# Patient Record
Sex: FEMALE | Race: BLACK OR AFRICAN AMERICAN | NOT HISPANIC OR LATINO | Employment: FULL TIME | URBAN - METROPOLITAN AREA
[De-identification: names, ages, dates, MRNs, and addresses within clinical notes are randomized per-mention and may not be internally consistent; named-entity substitution may affect disease eponyms.]

---

## 2017-01-04 ENCOUNTER — INFUSION (OUTPATIENT)
Dept: INFUSION THERAPY | Facility: HOSPITAL | Age: 62
End: 2017-01-04
Attending: SURGERY
Payer: MEDICAID

## 2017-01-04 DIAGNOSIS — C7A.00 MALIGNANT CARCINOID TUMOR OF UNKNOWN PRIMARY SITE: Primary | ICD-10-CM

## 2017-01-04 DIAGNOSIS — C7B.02 METASTATIC MALIGNANT CARCINOID TUMOR TO LIVER: ICD-10-CM

## 2017-01-04 PROCEDURE — 96372 THER/PROPH/DIAG INJ SC/IM: CPT

## 2017-01-04 PROCEDURE — 63600175 PHARM REV CODE 636 W HCPCS: Performed by: SURGERY

## 2017-01-04 RX ORDER — LANREOTIDE ACETATE 120 MG/.5ML
120 INJECTION SUBCUTANEOUS
Status: DISCONTINUED | OUTPATIENT
Start: 2017-01-04 | End: 2017-01-04 | Stop reason: HOSPADM

## 2017-01-04 RX ORDER — LANREOTIDE ACETATE 120 MG/.5ML
120 INJECTION SUBCUTANEOUS
Status: CANCELLED | OUTPATIENT
Start: 2017-01-04

## 2017-01-04 RX ADMIN — LANREOTIDE ACETATE 120 MG: 120 INJECTION SUBCUTANEOUS at 11:01

## 2017-01-20 ENCOUNTER — OFFICE VISIT (OUTPATIENT)
Dept: NEUROLOGY | Facility: HOSPITAL | Age: 62
End: 2017-01-20
Attending: SURGERY
Payer: MEDICAID

## 2017-01-20 VITALS
SYSTOLIC BLOOD PRESSURE: 129 MMHG | WEIGHT: 135 LBS | DIASTOLIC BLOOD PRESSURE: 83 MMHG | BODY MASS INDEX: 23.05 KG/M2 | HEIGHT: 64 IN | TEMPERATURE: 98 F | HEART RATE: 80 BPM

## 2017-01-20 DIAGNOSIS — C7B.02 METASTATIC MALIGNANT CARCINOID TUMOR TO LIVER: Primary | ICD-10-CM

## 2017-01-20 DIAGNOSIS — C7A.00 MALIGNANT CARCINOID TUMOR OF UNKNOWN PRIMARY SITE: ICD-10-CM

## 2017-01-20 PROCEDURE — 99214 OFFICE O/P EST MOD 30 MIN: CPT | Performed by: SURGERY

## 2017-01-20 NOTE — PROGRESS NOTES
"NOLANETS:  [facility]  A collaboration between [facility] and [facility]      PATIENT: Lena Brantley  MRN: [id number]  DATE: 1/20/2017    Subjective:      Chief Complaint: referred by EW for possible OR. Gastrinoma.  unk primary      Vitals:   Vitals:    01/20/17 1110   BP: 129/83   Pulse: 80   Temp: 98.1 °F (36.7 °C)   TempSrc: Oral   Weight: 61.2 kg (135 lb)   Height: 5' 4" (1.626 m)        Karnofsky Score:     Diagnosis:   1. 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         Oncologic History: newly diagnoses gastrinoma. KI 67 1%.  Liver met segment 4                                    hypercalcemia  Interval History:   C/o diarrhea, abd pain, leg cramps, multiple trips to ER.    Past Medical History:  Past Medical History   Diagnosis Date    Liver mass 06/2016    Mass of colon 06/2016       Past Surgical History:  History reviewed. No pertinent past surgical history.    Family History:  History reviewed. No pertinent family history.    Allergies:  Review of patient's allergies indicates:   Allergen Reactions    Epinephrine Anaphylaxis     Can Cause Carcinoid Crisis       Medications:  Current Outpatient Prescriptions   Medication Sig Dispense Refill    diphenoxylate-atropine 2.5-0.025 mg (LOMOTIL) 2.5-0.025 mg per tablet Take 1 tablet by mouth 4 (four) times daily as needed for Diarrhea.      lanreotide (SOMATULINE DEPOT) 120 mg/0.5 mL Syrg Inject 120 mg into the skin every 28 days.      ondansetron (ZOFRAN-ODT) 4 MG TbDL Take 1 tablet (4 mg total) by mouth every 8 (eight) hours as needed. 12 tablet 0    promethazine (PHENERGAN) 25 MG suppository Place 1 suppository (25 mg total) rectally every 6 (six) hours as needed for Nausea. 10 suppository 0    PROTONIX 40 mg tablet Take 40 mg by mouth once daily.   1     No current facility-administered medications for this visit.        Review of Systems "   Constitutional: Negative.  Negative for appetite change, chills, fatigue, fever and unexpected weight change.   HENT: Negative.  Negative for ear discharge, hearing loss and tinnitus.    Eyes: Negative.  Negative for photophobia and visual disturbance.   Respiratory: Negative.  Negative for apnea, cough, shortness of breath, wheezing and stridor.    Cardiovascular: Negative.  Negative for chest pain, palpitations and leg swelling.   Gastrointestinal: Negative for abdominal distention, abdominal pain, constipation, diarrhea, nausea and vomiting.   Endocrine: Negative.    Genitourinary: Negative.  Negative for difficulty urinating and hematuria.   Musculoskeletal: Negative for arthralgias, back pain, gait problem, joint swelling and neck pain.        Leg cramps   Skin: Negative.  Negative for color change, pallor and rash.   Allergic/Immunologic: Negative.  Negative for environmental allergies, food allergies and immunocompromised state.   Neurological: Negative.  Negative for dizziness, seizures, syncope and weakness.   Hematological: Negative.  Negative for adenopathy. Does not bruise/bleed easily.   Psychiatric/Behavioral: Negative.  Negative for behavioral problems, dysphoric mood and hallucinations.      Objective:      Physical Exam   Constitutional: She is oriented to person, place, and time. She appears well-developed and well-nourished. No distress.   HENT:   Head: Normocephalic and atraumatic.   Mouth/Throat: Oropharynx is clear and moist.   Eyes: EOM are normal. Pupils are equal, round, and reactive to light. No scleral icterus.   Neck: Normal range of motion. Neck supple. No thyromegaly present.   Cardiovascular: Normal rate, regular rhythm, normal heart sounds and intact distal pulses.  Exam reveals no gallop.    No murmur heard.  Pulmonary/Chest: Effort normal and breath sounds normal. No respiratory distress. She has no wheezes. She has no rales.   Abdominal: Soft. Bowel sounds are normal. She  exhibits no distension and no mass. There is no tenderness. There is no rebound and no guarding. No hernia. Hernia confirmed negative in the ventral area.   Musculoskeletal: Normal range of motion. She exhibits no edema or tenderness.   Lymphadenopathy:        Head (right side): No submandibular adenopathy present.        Head (left side): No submandibular adenopathy present.     She has no cervical adenopathy.        Right cervical: No superficial cervical adenopathy present.       Left cervical: No superficial cervical adenopathy present.        Right axillary: No pectoral and no lateral adenopathy present.        Left axillary: No pectoral and no lateral adenopathy present.       Right: No inguinal and no supraclavicular adenopathy present.        Left: No inguinal and no supraclavicular adenopathy present.   Neurological: She is alert and oriented to person, place, and time. She has normal reflexes. No cranial nerve deficit.   Skin: Skin is warm, dry and intact. No rash noted. She is not diaphoretic. No erythema. No pallor.   Psychiatric: She has a normal mood and affect. Her behavior is normal. Thought content normal.   Nursing note and vitals reviewed.     Assessment:       1. 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        EUS:  No tumor found in panc, stomach or duodenum.      Neuroendocrine Labs Latest Ref Rng 7/27/2016   EXT 5 HIAA BLOOD 0 - 22 ng/ml 11   EXT GASTRIN 0 - 100 pg/ml 10418 (A)   EXT SEROTONIN 56 - 244 ng/ml 216   EXT CHROMOGRANIN A 0 - 15 ng/ml 585 (A)   EXT PANCREASTATIN THAIS 10 - 135 pg/ml 695 (A)   EXT ANTI PARIETAL CELL AB Negative Negative   EXT ANTI THYROID AB <9 <1   EXT ANTI ISLET CELL AB Negative Negative   EXT FOLATE <3.4 - >5.4 ng/ml 15.6   EXT VITAMIN B12 200 - 1100 pg/ml 1319 (A)     Laboratory Data:    Neuroendocrine Labs Latest Ref Rng & Units 1/20/2017 11/8/2016 11/8/2016 11/2/2016 10/25/2016 10/25/2016 9/8/2016   EXT 5 HIAA BLOOD 0 - 22 ng/ml - - - - - - -   EXT GASTRIN 0  - 100 pg/ml - - - - - - -   EXT SEROTONIN 56 - 244 ng/ml - - - - - - -   EXT CHROMOGRANIN A 0 - 15 ng/ml - - - - - - -   EXT PANCREASTATIN THAIS 10 - 135 pg/ml - - - - - - -   EXT ANTI PARIETAL CELL AB Negative - - - - - - -   EXT ANTI THYROID AB <9 - - - - - - -   EXT ANTI ISLET CELL AB Negative - - - - - - -   EXT FOLATE <3.4 - >5.4 ng/ml - - - - - - -   EXT VITAMIN B12 200 - 1100 pg/ml - - - - - - -   EXT CALCITONIN 0 - 5 pg/ml - <2 - - - - -   EXT PTH, INTACT 14 - 64 pg/ml - 58 - - - - -   WBC 3.90 - 12.70 K/uL - - - - 7.98 - -   HGB 12.0 - 16.0 g/dL - - - - 13.1 - -   HCT 37.0 - 48.5 % - - - - 40.7 - -   PLATLETS 150 - 350 K/uL - - - - 414(H) - -   PT 9.0 - 12.5 sec - - - - - - 11.4   PTT 21.0 - 32.0 sec - - - - - - 29.2   INR 0.8 - 1.2 - - - - - - 1.1   GLUCOSE 70 - 110 mg/dL - - - - 144(H) - -   BUN 6 - 20 mg/dL - - - - 16 - -   CREATININE 0.5 - 1.4 mg/dL - - - - 1.2 - -    - 145 mmol/L - - - - 140 - -   K 3.5 - 5.1 mmol/L - - - - 3.5 - -   CHLORIDE 95 - 110 mmol/L - - - - 95 - -   CO2 23 - 29 mmol/L - - - - 29 - -   CALCIUM 8.7 - 10.5 mg/dL - - - - 11.0(H) - -   EXT CALCIUM 8.6 - 10.4 mg/dl - 9.5 - - - - -   PROTEIN, TOTAL 6.0 - 8.4 g/dL - - - - 8.4 - -   ALBUMIN 3.5 - 5.2 g/dL - - - - 4.4 - -   TOTAL BILIRUBIN 0.1 - 1.0 mg/dL - - - - 1.2(H) - -   ALK PHOSPHATASE 55 - 135 U/L - - - - 109 - -   SGOT (AST) 10 - 40 U/L - - - - 19 - -   SGPT (ALT) 10 - 44 U/L - - - - 13 - -   Weight - 135 lbs - 123 lbs 128 lbs - 130 lbs -             Impression: Gastrinoma, metastatic to liver.    possible MEN.  patient unsure about OR, j  Plan:       Long D/W patient , pt has unrealistic fear of surgery @ this time wants to wait until May 2017.  Needs restage if she wants to postpone OR  3 phase CT scan, chest/abd/pelvis  MRI  Calcium/calcitonin/PTH levels, gastrin, CGA, pancreastatin,   Repeat EUS esophagus/stomach/duodenum/pancreas  Gallium 68 scan    lanreotide.120 Q month   RTC 1 month      > sly Gomez  MD Venkatesh, FACS  Professor of Surgery, [facility]  Neuroendocrine Surgery, Hepatic/Pancreatic & General Surgery  [address]  [place]  [address]  ph. [phone]; [phone]  fax. [phone]

## 2017-01-20 NOTE — MR AVS SNAPSHOT
[facility]  [address] [place] [address]  Phone: [phone]  Fax: [phone]                  Lena Calderon Early   2017 11:00 AM   Office Visit    Description:  Female : 1955   Provider:  JANES Riddle MD   Department:  [facility]           Reason for Visit     Follow-up           Diagnoses this Visit        Comments    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    -  Primary     Malignant carcinoid tumor of unknown primary site                To Do List           Future Appointments        Provider Department Dept Phone    2017 11:00 AM CHAIR 07 [facility] [phone]      Goals (5 Years of Data)     None      [facility]     [facility] Nurse Care Line -  Assistance  Registered nurses in the [facility] provide clinical advisement, health education, appointment booking, and other advisory services.  Call for this free service at [phone].             Medications                Verify that the below list of medications is an accurate representation of the medications you are currently taking.  If none reported, the list may be blank. If incorrect, please contact your healthcare provider. Carry this list with you in case of emergency.           Current Medications     diphenoxylate-atropine 2.5-0.025 mg (LOMOTIL) 2.5-0.025 mg per tablet Take 1 tablet by mouth 4 (four) times daily as needed for Diarrhea.    lanreotide (SOMATULINE DEPOT) 120 mg/0.5 mL Syrg Inject 120 mg into the skin every 28 days.    ondansetron (ZOFRAN-ODT) 4 MG TbDL Take 1 tablet (4 mg total) by mouth every 8 (eight) hours as needed.    promethazine (PHENERGAN) 25 MG suppository Place 1 suppository (25 mg total) rectally every 6 (six) hours as needed for Nausea.    PROTONIX 40 mg tablet Take 40 mg by mouth once daily.            Clinical Reference Information           Vital Signs - Last Recorded  Most recent  "update: 1/20/2017 11:18 AM by Ivonne Funez MA    BP Pulse Temp Ht Wt BMI    129/83 80 98.1 °F (36.7 °C) (Oral) 5' 4" (1.626 m) 61.2 kg (135 lb) 23.17 kg/m2      Blood Pressure          Most Recent Value    BP  129/83      Allergies as of 1/20/2017     Epinephrine      Immunizations Administered on Date of Encounter - 1/20/2017     None      Orders Placed During Today's Visit     Future Labs/Procedures Expected by Expires    CT Abdomen Pelvis W Wo Contrast  1/20/2017 1/20/2018    MRI Abdomen W WO Contrast  1/20/2017 1/20/2018    NM Gallium GA68 Positron Scan  1/20/2017 1/20/2018      [facility] Sign-Up     Activating your [facility] account is as easy as 1-2-3!     1) Visit [email or web address], select Sign Up Now, enter this activation code and your date of birth, then select Next.  [email or web address]  Expires: 3/6/2017 12:12 PM      2) Create a username and password to use when you visit [facility] in the future and select a security question in case you lose your password and select Next.    3) Enter your e-mail address and click Sign Up!    Additional Information  If you have questions, please e-mail [email or web address] or call [phone] to talk to our [facility] staff. Remember, [facility] is NOT to be used for urgent needs. For medical emergencies, dial 911.         Instructions    Galium 68 scan 2/17/17 at 7:30am    CT/MRI call [phone] to schedule              "

## 2017-01-26 LAB
EXT 24 HR UR METANEPHRINE: ABNORMAL
EXT 24 HR UR NORMETANEPHRINE: ABNORMAL
EXT 24 HR UR NORMETANEPHRINE: ABNORMAL
EXT 25 HYDROXY VIT D2: ABNORMAL
EXT 25 HYDROXY VIT D3: ABNORMAL
EXT 5 HIAA 24 HR URINE: ABNORMAL
EXT 5 HIAA BLOOD: 7 NG/ML (ref 0–22)
EXT ACTH: ABNORMAL
EXT AFP: ABNORMAL
EXT ALBUMIN: 4.1 G/DL (ref 3.6–5.1)
EXT ALKALINE PHOSPHATASE: 107 U/L (ref 33–130)
EXT ALT: 12 U/L (ref 6–29)
EXT AMYLASE: ABNORMAL
EXT ANTI ISLET CELL AB: ABNORMAL
EXT ANTI PARIETAL CELL AB: ABNORMAL
EXT ANTI THYROID AB: ABNORMAL
EXT AST: 18 U/L (ref 10–35)
EXT BILIRUBIN DIRECT: ABNORMAL
EXT BILIRUBIN TOTAL: 1.4 MG/DL (ref 0.2–1.2)
EXT BK VIRUS DNA QN PCR: ABNORMAL
EXT BUN: 13 MG/DL (ref 7–25)
EXT C PEPTIDE: ABNORMAL
EXT CA 125: ABNORMAL
EXT CA 19-9: ABNORMAL
EXT CA 27-29: ABNORMAL
EXT CALCITONIN: ABNORMAL
EXT CALCIUM: 9.6 MG/DL (ref 8.6–10.4)
EXT CEA: ABNORMAL
EXT CHLORIDE: 100 MMOL/L (ref 98–110)
EXT CHOLESTEROL: ABNORMAL
EXT CHROMOGRANIN A: ABNORMAL
EXT CO2: 32 MMOL/L (ref 20–31)
EXT CREATININE UA: ABNORMAL
EXT CREATININE: 0.87 MG/DL (ref 0.5–1.05)
EXT CYCLOSPORONE LEVEL: ABNORMAL
EXT DOPAMINE: ABNORMAL
EXT EBV DNA BY PCR: ABNORMAL
EXT EPINEPHRINE: ABNORMAL
EXT FOLATE: ABNORMAL
EXT FREE T3: ABNORMAL
EXT FREE T4: ABNORMAL
EXT FSH: ABNORMAL
EXT GASTRIN RELEASING PEPTIDE: ABNORMAL
EXT GASTRIN RELEASING PEPTIDE: ABNORMAL
EXT GASTRIN: 81 PG/ML (ref 0–100)
EXT GGT: ABNORMAL
EXT GHRELIN: ABNORMAL
EXT GLUCAGON: ABNORMAL
EXT GLUCOSE: 99 MG/DL (ref 65–99)
EXT GROWTH HORMONE: ABNORMAL
EXT HCV RNA QUANT PCR: ABNORMAL
EXT HDL: ABNORMAL
EXT HEMATOCRIT: 34.2 % (ref 35–45)
EXT HEMOGLOBIN A1C: ABNORMAL
EXT HEMOGLOBIN: 10.7 G/DL (ref 11.7–15.5)
EXT HISTAMINE 24 HR URINE: ABNORMAL
EXT HISTAMINE: ABNORMAL
EXT IGF-1: ABNORMAL
EXT IMMUNKNOW (NON-STIMULATED): ABNORMAL
EXT IMMUNKNOW (STIMULATED): ABNORMAL
EXT INR: 1.1 (ref 0.9–1.1)
EXT INSULIN: ABNORMAL
EXT LANREOTIDE LEVEL: ABNORMAL
EXT LDH, TOTAL: ABNORMAL
EXT LDL CHOLESTEROL: ABNORMAL
EXT LIPASE: ABNORMAL
EXT MAGNESIUM: ABNORMAL
EXT METANEPHRINE FREE PLASMA: ABNORMAL
EXT MOTILIN: ABNORMAL
EXT NEUROKININ A CAMB: ABNORMAL
EXT NEUROKININ A ISI: ABNORMAL
EXT NEUROTENSIN: ABNORMAL
EXT NOREPINEPHRINE: ABNORMAL
EXT NORMETANEPHRINE: ABNORMAL
EXT NSE: ABNORMAL
EXT OCTREOTIDE LEVEL: ABNORMAL
EXT PANCREASTATIN CAMB: ABNORMAL
EXT PANCREASTATIN ISI: 59 PG/ML (ref 10–135)
EXT PANCREATIC POLYPEPTIDE: ABNORMAL
EXT PHOSPHORUS: ABNORMAL
EXT PLATELETS: 217 1000/UL (ref 140–400)
EXT POTASSIUM: 4.1 MMOL/L (ref 3.5–5.3)
EXT PROGRAF LEVEL: ABNORMAL
EXT PROLACTIN: ABNORMAL
EXT PROTEIN TOTAL: 7.2 G/DL (ref 6.1–8.1)
EXT PROTEIN UA: ABNORMAL
EXT PT: 10.8 SEC (ref 9–11.35)
EXT PTH, INTACT: 67 PG/ML (ref 14–64)
EXT PTT: ABNORMAL
EXT RAPAMUNE LEVEL: ABNORMAL
EXT SEROTONIN: 97 NG/ML (ref 56–244)
EXT SODIUM: 137 MMOL/L (ref 135–146)
EXT SOMATOSTATIN: ABNORMAL
EXT SUBSTANCE P: ABNORMAL
EXT TRIGLYCERIDES: ABNORMAL
EXT TRYPTASE: ABNORMAL
EXT TSH: ABNORMAL
EXT URIC ACID: ABNORMAL
EXT URINE AMYLASE U/HR: ABNORMAL
EXT URINE AMYLASE U/L: ABNORMAL
EXT VASOACTIVE INTESTINAL POLYPEPTIDE: ABNORMAL
EXT VITAMIN B12: ABNORMAL
EXT VMA 24 HR URINE: ABNORMAL
EXT WBC: 3.2 1000/UL (ref 3.8–10.8)
NEURON SPECIFIC ENOLASE: ABNORMAL

## 2017-02-01 ENCOUNTER — INFUSION (OUTPATIENT)
Dept: INFUSION THERAPY | Facility: HOSPITAL | Age: 62
End: 2017-02-01
Attending: SURGERY
Payer: MEDICAID

## 2017-02-01 DIAGNOSIS — C7B.02 METASTATIC MALIGNANT CARCINOID TUMOR TO LIVER: ICD-10-CM

## 2017-02-01 DIAGNOSIS — C7A.00 MALIGNANT CARCINOID TUMOR OF UNKNOWN PRIMARY SITE: Primary | ICD-10-CM

## 2017-02-01 PROCEDURE — 63600175 PHARM REV CODE 636 W HCPCS: Performed by: SURGERY

## 2017-02-01 PROCEDURE — 96372 THER/PROPH/DIAG INJ SC/IM: CPT

## 2017-02-01 RX ORDER — LANREOTIDE ACETATE 120 MG/.5ML
120 INJECTION SUBCUTANEOUS
Status: CANCELLED | OUTPATIENT
Start: 2017-02-01

## 2017-02-01 RX ORDER — LANREOTIDE ACETATE 120 MG/.5ML
120 INJECTION SUBCUTANEOUS
Status: DISCONTINUED | OUTPATIENT
Start: 2017-02-01 | End: 2017-02-01 | Stop reason: HOSPADM

## 2017-02-01 RX ADMIN — LANREOTIDE ACETATE 120 MG: 120 INJECTION SUBCUTANEOUS at 11:02

## 2017-02-01 NOTE — NURSING
Injection given to left glut without difficulty after one attempt.  Pt verbalized some pain with injection.  Return appointment made.

## 2017-02-24 ENCOUNTER — HOSPITAL ENCOUNTER (OUTPATIENT)
Dept: RADIOLOGY | Facility: HOSPITAL | Age: 62
Discharge: HOME | End: 2017-02-24
Attending: SURGERY
Payer: MEDICAID

## 2017-02-24 DIAGNOSIS — C7A.00 MALIGNANT CARCINOID TUMOR OF UNKNOWN PRIMARY SITE: ICD-10-CM

## 2017-02-24 DIAGNOSIS — C7B.02 METASTATIC MALIGNANT CARCINOID TUMOR TO LIVER: ICD-10-CM

## 2017-02-24 PROCEDURE — 25500020 PHARM REV CODE 255: Performed by: SURGERY

## 2017-02-24 PROCEDURE — 74183 MRI ABD W/O CNTR FLWD CNTR: CPT | Mod: 26,,, | Performed by: RADIOLOGY

## 2017-02-24 PROCEDURE — 74178 CT ABD&PLV WO CNTR FLWD CNTR: CPT | Mod: TC

## 2017-02-24 PROCEDURE — 74178 CT ABD&PLV WO CNTR FLWD CNTR: CPT | Mod: 26,,, | Performed by: RADIOLOGY

## 2017-02-24 PROCEDURE — 74183 MRI ABD W/O CNTR FLWD CNTR: CPT | Mod: TC

## 2017-02-24 PROCEDURE — A9585 GADOBUTROL INJECTION: HCPCS | Performed by: SURGERY

## 2017-02-24 RX ORDER — GADOBUTROL 604.72 MG/ML
10 INJECTION INTRAVENOUS
Status: COMPLETED | OUTPATIENT
Start: 2017-02-24 | End: 2017-02-24

## 2017-02-24 RX ADMIN — IOHEXOL 30 ML: 350 INJECTION, SOLUTION INTRAVENOUS at 09:02

## 2017-02-24 RX ADMIN — IOHEXOL 75 ML: 350 INJECTION, SOLUTION INTRAVENOUS at 10:02

## 2017-02-24 RX ADMIN — GADOBUTROL 10 ML: 604.72 INJECTION INTRAVENOUS at 09:02

## 2017-03-01 ENCOUNTER — INFUSION (OUTPATIENT)
Dept: INFUSION THERAPY | Facility: HOSPITAL | Age: 62
End: 2017-03-01
Attending: SURGERY
Payer: MEDICAID

## 2017-03-01 DIAGNOSIS — C7A.00 MALIGNANT CARCINOID TUMOR OF UNKNOWN PRIMARY SITE: Primary | ICD-10-CM

## 2017-03-01 DIAGNOSIS — C7B.02 METASTATIC MALIGNANT CARCINOID TUMOR TO LIVER: ICD-10-CM

## 2017-03-01 PROCEDURE — 96372 THER/PROPH/DIAG INJ SC/IM: CPT

## 2017-03-01 PROCEDURE — 63600175 PHARM REV CODE 636 W HCPCS: Performed by: SURGERY

## 2017-03-01 RX ORDER — LANREOTIDE ACETATE 120 MG/.5ML
120 INJECTION SUBCUTANEOUS
Status: CANCELLED | OUTPATIENT
Start: 2017-03-01

## 2017-03-01 RX ORDER — LANREOTIDE ACETATE 120 MG/.5ML
120 INJECTION SUBCUTANEOUS
Status: DISCONTINUED | OUTPATIENT
Start: 2017-03-01 | End: 2017-03-01 | Stop reason: HOSPADM

## 2017-03-01 RX ADMIN — LANREOTIDE ACETATE 120 MG: 120 INJECTION SUBCUTANEOUS at 10:03

## 2017-03-03 ENCOUNTER — OFFICE VISIT (OUTPATIENT)
Dept: NEUROLOGY | Facility: HOSPITAL | Age: 62
End: 2017-03-03
Attending: SURGERY
Payer: MEDICAID

## 2017-03-03 VITALS
WEIGHT: 150 LBS | SYSTOLIC BLOOD PRESSURE: 114 MMHG | HEART RATE: 75 BPM | DIASTOLIC BLOOD PRESSURE: 78 MMHG | TEMPERATURE: 98 F | BODY MASS INDEX: 25.75 KG/M2

## 2017-03-03 DIAGNOSIS — C7A.00 MALIGNANT CARCINOID TUMOR OF UNKNOWN PRIMARY SITE: ICD-10-CM

## 2017-03-03 DIAGNOSIS — C7B.02 METASTATIC MALIGNANT CARCINOID TUMOR TO LIVER: ICD-10-CM

## 2017-03-03 DIAGNOSIS — E16.4 HYPERGASTRINEMIA: Primary | ICD-10-CM

## 2017-03-03 PROCEDURE — 99213 OFFICE O/P EST LOW 20 MIN: CPT | Performed by: SURGERY

## 2017-03-03 NOTE — MR AVS SNAPSHOT
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  Call for this free service at [phone].             Medications                Verify that the below list of medications is an accurate representation of the medications you are currently taking.  If none reported, the list may be blank. If incorrect, please contact your healthcare provider. Carry this list with you in case of emergency.           Current Medications     diphenoxylate-atropine 2.5-0.025 mg (LOMOTIL) 2.5-0.025 mg per tablet Take 1 tablet by mouth 4 (four) times daily as needed for Diarrhea.    lanreotide (SOMATULINE DEPOT) 120 mg/0.5 mL Syrg Inject 120 mg into the skin every 28 days.    ondansetron (ZOFRAN-ODT) 4 MG TbDL Take 1 tablet (4 mg total) by mouth every 8 (eight) hours as needed.    PROTONIX 40 mg tablet Take 40 mg by mouth once daily.            Clinical Reference Information           Your Vitals Were     BP Pulse Temp Weight BMI    114/78 75  97.7 °F (36.5 °C) (Oral) 68 kg (150 lb) 25.75 kg/m2      Blood Pressure          Most Recent Value    BP  114/78      Allergies as of 3/3/2017     Epinephrine      Immunizations Administered on Date of Encounter - 3/3/2017     None      [facility] Sign[facility]Up     Activating your [facility] account is as easy as 1-2-3!     1) Visit [email or web address], select Sign Up Now, enter this activation code and your date of birth, then select Next.  [email or web address]  Expires: 3/6/2017 12:12 PM      2) Create a username and password to use when you visit [facility] in the future and select a security question in case you lose your password and select Next.    3) Enter your e-mail address and click Sign Up!    Additional Information  If you have questions, please e-mail [email or web address] or call [phone] to talk to our [facility] staff. Remember, [facility] is NOT to be used for urgent needs. For medical emergencies, dial 911.         Instructions    - doing well on current therapy  - repeat EUS   - appeal for insurance for gallium scan   - RTC in May to discuss surgery          Language Assistance Services     ATTENTION: Language assistance services are available, free of charge. Please call [phone].      ATENCIÓN: Si habla lolaañol, tiene a samayoa disposición servicios gratuitos de asistencia lingüística. Llame al [phone].     CHÚ Ý: N?u b?n nói Ti?ng Vi?t, có các d?ch v? h? tr? ngôn ng? mi?n phí dành cho b?n. G?i s? [phone].         [facility] complies with applicable Federal civil rights laws and does not discriminate on the basis of race, color, national origin, age, disability, or sex.

## 2017-03-03 NOTE — PROGRESS NOTES
NOLANETS:  [facility]  A collaboration between [facility] and [facility]      PATIENT: Lena Brantley  MRN: [id number]  DATE: 3/3/2017    Subjective:      Chief Complaint: Follow-up (4 week follow up)      Vitals:   Vitals:    03/03/17 1050   BP: 114/78   Pulse: 75   Temp: 97.7 °F (36.5 °C)   TempSrc: Oral   Weight: 68 kg (150 lb)        Karnofsky Score:     Diagnosis:   1. Hypergastrinemia    2. 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    3. Malignant carcinoid tumor of unknown primary site         Oncologic History: Gastrinoma, metastatic to liver    Interval History: 61 y F seen in clinic ~ 1 month ago to discuss possible surgery for gastrinoma metastatic to liver.  Did not desire surgery at that time.  Significant improvement in symptoms.  Tolerating diet with no current nausea/emesis.  Has regained lost weight.  Regular, formed bowel movements.  Taking protonix 40 once per day with good control of symptoms.  Denies any current GI complaints.  Muscle cramps resolved.  Insurance did not approve Gallium 68 scan.  Wanting to hold off on any surgery until daughter finishes that semester.      Past Medical History:  Past Medical History:   Diagnosis Date    Liver mass 06/2016    Mass of colon 06/2016       Past Surgical History:  History reviewed. No pertinent surgical history.    Family History:  History reviewed. No pertinent family history.    Allergies:  Review of patient's allergies indicates:   Allergen Reactions    Epinephrine Anaphylaxis     Can Cause Carcinoid Crisis       Medications:  Current Outpatient Prescriptions   Medication Sig Dispense Refill    diphenoxylate-atropine 2.5-0.025 mg (LOMOTIL) 2.5-0.025 mg per tablet Take 1 tablet by mouth 4 (four) times daily as needed for Diarrhea.      lanreotide (SOMATULINE DEPOT) 120 mg/0.5 mL Syrg Inject 120 mg into the skin every 28 days.       ondansetron (ZOFRAN-ODT) 4 MG TbDL Take 1 tablet (4 mg total) by mouth every 8 (eight) hours as needed. 12 tablet 0    PROTONIX 40 mg tablet Take 40 mg by mouth once daily.   1     No current facility-administered medications for this visit.        Review of Systems   Constitutional: Negative.  Negative for appetite change, chills, fatigue, fever and unexpected weight change.   HENT: Negative.  Negative for ear discharge, hearing loss and tinnitus.    Eyes: Negative.  Negative for photophobia and visual disturbance.   Respiratory: Negative.  Negative for apnea, cough, shortness of breath, wheezing and stridor.    Cardiovascular: Negative.  Negative for chest pain, palpitations and leg swelling.   Gastrointestinal: Negative for abdominal distention, abdominal pain, constipation, diarrhea, nausea and vomiting.        No further nausea/emesis.  Tolerating diet.  BM regular again    Endocrine: Negative.    Genitourinary: Negative.  Negative for difficulty urinating and hematuria.   Musculoskeletal: Negative for arthralgias, back pain, gait problem, joint swelling and neck pain.        Cramps resolved    Skin: Negative.  Negative for color change, pallor and rash.   Allergic/Immunologic: Negative.  Negative for environmental allergies, food allergies and immunocompromised state.   Neurological: Negative.  Negative for dizziness, seizures, syncope and weakness.   Hematological: Negative.  Negative for adenopathy. Does not bruise/bleed easily.   Psychiatric/Behavioral: Negative.  Negative for behavioral problems, dysphoric mood and hallucinations.      Objective:      Physical Exam   Constitutional: She is oriented to person, place, and time. She appears well-developed and well-nourished. No distress.   HENT:   Head: Normocephalic and atraumatic.   Mouth/Throat: Oropharynx is clear and moist.   Eyes: EOM are normal. Pupils are equal, round, and reactive to light. No scleral icterus.   Neck: Normal range of motion. Neck  supple. No thyromegaly present.   Cardiovascular: Normal rate, regular rhythm, normal heart sounds and intact distal pulses.  Exam reveals no gallop.    No murmur heard.  Pulmonary/Chest: Effort normal and breath sounds normal. No respiratory distress. She has no wheezes. She has no rales.   Abdominal: Soft. Bowel sounds are normal. She exhibits no distension and no mass. There is no tenderness. There is no rebound and no guarding. No hernia. Hernia confirmed negative in the ventral area.   Musculoskeletal: Normal range of motion. She exhibits no edema or tenderness.   Lymphadenopathy:        Head (right side): No submandibular adenopathy present.        Head (left side): No submandibular adenopathy present.     She has no cervical adenopathy.        Right cervical: No superficial cervical adenopathy present.       Left cervical: No superficial cervical adenopathy present.        Right axillary: No pectoral and no lateral adenopathy present.        Left axillary: No pectoral and no lateral adenopathy present.       Right: No inguinal and no supraclavicular adenopathy present.        Left: No inguinal and no supraclavicular adenopathy present.   Neurological: She is alert and oriented to person, place, and time. She has normal reflexes. No cranial nerve deficit.   Skin: Skin is warm, dry and intact. No rash noted. She is not diaphoretic. No erythema. No pallor.   Psychiatric: She has a normal mood and affect. Her behavior is normal. Thought content normal.   Nursing note and vitals reviewed.     Assessment:       1. Hypergastrinemia    2. 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    3. Malignant carcinoid tumor of unknown primary site        Laboratory Data:  Neuroendocrine Labs Latest Ref Rng & Units 3/3/2017 1/26/2017 1/20/2017 11/8/2016 11/8/2016 11/2/2016 10/25/2016   EXT 5 HIAA BLOOD 0 - 22 ng/ml - 7 - - - - -   EXT GASTRIN 0 - 100 pg/ml - 81 - - - - -   EXT SEROTONIN 56 - 244 ng/ml - 97 - - - - -   EXT  CHROMOGRANIN A 0 - 15 ng/ml - - - - - - -   EXT PANCREASTATIN THAIS 10 - 135 pg/ml - 59 - - - - -   EXT ANTI PARIETAL CELL AB Negative - - - - - - -   EXT ANTI THYROID AB <9 - - - - - - -   EXT ANTI ISLET CELL AB Negative - - - - - - -   EXT FOLATE <3.4 - >5.4 ng/ml - - - - - - -   EXT VITAMIN B12 200 - 1100 pg/ml - - - - - - -   EXT CALCITONIN 0 - 5 pg/ml - - - <2 - - -   EXT PTH, INTACT 14 - 64 pg/ml - 67(A) - 58 - - -   WBC 3.90 - 12.70 K/uL - - - - - - 7.98   EXT WBC 3.8 - 10.8 1000/ul - 3.2(A) - - - - -   HGB 12.0 - 16.0 g/dL - - - - - - 13.1   EXT HGB 11.7 - 15.5 g/dl - 10.7(A) - - - - -   HCT 37.0 - 48.5 % - - - - - - 40.7   EXT HCT 35.0 - 45.0 % - 34.2(A) - - - - -   PLATLETS 150 - 350 K/uL - - - - - - 414(H)   EXT PLATLETS 140 - 400 1000/ul - 217 - - - - -   PT 9.0 - 12.5 sec - - - - - - -   EXT PT 9.0 - 11.35 sec - 10.8 - - - - -   PTT 21.0 - 32.0 sec - - - - - - -   INR 0.8 - 1.2 - - - - - - -   EXT INR 0.9 - 1.1 - 1.1 - - - - -   GLUCOSE 70 - 110 mg/dL - - - - - - 144(H)   EXT GLUCOSE 65 - 99 mg/dl - 99 - - - - -   BUN 6 - 20 mg/dL - - - - - - 16   EXT BUN 7 - 25 mg/dl - 13 - - - - -   CREATININE 0.5 - 1.4 mg/dL - - - - - - 1.2   EXT CREATININE 0.50 - 1.05 mg/dL - 0.87 - - - - -    - 145 mmol/L - - - - - - 140   EXT  - 146 mmol/l - 137 - - - - -   K 3.5 - 5.1 mmol/L - - - - - - 3.5   EXT K 3.5 - 5.3 mmol/l - 4.1 - - - - -   CHLORIDE 95 - 110 mmol/L - - - - - - 95   EXT CHLORIDE 98 - 110 mmol/l - 100 - - - - -   CO2 23 - 29 mmol/L - - - - - - 29   EXT CO2 20 - 31 mmol/l - 32(A) - - - - -   CALCIUM 8.7 - 10.5 mg/dL - - - - - - 11.0(H)   EXT CALCIUM 8.6 - 10.4 mg/dl - 9.6 - 9.5 - - -   PROTEIN, TOTAL 6.0 - 8.4 g/dL - - - - - - 8.4   EXT PROTEIN, TOTAL 6.1 - 8.1 g/dl - 7.2 - - - - -   ALBUMIN 3.5 - 5.2 g/dL - - - - - - 4.4   EXT ALBUMIN 3.6 - 5.1 g/dl - 4.1 - - - - -   TOTAL BILIRUBIN 0.1 - 1.0 mg/dL - - - - - - 1.2(H)   EXT TOTAL BILIRUBIN 0.2 - 1.2 mg/dl - 1.4(A) - - - - -   ALK PHOSPHATASE 55 -  135 U/L - - - - - - 109   EXT ALK PHOSPHATASE 33 - 130 u/l - 107 - - - - -   SGOT (AST) 10 - 40 U/L - - - - - - 19   EXT SGOT (AST) 10 - 35 u/l - 18 - - - - -   SGPT (ALT) 10 - 44 U/L - - - - - - 13   EXT ALT 6 - 29 u/l - 12 - - - - -   Weight - 150 lbs - 135 lbs - 123 lbs 128 lbs -     Pre-lanreotide  Neuroendocrine Labs Latest Ref Rng & Units 7/27/2016   EXT 5 HIAA BLOOD 0 - 22 ng/ml 11   EXT GASTRIN 0 - 100 pg/ml 51009 (A)   EXT SEROTONIN 56 - 244 ng/ml 216   EXT CHROMOGRANIN A 0 - 15 ng/ml 585 (A)   EXT PANCREASTATIN THAIS 10 - 135 pg/ml 695 (A)   EXT ANTI PARIETAL CELL AB Negative Negative   EXT ANTI THYROID AB <9 <1   EXT ANTI ISLET CELL AB Negative Negative   EXT FOLATE <3.4 - >5.4 ng/ml 15.6   EXT VITAMIN B12 200 - 1100 pg/ml 1319 (A)       CT:   The lung bases are clear.  There is a heterogeneously enhancing liver lesion noted in the segment 4 a measuring approximately 4.0 cm (sequence 6 image 14).  It appears similar to the prior exam.  No new liver lesions seen.  The biliary ducts are not dilated.  The gallbladder is present.  The stomach, pancreas, spleen, adrenal glands and bilateral kidneys appear normal.  Small left kidney cyst, unchanged.  The aorta tapers normally, and no para-aortic lymphadenopathy.  There is a moderate amount of retained stool.  No bowel dilation or inflammatory changes of the bowel seen.  No mesenteric abnormalities seen.  The uterus is retroverted, there are calcified uterine fibroid.  The ovaries appear within normal limits.  The osseous structures demonstrate a mild levoscoliosis of the lumbar spine, no osseous lesions.   Impression       Liver lesion consistent with biopsy-proven metastatic neuroendocrine tumor is unchanged in size.  No new lesions identified.  Left kidney cyst.  Calcified uterine fibroid.     MRI:    Results: Axial gradient T2, axial T1 in and out of phase, axial T2 SSFSE, coronal T2 gradient and T2 SSFSE followed by pre-contrast axial T1 gradient fat sat and  dynamic post contrast images were obtained. The patient received  10 cc of IV Gadovist contrast.    The lung bases appear normal.  Index lesion #1, segment 4A, biopsy proven metastatic well-differentiated neuroendocrine tumor.  Measuring 4.0 cm on the 4 minutes postcontrast.  (Previous MRI measurements are not seen on the MRI films, I personally measured the lesion on the prior MRI in the same plane as the MRI performed today and it is unchanged).    No additional liver lesions seen.  The biliary ducts are not dilated.  The gallbladder is present.  The stomach, pancreas, spleen, adrenal glands and bilateral kidneys appear normal.  Small left kidney cyst, unchanged.  No osseous lesions.  There is a mild levoscoliosis of the lumbar spine.   Impression         1.Unchanged liver lesion consistent with metastatic neuroendocrine tumor.  No new liver lesions seen.  2.  Left kidney cyst.         Impression: Gastrinoma, metastatic to liver, symptoms currently controlled, good response to lanreotide with near normalization of markers      Plan:       - doing well on current therapy  - repeat EUS   - appeal for insurance for gallium scan   - RTC in May to discuss surgery             JANES Riddle MD, FACS  Professor of Surgery, [facility]  Neuroendocrine Surgery, Hepatic/Pancreatic & General Surgery  [address]  [place]  [address]  ph. [phone]; [phone]  fax. [phone]

## 2017-03-03 NOTE — PATIENT INSTRUCTIONS
- doing well on current therapy  - repeat EUS   - appeal for insurance for gallium scan   - RTC in May to discuss surgery

## 2017-03-31 ENCOUNTER — INFUSION (OUTPATIENT)
Dept: INFUSION THERAPY | Facility: HOSPITAL | Age: 62
End: 2017-03-31
Attending: INTERNAL MEDICINE
Payer: MEDICAID

## 2017-03-31 DIAGNOSIS — C7B.02 METASTATIC MALIGNANT CARCINOID TUMOR TO LIVER: ICD-10-CM

## 2017-03-31 DIAGNOSIS — C7A.00 MALIGNANT CARCINOID TUMOR OF UNKNOWN PRIMARY SITE: Primary | ICD-10-CM

## 2017-03-31 PROCEDURE — 96372 THER/PROPH/DIAG INJ SC/IM: CPT

## 2017-03-31 PROCEDURE — 63600175 PHARM REV CODE 636 W HCPCS: Performed by: SURGERY

## 2017-03-31 RX ORDER — LANREOTIDE ACETATE 120 MG/.5ML
120 INJECTION SUBCUTANEOUS
Status: CANCELLED | OUTPATIENT
Start: 2017-03-31

## 2017-03-31 RX ORDER — LANREOTIDE ACETATE 120 MG/.5ML
120 INJECTION SUBCUTANEOUS
Status: DISCONTINUED | OUTPATIENT
Start: 2017-03-31 | End: 2017-03-31 | Stop reason: HOSPADM

## 2017-03-31 RX ADMIN — LANREOTIDE ACETATE 120 MG: 120 INJECTION SUBCUTANEOUS at 11:03

## 2017-03-31 NOTE — NURSING
Tolerated Lanreotide to left buttock injection well. Discharged home with next appointment scheduled.

## 2017-04-27 ENCOUNTER — INFUSION (OUTPATIENT)
Dept: INFUSION THERAPY | Facility: HOSPITAL | Age: 62
End: 2017-04-27
Attending: SURGERY
Payer: MEDICAID

## 2017-04-27 DIAGNOSIS — C7B.02 METASTATIC MALIGNANT CARCINOID TUMOR TO LIVER: ICD-10-CM

## 2017-04-27 DIAGNOSIS — C7A.00 MALIGNANT CARCINOID TUMOR OF UNKNOWN PRIMARY SITE: Primary | ICD-10-CM

## 2017-04-27 PROCEDURE — 63600175 PHARM REV CODE 636 W HCPCS: Performed by: SURGERY

## 2017-04-27 PROCEDURE — 96372 THER/PROPH/DIAG INJ SC/IM: CPT

## 2017-04-27 RX ORDER — LANREOTIDE ACETATE 120 MG/.5ML
120 INJECTION SUBCUTANEOUS
Status: DISCONTINUED | OUTPATIENT
Start: 2017-04-27 | End: 2017-04-27 | Stop reason: HOSPADM

## 2017-04-27 RX ORDER — LANREOTIDE ACETATE 120 MG/.5ML
120 INJECTION SUBCUTANEOUS
Status: CANCELLED | OUTPATIENT
Start: 2017-04-27

## 2017-04-27 RX ADMIN — LANREOTIDE ACETATE 120 MG: 120 INJECTION SUBCUTANEOUS at 11:04

## 2017-05-18 ENCOUNTER — INFUSION (OUTPATIENT)
Dept: INFUSION THERAPY | Facility: HOSPITAL | Age: 62
End: 2017-05-18
Attending: SURGERY
Payer: MEDICAID

## 2017-05-18 DIAGNOSIS — C7A.00 MALIGNANT CARCINOID TUMOR OF UNKNOWN PRIMARY SITE: Primary | ICD-10-CM

## 2017-05-18 DIAGNOSIS — C7B.02 METASTATIC MALIGNANT CARCINOID TUMOR TO LIVER: ICD-10-CM

## 2017-05-18 PROCEDURE — 63600175 PHARM REV CODE 636 W HCPCS: Performed by: SURGERY

## 2017-05-18 PROCEDURE — 96372 THER/PROPH/DIAG INJ SC/IM: CPT

## 2017-05-18 RX ORDER — LANREOTIDE ACETATE 120 MG/.5ML
120 INJECTION SUBCUTANEOUS
Status: DISCONTINUED | OUTPATIENT
Start: 2017-05-18 | End: 2017-05-18 | Stop reason: HOSPADM

## 2017-05-18 RX ORDER — LANREOTIDE ACETATE 120 MG/.5ML
120 INJECTION SUBCUTANEOUS
Status: CANCELLED | OUTPATIENT
Start: 2017-05-18

## 2017-05-18 RX ADMIN — LANREOTIDE ACETATE 120 MG: 120 INJECTION SUBCUTANEOUS at 11:05

## 2017-05-26 ENCOUNTER — OFFICE VISIT (OUTPATIENT)
Dept: NEUROLOGY | Facility: HOSPITAL | Age: 62
End: 2017-05-26
Attending: SURGERY
Payer: MEDICAID

## 2017-05-26 VITALS
HEIGHT: 64 IN | DIASTOLIC BLOOD PRESSURE: 80 MMHG | BODY MASS INDEX: 26.63 KG/M2 | TEMPERATURE: 99 F | SYSTOLIC BLOOD PRESSURE: 135 MMHG | WEIGHT: 156 LBS | HEART RATE: 80 BPM

## 2017-05-26 DIAGNOSIS — C7A.00 MALIGNANT CARCINOID TUMOR OF UNKNOWN PRIMARY SITE: ICD-10-CM

## 2017-05-26 DIAGNOSIS — C25.4 GASTRINOMA, MALIGNANT: ICD-10-CM

## 2017-05-26 DIAGNOSIS — C7B.02 METASTATIC MALIGNANT CARCINOID TUMOR TO LIVER: Primary | ICD-10-CM

## 2017-05-26 LAB
EXT 24 HR UR METANEPHRINE: ABNORMAL
EXT 24 HR UR NORMETANEPHRINE: ABNORMAL
EXT 24 HR UR NORMETANEPHRINE: ABNORMAL
EXT 25 HYDROXY VIT D2: ABNORMAL
EXT 25 HYDROXY VIT D3: ABNORMAL
EXT 5 HIAA 24 HR URINE: ABNORMAL
EXT 5 HIAA BLOOD: 10 NG/ML (ref 0–22)
EXT ACTH: ABNORMAL
EXT AFP: ABNORMAL
EXT ALBUMIN: 0.8 MG/DL (ref 0.2–1.2)
EXT ALKALINE PHOSPHATASE: 110 U/L (ref 33–130)
EXT ALT: 11 U/L (ref 6–29)
EXT AMYLASE: ABNORMAL
EXT ANTI ISLET CELL AB: ABNORMAL
EXT ANTI PARIETAL CELL AB: <20
EXT ANTI THYROID AB: <1 IU/ML (ref 0–1)
EXT AST: 21 U/L (ref 10–35)
EXT BILIRUBIN DIRECT: ABNORMAL
EXT BILIRUBIN TOTAL: 0.8 MG/DL (ref 0.2–1.2)
EXT BK VIRUS DNA QN PCR: ABNORMAL
EXT BUN: 10 MG/DL (ref 7–25)
EXT C PEPTIDE: ABNORMAL
EXT CA 125: ABNORMAL
EXT CA 19-9: ABNORMAL
EXT CA 27-29: ABNORMAL
EXT CALCITONIN: ABNORMAL
EXT CALCIUM: 9.3 MG/DL (ref 8.6–10.4)
EXT CEA: ABNORMAL
EXT CHLORIDE: 103 MMOL/L (ref 98–110)
EXT CHOLESTEROL: ABNORMAL
EXT CHROMOGRANIN A: 13 NG/ML (ref 0–15)
EXT CO2: 28 MMOL/L (ref 20–31)
EXT CREATININE UA: ABNORMAL
EXT CREATININE: 0.77 MG/DL (ref 0.5–1.06)
EXT CYCLOSPORONE LEVEL: ABNORMAL
EXT DOPAMINE: ABNORMAL
EXT EBV DNA BY PCR: ABNORMAL
EXT EPINEPHRINE: ABNORMAL
EXT FOLATE: ABNORMAL
EXT FREE T3: ABNORMAL
EXT FREE T4: ABNORMAL
EXT FSH: ABNORMAL
EXT GASTRIN RELEASING PEPTIDE: ABNORMAL
EXT GASTRIN RELEASING PEPTIDE: ABNORMAL
EXT GASTRIN: 47 (ref 0–100)
EXT GGT: ABNORMAL
EXT GHRELIN: ABNORMAL
EXT GLUCAGON: ABNORMAL
EXT GLUCOSE: 99 MG/DL (ref 65–99)
EXT GROWTH HORMONE: ABNORMAL
EXT HCV RNA QUANT PCR: ABNORMAL
EXT HDL: ABNORMAL
EXT HEMATOCRIT: 32.2 % (ref 35–45)
EXT HEMOGLOBIN A1C: ABNORMAL
EXT HEMOGLOBIN: 10 G/DL (ref 11.7–155)
EXT HISTAMINE 24 HR URINE: ABNORMAL
EXT HISTAMINE: ABNORMAL
EXT IGF-1: ABNORMAL
EXT IMMUNKNOW (NON-STIMULATED): ABNORMAL
EXT IMMUNKNOW (STIMULATED): ABNORMAL
EXT INR: ABNORMAL
EXT INSULIN: ABNORMAL
EXT LANREOTIDE LEVEL: ABNORMAL
EXT LDH, TOTAL: ABNORMAL
EXT LDL CHOLESTEROL: ABNORMAL
EXT LIPASE: ABNORMAL
EXT MAGNESIUM: ABNORMAL
EXT METANEPHRINE FREE PLASMA: ABNORMAL
EXT MOTILIN: ABNORMAL
EXT NEUROKININ A CAMB: ABNORMAL
EXT NEUROKININ A ISI: ABNORMAL
EXT NEUROTENSIN: ABNORMAL
EXT NOREPINEPHRINE: ABNORMAL
EXT NORMETANEPHRINE: ABNORMAL
EXT NSE: ABNORMAL
EXT OCTREOTIDE LEVEL: ABNORMAL
EXT PANCREASTATIN CAMB: ABNORMAL
EXT PANCREASTATIN ISI: 23 PG/ML (ref 10–135)
EXT PANCREATIC POLYPEPTIDE: ABNORMAL
EXT PHOSPHORUS: ABNORMAL
EXT PLATELETS: 224 1000/UL (ref 140–400)
EXT POTASSIUM: 4.2 MMOL/L (ref 3.5–5.3)
EXT PROGRAF LEVEL: ABNORMAL
EXT PROLACTIN: ABNORMAL
EXT PROTEIN TOTAL: 7.2 G/DL (ref 3.6–5.1)
EXT PROTEIN UA: ABNORMAL
EXT PT: ABNORMAL
EXT PTH, INTACT: ABNORMAL
EXT PTT: ABNORMAL
EXT RAPAMUNE LEVEL: ABNORMAL
EXT SEROTONIN: 118 NG/ML (ref 56–244)
EXT SODIUM: 140 MMOL/L (ref 135–146)
EXT SOMATOSTATIN: ABNORMAL
EXT SUBSTANCE P: ABNORMAL
EXT TRIGLYCERIDES: ABNORMAL
EXT TRYPTASE: ABNORMAL
EXT TSH: ABNORMAL
EXT URIC ACID: ABNORMAL
EXT URINE AMYLASE U/HR: ABNORMAL
EXT URINE AMYLASE U/L: ABNORMAL
EXT VASOACTIVE INTESTINAL POLYPEPTIDE: ABNORMAL
EXT VITAMIN B12: ABNORMAL
EXT VMA 24 HR URINE: ABNORMAL
EXT WBC: 3.2 1000/UL (ref 3.8–10.8)
NEURON SPECIFIC ENOLASE: ABNORMAL

## 2017-05-26 PROCEDURE — 99215 OFFICE O/P EST HI 40 MIN: CPT | Performed by: SURGERY

## 2017-05-26 NOTE — PROGRESS NOTES
"NOLANETS:  [facility]  A collaboration between [facility] and [facility]      PATIENT: Lena Brantley  MRN: [id number]  DATE: 5/26/2017    Subjective:      Chief Complaint: Follow-up (discuss surgery)      Vitals:   Vitals:    05/26/17 1143   BP: 135/80   Pulse: 80   Temp: 98.9 °F (37.2 °C)   TempSrc: Oral   Weight: 70.8 kg (156 lb)   Height: 5' 4" (1.626 m)        Karnofsky Score:     Diagnosis:   1. 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 Malignant carcinoid tumor of unknown primary site    3. Gastrinoma, malignant         Oncologic History: Gastrinoma, metastatic to liver    Interval History: 61 y F seen in clinic ~ 1 month ago to discuss possible surgery for gastrinoma metastatic to liver.  Did not desire surgery at that time.  Significant improvement in symptoms.  Tolerating diet with no current nausea/emesis.  Has regained lost weight.  Regular, formed bowel movements.  Taking protonix 40 once per day with good control of symptoms.  Denies any current GI complaints.  Muscle cramps resolved.  Insurance did not approve Gallium 68 scan.  Wanting to hold off on any surgery until daughter finishes that semester.      Past Medical History:  Past Medical History:   Diagnosis Date    Liver mass 06/2016    Mass of colon 06/2016       Past Surgical History:  History reviewed. No pertinent surgical history.    Family History:  History reviewed. No pertinent family history.    Allergies:  Review of patient's allergies indicates:   Allergen Reactions    Epinephrine Anaphylaxis     Can Cause Carcinoid Crisis       Medications:  Current Outpatient Prescriptions   Medication Sig Dispense Refill    diphenoxylate-atropine 2.5-0.025 mg (LOMOTIL) 2.5-0.025 mg per tablet Take 1 tablet by mouth 4 (four) times daily as needed for Diarrhea.      lanreotide (SOMATULINE DEPOT) 120 mg/0.5 mL Syrg Inject 120 mg into the " skin every 28 days.      ondansetron (ZOFRAN-ODT) 4 MG TbDL Take 1 tablet (4 mg total) by mouth every 8 (eight) hours as needed. 12 tablet 0    PROTONIX 40 mg tablet Take 40 mg by mouth once daily.   1     No current facility-administered medications for this visit.        Review of Systems   Constitutional: Negative.  Negative for appetite change, chills, fatigue, fever and unexpected weight change.   HENT: Negative.  Negative for ear discharge, hearing loss and tinnitus.    Eyes: Negative.  Negative for photophobia and visual disturbance.   Respiratory: Negative.  Negative for apnea, cough, shortness of breath, wheezing and stridor.    Cardiovascular: Negative.  Negative for chest pain, palpitations and leg swelling.   Gastrointestinal: Negative for abdominal distention, abdominal pain, constipation, diarrhea, nausea and vomiting.        No further nausea/emesis.  Tolerating diet.  BM regular again    Endocrine: Negative.    Genitourinary: Negative.  Negative for difficulty urinating and hematuria.   Musculoskeletal: Negative for arthralgias, back pain, gait problem, joint swelling and neck pain.        Cramps resolved    Skin: Negative.  Negative for color change, pallor and rash.   Allergic/Immunologic: Negative.  Negative for environmental allergies, food allergies and immunocompromised state.   Neurological: Negative.  Negative for dizziness, seizures, syncope and weakness.   Hematological: Negative.  Negative for adenopathy. Does not bruise/bleed easily.   Psychiatric/Behavioral: Negative.  Negative for behavioral problems, dysphoric mood and hallucinations.      Objective:      Physical Exam   Constitutional: She is oriented to person, place, and time. She appears well-developed and well-nourished. No distress.   HENT:   Head: Normocephalic and atraumatic.   Mouth/Throat: Oropharynx is clear and moist.   Eyes: EOM are normal. Pupils are equal, round, and reactive to light. No scleral icterus.   Neck:  Normal range of motion. Neck supple. No thyromegaly present.   Cardiovascular: Normal rate, regular rhythm, normal heart sounds and intact distal pulses.  Exam reveals no gallop.    No murmur heard.  Pulmonary/Chest: Effort normal and breath sounds normal. No respiratory distress. She has no wheezes. She has no rales.   Abdominal: Soft. Bowel sounds are normal. She exhibits no distension and no mass. There is no tenderness. There is no rebound and no guarding. No hernia. Hernia confirmed negative in the ventral area.   Musculoskeletal: Normal range of motion. She exhibits no edema or tenderness.   Lymphadenopathy:        Head (right side): No submandibular adenopathy present.        Head (left side): No submandibular adenopathy present.     She has no cervical adenopathy.        Right cervical: No superficial cervical adenopathy present.       Left cervical: No superficial cervical adenopathy present.        Right axillary: No pectoral and no lateral adenopathy present.        Left axillary: No pectoral and no lateral adenopathy present.       Right: No inguinal and no supraclavicular adenopathy present.        Left: No inguinal and no supraclavicular adenopathy present.   Neurological: She is alert and oriented to person, place, and time. She has normal reflexes. No cranial nerve deficit.   Skin: Skin is warm, dry and intact. No rash noted. She is not diaphoretic. No erythema. No pallor.   Psychiatric: She has a normal mood and affect. Her behavior is normal. Thought content normal.   Nursing note and vitals reviewed.     Assessment:       1. 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 Malignant carcinoid tumor of unknown primary site    3. Gastrinoma, malignant        Laboratory Data:    Neuroendocrine Labs Latest Ref Rng & Units 5/26/2017 3/3/2017 1/26/2017 1/20/2017 11/8/2016 11/8/2016 11/2/2016   EXT 5 HIAA BLOOD 0 - 22 ng/ml - - 7 - - - -   EXT GASTRIN 0 - 100 pg/ml - - 81 - - - -   EXT SEROTONIN 56 -  244 ng/ml - - 97 - - - -   EXT CHROMOGRANIN A 0 - 15 ng/ml - - - - - - -   EXT PANCREASTATIN THASI 10 - 135 pg/ml - - 59 - - - -   EXT ANTI PARIETAL CELL AB Negative - - - - - - -   EXT ANTI THYROID AB <9 - - - - - - -   EXT ANTI ISLET CELL AB Negative - - - - - - -   EXT FOLATE <3.4 - >5.4 ng/ml - - - - - - -   EXT VITAMIN B12 200 - 1,100 pg/ml - - - - - - -   EXT CALCITONIN 0 - 5 pg/ml - - - - <2 - -   EXT PTH, INTACT 14 - 64 pg/ml - - 67(A) - 58 - -   WBC 3.90 - 12.70 K/uL - - - - - - -   EXT WBC 3.8 - 10.8 1000/ul - - 3.2(A) - - - -   HGB 12.0 - 16.0 g/dL - - - - - - -   EXT HGB 11.7 - 15.5 g/dl - - 10.7(A) - - - -   HCT 37.0 - 48.5 % - - - - - - -   EXT HCT 35.0 - 45.0 % - - 34.2(A) - - - -   PLATLETS 150 - 350 K/uL - - - - - - -   EXT PLATLETS 140 - 400 1000/ul - - 217 - - - -   PT 9.0 - 12.5 sec - - - - - - -   EXT PT 9.0 - 11.35 sec - - 10.8 - - - -   PTT 21.0 - 32.0 sec - - - - - - -   INR 0.8 - 1.2 - - - - - - -   EXT INR 0.9 - 1.1 - - 1.1 - - - -   GLUCOSE 70 - 110 mg/dL - - - - - - -   EXT GLUCOSE 65 - 99 mg/dl - - 99 - - - -   BUN 6 - 20 mg/dL - - - - - - -   EXT BUN 7 - 25 mg/dl - - 13 - - - -   CREATININE 0.5 - 1.4 mg/dL - - - - - - -   EXT CREATININE 0.50 - 1.05 mg/dL - - 0.87 - - - -    - 145 mmol/L - - - - - - -   EXT  - 146 mmol/l - - 137 - - - -   K 3.5 - 5.1 mmol/L - - - - - - -   EXT K 3.5 - 5.3 mmol/l - - 4.1 - - - -   CHLORIDE 95 - 110 mmol/L - - - - - - -   EXT CHLORIDE 98 - 110 mmol/l - - 100 - - - -   CO2 23 - 29 mmol/L - - - - - - -   EXT CO2 20 - 31 mmol/l - - 32(A) - - - -   CALCIUM 8.7 - 10.5 mg/dL - - - - - - -   EXT CALCIUM 8.6 - 10.4 mg/dl - - 9.6 - 9.5 - -   PROTEIN, TOTAL 6.0 - 8.4 g/dL - - - - - - -   EXT PROTEIN, TOTAL 6.1 - 8.1 g/dl - - 7.2 - - - -   ALBUMIN 3.5 - 5.2 g/dL - - - - - - -   EXT ALBUMIN 3.6 - 5.1 g/dl - - 4.1 - - - -   TOTAL BILIRUBIN 0.1 - 1.0 mg/dL - - - - - - -   EXT TOTAL BILIRUBIN 0.2 - 1.2 mg/dl - - 1.4(A) - - - -   ALK PHOSPHATASE 55 - 135 U/L - -  - - - - -   EXT ALK PHOSPHATASE 33 - 130 u/l - - 107 - - - -   SGOT (AST) 10 - 40 U/L - - - - - - -   EXT SGOT (AST) 10 - 35 u/l - - 18 - - - -   SGPT (ALT) 10 - 44 U/L - - - - - - -   EXT ALT 6 - 29 u/l - - 12 - - - -   Weight - 156 lbs 150 lbs - 135 lbs - 123 lbs 128 lbs     Pre-lanreotide  Neuroendocrine Labs Latest Ref Rng & Units 7/27/2016   EXT 5 HIAA BLOOD 0 - 22 ng/ml 11   EXT GASTRIN 0 - 100 pg/ml 64461 (A)   EXT SEROTONIN 56 - 244 ng/ml 216   EXT CHROMOGRANIN A 0 - 15 ng/ml 585 (A)   EXT PANCREASTATIN THAIS 10 - 135 pg/ml 695 (A)   EXT ANTI PARIETAL CELL AB Negative Negative   EXT ANTI THYROID AB <9 <1   EXT ANTI ISLET CELL AB Negative Negative   EXT FOLATE <3.4 - >5.4 ng/ml 15.6   EXT VITAMIN B12 200 - 1100 pg/ml 1319 (A)       CT:   The lung bases are clear.  There is a heterogeneously enhancing liver lesion noted in the segment 4 a measuring approximately 4.0 cm (sequence 6 image 14).  It appears similar to the prior exam.  No new liver lesions seen.  The biliary ducts are not dilated.  The gallbladder is present.  The stomach, pancreas, spleen, adrenal glands and bilateral kidneys appear normal.  Small left kidney cyst, unchanged.  The aorta tapers normally, and no para-aortic lymphadenopathy.  There is a moderate amount of retained stool.  No bowel dilation or inflammatory changes of the bowel seen.  No mesenteric abnormalities seen.  The uterus is retroverted, there are calcified uterine fibroid.  The ovaries appear within normal limits.  The osseous structures demonstrate a mild levoscoliosis of the lumbar spine, no osseous lesions.   Impression       Liver lesion consistent with biopsy-proven metastatic neuroendocrine tumor is unchanged in size.  No new lesions identified.  Left kidney cyst.  Calcified uterine fibroid.     MRI:    Results: Axial gradient T2, axial T1 in and out of phase, axial T2 SSFSE, coronal T2 gradient and T2 SSFSE followed by pre-contrast axial T1 gradient fat sat and dynamic  post contrast images were obtained. The patient received  10 cc of IV Gadovist contrast.    The lung bases appear normal.  Index lesion #1, segment 4A, biopsy proven metastatic well-differentiated neuroendocrine tumor.  Measuring 4.0 cm on the 4 minutes postcontrast.  (Previous MRI measurements are not seen on the MRI films, I personally measured the lesion on the prior MRI in the same plane as the MRI performed today and it is unchanged).    No additional liver lesions seen.  The biliary ducts are not dilated.  The gallbladder is present.  The stomach, pancreas, spleen, adrenal glands and bilateral kidneys appear normal.  Small left kidney cyst, unchanged.  No osseous lesions.  There is a mild levoscoliosis of the lumbar spine.   Impression         1.Unchanged liver lesion consistent with metastatic neuroendocrine tumor.  No new liver lesions seen.  2.  Left kidney cyst.         Impression: Gastrinoma, metastatic to liver, symptoms currently controlled, good response to lanreotide with near normalization of markers  Previously.  Needs restage.  Refuses surgery @ this time    Plan:     tumor markers & gastrin today  Restage CT/MRI/O scan MIBG scan  - repeat EUS   - appeal for insurance for gallium scan   - RTC in 2 mos to discuss  Pt wants to talk to other patients/support group          JANES Ridlde MD, FACS  Professor of Surgery, [facility]  Neuroendocrine Surgery, Hepatic/Pancreatic & General Surgery  [address]  [place]  [address]  ph. [phone]; [phone]  fax. [phone]

## 2017-06-16 ENCOUNTER — INFUSION (OUTPATIENT)
Dept: INFUSION THERAPY | Facility: HOSPITAL | Age: 62
End: 2017-06-16
Attending: SURGERY
Payer: MEDICAID

## 2017-06-16 ENCOUNTER — HOSPITAL ENCOUNTER (EMERGENCY)
Facility: HOSPITAL | Age: 62
Discharge: HOME | End: 2017-06-16
Attending: EMERGENCY MEDICINE
Payer: MEDICAID

## 2017-06-16 VITALS
RESPIRATION RATE: 16 BRPM | BODY MASS INDEX: 25.99 KG/M2 | TEMPERATURE: 98 F | WEIGHT: 156 LBS | SYSTOLIC BLOOD PRESSURE: 130 MMHG | OXYGEN SATURATION: 100 % | HEART RATE: 61 BPM | DIASTOLIC BLOOD PRESSURE: 69 MMHG | HEIGHT: 65 IN

## 2017-06-16 VITALS
SYSTOLIC BLOOD PRESSURE: 150 MMHG | DIASTOLIC BLOOD PRESSURE: 74 MMHG | WEIGHT: 156 LBS | HEART RATE: 84 BPM | RESPIRATION RATE: 18 BRPM | TEMPERATURE: 98 F | BODY MASS INDEX: 26.78 KG/M2

## 2017-06-16 DIAGNOSIS — R42 DIZZINESS: Primary | ICD-10-CM

## 2017-06-16 DIAGNOSIS — C7B.02 METASTATIC MALIGNANT CARCINOID TUMOR TO LIVER: ICD-10-CM

## 2017-06-16 DIAGNOSIS — C7A.00 MALIGNANT CARCINOID TUMOR OF UNKNOWN PRIMARY SITE: Primary | ICD-10-CM

## 2017-06-16 DIAGNOSIS — D50.9 MICROCYTIC HYPOCHROMIC ANEMIA: ICD-10-CM

## 2017-06-16 LAB
ALBUMIN SERPL BCP-MCNC: 3.5 G/DL
ALP SERPL-CCNC: 112 U/L
ALT SERPL W/O P-5'-P-CCNC: 12 U/L
ANION GAP SERPL CALC-SCNC: 7 MMOL/L
ANISOCYTOSIS BLD QL SMEAR: SLIGHT
AST SERPL-CCNC: 19 U/L
BACTERIA #/AREA URNS HPF: ABNORMAL /HPF
BASOPHILS # BLD AUTO: 0.02 K/UL
BASOPHILS NFR BLD: 0.5 %
BILIRUB SERPL-MCNC: 1.1 MG/DL
BILIRUB UR QL STRIP: NEGATIVE
BUN SERPL-MCNC: 11 MG/DL
CALCIUM SERPL-MCNC: 9.3 MG/DL
CHLORIDE SERPL-SCNC: 101 MMOL/L
CLARITY UR: CLEAR
CO2 SERPL-SCNC: 29 MMOL/L
COLOR UR: YELLOW
CREAT SERPL-MCNC: 0.9 MG/DL
DIFFERENTIAL METHOD: ABNORMAL
EOSINOPHIL # BLD AUTO: 0.1 K/UL
EOSINOPHIL NFR BLD: 1.3 %
ERYTHROCYTE [DISTWIDTH] IN BLOOD BY AUTOMATED COUNT: 16.5 %
EST. GFR  (AFRICAN AMERICAN): >60 ML/MIN/1.73 M^2
EST. GFR  (NON AFRICAN AMERICAN): >60 ML/MIN/1.73 M^2
GLUCOSE SERPL-MCNC: 139 MG/DL
GLUCOSE UR QL STRIP: NEGATIVE
HCT VFR BLD AUTO: 32.4 %
HGB BLD-MCNC: 9.8 G/DL
HGB UR QL STRIP: ABNORMAL
HYPOCHROMIA BLD QL SMEAR: ABNORMAL
KETONES UR QL STRIP: NEGATIVE
LEUKOCYTE ESTERASE UR QL STRIP: ABNORMAL
LYMPHOCYTES # BLD AUTO: 0.9 K/UL
LYMPHOCYTES NFR BLD: 24.1 %
MAGNESIUM SERPL-MCNC: 2.1 MG/DL
MCH RBC QN AUTO: 21.8 PG
MCHC RBC AUTO-ENTMCNC: 30.2 %
MCV RBC AUTO: 72 FL
MICROSCOPIC COMMENT: ABNORMAL
MONOCYTES # BLD AUTO: 0.4 K/UL
MONOCYTES NFR BLD: 11 %
NEUTROPHILS # BLD AUTO: 2.4 K/UL
NEUTROPHILS NFR BLD: 63.1 %
NITRITE UR QL STRIP: NEGATIVE
NON-SQ EPI CELLS #/AREA URNS HPF: 1 /HPF
OVALOCYTES BLD QL SMEAR: ABNORMAL
PH UR STRIP: 7 [PH] (ref 5–8)
PHOSPHATE SERPL-MCNC: 2.9 MG/DL
PLATELET # BLD AUTO: 233 K/UL
PLATELET BLD QL SMEAR: ABNORMAL
PMV BLD AUTO: 10.7 FL
POIKILOCYTOSIS BLD QL SMEAR: SLIGHT
POTASSIUM SERPL-SCNC: 3.9 MMOL/L
PROT SERPL-MCNC: 7.3 G/DL
PROT UR QL STRIP: NEGATIVE
RBC # BLD AUTO: 4.49 M/UL
RBC #/AREA URNS HPF: 1 /HPF (ref 0–4)
SODIUM SERPL-SCNC: 137 MMOL/L
SP GR UR STRIP: <=1.005 (ref 1–1.03)
SQUAMOUS #/AREA URNS HPF: 4 /HPF
UNIDENT CRYS URNS QL MICRO: ABNORMAL
URN SPEC COLLECT METH UR: ABNORMAL
UROBILINOGEN UR STRIP-ACNC: NEGATIVE EU/DL
WBC # BLD AUTO: 3.81 K/UL
WBC #/AREA URNS HPF: 50 /HPF (ref 0–5)
WBC CLUMPS URNS QL MICRO: ABNORMAL

## 2017-06-16 PROCEDURE — 83735 ASSAY OF MAGNESIUM: CPT

## 2017-06-16 PROCEDURE — 93005 ELECTROCARDIOGRAM TRACING: CPT

## 2017-06-16 PROCEDURE — 99284 EMERGENCY DEPT VISIT MOD MDM: CPT | Mod: 25

## 2017-06-16 PROCEDURE — 80053 COMPREHEN METABOLIC PANEL: CPT

## 2017-06-16 PROCEDURE — 25000003 PHARM REV CODE 250: Performed by: PHYSICIAN ASSISTANT

## 2017-06-16 PROCEDURE — 85025 COMPLETE CBC W/AUTO DIFF WBC: CPT

## 2017-06-16 PROCEDURE — 96361 HYDRATE IV INFUSION ADD-ON: CPT

## 2017-06-16 PROCEDURE — 81000 URINALYSIS NONAUTO W/SCOPE: CPT

## 2017-06-16 PROCEDURE — 84100 ASSAY OF PHOSPHORUS: CPT

## 2017-06-16 PROCEDURE — 96360 HYDRATION IV INFUSION INIT: CPT

## 2017-06-16 PROCEDURE — 87086 URINE CULTURE/COLONY COUNT: CPT

## 2017-06-16 RX ORDER — NITROFURANTOIN 25; 75 MG/1; MG/1
100 CAPSULE ORAL 2 TIMES DAILY
Qty: 14 CAPSULE | Refills: 0 | Status: SHIPPED | OUTPATIENT
Start: 2017-06-16 | End: 2017-06-16 | Stop reason: CLARIF

## 2017-06-16 RX ORDER — SODIUM CHLORIDE 9 MG/ML
1000 INJECTION, SOLUTION INTRAVENOUS
Status: COMPLETED | OUTPATIENT
Start: 2017-06-16 | End: 2017-06-16

## 2017-06-16 RX ORDER — MECLIZINE HYDROCHLORIDE 25 MG/1
25 TABLET ORAL
Status: COMPLETED | OUTPATIENT
Start: 2017-06-16 | End: 2017-06-16

## 2017-06-16 RX ORDER — MECLIZINE HYDROCHLORIDE 25 MG/1
25 TABLET ORAL 3 TIMES DAILY PRN
Qty: 20 TABLET | Refills: 0 | Status: SHIPPED | OUTPATIENT
Start: 2017-06-16 | End: 2017-08-11

## 2017-06-16 RX ORDER — LANREOTIDE ACETATE 120 MG/.5ML
120 INJECTION SUBCUTANEOUS
Status: DISCONTINUED | OUTPATIENT
Start: 2017-06-16 | End: 2017-06-16 | Stop reason: HOSPADM

## 2017-06-16 RX ORDER — LANREOTIDE ACETATE 120 MG/.5ML
120 INJECTION SUBCUTANEOUS
Status: CANCELLED | OUTPATIENT
Start: 2017-06-16

## 2017-06-16 RX ADMIN — SODIUM CHLORIDE 1000 ML: 0.9 INJECTION, SOLUTION INTRAVENOUS at 01:06

## 2017-06-16 RX ADMIN — LANREOTIDE ACETATE 120 MG: 120 INJECTION SUBCUTANEOUS at 12:06

## 2017-06-16 RX ADMIN — SODIUM CHLORIDE 1000 ML: 0.9 INJECTION, SOLUTION INTRAVENOUS at 02:06

## 2017-06-16 RX ADMIN — MECLIZINE HYDROCHLORIDE 25 MG: 25 TABLET ORAL at 01:06

## 2017-06-16 NOTE — ED PROVIDER NOTES
Encounter Date: 6/16/2017       History     Chief Complaint   Patient presents with    Dizziness     dizziness for one week, headache, constipation     Review of patient's allergies indicates:   Allergen Reactions    Epinephrine Anaphylaxis     Can Cause Carcinoid Crisis     Lena Brantley, a 61 y.o. 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  Dizziness is described as feeling off balance.  She first noted it upon waking and reports that it occurs each morning when she wakes.  It resolves shortly afterwards.  She also noted the same dizziness while walking at work yesterday.  She also reports intermittent headache which resolves with tylenol.  Pt states doesn't usually get headaches.  She does not currently have a headache in the ED.  She also reports constipation but states that her last BM was yesterday.  It was of a firmer consistency than normal.  She denies associated blood in her stool or abdominal pain.  Treatments tried for this was change in diet.  She further reports a bulge in the right side of her neck with soreness that was present on Tuesday.  She points to her trapezius and reports that the pain and bulge was there, but isn o longer present. She was unsure if she slept in an awkward position or had a strained muscle, but denies any trauma to the site.  She used warm compresses and the area improved.        The history is provided by the patient.     Past Medical History:   Diagnosis Date    Cancer     GERD (gastroesophageal reflux disease)     Liver mass 06/2016    Mass of colon 06/2016     History reviewed. No pertinent surgical history.  History reviewed. No pertinent family history.  Social History   Substance Use Topics    Smoking status: Never Smoker    Smokeless tobacco: Not on file    Alcohol use No     Review of Systems   Constitutional: Negative for fever.   HENT: Negative for sore throat.     Respiratory: Negative for shortness of breath.    Cardiovascular: Negative for chest pain.   Gastrointestinal: Negative for abdominal pain, nausea and vomiting.   Genitourinary: Negative for dysuria and frequency.   Musculoskeletal: Negative for back pain.   Skin: Negative for color change and rash.   Allergic/Immunologic: Negative for immunocompromised state.   Neurological: Positive for dizziness. Negative for weakness and numbness.   Hematological: Does not bruise/bleed easily.   Psychiatric/Behavioral: Negative for agitation and confusion.   All other systems reviewed and are negative.      Physical Exam     Initial Vitals [06/16/17 1223]   BP Pulse Resp Temp SpO2   (!) 145/77 70 20 97.9 °F (36.6 °C) 99 %     Physical Exam    Nursing note and vitals reviewed.  Constitutional: She appears well-developed and well-nourished. She is not diaphoretic. No distress.   HENT:   Head: Normocephalic and atraumatic.   Right Ear: External ear normal.   Left Ear: External ear normal.   Nose: Nose normal.   Mouth/Throat: Oropharynx is clear and moist.   Eyes: Conjunctivae and EOM are normal. Pupils are equal, round, and reactive to light. Right eye exhibits no discharge. Left eye exhibits no discharge. No scleral icterus. Right eye exhibits no nystagmus. Left eye exhibits no nystagmus.   Neck: Normal range of motion. No tracheal deviation present.   Cardiovascular: Normal rate, regular rhythm and normal heart sounds. Exam reveals no gallop and no friction rub.    No murmur heard.  Pulmonary/Chest: Breath sounds normal. No respiratory distress. She has no wheezes. She has no rhonchi. She has no rales. She exhibits no tenderness.   Abdominal: Soft. Bowel sounds are normal. She exhibits no distension. There is no tenderness. There is no rebound and no guarding.   Musculoskeletal: Normal range of motion.        Arms:  Location noted where tenderness is and previous bulge was (trapezius where shoulder meets neck)   Neurological:  She is alert and oriented to person, place, and time. She has normal strength. No cranial nerve deficit. Coordination and gait normal. GCS eye subscore is 4. GCS verbal subscore is 5. GCS motor subscore is 6.   Skin: Skin is warm and dry. No rash noted. No erythema.   Psychiatric: She has a normal mood and affect. Thought content normal.         ED Course   Procedures  Labs Reviewed   CBC W/ AUTO DIFFERENTIAL - Abnormal; Notable for the following:        Result Value    WBC 3.81 (*)     Hemoglobin 9.8 (*)     Hematocrit 32.4 (*)     MCV 72 (*)     MCH 21.8 (*)     MCHC 30.2 (*)     RDW 16.5 (*)     Lymph # 0.9 (*)     All other components within normal limits   COMPREHENSIVE METABOLIC PANEL - Abnormal; Notable for the following:     Glucose 139 (*)     Total Bilirubin 1.1 (*)     Anion Gap 7 (*)     All other components within normal limits   URINALYSIS - Abnormal; Notable for the following:     Specific Gravity, UA <=1.005 (*)     Occult Blood UA Trace (*)     Leukocytes, UA 3+ (*)     All other components within normal limits   URINALYSIS MICROSCOPIC - Abnormal; Notable for the following:     WBC, UA 50 (*)     WBC Clumps, UA Occasional (*)     Non-Squam Epith 1 (*)     All other components within normal limits   CULTURE, URINE   MAGNESIUM   PHOSPHORUS     EKG Readings: (Independently Interpreted)   Initial Reading: No STEMI. Rhythm: Sinus Bradycardia. Heart Rate: 59. Ectopy: No Ectopy. Conduction: Normal. ST Segments: Normal ST Segments. T Waves: Normal.     Imaging Results          CT Head Without Contrast (Final result)  Result time 06/16/17 13:57:35    Final result by Jorden Lord MD (06/16/17 13:57:35)                 Impression:       No acute intracranial abnormalities identified.      Electronically signed by: JORDEN LORD MD  Date:     06/16/17  Time:    13:57              Narrative:    Exam: CT of the head without contrast -- 61-year-old female with dizziness.    Comparison: None.    Technique: 5  mm noncontrast axial images through the brain were obtained.    Findings: The brain is normally formed and exhibits normal density throughout with no indication of acute/recent major vascular distribution cerebral infarction, intraparenchymal hemorrhage, or intra-axial space occupying lesion. The ventricular system is normal in appearance for age. No hydrocephalus. No effacement of the skull-base cisterns. No abnormal extra-axial fluid collections or blood products. The paranasal sinuses and mastoid air cells are unremarkable. The calvarium shows no significant abnormality.                                 Medical Decision Making:   Initial Assessment:   Dizziness, headache, constipation   Differential Diagnosis:   Dehydration, electrolyte abnormality, tension headache  Clinical Tests:   Lab Tests: Ordered and Reviewed  The following lab test(s) were unremarkable: CMP and Urinalysis  Radiological Study: Ordered and Reviewed  ED Management:  Patient presents in NAD, afebrile and non-toxic.  She showed no evidence of orthostasis.  There is no nystagmus when EOMs are checked.  Patient is neurologically intact.  Meclizine given for dizziness as well as 2 liters of fluid.  CMP showed no concerning abnormalities.  UA showed evidence of WBC but also had squamous cells indicating a dirty catch.  CBC showed a drop in H&H from 7 months ago.  Patient states that she recently stopped taking a vitamin supplement and was encouraged to start taking the supplement again.  Dr. Rivas was consult and started for patient to make an appointment for next week for further evaluation of her symptoms and felt that no further testing was needed today.  Patient states she is feeling much better after fluids and meclizine. Strict return precautions given and patient verbalized understanding.    RX: meclizine to take as needed for dizziness   Other:   I discussed test(s) with the performing physician.              Attending Attestation:      Physician Attestation Statement for NP/PA:   I have conducted a face to face encounter with this patient in addition to the NP/PA, due to NP/PA Request    Other NP/PA Attestation Additions:    History of Present Illness: 61-year-old female with metastatic malignant carcinoid tumor presents with dizziness which she describes as feeling off balance.  She expresses this dizziness daily upon waking.  It resolved shortly thereafter.  Yesterday it also occurred while walking at work so she became concerned.    Medical Decision Making: Vital signs are stable and she is neurologically intact on exam.  Workup was remarkable for anemia which she has a history of.  CT of the head did not show any acute abnormalities.  Patient was instructed to start iron.  And she is to follow-up with her primary care physician if her symptoms continue.                 ED Course     Clinical Impression:   The primary encounter diagnosis was Dizziness. A diagnosis of Microcytic hypochromic anemia was also pertinent to this visit.          Klarissa Evangelista PA-C  06/16/17 1757       Mary Kay Funk MD  06/21/17 1909       Mary Kay Funk MD  06/22/17 0001

## 2017-06-16 NOTE — ED NOTES
Pt states urinalysis was not collected mid stream.  Pt a/a/o x4.  NAD noted. Respirations even, unlabored.  Will continue to monitor.

## 2017-06-16 NOTE — ED NOTES
Pt c/o dizziness that begins every morning and lasts for approx 30 minutes for the past week.  Pt states that she at times gets dizzy in the afternoon while at work and has to sit down.  Pt also c/o intermittent SOB and HAs.  Pt states she rarely ever has HAs and has taken tylenol for HA.  Pt also c/o constipation.  Pt states that she has been having BMs, but they have been hard.  Pt denies CP.  Pt states she is a pt of Dr. Riddle for an neuroendocrine tumor.  Pt states that she doesn't feel well.

## 2017-06-16 NOTE — ED NOTES
Pt sitting upright in bed, a/a/o x4.  NAD noted.  Respirations even, unlabored.  Pt states that she is feeling much better.  Pt notified of dc shortly.  Will continue to monitor.

## 2017-06-16 NOTE — NURSING
Pt feeling dizzy in waiting room and reports hx of 1 week of dizziness. Nurse Luciana Correia present w/admit to infusion center, spoke with pt about symptoms and will report to Dr Riddle. Pt Vital Signs taken, see flowsheet. Pt reports she has been constipated but still able to have stool, but without stomach or abdominal pain.  She does report feeling bloated. Pt states she is under some stress.  Pt tolerated Lanreotide SQ Injection to upper right gluteal region. No complaints w/last injection. Appt scheduled for next injection. Pt taken to ER in wheelchair by other RN for dizziness before injection.  Pt was given juice before being transported to ER.

## 2017-06-17 LAB
BACTERIA UR CULT: NORMAL
BACTERIA UR CULT: NORMAL

## 2017-07-14 ENCOUNTER — INFUSION (OUTPATIENT)
Dept: INFUSION THERAPY | Facility: HOSPITAL | Age: 62
End: 2017-07-14
Attending: SURGERY
Payer: MEDICAID

## 2017-07-14 DIAGNOSIS — C7B.02 METASTATIC MALIGNANT CARCINOID TUMOR TO LIVER: ICD-10-CM

## 2017-07-14 DIAGNOSIS — C7A.00 MALIGNANT CARCINOID TUMOR OF UNKNOWN PRIMARY SITE: Primary | ICD-10-CM

## 2017-07-14 PROCEDURE — 63600175 PHARM REV CODE 636 W HCPCS: Performed by: SURGERY

## 2017-07-14 PROCEDURE — 96372 THER/PROPH/DIAG INJ SC/IM: CPT

## 2017-07-14 RX ORDER — LANREOTIDE ACETATE 120 MG/.5ML
120 INJECTION SUBCUTANEOUS
Status: CANCELLED | OUTPATIENT
Start: 2017-07-14

## 2017-07-14 RX ORDER — LANREOTIDE ACETATE 120 MG/.5ML
120 INJECTION SUBCUTANEOUS
Status: DISCONTINUED | OUTPATIENT
Start: 2017-07-14 | End: 2017-07-14 | Stop reason: HOSPADM

## 2017-07-14 RX ADMIN — LANREOTIDE ACETATE 120 MG: 120 INJECTION SUBCUTANEOUS at 11:07

## 2017-07-14 NOTE — NURSING
Tolerated Lanreotide to right buttock injection well. Discharged home with next appointment scheduled.

## 2017-08-10 ENCOUNTER — HOSPITAL ENCOUNTER (OUTPATIENT)
Dept: RADIOLOGY | Facility: HOSPITAL | Age: 62
Discharge: HOME | End: 2017-08-10
Attending: SURGERY
Payer: MEDICAID

## 2017-08-10 DIAGNOSIS — C7A.00 MALIGNANT CARCINOID TUMOR OF UNKNOWN PRIMARY SITE: ICD-10-CM

## 2017-08-10 DIAGNOSIS — C25.4 GASTRINOMA, MALIGNANT: ICD-10-CM

## 2017-08-10 DIAGNOSIS — C7B.02 METASTATIC MALIGNANT CARCINOID TUMOR TO LIVER: ICD-10-CM

## 2017-08-10 PROCEDURE — 78803 RP LOCLZJ TUM SPECT 1 AREA: CPT | Mod: 26,,, | Performed by: RADIOLOGY

## 2017-08-10 PROCEDURE — 25500020 PHARM REV CODE 255: Performed by: SURGERY

## 2017-08-10 PROCEDURE — 74178 CT ABD&PLV WO CNTR FLWD CNTR: CPT | Mod: TC

## 2017-08-10 PROCEDURE — 78803 RP LOCLZJ TUM SPECT 1 AREA: CPT | Mod: TC

## 2017-08-10 PROCEDURE — 74150 CT ABDOMEN W/O CONTRAST: CPT | Mod: 26,,, | Performed by: RADIOLOGY

## 2017-08-10 PROCEDURE — 78804 RP LOCLZJ TUM WHBDY 2+D IMG: CPT | Mod: 26,,, | Performed by: RADIOLOGY

## 2017-08-10 PROCEDURE — 74178 CT ABD&PLV WO CNTR FLWD CNTR: CPT | Mod: 26,,, | Performed by: RADIOLOGY

## 2017-08-10 PROCEDURE — 74183 MRI ABD W/O CNTR FLWD CNTR: CPT | Mod: 26,,, | Performed by: RADIOLOGY

## 2017-08-10 PROCEDURE — A9585 GADOBUTROL INJECTION: HCPCS | Performed by: SURGERY

## 2017-08-10 PROCEDURE — 74183 MRI ABD W/O CNTR FLWD CNTR: CPT | Mod: TC

## 2017-08-10 RX ORDER — GADOBUTROL 604.72 MG/ML
10 INJECTION INTRAVENOUS
Status: COMPLETED | OUTPATIENT
Start: 2017-08-10 | End: 2017-08-10

## 2017-08-10 RX ADMIN — IOHEXOL 75 ML: 350 INJECTION, SOLUTION INTRAVENOUS at 10:08

## 2017-08-10 RX ADMIN — GADOBUTROL 10 ML: 604.72 INJECTION INTRAVENOUS at 12:08

## 2017-08-10 RX ADMIN — IOHEXOL 30 ML: 350 INJECTION, SOLUTION INTRAVENOUS at 09:08

## 2017-08-11 ENCOUNTER — INFUSION (OUTPATIENT)
Dept: INFUSION THERAPY | Facility: HOSPITAL | Age: 62
End: 2017-08-11
Attending: SURGERY
Payer: MEDICAID

## 2017-08-11 ENCOUNTER — OFFICE VISIT (OUTPATIENT)
Dept: NEUROLOGY | Facility: HOSPITAL | Age: 62
End: 2017-08-11
Attending: SURGERY
Payer: MEDICAID

## 2017-08-11 ENCOUNTER — HOSPITAL ENCOUNTER (OUTPATIENT)
Dept: RADIOLOGY | Facility: HOSPITAL | Age: 62
Discharge: HOME | End: 2017-08-11
Attending: SURGERY
Payer: MEDICAID

## 2017-08-11 VITALS — WEIGHT: 156 LBS | BODY MASS INDEX: 25.99 KG/M2 | HEIGHT: 65 IN

## 2017-08-11 VITALS
WEIGHT: 161.81 LBS | HEART RATE: 77 BPM | DIASTOLIC BLOOD PRESSURE: 73 MMHG | SYSTOLIC BLOOD PRESSURE: 120 MMHG | BODY MASS INDEX: 26.96 KG/M2 | TEMPERATURE: 98 F | HEIGHT: 65 IN

## 2017-08-11 DIAGNOSIS — C7B.02 METASTATIC MALIGNANT CARCINOID TUMOR TO LIVER: ICD-10-CM

## 2017-08-11 DIAGNOSIS — C25.4 GASTRINOMA, MALIGNANT: ICD-10-CM

## 2017-08-11 DIAGNOSIS — C7A.00 MALIGNANT CARCINOID TUMOR OF UNKNOWN PRIMARY SITE: Primary | ICD-10-CM

## 2017-08-11 DIAGNOSIS — C7A.00 MALIGNANT CARCINOID TUMOR OF UNKNOWN PRIMARY SITE: ICD-10-CM

## 2017-08-11 DIAGNOSIS — C25.4 GASTRINOMA, MALIGNANT: Primary | ICD-10-CM

## 2017-08-11 PROCEDURE — 63600175 PHARM REV CODE 636 W HCPCS: Performed by: SURGERY

## 2017-08-11 PROCEDURE — 96372 THER/PROPH/DIAG INJ SC/IM: CPT

## 2017-08-11 PROCEDURE — 78803 RP LOCLZJ TUM SPECT 1 AREA: CPT | Mod: 26,,, | Performed by: RADIOLOGY

## 2017-08-11 PROCEDURE — A9572 INDIUM IN-111 PENTETREOTIDE: HCPCS

## 2017-08-11 PROCEDURE — 99214 OFFICE O/P EST MOD 30 MIN: CPT | Mod: 25 | Performed by: SURGERY

## 2017-08-11 PROCEDURE — 78803 RP LOCLZJ TUM SPECT 1 AREA: CPT | Mod: TC

## 2017-08-11 RX ORDER — LANREOTIDE ACETATE 120 MG/.5ML
120 INJECTION SUBCUTANEOUS
Status: DISCONTINUED | OUTPATIENT
Start: 2017-08-11 | End: 2017-08-11 | Stop reason: HOSPADM

## 2017-08-11 RX ORDER — LANREOTIDE ACETATE 120 MG/.5ML
120 INJECTION SUBCUTANEOUS
Status: CANCELLED | OUTPATIENT
Start: 2017-08-11

## 2017-08-11 RX ADMIN — LANREOTIDE ACETATE 120 MG: 120 INJECTION SUBCUTANEOUS at 10:08

## 2017-08-11 NOTE — PATIENT INSTRUCTIONS
Clinic appt scheduled on Friday, September 22, 2017 at 1130.    Call [phone], to schedule RFA.

## 2017-08-11 NOTE — PROGRESS NOTES
[place]:  [facility]  A collaboration between [facility] and [facility]      PATIENT: Lena Brantley  MRN: [id number]  DATE: 8/11/2017    Subjective:      Chief Complaint: No chief complaint on file.      Vitals:   There were no vitals filed for this visit.     Karnofsky Score:     Diagnosis:   1. Gastrinoma, malignant         Oncologic History: Gastrinoma, metastatic to liver    Interval History: 61 y F seen in clinic ~ 1 month ago to discuss possible surgery for gastrinoma metastatic to liver.  Did not desire surgery at that time.  Significant improvement in symptoms.  Tolerating diet with no current nausea/emesis.  Has regained lost weight.  Regular, formed bowel movements.  Taking protonix 40 once per day with good control of symptoms.  Denies any current GI complaints.  Muscle cramps resolved.  Insurance did not approve Gallium 68 scan.  Wanting to hold off on any surgery until daughter finishes that semester.      Past Medical History:  Past Medical History:   Diagnosis Date    Cancer     GERD (gastroesophageal reflux disease)     Liver mass 06/2016    Mass of colon 06/2016       Past Surgical History:  History reviewed. No pertinent surgical history.    Family History:  History reviewed. No pertinent family history.    Allergies:  Review of patient's allergies indicates:   Allergen Reactions    Epinephrine Anaphylaxis     Can Cause Carcinoid Crisis       Medications:  Current Outpatient Prescriptions   Medication Sig Dispense Refill    diphenoxylate-atropine 2.5-0.025 mg (LOMOTIL) 2.5-0.025 mg per tablet Take 1 tablet by mouth 4 (four) times daily as needed for Diarrhea.      lanreotide (SOMATULINE DEPOT) 120 mg/0.5 mL Syrg Inject 120 mg into the skin every 28 days.      meclizine (ANTIVERT) 25 mg tablet Take 1 tablet (25 mg total) by mouth 3 (three) times daily as needed. 20 tablet 0    ondansetron  (ZOFRAN-ODT) 4 MG TbDL Take 1 tablet (4 mg total) by mouth every 8 (eight) hours as needed. 12 tablet 0    PROTONIX 40 mg tablet Take 40 mg by mouth once daily.   1     No current facility-administered medications for this visit.        Review of Systems   Constitutional: Negative.  Negative for appetite change, chills, fatigue, fever and unexpected weight change.   HENT: Negative.  Negative for ear discharge, hearing loss and tinnitus.    Eyes: Negative.  Negative for photophobia and visual disturbance.   Respiratory: Negative.  Negative for apnea, cough, shortness of breath, wheezing and stridor.    Cardiovascular: Negative.  Negative for chest pain, palpitations and leg swelling.   Gastrointestinal: Negative for abdominal distention, abdominal pain, constipation, diarrhea, nausea and vomiting.        No further nausea/emesis.  Tolerating diet.  BM regular again    Endocrine: Negative.    Genitourinary: Negative.  Negative for difficulty urinating and hematuria.   Musculoskeletal: Negative for arthralgias, back pain, gait problem, joint swelling and neck pain.        Cramps resolved    Skin: Negative.  Negative for color change, pallor and rash.   Allergic/Immunologic: Negative.  Negative for environmental allergies, food allergies and immunocompromised state.   Neurological: Negative.  Negative for dizziness, seizures, syncope and weakness.   Hematological: Negative.  Negative for adenopathy. Does not bruise/bleed easily.   Psychiatric/Behavioral: Negative.  Negative for behavioral problems, dysphoric mood and hallucinations.      Objective:      Physical Exam   Constitutional: She is oriented to person, place, and time. She appears well-developed and well-nourished. No distress.   HENT:   Head: Normocephalic and atraumatic.   Mouth/Throat: Oropharynx is clear and moist.   Eyes: EOM are normal. Pupils are equal, round, and reactive to light. No scleral icterus.   Neck: Normal range of motion. Neck supple. No  thyromegaly present.   Cardiovascular: Normal rate, regular rhythm, normal heart sounds and intact distal pulses.  Exam reveals no gallop.    No murmur heard.  Pulmonary/Chest: Effort normal and breath sounds normal. No respiratory distress. She has no wheezes. She has no rales.   Abdominal: Soft. Bowel sounds are normal. She exhibits no distension and no mass. There is no tenderness. There is no rebound and no guarding. No hernia. Hernia confirmed negative in the ventral area.   Musculoskeletal: Normal range of motion. She exhibits no edema or tenderness.   Lymphadenopathy:        Head (right side): No submandibular adenopathy present.        Head (left side): No submandibular adenopathy present.     She has no cervical adenopathy.        Right cervical: No superficial cervical adenopathy present.       Left cervical: No superficial cervical adenopathy present.        Right axillary: No pectoral and no lateral adenopathy present.        Left axillary: No pectoral and no lateral adenopathy present.       Right: No inguinal and no supraclavicular adenopathy present.        Left: No inguinal and no supraclavicular adenopathy present.   Neurological: She is alert and oriented to person, place, and time. She has normal reflexes. No cranial nerve deficit.   Skin: Skin is warm, dry and intact. No rash noted. She is not diaphoretic. No erythema. No pallor.   Psychiatric: She has a normal mood and affect. Her behavior is normal. Thought content normal.   Nursing note and vitals reviewed.     Assessment:       1. Gastrinoma, malignant        Laboratory Data:    Neuroendocrine Labs Latest Ref Rng & Units 8/11/2017 6/16/2017 6/16/2017 6/16/2017 5/26/2017 5/26/2017 3/3/2017   EXT 5 HIAA BLOOD 0 - 22 ng/ml - - - - 10 - -   EXT GASTRIN 0 - 100 pg//ml - - - - 47 - -   EXT SEROTONIN 56 - 244 ng/ml - - - - 118 - -   EXT CHROMOGRANIN A 0 - 15 ng/ml - - - - 13 - -   EXT PANCREASTATIN THAIS 10 - 135 pg/ml - - - - 23 - -   EXT ANTI  PARIETAL CELL AB - - - - - <20.0 - -   EXT ANTI THYROID AB 0 - 1 iu/ml - - - - <1 - -   EXT ANTI ISLET CELL AB NEG - - - - NEG - -   EXT FOLATE <3.4 - >5.4 ng/ml - - - - - - -   EXT VITAMIN B12 200 - 1,100 pg/ml - - - - - - -   EXT CALCITONIN 0 - 5 pg/ml - - - - - - -   EXT PTH, INTACT 14 - 64 pg/ml - - - - - - -   WBC 3.90 - 12.70 K/uL - 3.81(L) - - - - -   EXT WBC 3.8 - 10.8 1000/ul - - - - 3.2(A) - -   HGB 12.0 - 16.0 g/dL - 9.8(L) - - - - -   EXT HGB 11.7 - 155 g/dl - - - - 10.0(A) - -   HCT 37.0 - 48.5 % - 32.4(L) - - - - -   EXT HCT 35.0 - 45.0 % - - - - 32.2(A) - -   PLATLETS 150 - 350 K/uL - 233 - - - - -   EXT PLATLETS 140 - 400 1000/ul - - - - 224 - -   PT 9.0 - 12.5 sec - - - - - - -   EXT PT 9.0 - 11.35 sec - - - - - - -   PTT 21.0 - 32.0 sec - - - - - - -   INR 0.8 - 1.2 - - - - - - -   EXT INR 0.9 - 1.1 - - - - - - -   GLUCOSE 70 - 110 mg/dL - 139(H) - - - - -   EXT GLUCOSE 65 - 99 mg/dl - - - - 99 - -   BUN 6 - 20 mg/dL - 11 - - - - -   EXT BUN 7 - 25 mg/dl - - - - 10 - -   CREATININE 0.5 - 1.4 mg/dL - 0.9 - - - - -   EXT CREATININE 0.50 - 1.058 mg/dl - - - - 0.77 - -    - 145 mmol/L - 137 - - - - -   EXT  - 146 mmol/l - - - - 140 - -   K 3.5 - 5.1 mmol/L - 3.9 - - - - -   EXT K 3.5 - 5.3 mmol/l - - - - 4.2 - -   CHLORIDE 95 - 110 mmol/L - 101 - - - - -   EXT CHLORIDE 98 - 110 mmol/l - - - - 103 - -   CO2 23 - 29 mmol/L - 29 - - - - -   EXT CO2 20 - 31 mmol/l - - - - 28 - -   CALCIUM 8.7 - 10.5 mg/dL - 9.3 - - - - -   EXT CALCIUM 8.6 - 10.4 mg/dl - - - - 9.3 - -   PROTEIN, TOTAL 6.0 - 8.4 g/dL - 7.3 - - - - -   EXT PROTEIN, TOTAL 3.6 - 5.1 g/dl - - - - 7.2(A) - -   PHOSPHORUS 2.7 - 4.5 mg/dL - 2.9 - - - - -   ALBUMIN 3.5 - 5.2 g/dL - 3.5 - - - - -   EXT ALBUMIN 0.2 - 1.2 mg/dl - - - - 0.8 - -   TOTAL BILIRUBIN 0.1 - 1.0 mg/dL - 1.1(H) - - - - -   EXT TOTAL BILIRUBIN 0.2 - 1.2 mg/dl - - - - 0.8 - -   ALK PHOSPHATASE 55 - 135 U/L - 112 - - - - -   EXT ALK PHOSPHATASE 33 - 130 u/l - - - - 110  - -   SGOT (AST) 10 - 40 U/L - 19 - - - - -   EXT SGOT (AST) 10 - 35 u/l - - - - 21 - -   SGPT (ALT) 10 - 44 U/L - 12 - - - - -   EXT ALT 6 - 29 u/l - - - - 11 - -   MG 1.6 - 2.6 mg/dL - 2.1 - - - - -   Weight - 156 lbs - 156 lbs 156 lbs - 156 lbs 150 lbs     Pre-lanreotide  Neuroendocrine Labs Latest Ref Rng & Units 7/27/2016   EXT 5 HIAA BLOOD 0 - 22 ng/ml 11   EXT GASTRIN 0 - 100 pg/ml 86491 (A)   EXT SEROTONIN 56 - 244 ng/ml 216   EXT CHROMOGRANIN A 0 - 15 ng/ml 585 (A)   EXT PANCREASTATIN THAIS 10 - 135 pg/ml 695 (A)   EXT ANTI PARIETAL CELL AB Negative Negative   EXT ANTI THYROID AB <9 <1   EXT ANTI ISLET CELL AB Negative Negative   EXT FOLATE <3.4 - >5.4 ng/ml 15.6   EXT VITAMIN B12 200 - 1100 pg/ml 1319 (A)       CT:   The lung bases are clear.  There is a heterogeneously enhancing liver lesion noted in the segment 4 a measuring approximately 4.0 cm (sequence 6 image 14).  It appears similar to the prior exam.  No new liver lesions seen.  The biliary ducts are not dilated.  The gallbladder is present.  The stomach, pancreas, spleen, adrenal glands and bilateral kidneys appear normal.  Small left kidney cyst, unchanged.  The aorta tapers normally, and no para-aortic lymphadenopathy.  There is a moderate amount of retained stool.  No bowel dilation or inflammatory changes of the bowel seen.  No mesenteric abnormalities seen.  The uterus is retroverted, there are calcified uterine fibroid.  The ovaries appear within normal limits.  The osseous structures demonstrate a mild levoscoliosis of the lumbar spine, no osseous lesions.   Impression       Liver lesion consistent with biopsy-proven metastatic neuroendocrine tumor is unchanged in size.  No new lesions identified.  Left kidney cyst.  Calcified uterine fibroid.     MRI:    Results: Axial gradient T2, axial T1 in and out of phase, axial T2 SSFSE, coronal T2 gradient and T2 SSFSE followed by pre-contrast axial T1 gradient fat sat and dynamic post contrast images  "were obtained. The patient received  10 cc of IV Gadovist contrast.    The lung bases appear normal.  Index lesion #1, segment 4A, biopsy proven metastatic well-differentiated neuroendocrine tumor.  Measuring 4.0 cm on the 4 minutes postcontrast.  (Previous MRI measurements are not seen on the MRI films, I personally measured the lesion on the prior MRI in the same plane as the MRI performed today and it is unchanged).    No additional liver lesions seen.  The biliary ducts are not dilated.  The gallbladder is present.  The stomach, pancreas, spleen, adrenal glands and bilateral kidneys appear normal.  Small left kidney cyst, unchanged.  No osseous lesions.  There is a mild levoscoliosis of the lumbar spine.   Impression         1.Unchanged liver lesion consistent with metastatic neuroendocrine tumor.  No new liver lesions seen.  2.  Left kidney cyst.     CT shows 3/.7 ->3.9 _> 4.0 cms.  Slow progression    Impression: Gastrinoma, metastatic to liver,slow progression. symptoms currently controlled, good response to lanreotide with near normalization of markers  Previously.  Needs restage.  Refuses surgery @ this time  Now wants to "wait until it gets cold outside"  But is willing to consider percutaneous ablation.   Unknown primary  Plan:     tumor markers & gastrin today    - repeat EUS   - Tumor board  Refer to Dr Jones for possible percutaneous ablation and repeat biopsy for [employer] at time of ablation.        Pt wants to talk to other patients/support group          JANES Riddle MD, FACS  Professor of Surgery, [facility]  Neuroendocrine Surgery, Hepatic/Pancreatic & General Surgery  [address], [address]  [place]  [address]  ph. [phone]; [phone]  fax. [phone]  "

## 2017-08-15 ENCOUNTER — TELEPHONE (OUTPATIENT)
Dept: NEUROLOGY | Facility: HOSPITAL | Age: 62
End: 2017-08-15

## 2017-08-22 ENCOUNTER — TELEPHONE (OUTPATIENT)
Dept: NEUROLOGY | Facility: HOSPITAL | Age: 62
End: 2017-08-22

## 2017-08-22 ENCOUNTER — CONFERENCE (OUTPATIENT)
Dept: NEUROLOGY | Facility: HOSPITAL | Age: 62
End: 2017-08-22

## 2017-08-22 NOTE — LETTER
August 22, 2017      [facility] [place] [address]  Phone: [phone]  Fax: [phone]       Patient: Lena Brantley   YOB: 1955  Date of Visit: 08/22/2017    Dear Ms. Fercho,     Multiple attempts have been made by our office and the Interventional Radiology department to schedule a consultation for percutaneous ablation and biopsy, without success. I would like to stress the importance of these appointments, and encourage you to call our office or Interventional Radiology to make these appointments.         Sincerely,        Luciana Correia LPN for  Dr. JANES Riddle

## 2017-08-22 NOTE — TELEPHONE ENCOUNTER
"Multiple attempts made by IR , Kenna, and this nurse to contact pt, messages left on voicemail. Today voicemail box full, unable to leave message. Kenna spoke with pts daughter, Sujata today in an attempt to schedule pt, daughter states no dates available will work for her, Kenna attempted to give return call number to schedule, daughter cut off Kenna in the middle of giving phone number by saying "what is your name", followed by thank you and hung up phone. This nurse attempted to call pt, LVM to return call. Will send certified letter to pts home address.   "

## 2017-08-22 NOTE — TELEPHONE ENCOUNTER
Multidisciplinary NET Tumor Board Summary:     Presenter:    Attendees:    Surgery:   MD JANES Allison MD T. Ramcharan, MD  Interventional Radiology - Ronald Boland MD  Pathology - Ami Restrepo MD  Oncology - Sandeep Sanford MD  Gastroenterology - Not present       Discussion: Reviewed medical history, scans and lab work, gastrinoma, mets to liver, tumor is enlarging,       Recommendations:   1. Appointment with Dr Boland  2. Perfect candidate for segmentectomy

## 2017-09-08 ENCOUNTER — INFUSION (OUTPATIENT)
Dept: INFUSION THERAPY | Facility: HOSPITAL | Age: 62
End: 2017-09-08
Attending: SURGERY
Payer: MEDICAID

## 2017-09-08 VITALS — BODY MASS INDEX: 26.82 KG/M2 | HEIGHT: 65 IN | WEIGHT: 161 LBS

## 2017-09-08 DIAGNOSIS — C7A.00 MALIGNANT CARCINOID TUMOR OF UNKNOWN PRIMARY SITE: Primary | ICD-10-CM

## 2017-09-08 DIAGNOSIS — C7B.02 METASTATIC MALIGNANT CARCINOID TUMOR TO LIVER: ICD-10-CM

## 2017-09-08 PROCEDURE — 96372 THER/PROPH/DIAG INJ SC/IM: CPT

## 2017-09-08 PROCEDURE — 63600175 PHARM REV CODE 636 W HCPCS: Performed by: SURGERY

## 2017-09-08 RX ORDER — LANREOTIDE ACETATE 120 MG/.5ML
120 INJECTION SUBCUTANEOUS
Status: DISCONTINUED | OUTPATIENT
Start: 2017-09-08 | End: 2017-09-08 | Stop reason: HOSPADM

## 2017-09-08 RX ORDER — LANREOTIDE ACETATE 120 MG/.5ML
120 INJECTION SUBCUTANEOUS
Status: CANCELLED | OUTPATIENT
Start: 2017-09-08

## 2017-09-08 RX ADMIN — LANREOTIDE ACETATE 120 MG: 120 INJECTION SUBCUTANEOUS at 11:09

## 2017-09-29 ENCOUNTER — INITIAL CONSULT (OUTPATIENT)
Dept: INTERVENTIONAL RADIOLOGY/VASCULAR | Facility: CLINIC | Age: 62
End: 2017-09-29
Attending: FAMILY MEDICINE
Payer: MEDICAID

## 2017-09-29 VITALS
DIASTOLIC BLOOD PRESSURE: 78 MMHG | BODY MASS INDEX: 28.32 KG/M2 | HEIGHT: 65 IN | HEART RATE: 80 BPM | SYSTOLIC BLOOD PRESSURE: 141 MMHG | WEIGHT: 170 LBS

## 2017-09-29 DIAGNOSIS — C7B.02 METASTATIC MALIGNANT CARCINOID TUMOR TO LIVER: Primary | ICD-10-CM

## 2017-09-29 PROCEDURE — 99204 OFFICE O/P NEW MOD 45 MIN: CPT | Mod: S$PBB,,, | Performed by: FAMILY MEDICINE

## 2017-09-29 PROCEDURE — 99999 PR PBB SHADOW E&M-EST. PATIENT-LVL III: CPT | Mod: PBBFAC,,,

## 2017-09-29 PROCEDURE — 99213 OFFICE O/P EST LOW 20 MIN: CPT | Mod: PBBFAC

## 2017-09-29 PROCEDURE — 3008F BODY MASS INDEX DOCD: CPT | Mod: ,,, | Performed by: FAMILY MEDICINE

## 2017-09-29 NOTE — PROGRESS NOTES
Subjective:       Patient ID: Lena Brantley is a 62 y.o. female.    Chief Complaint: Cancer    Patient here to discuss treatment of her liver tumor. 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 She had a liver biopsy performed on 9/8/2016 which showed: Metastatic well-differentiated neuroendocrine tumor. She has c/o occasionally feeling distended, but also tells me she has a history of reflux. She denies any abdominal pain. She complains of fatigue.      Review of Systems   Constitutional: Positive for fatigue. Negative for activity change, appetite change, chills and fever.   HENT: Negative for congestion, drooling, ear discharge, postnasal drip, sneezing and trouble swallowing.    Eyes: Negative for pain, discharge, redness and itching.   Respiratory: Negative for cough, shortness of breath, wheezing and stridor.    Cardiovascular: Negative for chest pain, palpitations and leg swelling.   Gastrointestinal: Positive for abdominal distention (comes and goes). Negative for abdominal pain, constipation, diarrhea, nausea and vomiting.   Genitourinary: Negative for difficulty urinating, dysuria, frequency and urgency.   Musculoskeletal: Negative for arthralgias, back pain, gait problem, joint swelling and myalgias.   Skin: Negative for color change, pallor and rash.   Neurological: Negative for dizziness, weakness and headaches.       Objective:      Physical Exam   Constitutional: She is oriented to person, place, and time. She appears well-developed and well-nourished. No distress.   HENT:   Head: Normocephalic and atraumatic.   Right Ear: External ear normal.   Left Ear: External ear normal.   Nose: Nose normal.   Mouth/Throat: Oropharynx is clear and moist. No oropharyngeal exudate.   Eyes: Conjunctivae are normal. Pupils are equal, round, and reactive to light. Right eye exhibits no discharge. Left eye exhibits no discharge. No scleral icterus.   Neck:  Neck supple. No tracheal deviation present. No thyromegaly present.   Cardiovascular: Normal rate, regular rhythm, normal heart sounds and intact distal pulses.  Exam reveals no gallop and no friction rub.    No murmur heard.  Pulmonary/Chest: Effort normal and breath sounds normal. No stridor. No respiratory distress. She has no wheezes. She has no rales.   Abdominal: Soft. Bowel sounds are normal. She exhibits no distension and no mass. There is no hepatosplenomegaly. There is tenderness in the epigastric area. There is no guarding.   Musculoskeletal: She exhibits no edema.   Lymphadenopathy:     She has no cervical adenopathy.   Neurological: She is alert and oriented to person, place, and time. Gait normal.   Skin: Skin is warm and dry. She is not diaphoretic. No cyanosis. Nails show no clubbing.   Psychiatric: She has a normal mood and affect.   Vitals reviewed.      Assessment:       1. 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  Discussed the need for initial Angiogram mapping study prior to scheduling the actual Radioembolization procedure. Patient verbalized understanding and agreement. She would like to schedule her mapping on either October 20, 2017 or October 27, 2017.  will call her once the physician schedule is posted. Verified patient's phone number in [employer]. Clinic phone number provided. Patient asks if possible to perform this procedure at [facility]. I will ask Dr. Boland, and let her know.

## 2017-10-06 ENCOUNTER — INFUSION (OUTPATIENT)
Dept: INFUSION THERAPY | Facility: HOSPITAL | Age: 62
End: 2017-10-06
Attending: SURGERY
Payer: MEDICAID

## 2017-10-06 DIAGNOSIS — C7B.02 METASTATIC MALIGNANT CARCINOID TUMOR TO LIVER: ICD-10-CM

## 2017-10-06 DIAGNOSIS — C7A.00 MALIGNANT CARCINOID TUMOR OF UNKNOWN PRIMARY SITE: Primary | ICD-10-CM

## 2017-10-06 PROCEDURE — 63600175 PHARM REV CODE 636 W HCPCS: Performed by: SURGERY

## 2017-10-06 PROCEDURE — 96372 THER/PROPH/DIAG INJ SC/IM: CPT

## 2017-10-06 RX ORDER — LANREOTIDE ACETATE 120 MG/.5ML
120 INJECTION SUBCUTANEOUS
Status: DISCONTINUED | OUTPATIENT
Start: 2017-10-06 | End: 2017-10-06 | Stop reason: HOSPADM

## 2017-10-06 RX ORDER — LANREOTIDE ACETATE 120 MG/.5ML
120 INJECTION SUBCUTANEOUS
Status: CANCELLED | OUTPATIENT
Start: 2017-10-06

## 2017-10-06 RX ADMIN — LANREOTIDE ACETATE 120 MG: 120 INJECTION SUBCUTANEOUS at 12:10

## 2017-10-06 NOTE — PATIENT INSTRUCTIONS
Lanreotide injection  What is this medicine?  LANREOTIDE (jaja REE oh tide) is used to reduce blood levels of growth hormone in patients with a condition called acromegaly. It also works to slow or stop tumor growth in patients with gastroenteropancreatic neuroendocrine tumor (GEP-NET).  How should I use this medicine?  This medicine is for injection under the skin. It is given by a health care professional in a hospital or clinic setting.  Contact your pediatrician or health care professional regarding the use of this medicine in children. Special care may be needed.  What side effects may I notice from receiving this medicine?  Side effects that you should report to your doctor or health care professional as soon as possible:  · allergic reactions like skin rash, itching or hives, swelling of the face, lips, or tongue  · changes in blood sugar  · changes in heart rate  · severe stomach pain  Side effects that usually do not require medical attention (report to your doctor or health care professional if they continue or are bothersome):  · diarrhea or constipation  · gas or stomach pain  · nausea, vomiting  · pain, redness, swelling and irritation at site where injected  What may interact with this medicine?  · bromocriptine  · cyclosporine  · medicines for diabetes, including insulin  · medicines for heart disease or hypertension  · quinidine  What if I miss a dose?  It is important not to miss your dose. Call your doctor or health care professional if you are unable to keep an appointment.  Where should I keep my medicine?  This drug is given in a hospital or clinic and will not be stored at home.  What should I tell my health care provider before I take this medicine?  They need to know if you have any of these conditions:  · diabetes  · gallbladder disease  · heart disease  · kidney disease  · liver disease  · an unusual or allergic reaction to lanreotide, other medicines, latex, foods, dyes, or  preservatives  · pregnant or trying to get pregnant  · breast-feeding  What should I watch for while using this medicine?  Visit your doctor or health care professional for regular checks on your progress. Your condition will be monitored carefully while you are receiving this medicine.  This medicine may cause increases or decreases in blood sugar. Signs of high blood sugar include frequent urination, unusual thirst, flushed or dry skin, difficulty breathing, drowsiness, stomach ache, nausea, vomiting or dry mouth. Signs of low blood sugar include chills, cool, pale skin or cold sweats, drowsiness, extreme hunger, fast heartbeat, headache, nausea, nervousness or anxiety, shakiness, trembling, unsteadiness, tiredness, or weakness. Contact your doctor or health care professional right away if you experience any of these symptoms.  NOTE:This sheet is a summary. It may not cover all possible information. If you have questions about this medicine, talk to your doctor, pharmacist, or health care provider. Copyright© 2017 [employer]

## 2017-10-18 ENCOUNTER — TELEPHONE (OUTPATIENT)
Dept: RESEARCH | Facility: HOSPITAL | Age: 62
End: 2017-10-18

## 2017-11-03 ENCOUNTER — TELEPHONE (OUTPATIENT)
Dept: NEUROLOGY | Facility: HOSPITAL | Age: 62
End: 2017-11-03

## 2017-11-03 ENCOUNTER — INFUSION (OUTPATIENT)
Dept: INFUSION THERAPY | Facility: HOSPITAL | Age: 62
End: 2017-11-03
Attending: SURGERY
Payer: MEDICAID

## 2017-11-03 VITALS — WEIGHT: 170 LBS | BODY MASS INDEX: 28.32 KG/M2 | HEIGHT: 65 IN

## 2017-11-03 DIAGNOSIS — C7A.00 MALIGNANT CARCINOID TUMOR OF UNKNOWN PRIMARY SITE: Primary | ICD-10-CM

## 2017-11-03 DIAGNOSIS — C7B.02 METASTATIC MALIGNANT CARCINOID TUMOR TO LIVER: ICD-10-CM

## 2017-11-03 PROCEDURE — 96372 THER/PROPH/DIAG INJ SC/IM: CPT

## 2017-11-03 PROCEDURE — 63600175 PHARM REV CODE 636 W HCPCS: Performed by: SURGERY

## 2017-11-03 RX ORDER — LANREOTIDE ACETATE 120 MG/.5ML
120 INJECTION SUBCUTANEOUS
Status: DISCONTINUED | OUTPATIENT
Start: 2017-11-03 | End: 2017-11-03 | Stop reason: HOSPADM

## 2017-11-03 RX ORDER — LANREOTIDE ACETATE 120 MG/.5ML
120 INJECTION SUBCUTANEOUS
Status: CANCELLED | OUTPATIENT
Start: 2017-11-03

## 2017-11-03 RX ADMIN — LANREOTIDE ACETATE 120 MG: 120 INJECTION SUBCUTANEOUS at 12:11

## 2017-11-03 NOTE — TELEPHONE ENCOUNTER
----- Message from Kya James sent at 11/3/2017  1:09 PM CDT -----  Contact: Patient  JPB- Patient would like to know what labs are needed . Please call the patient at [phone].

## 2017-11-03 NOTE — TELEPHONE ENCOUNTER
Clinic appt scheduled with pt on Tuesday, December 5, 2017 at 1130am.  Pt to call clinic next week to inquire if labs are required.  Pt repeated information correctly.

## 2017-11-03 NOTE — TELEPHONE ENCOUNTER
----- Message from Kerry Aguilar sent at 11/3/2017 12:33 PM CDT -----  Contact: Patient   JPSANTY- Patient would like to know if Dr Riddle needs lab work before rescheduling her appt. Patients call back [phone]

## 2017-12-05 ENCOUNTER — OFFICE VISIT (OUTPATIENT)
Dept: NEUROLOGY | Facility: HOSPITAL | Age: 62
End: 2017-12-05
Attending: SURGERY
Payer: COMMERCIAL

## 2017-12-05 ENCOUNTER — INFUSION (OUTPATIENT)
Dept: INFUSION THERAPY | Facility: HOSPITAL | Age: 62
End: 2017-12-05
Attending: SURGERY
Payer: COMMERCIAL

## 2017-12-05 VITALS
BODY MASS INDEX: 28.56 KG/M2 | HEIGHT: 65 IN | SYSTOLIC BLOOD PRESSURE: 121 MMHG | HEART RATE: 75 BPM | TEMPERATURE: 98 F | DIASTOLIC BLOOD PRESSURE: 80 MMHG | WEIGHT: 171.44 LBS

## 2017-12-05 VITALS — HEIGHT: 65 IN | WEIGHT: 171.44 LBS | BODY MASS INDEX: 28.56 KG/M2

## 2017-12-05 DIAGNOSIS — C25.4 GASTRINOMA, MALIGNANT: Primary | ICD-10-CM

## 2017-12-05 DIAGNOSIS — C7B.02 METASTATIC MALIGNANT CARCINOID TUMOR TO LIVER: ICD-10-CM

## 2017-12-05 DIAGNOSIS — C7A.00 MALIGNANT CARCINOID TUMOR OF UNKNOWN PRIMARY SITE: Primary | ICD-10-CM

## 2017-12-05 PROCEDURE — 99214 OFFICE O/P EST MOD 30 MIN: CPT | Performed by: SURGERY

## 2017-12-05 RX ORDER — LANREOTIDE ACETATE 120 MG/.5ML
120 INJECTION SUBCUTANEOUS
Status: DISCONTINUED | OUTPATIENT
Start: 2017-12-05 | End: 2017-12-05 | Stop reason: HOSPADM

## 2017-12-05 NOTE — PROGRESS NOTES
"NOLANETS:  [facility]  A collaboration between [facility] and [facility]      PATIENT: Lena Brantley  MRN: [id number]  DATE: 12/5/2017    Subjective:      Chief Complaint: Follow-up (6 month follow up)      Vitals:   Vitals:    12/05/17 1132   BP: 121/80   Pulse: 75   Temp: 97.9 °F (36.6 °C)   TempSrc: Oral   Weight: 77.7 kg (171 lb 6.5 oz)   Height: 5' 5" (1.651 m)        Karnofsky Score:     Diagnosis:   1. Gastrinoma, malignant    2. 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    61 y F seen in clinic ~ 4 month ago to discuss possible surgery for gastrinoma metastatic to liver.  Did not desire surgery at that time.  Significant improvement in symptoms.  Tolerating diet with no current nausea/emesis.  Has regained lost weight.  Regular, formed bowel movements.  Taking protonix 40 once per day with good control of symptoms.  Denies any current GI complaints.  Muscle cramps resolved.  Insurance did not approve Gallium 68 scan.  Wanting to hold off on any surgery until daughter finishes that semester.    Biotheranostic: 96% Pancreas         Multidisciplinary NET Tumor Board Summary:      Presenter:     Attendees:    Surgery:              MD JANES Allison MD T. Ramcharan, MD  Interventional Radiology - Ronald Boland MD  Pathology - Ami Restrepo MD  Oncology - Sandeep Sanford MD  Gastroenterology - Not present         Discussion: Reviewed medical history, scans and lab work, gastrinoma, mets to liver, tumor is enlarging,         Recommendations:   1. Appointment with Dr Boland  2. 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 No pertinent surgical history.    Family History:  History reviewed. No pertinent family history.    Allergies:  Review of patient's allergies indicates:   Allergen Reactions    Epinephrine Anaphylaxis     Can Cause Carcinoid Crisis       Medications:  Current Outpatient Prescriptions   Medication Sig Dispense Refill    lanreotide (SOMATULINE DEPOT) 120 mg/0.5 mL Syrg Inject 120 mg into the skin every 28 days.      PROTONIX 40 mg tablet Take 40 mg by mouth once daily.   1     No current facility-administered medications for this visit.        Review of Systems   Constitutional: Negative.  Negative for appetite change, chills, fatigue, fever and unexpected weight change.   HENT: Negative.  Negative for ear discharge, hearing loss and tinnitus.    Eyes: Negative.  Negative for photophobia and visual disturbance.   Respiratory: Negative.  Negative for apnea, cough, shortness of breath, wheezing and stridor.    Cardiovascular: Negative.  Negative for chest pain, palpitations and leg swelling.   Gastrointestinal: Negative for abdominal distention, abdominal pain, constipation, diarrhea, nausea and vomiting.        No further nausea/emesis.  Tolerating diet.  BM regular again    Endocrine: Negative.    Genitourinary: Negative.  Negative for difficulty urinating and hematuria.   Musculoskeletal: Negative for arthralgias, back pain, gait problem, joint swelling and neck pain.        Cramps resolved    Skin: Negative.  Negative for color change, pallor and rash.   Allergic/Immunologic: Negative.  Negative for environmental allergies, food allergies and immunocompromised state.   Neurological: Negative.  Negative for dizziness, seizures, syncope and weakness.   Hematological: Negative.  Negative for adenopathy. Does not bruise/bleed easily.    Psychiatric/Behavioral: Negative.  Negative for behavioral problems, dysphoric mood and hallucinations.      Objective:      Physical Exam   Constitutional: She is oriented to person, place, and time. She appears well-developed and well-nourished. No distress.   HENT:   Head: Normocephalic and atraumatic.   Mouth/Throat: Oropharynx is clear and moist.   Eyes: EOM are normal. Pupils are equal, round, and reactive to light. No scleral icterus.   Neck: Normal range of motion. Neck supple. No thyromegaly present.   Cardiovascular: Normal rate, regular rhythm, normal heart sounds and intact distal pulses.  Exam reveals no gallop.    No murmur heard.  Pulmonary/Chest: Effort normal and breath sounds normal. No respiratory distress. She has no wheezes. She has no rales.   Abdominal: Soft. Bowel sounds are normal. She exhibits no distension and no mass. There is no tenderness. There is no rebound and no guarding. No hernia. Hernia confirmed negative in the ventral area.   Musculoskeletal: Normal range of motion. She exhibits no edema or tenderness.   Lymphadenopathy:        Head (right side): No submandibular adenopathy present.        Head (left side): No submandibular adenopathy present.     She has no cervical adenopathy.        Right cervical: No superficial cervical adenopathy present.       Left cervical: No superficial cervical adenopathy present.        Right axillary: No pectoral and no lateral adenopathy present.        Left axillary: No pectoral and no lateral adenopathy present.       Right: No inguinal and no supraclavicular adenopathy present.        Left: No inguinal and no supraclavicular adenopathy present.   Neurological: She is alert and oriented to person, place, and time. She has normal reflexes. No cranial nerve deficit.   Skin: Skin is warm, dry and intact. No rash noted. She is not diaphoretic. No erythema. No pallor.   Psychiatric: She has a normal mood and affect. Her behavior is normal. Thought  content normal.   Nursing note and vitals reviewed.     Assessment:       1. Gastrinoma, malignant    2. 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          EUS PANCREAS  2016: Normal     Laboratory Data:    Neuroendocrine Labs Latest Ref Rng & Units 12/5/2017 11/3/2017 9/29/2017 9/8/2017 8/11/2017 8/11/2017 6/16/2017   EXT 5 HIAA BLOOD 0 - 22 ng/ml - - - - - - -   EXT GASTRIN 0 - 100 pg//ml - - - - - - -   EXT SEROTONIN 56 - 244 ng/ml - - - - - - -   EXT CHROMOGRANIN A 0 - 15 ng/ml - - - - - - -   EXT PANCREASTATIN THASI 10 - 135 pg/ml - - - - - - -   EXT ANTI PARIETAL CELL AB - - - - - - - -   EXT ANTI THYROID AB 0 - 1 iu/ml - - - - - - -   EXT ANTI ISLET CELL AB NEG - - - - - - -   EXT FOLATE <3.4 - >5.4 ng/ml - - - - - - -   EXT VITAMIN B12 200 - 1,100 pg/ml - - - - - - -   EXT CALCITONIN 0 - 5 pg/ml - - - - - - -   EXT PTH, INTACT 14 - 64 pg/ml - - - - - - -   WBC 3.90 - 12.70 K/uL - - - - - - 3.81(L)   EXT WBC 3.8 - 10.8 1000/ul - - - - - - -   HGB 12.0 - 16.0 g/dL - - - - - - 9.8(L)   EXT HGB 11.7 - 155 g/dl - - - - - - -   HCT 37.0 - 48.5 % - - - - - - 32.4(L)   EXT HCT 35.0 - 45.0 % - - - - - - -   PLATLETS 150 - 350 K/uL - - - - - - 233   EXT PLATLETS 140 - 400 1000/ul - - - - - - -   PT 9.0 - 12.5 sec - - - - - - -   EXT PT 9.0 - 11.35 sec - - - - - - -   PTT 21.0 - 32.0 sec - - - - - - -   INR 0.8 - 1.2 - - - - - - -   EXT INR 0.9 - 1.1 - - - - - - -   GLUCOSE 70 - 110 mg/dL - - - - - - 139(H)   EXT GLUCOSE 65 - 99 mg/dl - - - - - - -   BUN 6 - 20 mg/dL - - - - - - 11   EXT BUN 7 - 25 mg/dl - - - - - - -   CREATININE 0.5 - 1.4 mg/dL - - - - - - 0.9   EXT CREATININE 0.50 - 1.058 mg/dl - - - - - - -    - 145 mmol/L - - - - - - 137   EXT  - 146 mmol/l - - - - - - -   K 3.5 - 5.1 mmol/L - - - - - - 3.9   EXT K 3.5 - 5.3 mmol/l - - - - - - -   CHLORIDE 95 - 110 mmol/L - - - - - - 101   EXT CHLORIDE 98 - 110 mmol/l - - - - - - -   CO2 23 - 29 mmol/L - - - - - - 29   EXT CO2 20 - 31 mmol/l - - - -  - - -   CALCIUM 8.7 - 10.5 mg/dL - - - - - - 9.3   EXT CALCIUM 8.6 - 10.4 mg/dl - - - - - - -   PROTEIN, TOTAL 6.0 - 8.4 g/dL - - - - - - 7.3   EXT PROTEIN, TOTAL 3.6 - 5.1 g/dl - - - - - - -   PHOSPHORUS 2.7 - 4.5 mg/dL - - - - - - 2.9   ALBUMIN 3.5 - 5.2 g/dL - - - - - - 3.5   EXT ALBUMIN 0.2 - 1.2 mg/dl - - - - - - -   TOTAL BILIRUBIN 0.1 - 1.0 mg/dL - - - - - - 1.1(H)   EXT TOTAL BILIRUBIN 0.2 - 1.2 mg/dl - - - - - - -   ALK PHOSPHATASE 55 - 135 U/L - - - - - - 112   EXT ALK PHOSPHATASE 33 - 130 u/l - - - - - - -   SGOT (AST) 10 - 40 U/L - - - - - - 19   EXT SGOT (AST) 10 - 35 u/l - - - - - - -   SGPT (ALT) 10 - 44 U/L - - - - - - 12   EXT ALT 6 - 29 u/l - - - - - - -   MG 1.6 - 2.6 mg/dL - - - - - - 2.1   Weight - 171 lbs 7 oz 170 lbs 170 lbs 161 lbs 156 lbs 161 lbs 13 oz -     Pre-lanreotide  Neuroendocrine Labs Latest Ref Rng & Units 7/27/2016   EXT 5 HIAA BLOOD 0 - 22 ng/ml 11   EXT GASTRIN 0 - 100 pg/ml 25261 (A)   EXT SEROTONIN 56 - 244 ng/ml 216   EXT CHROMOGRANIN A 0 - 15 ng/ml 585 (A)   EXT PANCREASTATIN THAIS 10 - 135 pg/ml 695 (A)   EXT ANTI PARIETAL CELL AB Negative Negative   EXT ANTI THYROID AB <9 <1   EXT ANTI ISLET CELL AB Negative Negative   EXT FOLATE <3.4 - >5.4 ng/ml 15.6   EXT VITAMIN B12 200 - 1100 pg/ml 1319 (A)       CT:   The lung bases are clear.  There is a heterogeneously enhancing liver lesion noted in the segment 4 a measuring approximately 4.0 cm (sequence 6 image 14).  It appears similar to the prior exam.  No new liver lesions seen.  The biliary ducts are not dilated.  The gallbladder is present.  The stomach, pancreas, spleen, adrenal glands and bilateral kidneys appear normal.  Small left kidney cyst, unchanged.  The aorta tapers normally, and no para-aortic lymphadenopathy.  There is a moderate amount of retained stool.  No bowel dilation or inflammatory changes of the bowel seen.  No mesenteric abnormalities seen.  The uterus is retroverted, there are calcified uterine fibroid.  " The ovaries appear within normal limits.  The osseous structures demonstrate a mild levoscoliosis of the lumbar spine, no osseous lesions.   Impression       Liver lesion consistent with biopsy-proven metastatic neuroendocrine tumor is unchanged in size.  No new lesions identified.  Left kidney cyst.  Calcified uterine fibroid.     MRI:    Results: Axial gradient T2, axial T1 in and out of phase, axial T2 SSFSE, coronal T2 gradient and T2 SSFSE followed by pre-contrast axial T1 gradient fat sat and dynamic post contrast images were obtained. The patient received  10 cc of IV Gadovist contrast.    The lung bases appear normal.  Index lesion #1, segment 4A, biopsy proven metastatic well-differentiated neuroendocrine tumor.  Measuring 4.0 cm on the 4 minutes postcontrast.  (Previous MRI measurements are not seen on the MRI films, I personally measured the lesion on the prior MRI in the same plane as the MRI performed today and it is unchanged).    No additional liver lesions seen.  The biliary ducts are not dilated.  The gallbladder is present.  The stomach, pancreas, spleen, adrenal glands and bilateral kidneys appear normal.  Small left kidney cyst, unchanged.  No osseous lesions.  There is a mild levoscoliosis of the lumbar spine.   Impression         1.Unchanged liver lesion consistent with metastatic neuroendocrine tumor.  No new liver lesions seen.  2.  Left kidney cyst.     CT shows 3/.7 ->3.9 _> 4.0 cms.  Slow progression    Impression: Gastrinoma, metastatic to liver,slow progression. symptoms currently controlled, good response to lanreotide with near normalization of markers  Previously.  Needs restage.  Refuses surgery @ this time  Now wants to "wait until it gets cold outside"  But is willing to consider percutaneous ablation.   Undiscover "paNCREAS" PRIMARY.    Plan:     tumor markers & gastrin today    Long d/W patiertn.> 50% of visit. Discussed numerous no shows, and procrastination.  Informed patient " that if she does not want to do anything she will need to see another physician as I cannot continue to make recommendations and have conferences about her that are not going to be followed, as that is not an appropriate use of resources.  She  now agrees to go ahead with Y90 treatments. Has a lot of anxiety and unrealistic fears. Tried to expliain fully. Pt voices understanding.       - Tumor board recs:  Refer to Dr Jones for possible percutaneous ablation and repeat biopsy for [employer] at time of ablation.    REPEAT REFeRRAL TO DR HARVEY FOR y 90 AS BEFORE.    Pt wants to talk to other patients/support group that have had Y 90.          JANES Riddle MD, FACS  Professor of Surgery, [facility]  Neuroendocrine Surgery, Hepatic/Pancreatic & General Surgery  [address]  [place]  [address]  ph. [phone]; [phone]  fax. [phone]

## 2017-12-05 NOTE — NURSING
Pt here to receive injection today. Prior to injection she told me she has BC thru her employment but never gave the info to us. I copied her card and was unsure if this insurance required pre auth. Pt had to go to work so when the insurance has been verified and the auth in place she will reschedule.

## 2017-12-05 NOTE — PATIENT INSTRUCTIONS
Kenna will contact you to set up Y90  We will coordinate a patient-to-patient discussion about experience about Y90 spheres

## 2017-12-11 ENCOUNTER — TELEPHONE (OUTPATIENT)
Dept: NEUROLOGY | Facility: HOSPITAL | Age: 62
End: 2017-12-11

## 2017-12-11 NOTE — TELEPHONE ENCOUNTER
Per [employer], no authorization is required for Lanreotide injections.  Secondary insurance is still active for 12/2017.

## 2017-12-13 ENCOUNTER — INFUSION (OUTPATIENT)
Dept: INFUSION THERAPY | Facility: HOSPITAL | Age: 62
End: 2017-12-13
Attending: SURGERY
Payer: COMMERCIAL

## 2017-12-13 VITALS — BODY MASS INDEX: 28.52 KG/M2 | WEIGHT: 171.44 LBS

## 2017-12-13 DIAGNOSIS — C7B.02 METASTATIC MALIGNANT CARCINOID TUMOR TO LIVER: ICD-10-CM

## 2017-12-13 DIAGNOSIS — C7A.00 MALIGNANT CARCINOID TUMOR OF UNKNOWN PRIMARY SITE: Primary | ICD-10-CM

## 2017-12-13 PROCEDURE — 63600175 PHARM REV CODE 636 W HCPCS: Performed by: SURGERY

## 2017-12-13 PROCEDURE — 96372 THER/PROPH/DIAG INJ SC/IM: CPT

## 2017-12-13 RX ORDER — LANREOTIDE ACETATE 120 MG/.5ML
120 INJECTION SUBCUTANEOUS
Status: DISCONTINUED | OUTPATIENT
Start: 2017-12-13 | End: 2017-12-13 | Stop reason: HOSPADM

## 2017-12-13 RX ADMIN — LANREOTIDE ACETATE 120 MG: 120 INJECTION SUBCUTANEOUS at 11:12

## 2018-01-11 ENCOUNTER — TELEPHONE (OUTPATIENT)
Dept: INFUSION THERAPY | Facility: HOSPITAL | Age: 63
End: 2018-01-11

## 2018-01-11 ENCOUNTER — INFUSION (OUTPATIENT)
Dept: INFUSION THERAPY | Facility: HOSPITAL | Age: 63
End: 2018-01-11
Attending: SURGERY
Payer: COMMERCIAL

## 2018-01-11 VITALS — WEIGHT: 171.44 LBS | BODY MASS INDEX: 28.56 KG/M2 | HEIGHT: 65 IN

## 2018-01-11 DIAGNOSIS — C7B.02 METASTATIC MALIGNANT CARCINOID TUMOR TO LIVER: ICD-10-CM

## 2018-01-11 DIAGNOSIS — C7A.00 MALIGNANT CARCINOID TUMOR OF UNKNOWN PRIMARY SITE: Primary | ICD-10-CM

## 2018-01-11 PROCEDURE — 96372 THER/PROPH/DIAG INJ SC/IM: CPT

## 2018-01-11 PROCEDURE — 63600175 PHARM REV CODE 636 W HCPCS: Mod: JG | Performed by: SURGERY

## 2018-01-11 RX ORDER — LANREOTIDE ACETATE 120 MG/.5ML
120 INJECTION SUBCUTANEOUS
Status: DISCONTINUED | OUTPATIENT
Start: 2018-01-11 | End: 2018-01-11 | Stop reason: HOSPADM

## 2018-01-11 RX ADMIN — LANREOTIDE ACETATE 120 MG: 120 INJECTION SUBCUTANEOUS at 11:01

## 2018-01-11 NOTE — NURSING
Tolerated Lanreotide injection well. Discharged home with next appointment scheduled. Provided pt with the paperwork for [facility] to complete and return for possible copay assistance.

## 2018-01-11 NOTE — TELEPHONE ENCOUNTER
LANREOTIDE 120 MG ( AND 31624) Q 28 DAYS DOES NOT REQUIRE AUTH PER [place]IM[place] AT [employer]. REF # [id number]

## 2018-02-02 RX ORDER — PANTOPRAZOLE SODIUM 40 MG/1
TABLET, DELAYED RELEASE ORAL
Qty: 90 TABLET | Refills: 0 | Status: SHIPPED | OUTPATIENT
Start: 2018-02-02 | End: 2018-03-08 | Stop reason: SDUPTHER

## 2018-02-09 ENCOUNTER — INFUSION (OUTPATIENT)
Dept: INFUSION THERAPY | Facility: HOSPITAL | Age: 63
End: 2018-02-09
Attending: SURGERY
Payer: COMMERCIAL

## 2018-02-09 VITALS — HEIGHT: 65 IN | WEIGHT: 171 LBS | BODY MASS INDEX: 28.49 KG/M2

## 2018-02-09 DIAGNOSIS — C7B.02 METASTATIC MALIGNANT CARCINOID TUMOR TO LIVER: ICD-10-CM

## 2018-02-09 DIAGNOSIS — C7A.00 MALIGNANT CARCINOID TUMOR OF UNKNOWN PRIMARY SITE: Primary | ICD-10-CM

## 2018-02-09 PROCEDURE — 96372 THER/PROPH/DIAG INJ SC/IM: CPT

## 2018-02-09 PROCEDURE — 63600175 PHARM REV CODE 636 W HCPCS: Mod: JG | Performed by: SURGERY

## 2018-02-09 RX ORDER — LANREOTIDE ACETATE 120 MG/.5ML
120 INJECTION SUBCUTANEOUS
Status: DISCONTINUED | OUTPATIENT
Start: 2018-02-09 | End: 2018-02-09 | Stop reason: HOSPADM

## 2018-02-09 RX ADMIN — LANREOTIDE ACETATE 120 MG: 120 INJECTION SUBCUTANEOUS at 11:02

## 2018-02-09 NOTE — NURSING
Tolerated Lanreotide injection to left well by Faiza Washington RN. Discharged home with next appointment scheduled.

## 2018-03-08 RX ORDER — PANTOPRAZOLE SODIUM 40 MG/1
40 TABLET, DELAYED RELEASE ORAL DAILY
Qty: 90 TABLET | Refills: 0 | Status: SHIPPED | OUTPATIENT
Start: 2018-03-08 | End: 2019-01-19 | Stop reason: SDUPTHER

## 2018-03-08 NOTE — TELEPHONE ENCOUNTER
----- Message from Barb Doan sent at 3/8/2018  9:32 AM CST -----  Contact: Patient  JPB:  Patient called requesting a refill of Protonix be sent to her pharmacy - [facility] which is the primary pharmacy listed in her chart.  If questions, Ms. Brantley can be reached at [phone].   Thank you  abc

## 2018-03-09 ENCOUNTER — INFUSION (OUTPATIENT)
Dept: INFUSION THERAPY | Facility: HOSPITAL | Age: 63
End: 2018-03-09
Attending: SURGERY
Payer: COMMERCIAL

## 2018-03-09 VITALS — BODY MASS INDEX: 28.49 KG/M2 | HEIGHT: 65 IN | WEIGHT: 171 LBS

## 2018-03-09 DIAGNOSIS — C7B.02 METASTATIC MALIGNANT CARCINOID TUMOR TO LIVER: ICD-10-CM

## 2018-03-09 DIAGNOSIS — C7A.00 MALIGNANT CARCINOID TUMOR OF UNKNOWN PRIMARY SITE: Primary | ICD-10-CM

## 2018-03-09 PROCEDURE — 63600175 PHARM REV CODE 636 W HCPCS: Mod: JG | Performed by: SURGERY

## 2018-03-09 RX ORDER — LANREOTIDE ACETATE 120 MG/.5ML
120 INJECTION SUBCUTANEOUS
Status: DISCONTINUED | OUTPATIENT
Start: 2018-03-09 | End: 2018-03-09 | Stop reason: HOSPADM

## 2018-03-09 RX ADMIN — LANREOTIDE ACETATE 120 MG: 120 INJECTION SUBCUTANEOUS at 12:03

## 2018-04-06 ENCOUNTER — INFUSION (OUTPATIENT)
Dept: INFUSION THERAPY | Facility: HOSPITAL | Age: 63
End: 2018-04-06
Attending: SURGERY
Payer: COMMERCIAL

## 2018-04-06 VITALS — HEIGHT: 65 IN | BODY MASS INDEX: 28.49 KG/M2 | WEIGHT: 171 LBS

## 2018-04-06 DIAGNOSIS — C7B.02 METASTATIC MALIGNANT CARCINOID TUMOR TO LIVER: ICD-10-CM

## 2018-04-06 DIAGNOSIS — C7A.00 MALIGNANT CARCINOID TUMOR OF UNKNOWN PRIMARY SITE: Primary | ICD-10-CM

## 2018-04-06 PROCEDURE — 63600175 PHARM REV CODE 636 W HCPCS: Mod: JG | Performed by: SURGERY

## 2018-04-06 PROCEDURE — 96372 THER/PROPH/DIAG INJ SC/IM: CPT

## 2018-04-06 RX ORDER — LANREOTIDE ACETATE 120 MG/.5ML
120 INJECTION SUBCUTANEOUS
Status: DISCONTINUED | OUTPATIENT
Start: 2018-04-06 | End: 2018-04-06 | Stop reason: HOSPADM

## 2018-04-06 RX ADMIN — LANREOTIDE ACETATE 120 MG: 120 INJECTION SUBCUTANEOUS at 11:04

## 2018-05-04 ENCOUNTER — INFUSION (OUTPATIENT)
Dept: INFUSION THERAPY | Facility: HOSPITAL | Age: 63
End: 2018-05-04
Attending: SURGERY
Payer: COMMERCIAL

## 2018-05-04 VITALS — HEIGHT: 65 IN | BODY MASS INDEX: 28.49 KG/M2 | WEIGHT: 171 LBS

## 2018-05-04 DIAGNOSIS — C7A.00 MALIGNANT CARCINOID TUMOR OF UNKNOWN PRIMARY SITE: Primary | ICD-10-CM

## 2018-05-04 DIAGNOSIS — C7B.02 METASTATIC MALIGNANT CARCINOID TUMOR TO LIVER: ICD-10-CM

## 2018-05-04 PROCEDURE — 63600175 PHARM REV CODE 636 W HCPCS: Mod: JG | Performed by: SURGERY

## 2018-05-04 PROCEDURE — 96372 THER/PROPH/DIAG INJ SC/IM: CPT

## 2018-05-04 RX ORDER — LANREOTIDE ACETATE 120 MG/.5ML
120 INJECTION SUBCUTANEOUS
Status: DISCONTINUED | OUTPATIENT
Start: 2018-05-04 | End: 2018-05-04 | Stop reason: HOSPADM

## 2018-05-04 RX ADMIN — LANREOTIDE ACETATE 120 MG: 120 INJECTION SUBCUTANEOUS at 11:05

## 2018-06-01 ENCOUNTER — TELEPHONE (OUTPATIENT)
Dept: NEUROLOGY | Facility: HOSPITAL | Age: 63
End: 2018-06-01

## 2018-06-01 ENCOUNTER — INFUSION (OUTPATIENT)
Dept: INFUSION THERAPY | Facility: HOSPITAL | Age: 63
End: 2018-06-01
Attending: INTERNAL MEDICINE
Payer: COMMERCIAL

## 2018-06-01 VITALS — BODY MASS INDEX: 28.49 KG/M2 | WEIGHT: 171 LBS | HEIGHT: 65 IN

## 2018-06-01 DIAGNOSIS — C7A.00 MALIGNANT CARCINOID TUMOR OF UNKNOWN PRIMARY SITE: Primary | ICD-10-CM

## 2018-06-01 DIAGNOSIS — C7B.02 METASTATIC MALIGNANT CARCINOID TUMOR TO LIVER: ICD-10-CM

## 2018-06-01 PROCEDURE — 96372 THER/PROPH/DIAG INJ SC/IM: CPT

## 2018-06-01 PROCEDURE — 63600175 PHARM REV CODE 636 W HCPCS: Mod: JG | Performed by: INTERNAL MEDICINE

## 2018-06-01 RX ORDER — LANREOTIDE ACETATE 120 MG/.5ML
120 INJECTION SUBCUTANEOUS
Status: DISCONTINUED | OUTPATIENT
Start: 2018-06-01 | End: 2018-06-01 | Stop reason: HOSPADM

## 2018-06-01 RX ADMIN — LANREOTIDE ACETATE 120 MG: 120 INJECTION SUBCUTANEOUS at 12:06

## 2018-06-01 NOTE — NURSING
Tolerated Lanreotide injection well. Discharged home with next appointment scheduled.  Spoke with Tisha regarding the next steps this pt needs to do. Pt has been advised she needs blood work and an appt with Dr. Sarmiento for possible Y90.  This recommendation was made in December 2017 by Dr. Riddle but pt did not follow through.  Instructed pt that if she does not comply with the blood work and or appt with Dr. Sarmiento she cannot continue to receive Lanreotide without being followed by a physician. Pt verbalized understanding.

## 2018-06-01 NOTE — TELEPHONE ENCOUNTER
Pt here for Infusion/injection.  Disucssed lack of follow up with Infusion nurse.  Encouraged to get tumor markers now and follow up with IR doc to discuss Y-90 as recommended.  Msg sent to IR Scheduling to contact pt for carlos.

## 2018-06-08 LAB
EXT 24 HR UR METANEPHRINE: ABNORMAL
EXT 24 HR UR NORMETANEPHRINE: ABNORMAL
EXT 24 HR UR NORMETANEPHRINE: ABNORMAL
EXT 25 HYDROXY VIT D2: ABNORMAL
EXT 25 HYDROXY VIT D3: ABNORMAL
EXT 5 HIAA 24 HR URINE: ABNORMAL
EXT 5 HIAA BLOOD: 8 NG/ML (ref 0–22)
EXT ACTH: ABNORMAL
EXT AFP: ABNORMAL
EXT ALBUMIN: 4.1 G/DL (ref 3.6–5.1)
EXT ALKALINE PHOSPHATASE: 133 U/L (ref 33–130)
EXT ALT: 13 U/L (ref 6–29)
EXT AMYLASE: ABNORMAL
EXT ANTI ISLET CELL AB: ABNORMAL
EXT ANTI PARIETAL CELL AB: ABNORMAL
EXT ANTI THYROID AB: ABNORMAL
EXT AST: 19 U/L (ref 10–35)
EXT BILIRUBIN DIRECT: ABNORMAL
EXT BILIRUBIN TOTAL: 0.8 MG/DL (ref 0.2–1.2)
EXT BK VIRUS DNA QN PCR: ABNORMAL
EXT BUN: 10 MG/DL (ref 7–25)
EXT C PEPTIDE: ABNORMAL
EXT CA 125: ABNORMAL
EXT CA 19-9: ABNORMAL
EXT CA 27-29: ABNORMAL
EXT CALCITONIN: ABNORMAL
EXT CALCIUM: 9.6 MG/DL (ref 8.6–10.4)
EXT CEA: ABNORMAL
EXT CHLORIDE: 102 MMOL/L (ref 98–110)
EXT CHOLESTEROL: ABNORMAL
EXT CHROMOGRANIN A: ABNORMAL
EXT CO2: 31 MMOL/L (ref 20–31)
EXT CREATININE UA: ABNORMAL
EXT CREATININE: 0.77 MG/DL (ref 0.5–0.99)
EXT CYCLOSPORONE LEVEL: ABNORMAL
EXT DOPAMINE: ABNORMAL
EXT EBV DNA BY PCR: ABNORMAL
EXT EPINEPHRINE: ABNORMAL
EXT FOLATE: 6.5 NG/ML
EXT FREE T3: ABNORMAL
EXT FREE T4: ABNORMAL
EXT FSH: ABNORMAL
EXT GASTRIN RELEASING PEPTIDE: ABNORMAL
EXT GASTRIN RELEASING PEPTIDE: ABNORMAL
EXT GASTRIN: 130 PG/ML (ref 0–100)
EXT GGT: ABNORMAL
EXT GHRELIN: ABNORMAL
EXT GLUCAGON: ABNORMAL
EXT GLUCOSE: 108 MG/DL (ref 65–99)
EXT GROWTH HORMONE: ABNORMAL
EXT HCV RNA QUANT PCR: ABNORMAL
EXT HDL: ABNORMAL
EXT HEMATOCRIT: 33 % (ref 35–45)
EXT HEMOGLOBIN A1C: ABNORMAL
EXT HEMOGLOBIN: 9.9 G/DL (ref 11.7–15.5)
EXT HISTAMINE 24 HR URINE: ABNORMAL
EXT HISTAMINE: ABNORMAL
EXT IGF-1: ABNORMAL
EXT IMMUNKNOW (NON-STIMULATED): ABNORMAL
EXT IMMUNKNOW (STIMULATED): ABNORMAL
EXT INR: ABNORMAL
EXT INSULIN: ABNORMAL
EXT LANREOTIDE LEVEL: ABNORMAL
EXT LDH, TOTAL: ABNORMAL
EXT LDL CHOLESTEROL: ABNORMAL
EXT LIPASE: ABNORMAL
EXT MAGNESIUM: ABNORMAL
EXT METANEPHRINE FREE PLASMA: ABNORMAL
EXT MOTILIN: ABNORMAL
EXT NEUROKININ A CAMB: ABNORMAL
EXT NEUROKININ A ISI: ABNORMAL
EXT NEUROTENSIN: ABNORMAL
EXT NOREPINEPHRINE: ABNORMAL
EXT NORMETANEPHRINE: ABNORMAL
EXT NSE: ABNORMAL
EXT OCTREOTIDE LEVEL: ABNORMAL
EXT PANCREASTATIN CAMB: ABNORMAL
EXT PANCREASTATIN ISI: 80 PG/ML (ref 10–135)
EXT PANCREATIC POLYPEPTIDE: ABNORMAL
EXT PHOSPHORUS: ABNORMAL
EXT PLATELETS: 251 1000/UL (ref 140–400)
EXT POTASSIUM: 4.1 MMOL/L (ref 3.5–5.3)
EXT PROGRAF LEVEL: ABNORMAL
EXT PROLACTIN: ABNORMAL
EXT PROTEIN TOTAL: 7.1 G/DL (ref 6.1–8.1)
EXT PROTEIN UA: ABNORMAL
EXT PT: ABNORMAL
EXT PTH, INTACT: ABNORMAL
EXT PTT: ABNORMAL
EXT RAPAMUNE LEVEL: ABNORMAL
EXT SEROTONIN: 136 NG/ML (ref 6–244)
EXT SODIUM: 139 MMOL/L (ref 135–146)
EXT SOMATOSTATIN: ABNORMAL
EXT SUBSTANCE P: ABNORMAL
EXT TRIGLYCERIDES: ABNORMAL
EXT TRYPTASE: ABNORMAL
EXT TSH: ABNORMAL
EXT URIC ACID: ABNORMAL
EXT URINE AMYLASE U/HR: ABNORMAL
EXT URINE AMYLASE U/L: ABNORMAL
EXT VASOACTIVE INTESTINAL POLYPEPTIDE: ABNORMAL
EXT VITAMIN B12: 332 PG/ML (ref 200–1100)
EXT VMA 24 HR URINE: ABNORMAL
EXT WBC: 4 1000/UL (ref 3.8–10.8)
NEURON SPECIFIC ENOLASE: ABNORMAL

## 2018-06-12 ENCOUNTER — TELEPHONE (OUTPATIENT)
Dept: NEUROLOGY | Facility: HOSPITAL | Age: 63
End: 2018-06-12

## 2018-06-12 NOTE — TELEPHONE ENCOUNTER
----- Message from Adali Mccabe RN sent at 6/11/2018  2:31 PM CDT -----  glenna  ----- Message -----  From: Kenna House  Sent: 6/11/2018   1:07 PM  To: Adali Mccabe RN     Patient is schedule to see Dr. Boland on 6-19-18. She has confirm appointment and was given where to report.  ----- Message -----  From: Adali Mccabe RN  Sent: 6/1/2018  12:20 PM  To: [place] Scheduling    pls call pt to set up carlos with Dr. Boland to discuss Y-90.  Thanks  Tisha

## 2018-06-19 ENCOUNTER — INITIAL CONSULT (OUTPATIENT)
Dept: INTERVENTIONAL RADIOLOGY/VASCULAR | Facility: CLINIC | Age: 63
End: 2018-06-19
Attending: FAMILY MEDICINE
Payer: COMMERCIAL

## 2018-06-19 VITALS
BODY MASS INDEX: 28.5 KG/M2 | DIASTOLIC BLOOD PRESSURE: 82 MMHG | WEIGHT: 171.06 LBS | SYSTOLIC BLOOD PRESSURE: 129 MMHG | HEIGHT: 65 IN | HEART RATE: 86 BPM

## 2018-06-19 DIAGNOSIS — D49.89 NEOPLASM OF ABDOMEN: ICD-10-CM

## 2018-06-19 DIAGNOSIS — C7A.00 MALIGNANT CARCINOID TUMOR OF UNKNOWN PRIMARY SITE: Primary | ICD-10-CM

## 2018-06-19 DIAGNOSIS — C7B.02 METASTATIC MALIGNANT CARCINOID TUMOR TO LIVER: ICD-10-CM

## 2018-06-19 PROCEDURE — 99213 OFFICE O/P EST LOW 20 MIN: CPT | Mod: S$GLB,,, | Performed by: RADIOLOGY

## 2018-06-19 PROCEDURE — 99999 PR PBB SHADOW E&M-EST. PATIENT-LVL III: CPT | Mod: PBBFAC,,, | Performed by: RADIOLOGY

## 2018-06-19 NOTE — PROGRESS NOTES
"Consult Note  Interventional Radiology    Consult Requested By: Dr. 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 Currently feels well. Symptoms controlled with Lanreotide. No imaging since 8/2017.    Past Medical History:   Diagnosis Date    Cancer     GERD (gastroesophageal reflux disease)     Liver mass 06/2016    Mass of colon 06/2016    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      History reviewed. No pertinent surgical history.  History reviewed. No pertinent family history.  Social History   Substance Use Topics    Smoking status: Never Smoker    Smokeless tobacco: Never Used    Alcohol use No       Review of patient's allergies indicates:   Allergen Reactions    Epinephrine Anaphylaxis     Can Cause Carcinoid Crisis        Review of Systems:   HEENT: No nose bleeds  CV: No chest pain  Resp: No shortness of breath  GI: No abdominal pain  : No difficulty urinating  MSK: No weakness  Neurologic: No numbness  Skin: No rashes  Hematology: No bleeding disorders       OBJECTIVE:     /82   Pulse 86   Ht 5' 4.5" (1.638 m)   Wt 77.6 kg (171 lb 1.2 oz)   BMI 28.91 kg/m²       Physical Exam:  General- Patient alert and oriented x3 in NAD  Eyes-  ENT- EOMI, MMM  Neck- No JVD  CV- Regular rate and rhythm  Resp-  No increased WOB  GI- Non tender/non-distended  Extrem- No cyanosis  Derm- No lesions noted  Neuro-  No focal deficits noted.     Physical Exam  Body mass index is 28.91 kg/m².    Laboratory:  Reviewed labs for last year.    Diagnostic Results:  MRI: Reviewed  Multiple priors dating back to 8/2016    EKG: N/A    ASSESSMENT/PLAN:     63 yo female with metastatic gastrinoma to the liver presents to discuss therapeutic options.  - Discussed again with pt ablation vs TACE vs Y90. All questions " were answered. Given than most recent imaging is from 8/2017 we will need updated imaging. Discussed with pt hat surgery would be beneficial to address primary site of malignancy given that if only metastatic disease is treated recurrence is still a possibility. We will order new imaging and follow up once that is done. Discussed with pt that her disease has been stable for one year but that can change. Currently her disease burden is manageable but this may change as well. She understands.    Ronald Boland M.D.  Diagnostic and Interventional Radiologist  Department of Radiology  Pager: [phone]

## 2018-06-19 NOTE — LETTER
June 19, 2018      JANES Riddle MD  [address]  [address]  [facility] [address]           [facility]  [address]  [facility] [address]  Phone: [phone]          Patient: Lena Brantley   MR Number: [id number]   YOB: 1955   Date of Visit: 6/19/2018       Dear Dr. JANES Riddle:    Thank you for referring Lena Brantley to me for evaluation. Attached you will find relevant portions of my assessment and plan of care.    If you have questions, please do not hesitate to call me. I look forward to following Lena Brantley along with you.    Sincerely,    Ronald Boland MD    Enclosure  CC:  No Recipients    If you would like to receive this communication electronically, please contact [email or web address] or [phone] to request more information on [employer] access.    For providers and/or their staff who would like to refer a patient to [facility], please contact us through our one-stop-shop provider referral line, [facility], [place], at [phone].    If you feel you have received this communication in error or would no longer like to receive these types of communications, please e-mail [email or web address]

## 2018-06-29 ENCOUNTER — INFUSION (OUTPATIENT)
Dept: INFUSION THERAPY | Facility: HOSPITAL | Age: 63
End: 2018-06-29
Attending: SURGERY
Payer: COMMERCIAL

## 2018-06-29 VITALS — WEIGHT: 171.06 LBS | HEIGHT: 65 IN | BODY MASS INDEX: 28.5 KG/M2

## 2018-06-29 DIAGNOSIS — C7A.00 MALIGNANT CARCINOID TUMOR OF UNKNOWN PRIMARY SITE: Primary | ICD-10-CM

## 2018-06-29 DIAGNOSIS — C7B.02 METASTATIC MALIGNANT CARCINOID TUMOR TO LIVER: ICD-10-CM

## 2018-06-29 PROCEDURE — 63600175 PHARM REV CODE 636 W HCPCS: Mod: JG | Performed by: SURGERY

## 2018-06-29 PROCEDURE — 96372 THER/PROPH/DIAG INJ SC/IM: CPT

## 2018-06-29 RX ORDER — LANREOTIDE ACETATE 120 MG/.5ML
120 INJECTION SUBCUTANEOUS
Status: DISCONTINUED | OUTPATIENT
Start: 2018-06-29 | End: 2018-06-29 | Stop reason: HOSPADM

## 2018-06-29 RX ORDER — LANREOTIDE ACETATE 120 MG/.5ML
INJECTION SUBCUTANEOUS
Status: DISPENSED
Start: 2018-06-29 | End: 2018-06-29

## 2018-06-29 RX ADMIN — LANREOTIDE ACETATE 120 MG: 120 INJECTION SUBCUTANEOUS at 10:06

## 2018-07-23 RX ORDER — PANTOPRAZOLE SODIUM 40 MG/1
TABLET, DELAYED RELEASE ORAL
Qty: 90 TABLET | Refills: 0 | Status: SHIPPED | OUTPATIENT
Start: 2018-07-23 | End: 2020-04-06 | Stop reason: SDUPTHER

## 2018-07-31 ENCOUNTER — INFUSION (OUTPATIENT)
Dept: INFUSION THERAPY | Facility: HOSPITAL | Age: 63
End: 2018-07-31
Attending: SURGERY
Payer: COMMERCIAL

## 2018-07-31 VITALS — WEIGHT: 171.06 LBS | HEIGHT: 65 IN | BODY MASS INDEX: 28.5 KG/M2

## 2018-07-31 DIAGNOSIS — C7B.02 METASTATIC MALIGNANT CARCINOID TUMOR TO LIVER: ICD-10-CM

## 2018-07-31 DIAGNOSIS — C7A.00 MALIGNANT CARCINOID TUMOR OF UNKNOWN PRIMARY SITE: Primary | ICD-10-CM

## 2018-07-31 PROCEDURE — 63600175 PHARM REV CODE 636 W HCPCS: Mod: JG | Performed by: SURGERY

## 2018-07-31 PROCEDURE — 96372 THER/PROPH/DIAG INJ SC/IM: CPT

## 2018-07-31 RX ORDER — LANREOTIDE ACETATE 120 MG/.5ML
120 INJECTION SUBCUTANEOUS
Status: DISCONTINUED | OUTPATIENT
Start: 2018-07-31 | End: 2018-07-31 | Stop reason: HOSPADM

## 2018-07-31 RX ADMIN — LANREOTIDE ACETATE 120 MG: 120 INJECTION SUBCUTANEOUS at 11:07

## 2018-08-28 ENCOUNTER — INFUSION (OUTPATIENT)
Dept: INFUSION THERAPY | Facility: HOSPITAL | Age: 63
End: 2018-08-28
Attending: SURGERY
Payer: COMMERCIAL

## 2018-08-28 VITALS — BODY MASS INDEX: 28.5 KG/M2 | WEIGHT: 171.06 LBS | HEIGHT: 65 IN

## 2018-08-28 DIAGNOSIS — C7A.00 MALIGNANT CARCINOID TUMOR OF UNKNOWN PRIMARY SITE: Primary | ICD-10-CM

## 2018-08-28 DIAGNOSIS — C7B.02 METASTATIC MALIGNANT CARCINOID TUMOR TO LIVER: ICD-10-CM

## 2018-08-28 PROCEDURE — 63600175 PHARM REV CODE 636 W HCPCS: Mod: JG | Performed by: SURGERY

## 2018-08-28 PROCEDURE — 96372 THER/PROPH/DIAG INJ SC/IM: CPT

## 2018-08-28 RX ORDER — LANREOTIDE ACETATE 120 MG/.5ML
120 INJECTION SUBCUTANEOUS
Status: DISCONTINUED | OUTPATIENT
Start: 2018-08-28 | End: 2018-08-28 | Stop reason: HOSPADM

## 2018-08-28 RX ADMIN — LANREOTIDE ACETATE 120 MG: 120 INJECTION SUBCUTANEOUS at 11:08

## 2018-09-25 ENCOUNTER — INFUSION (OUTPATIENT)
Dept: INFUSION THERAPY | Facility: HOSPITAL | Age: 63
End: 2018-09-25
Attending: SURGERY
Payer: COMMERCIAL

## 2018-09-25 VITALS — BODY MASS INDEX: 28.5 KG/M2 | WEIGHT: 171.06 LBS | HEIGHT: 65 IN

## 2018-09-25 DIAGNOSIS — C7A.00 MALIGNANT CARCINOID TUMOR OF UNKNOWN PRIMARY SITE: Primary | ICD-10-CM

## 2018-09-25 DIAGNOSIS — C7B.02 METASTATIC MALIGNANT CARCINOID TUMOR TO LIVER: ICD-10-CM

## 2018-09-25 PROCEDURE — 96372 THER/PROPH/DIAG INJ SC/IM: CPT

## 2018-09-25 PROCEDURE — 63600175 PHARM REV CODE 636 W HCPCS: Mod: JG | Performed by: SURGERY

## 2018-09-25 RX ORDER — LANREOTIDE ACETATE 120 MG/.5ML
120 INJECTION SUBCUTANEOUS
Status: DISCONTINUED | OUTPATIENT
Start: 2018-09-25 | End: 2018-09-25 | Stop reason: HOSPADM

## 2018-09-25 RX ADMIN — LANREOTIDE ACETATE 120 MG: 120 INJECTION SUBCUTANEOUS at 11:09

## 2018-10-23 ENCOUNTER — INFUSION (OUTPATIENT)
Dept: INFUSION THERAPY | Facility: HOSPITAL | Age: 63
End: 2018-10-23
Attending: SURGERY
Payer: COMMERCIAL

## 2018-10-23 VITALS — HEIGHT: 65 IN | WEIGHT: 171.06 LBS | BODY MASS INDEX: 28.5 KG/M2

## 2018-10-23 DIAGNOSIS — C7B.02 METASTATIC MALIGNANT CARCINOID TUMOR TO LIVER: ICD-10-CM

## 2018-10-23 DIAGNOSIS — C7A.00 MALIGNANT CARCINOID TUMOR OF UNKNOWN PRIMARY SITE: Primary | ICD-10-CM

## 2018-10-23 PROCEDURE — 63600175 PHARM REV CODE 636 W HCPCS: Mod: JG | Performed by: SURGERY

## 2018-10-23 PROCEDURE — 96372 THER/PROPH/DIAG INJ SC/IM: CPT

## 2018-10-23 RX ORDER — LANREOTIDE ACETATE 120 MG/.5ML
120 INJECTION SUBCUTANEOUS
Status: DISCONTINUED | OUTPATIENT
Start: 2018-10-23 | End: 2018-10-23 | Stop reason: HOSPADM

## 2018-10-23 RX ADMIN — LANREOTIDE ACETATE 120 MG: 120 INJECTION SUBCUTANEOUS at 11:10

## 2018-11-20 ENCOUNTER — INFUSION (OUTPATIENT)
Dept: INFUSION THERAPY | Facility: HOSPITAL | Age: 63
End: 2018-11-20
Attending: SURGERY
Payer: COMMERCIAL

## 2018-11-20 VITALS — HEIGHT: 65 IN | BODY MASS INDEX: 28.5 KG/M2 | WEIGHT: 171.06 LBS

## 2018-11-20 DIAGNOSIS — C7A.00 MALIGNANT CARCINOID TUMOR OF UNKNOWN PRIMARY SITE: Primary | ICD-10-CM

## 2018-11-20 DIAGNOSIS — C7B.02 METASTATIC MALIGNANT CARCINOID TUMOR TO LIVER: ICD-10-CM

## 2018-11-20 PROCEDURE — 96372 THER/PROPH/DIAG INJ SC/IM: CPT

## 2018-11-20 PROCEDURE — 63600175 PHARM REV CODE 636 W HCPCS: Mod: JG | Performed by: SURGERY

## 2018-11-20 RX ORDER — LANREOTIDE ACETATE 120 MG/.5ML
120 INJECTION SUBCUTANEOUS
Status: DISCONTINUED | OUTPATIENT
Start: 2018-11-20 | End: 2018-11-20 | Stop reason: HOSPADM

## 2018-11-20 RX ADMIN — LANREOTIDE ACETATE 120 MG: 120 INJECTION SUBCUTANEOUS at 11:11

## 2018-12-18 ENCOUNTER — INFUSION (OUTPATIENT)
Dept: INFUSION THERAPY | Facility: HOSPITAL | Age: 63
End: 2018-12-18
Attending: SURGERY
Payer: COMMERCIAL

## 2018-12-18 VITALS — HEIGHT: 65 IN | BODY MASS INDEX: 28.49 KG/M2 | WEIGHT: 171 LBS

## 2018-12-18 DIAGNOSIS — C7A.00 MALIGNANT CARCINOID TUMOR OF UNKNOWN PRIMARY SITE: Primary | ICD-10-CM

## 2018-12-18 DIAGNOSIS — C7B.02 METASTATIC MALIGNANT CARCINOID TUMOR TO LIVER: ICD-10-CM

## 2018-12-18 PROCEDURE — 63600175 PHARM REV CODE 636 W HCPCS: Mod: JG | Performed by: SURGERY

## 2018-12-18 PROCEDURE — 96372 THER/PROPH/DIAG INJ SC/IM: CPT

## 2018-12-18 RX ORDER — LANREOTIDE ACETATE 120 MG/.5ML
120 INJECTION SUBCUTANEOUS
Status: DISCONTINUED | OUTPATIENT
Start: 2018-12-18 | End: 2018-12-18 | Stop reason: HOSPADM

## 2018-12-18 RX ADMIN — LANREOTIDE ACETATE 120 MG: 120 INJECTION SUBCUTANEOUS at 11:12

## 2019-01-18 ENCOUNTER — INFUSION (OUTPATIENT)
Dept: INFUSION THERAPY | Facility: HOSPITAL | Age: 64
End: 2019-01-18
Attending: SURGERY
Payer: COMMERCIAL

## 2019-01-18 VITALS — WEIGHT: 171 LBS | BODY MASS INDEX: 28.49 KG/M2 | HEIGHT: 65 IN

## 2019-01-18 DIAGNOSIS — C7B.02 METASTATIC MALIGNANT CARCINOID TUMOR TO LIVER: ICD-10-CM

## 2019-01-18 DIAGNOSIS — C7A.00 MALIGNANT CARCINOID TUMOR OF UNKNOWN PRIMARY SITE: Primary | ICD-10-CM

## 2019-01-18 PROCEDURE — 96372 THER/PROPH/DIAG INJ SC/IM: CPT

## 2019-01-18 PROCEDURE — 63600175 PHARM REV CODE 636 W HCPCS: Mod: JG | Performed by: SURGERY

## 2019-01-18 RX ORDER — LANREOTIDE ACETATE 120 MG/.5ML
120 INJECTION SUBCUTANEOUS
Status: DISCONTINUED | OUTPATIENT
Start: 2019-01-18 | End: 2019-01-18 | Stop reason: HOSPADM

## 2019-01-18 RX ADMIN — LANREOTIDE ACETATE 120 MG: 120 INJECTION SUBCUTANEOUS at 01:01

## 2019-01-21 RX ORDER — PANTOPRAZOLE SODIUM 40 MG/1
TABLET, DELAYED RELEASE ORAL
Qty: 90 TABLET | Refills: 0 | Status: SHIPPED | OUTPATIENT
Start: 2019-01-21 | End: 2019-04-16 | Stop reason: SDUPTHER

## 2019-02-11 ENCOUNTER — TELEPHONE (OUTPATIENT)
Dept: NEUROLOGY | Facility: HOSPITAL | Age: 64
End: 2019-02-11

## 2019-02-11 DIAGNOSIS — C7A.00: ICD-10-CM

## 2019-02-11 DIAGNOSIS — C25.4 GASTRINOMA, MALIGNANT: Primary | ICD-10-CM

## 2019-02-11 DIAGNOSIS — C78.7 SECONDARY MALIGNANT NEOPLASM OF LIVER: ICD-10-CM

## 2019-02-11 NOTE — TELEPHONE ENCOUNTER
----- Message from Kya James sent at 2/11/2019 11:35 AM CST -----  Contact: Patient  JPSANTY- Patient has questions about her [place] [place] Assistance Program information received about re-enrolement questions. Call patient at [phone]  
Returned pts call. [facility]. She will call [facility] to confirm re-enrollment and we will complete application on her behalf. Also noted pt has been lost to follow up. Pt was supposed to have f/u imaging w/ IR and did not follow through. Pt in need of complete restage. Pt transferred to scheduling department to schedule CT, MRI and Gallium scan. Clinic appt made. Pt due for injection this Friday. Advised to  lab orders on Friday from , then proceed to [facility] on 3rd floor to have labs drawn. Reinforced importance of restage. Pt verbalizes understanding.   
Yes

## 2019-02-15 ENCOUNTER — INFUSION (OUTPATIENT)
Dept: INFUSION THERAPY | Facility: HOSPITAL | Age: 64
End: 2019-02-15
Attending: SURGERY
Payer: COMMERCIAL

## 2019-02-15 VITALS — HEIGHT: 64 IN | BODY MASS INDEX: 29.19 KG/M2 | WEIGHT: 171 LBS

## 2019-02-15 DIAGNOSIS — C7A.00 MALIGNANT CARCINOID TUMOR OF UNKNOWN PRIMARY SITE: Primary | ICD-10-CM

## 2019-02-15 DIAGNOSIS — C7B.02 METASTATIC MALIGNANT CARCINOID TUMOR TO LIVER: ICD-10-CM

## 2019-02-15 LAB
EXT 24 HR UR METANEPHRINE: ABNORMAL
EXT 24 HR UR NORMETANEPHRINE: ABNORMAL
EXT 24 HR UR NORMETANEPHRINE: ABNORMAL
EXT 25 HYDROXY VIT D2: ABNORMAL
EXT 25 HYDROXY VIT D3: ABNORMAL
EXT 5 HIAA 24 HR URINE: ABNORMAL
EXT 5 HIAA BLOOD: 8 NG/ML (ref 0–22)
EXT ACTH: ABNORMAL
EXT AFP: ABNORMAL
EXT ALBUMIN: 4.1 G/DL (ref 3.6–5.1)
EXT ALKALINE PHOSPHATASE: 142 U/L (ref 33–130)
EXT ALT: 17 U/L (ref 6–29)
EXT AMYLASE: ABNORMAL
EXT ANTI ISLET CELL AB: ABNORMAL
EXT ANTI PARIETAL CELL AB: ABNORMAL
EXT ANTI THYROID AB: ABNORMAL
EXT AST: 21 U/L (ref 10–35)
EXT BILIRUBIN DIRECT: ABNORMAL
EXT BILIRUBIN TOTAL: 0.8 MG/DL (ref 0.2–1.2)
EXT BK VIRUS DNA QN PCR: ABNORMAL
EXT BUN: 9 MG/DL (ref 7–25)
EXT C PEPTIDE: ABNORMAL
EXT CA 125: ABNORMAL
EXT CA 19-9: ABNORMAL
EXT CA 27-29: ABNORMAL
EXT CALCITONIN: ABNORMAL
EXT CALCIUM: 9.5 MG/DL (ref 8.6–10.4)
EXT CEA: ABNORMAL
EXT CHLORIDE: 103 MMOL/L (ref 98–110)
EXT CHOLESTEROL: ABNORMAL
EXT CHROMOGRANIN A: ABNORMAL
EXT CO2: 29 MMOL/L (ref 20–32)
EXT CREATININE UA: ABNORMAL
EXT CREATININE: 0.84 MG/DL (ref 0.5–0.99)
EXT CYCLOSPORONE LEVEL: ABNORMAL
EXT DOPAMINE: ABNORMAL
EXT EBV DNA BY PCR: ABNORMAL
EXT EPINEPHRINE: ABNORMAL
EXT FOLATE: 7.1 NG/ML
EXT FREE T3: ABNORMAL
EXT FREE T4: ABNORMAL
EXT FSH: ABNORMAL
EXT GASTRIN RELEASING PEPTIDE: ABNORMAL
EXT GASTRIN RELEASING PEPTIDE: ABNORMAL
EXT GASTRIN: 132 PG/ML (ref 0–100)
EXT GGT: ABNORMAL
EXT GHRELIN: ABNORMAL
EXT GLUCAGON: ABNORMAL
EXT GLUCOSE: 103 MG/DL (ref 65–99)
EXT GROWTH HORMONE: ABNORMAL
EXT HCV RNA QUANT PCR: ABNORMAL
EXT HDL: ABNORMAL
EXT HEMATOCRIT: 33.8 % (ref 35–45)
EXT HEMOGLOBIN A1C: ABNORMAL
EXT HEMOGLOBIN: 10.1 G/DL (ref 11.7–15.5)
EXT HISTAMINE 24 HR URINE: ABNORMAL
EXT HISTAMINE: ABNORMAL
EXT IGF-1: ABNORMAL
EXT IMMUNKNOW (NON-STIMULATED): ABNORMAL
EXT IMMUNKNOW (STIMULATED): ABNORMAL
EXT INR: ABNORMAL
EXT INSULIN: ABNORMAL
EXT LANREOTIDE LEVEL: ABNORMAL
EXT LDH, TOTAL: ABNORMAL
EXT LDL CHOLESTEROL: ABNORMAL
EXT LIPASE: ABNORMAL
EXT MAGNESIUM: ABNORMAL
EXT METANEPHRINE FREE PLASMA: ABNORMAL
EXT MOTILIN: ABNORMAL
EXT NEUROKININ A CAMB: ABNORMAL
EXT NEUROKININ A ISI: ABNORMAL
EXT NEUROTENSIN: ABNORMAL
EXT NOREPINEPHRINE: ABNORMAL
EXT NORMETANEPHRINE: ABNORMAL
EXT NSE: ABNORMAL
EXT OCTREOTIDE LEVEL: ABNORMAL
EXT PANCREASTATIN CAMB: ABNORMAL
EXT PANCREASTATIN ISI: 78 PG/ML (ref 10–135)
EXT PANCREATIC POLYPEPTIDE: ABNORMAL
EXT PHOSPHORUS: ABNORMAL
EXT PLATELETS: 297 1000/UL (ref 140–400)
EXT POTASSIUM: 4.5 MMOL/L (ref 3.5–5.3)
EXT PROGRAF LEVEL: ABNORMAL
EXT PROLACTIN: ABNORMAL
EXT PROTEIN TOTAL: 7.3 G/DL (ref 6.1–8.1)
EXT PROTEIN UA: ABNORMAL
EXT PT: ABNORMAL
EXT PTH, INTACT: ABNORMAL
EXT PTT: ABNORMAL
EXT RAPAMUNE LEVEL: ABNORMAL
EXT SEROTONIN: 149 NG/ML (ref 56–244)
EXT SODIUM: 138 MMOL/L (ref 135–146)
EXT SOMATOSTATIN: ABNORMAL
EXT SUBSTANCE P: ABNORMAL
EXT TRIGLYCERIDES: ABNORMAL
EXT TRYPTASE: ABNORMAL
EXT TSH: ABNORMAL
EXT URIC ACID: ABNORMAL
EXT URINE AMYLASE U/HR: ABNORMAL
EXT URINE AMYLASE U/L: ABNORMAL
EXT VASOACTIVE INTESTINAL POLYPEPTIDE: ABNORMAL
EXT VITAMIN B12: 627 PG/ML (ref 650–1340)
EXT VMA 24 HR URINE: ABNORMAL
EXT WBC: 3.6 1000/UL (ref 3.8–10.8)
NEURON SPECIFIC ENOLASE: ABNORMAL

## 2019-02-15 PROCEDURE — 63600175 PHARM REV CODE 636 W HCPCS: Mod: JG | Performed by: SURGERY

## 2019-02-15 PROCEDURE — 96372 THER/PROPH/DIAG INJ SC/IM: CPT

## 2019-02-15 RX ORDER — LANREOTIDE ACETATE 120 MG/.5ML
120 INJECTION SUBCUTANEOUS
Status: CANCELLED | OUTPATIENT
Start: 2019-02-15

## 2019-02-15 RX ORDER — LANREOTIDE ACETATE 120 MG/.5ML
120 INJECTION SUBCUTANEOUS
Status: DISCONTINUED | OUTPATIENT
Start: 2019-02-15 | End: 2019-02-15 | Stop reason: HOSPADM

## 2019-02-15 RX ADMIN — LANREOTIDE ACETATE 120 MG: 120 INJECTION SUBCUTANEOUS at 12:02

## 2019-02-22 ENCOUNTER — HOSPITAL ENCOUNTER (OUTPATIENT)
Dept: RADIOLOGY | Facility: HOSPITAL | Age: 64
Discharge: HOME | End: 2019-02-22
Attending: SURGERY
Payer: COMMERCIAL

## 2019-02-22 DIAGNOSIS — C78.7 SECONDARY MALIGNANT NEOPLASM OF LIVER: ICD-10-CM

## 2019-02-22 DIAGNOSIS — C25.4 GASTRINOMA, MALIGNANT: ICD-10-CM

## 2019-02-22 PROCEDURE — 74177 CT ABDOMEN PELVIS WITH CONTRAST: ICD-10-PCS | Mod: 26,,, | Performed by: RADIOLOGY

## 2019-02-22 PROCEDURE — 74177 CT ABD & PELVIS W/CONTRAST: CPT | Mod: TC

## 2019-02-22 PROCEDURE — 74177 CT ABD & PELVIS W/CONTRAST: CPT | Mod: 26,,, | Performed by: RADIOLOGY

## 2019-02-22 PROCEDURE — 25500020 PHARM REV CODE 255: Performed by: SURGERY

## 2019-02-22 RX ADMIN — IOHEXOL 30 ML: 350 INJECTION, SOLUTION INTRAVENOUS at 01:02

## 2019-02-22 RX ADMIN — IOHEXOL 75 ML: 350 INJECTION, SOLUTION INTRAVENOUS at 02:02

## 2019-03-08 ENCOUNTER — INFUSION (OUTPATIENT)
Dept: INFUSION THERAPY | Facility: HOSPITAL | Age: 64
End: 2019-03-08
Attending: SURGERY
Payer: COMMERCIAL

## 2019-03-08 ENCOUNTER — OFFICE VISIT (OUTPATIENT)
Dept: NEUROLOGY | Facility: HOSPITAL | Age: 64
End: 2019-03-08
Attending: SURGERY
Payer: COMMERCIAL

## 2019-03-08 VITALS
TEMPERATURE: 99 F | SYSTOLIC BLOOD PRESSURE: 100 MMHG | DIASTOLIC BLOOD PRESSURE: 68 MMHG | BODY MASS INDEX: 29.42 KG/M2 | HEART RATE: 94 BPM | HEIGHT: 64 IN | WEIGHT: 172.31 LBS

## 2019-03-08 VITALS — WEIGHT: 172.31 LBS | HEIGHT: 64 IN | BODY MASS INDEX: 29.42 KG/M2

## 2019-03-08 DIAGNOSIS — C7B.02 METASTATIC MALIGNANT CARCINOID TUMOR TO LIVER: ICD-10-CM

## 2019-03-08 DIAGNOSIS — C7A.00 MALIGNANT CARCINOID TUMOR OF UNKNOWN PRIMARY SITE: Primary | ICD-10-CM

## 2019-03-08 DIAGNOSIS — C25.4 GASTRINOMA, MALIGNANT: Primary | ICD-10-CM

## 2019-03-08 PROCEDURE — 99214 OFFICE O/P EST MOD 30 MIN: CPT | Mod: 25 | Performed by: SURGERY

## 2019-03-08 PROCEDURE — 63600175 PHARM REV CODE 636 W HCPCS: Performed by: SURGERY

## 2019-03-08 PROCEDURE — 96372 THER/PROPH/DIAG INJ SC/IM: CPT

## 2019-03-08 RX ORDER — CYANOCOBALAMIN 1000 UG/ML
1000 INJECTION, SOLUTION INTRAMUSCULAR; SUBCUTANEOUS
Status: CANCELLED
Start: 2019-03-28

## 2019-03-08 RX ORDER — LANREOTIDE ACETATE 120 MG/.5ML
120 INJECTION SUBCUTANEOUS
Status: CANCELLED | OUTPATIENT
Start: 2019-03-28

## 2019-03-08 RX ORDER — LANREOTIDE ACETATE 120 MG/.5ML
120 INJECTION SUBCUTANEOUS
Status: DISCONTINUED | OUTPATIENT
Start: 2019-03-08 | End: 2019-03-08 | Stop reason: HOSPADM

## 2019-03-08 RX ORDER — CYANOCOBALAMIN 1000 UG/ML
1000 INJECTION, SOLUTION INTRAMUSCULAR; SUBCUTANEOUS
Status: COMPLETED | OUTPATIENT
Start: 2019-03-08 | End: 2019-03-08

## 2019-03-08 RX ORDER — CYANOCOBALAMIN 1000 UG/ML
1000 INJECTION, SOLUTION INTRAMUSCULAR; SUBCUTANEOUS
Status: CANCELLED
Start: 2019-03-08

## 2019-03-08 RX ADMIN — LANREOTIDE ACETATE 120 MG: 120 INJECTION SUBCUTANEOUS at 01:03

## 2019-03-08 RX ADMIN — CYANOCOBALAMIN 1000 MCG: 1000 INJECTION, SOLUTION INTRAMUSCULAR at 01:03

## 2019-03-08 NOTE — PROGRESS NOTES
"NOLANETS:  [facility]  A collaboration between [facility] and [facility]      PATIENT: Lena Brantley  MRN: [id number]  DATE: 3/8/2019    Subjective:      Chief Complaint: Follow-up (follow up with MRI)      Vitals:   Vitals:    03/08/19 1154   BP: 100/68   Pulse: 94   Temp: 99.3 °F (37.4 °C)   TempSrc: Oral   Weight: 78.1 kg (172 lb 4.6 oz)   Height: 5' 4" (1.626 m)        Karnofsky Score:     Diagnosis:   1. Gastrinoma, malignant    2. 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    61 y F seen in clinic ~ 4 month ago to discuss possible surgery for gastrinoma metastatic to liver.  Did not desire surgery at that time.  Significant improvement in symptoms.  Tolerating diet with no current nausea/emesis.  Has regained lost weight.  Regular, formed bowel movements.  Taking protonix 40 once per day with good control of symptoms.  Denies any current GI complaints.  Muscle cramps resolved.  Insurance did not approve Gallium 68 scan.  Wanting to hold off on any surgery until daughter finishes that semester.  Now with more excuses for not doing Y 90 anfter d/w Kya, and still refuses surgery.    Biotheranostic: 96% Pancreas         Multidisciplinary NET Tumor Board Summary:      Presenter:     Attendees:    Surgery:              MD JANES Allison MD T. Ramcharan, MD  Interventional Radiology - Ronald Boland MD  Pathology - Ami Restrepo MD  Oncology - Sandeep Sanford MD  Gastroenterology - Not present         Discussion: Reviewed medical history, scans and lab work, gastrinoma, mets to liver, tumor is enlarging,         Recommendations:   1. Appointment with Dr Boland (done 2017)  2. Perfect candidate for segmentectomy  Not willing. 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    Allergies:  Review of patient's allergies indicates:   Allergen Reactions    Epinephrine Anaphylaxis     Can Cause Carcinoid Crisis       Medications:  Current Outpatient Medications   Medication Sig Dispense Refill    lanreotide (SOMATULINE DEPOT) 120 mg/0.5 mL Syrg Inject 120 mg into the skin every 28 days.      pantoprazole (PROTONIX) 40 MG tablet TAKE 1 TABLET BY MOUTH EVERY DAY 90 tablet 0    pantoprazole (PROTONIX) 40 MG tablet TAKE 1 TABLET(40 MG) BY MOUTH EVERY DAY 90 tablet 0     No current facility-administered medications for this visit.        Review of Systems   Constitutional: Negative.  Negative for appetite change, chills, fatigue, fever and unexpected weight change.   HENT: Negative.  Negative for ear discharge, hearing loss and tinnitus.    Eyes: Negative.  Negative for photophobia and visual disturbance.   Respiratory: Negative.  Negative for apnea, cough, shortness of breath, wheezing and stridor.    Cardiovascular: Negative.  Negative for chest pain, palpitations and leg swelling.   Gastrointestinal: Negative for abdominal distention, abdominal pain, constipation, diarrhea, nausea and vomiting.        No further nausea/emesis.  Tolerating diet.  BM regular again    Endocrine: Negative.    Genitourinary: Negative.  Negative for difficulty urinating and hematuria.   Musculoskeletal: Negative for arthralgias, back pain, gait problem,  joint swelling and neck pain.        Cramps resolved    Skin: Negative.  Negative for color change, pallor and rash.   Allergic/Immunologic: Negative.  Negative for environmental allergies, food allergies and immunocompromised state.   Neurological: Negative.  Negative for dizziness, seizures, syncope and weakness.   Hematological: Negative.  Negative for adenopathy. Does not bruise/bleed easily.   Psychiatric/Behavioral: Negative.  Negative for behavioral problems, dysphoric mood and hallucinations.      Objective:      Physical Exam   Constitutional: She is oriented to person, place, and time. She appears well-developed and well-nourished. No distress.   HENT:   Head: Normocephalic and atraumatic.   Mouth/Throat: Oropharynx is clear and moist.   Eyes: EOM are normal. Pupils are equal, round, and reactive to light. No scleral icterus.   Neck: Normal range of motion. Neck supple. No thyromegaly present.   Cardiovascular: Normal rate, regular rhythm, normal heart sounds and intact distal pulses. Exam reveals no gallop.   No murmur heard.  Pulmonary/Chest: Effort normal and breath sounds normal. No respiratory distress. She has no wheezes. She has no rales.   Abdominal: Soft. Bowel sounds are normal. She exhibits no distension and no mass. There is no tenderness. There is no rebound and no guarding. No hernia. Hernia confirmed negative in the ventral area.   Musculoskeletal: Normal range of motion. She exhibits no edema or tenderness.   Neurological: She is alert and oriented to person, place, and time. She has normal reflexes. No cranial nerve deficit.   Skin: Skin is warm, dry and intact. No rash noted. She is not diaphoretic. No erythema. No pallor.   Psychiatric: She has a normal mood and affect. Her behavior is normal. Thought content normal.   Nursing note and vitals reviewed.     Assessment:       1. Gastrinoma, malignant    2. 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          EUS PANCREAS  2016: Normal      Laboratory Data:    Neuroendocrine Labs Latest Ref Rng & Units 3/8/2019 2/15/2019 2/15/2019 1/18/2019 12/18/2018 11/20/2018 10/23/2018   EXT 5 HIAA BLOOD 0 - 22 ng/ml - 8 - - - - -   EXT GASTRIN 0 - 100 pg/ml - 132(A) - - - - -   EXT SEROTONIN 56 - 244 ng/ml - 149 - - - - -   EXT CHROMOGRANIN A 0 - 15 ng/ml - - - - - - -   EXT PANCREASTATIN THAIS 10 - 135 pg/ml - 78 - - - - -   EXT ANTI PARIETAL CELL AB NEG - NEG - - - - -   EXT ANTI THYROID AB 0 - 1 iu/ml - - - - - - -   EXT ANTI ISLET CELL AB NEG - NEG - - - - -   EXT FOLATE >5.4 ng/ml - 7.1 - - - - -   EXT VITAMIN B12 650 - 1,340 pg/ml - 627(A) - - - - -   EXT CALCITONIN 0 - 5 pg/ml - - - - - - -   EXT PTH, INTACT 14 - 64 pg/ml - - - - - - -   WBC 3.90 - 12.70 K/uL - - - - - - -   EXT WBC 3.8 - 10.8 1000/ul - 3.6(A) - - - - -   HGB 12.0 - 16.0 g/dL - - - - - - -   EXT HGB 11.7 - 15.5 g/dl - 10.1(A) - - - - -   HCT 37.0 - 48.5 % - - - - - - -   EXT HCT 35.0 - 45.0 % - 33.8(A) - - - - -   PLATLETS 150 - 350 K/uL - - - - - - -   EXT PLATLETS 140 - 400 1000/ul - 297 - - - - -   PT 9.0 - 12.5 sec - - - - - - -   EXT PT 9.0 - 11.35 sec - - - - - - -   PTT 21.0 - 32.0 sec - - - - - - -   INR 0.8 - 1.2 - - - - - - -   EXT INR 0.9 - 1.1 - - - - - - -   GLUCOSE 70 - 110 mg/dL - - - - - - -   EXT GLUCOSE 65 - 99 mg/dl - 103(A) - - - - -   BUN 6 - 20 mg/dL - - - - - - -   EXT BUN 7 - 25 mg/dl - 9 - - - - -   CREATININE 0.5 - 1.4 mg/dL - - - - - - -   EXT CREATININE 0.50 - 0.99 mg/dl - 0.84 - - - - -    - 145 mmol/L - - - - - - -   EXT  - 146 mmol/l - 138 - - - - -   K 3.5 - 5.1 mmol/L - - - - - - -   EXT K 3.5 - 5.3 mmol/l - 4.5 - - - - -   CHLORIDE 95 - 110 mmol/L - - - - - - -   EXT CHLORIDE 98 - 110 mmol/l - 103 - - - - -   CO2 23 - 29 mmol/L - - - - - - -   EXT CO2 20 - 32 mmol/l - 29 - - - - -   CALCIUM 8.7 - 10.5 mg/dL - - - - - - -   EXT CALCIUM 8.6 - 10.4 mg/dl - 9.5 - - - - -   PROTEIN, TOTAL 6.0 - 8.4 g/dL - - - - - - -   EXT PROTEIN, TOTAL 6.1 - 8.1  g/dl - 7.3 - - - - -   PHOSPHORUS 2.7 - 4.5 mg/dL - - - - - - -   ALBUMIN 3.5 - 5.2 g/dL - - - - - - -   EXT ALBUMIN 3.6 - 5.1 g/dl - 4.1 - - - - -   TOTAL BILIRUBIN 0.1 - 1.0 mg/dL - - - - - - -   EXT TOTAL BILIRUBIN 0.2 - 1.2 mg/dl - 0.8 - - - - -   ALK PHOSPHATASE 55 - 135 U/L - - - - - - -   EXT ALK PHOSPHATASE 33 - 130 u/l - 142(A) - - - - -   SGOT (AST) 10 - 40 U/L - - - - - - -   EXT SGOT (AST) 10 - 35 u/l - 21 - - - - -   SGPT (ALT) 10 - 44 U/L - - - - - - -   EXT ALT 6 - 29 u/l - 17 - - - - -   MG 1.6 - 2.6 mg/dL - - - - - - -   Weight - 172 lbs 5 oz - 171 lbs 171 lbs 171 lbs 171 lbs 1 oz 171 lbs 1 oz     Pre-lanreotide  Neuroendocrine Labs Latest Ref Rng & Units 7/27/2016   EXT 5 HIAA BLOOD 0 - 22 ng/ml 11   EXT GASTRIN 0 - 100 pg/ml 18319 (A)   EXT SEROTONIN 56 - 244 ng/ml 216   EXT CHROMOGRANIN A 0 - 15 ng/ml 585 (A)   EXT PANCREASTATIN THAIS 10 - 135 pg/ml 695 (A)   EXT ANTI PARIETAL CELL AB Negative Negative   EXT ANTI THYROID AB <9 <1   EXT ANTI ISLET CELL AB Negative Negative   EXT FOLATE <3.4 - >5.4 ng/ml 15.6   EXT VITAMIN B12 200 - 1100 pg/ml 1319 (A)       CT:   The lung bases are clear.  There is a heterogeneously enhancing liver lesion noted in the segment 4 a measuring approximately 4.0 cm (sequence 6 image 14).  It appears similar to the prior exam.  No new liver lesions seen.  The biliary ducts are not dilated.  The gallbladder is present.  The stomach, pancreas, spleen, adrenal glands and bilateral kidneys appear normal.  Small left kidney cyst, unchanged.  The aorta tapers normally, and no para-aortic lymphadenopathy.  There is a moderate amount of retained stool.  No bowel dilation or inflammatory changes of the bowel seen.  No mesenteric abnormalities seen.  The uterus is retroverted, there are calcified uterine fibroid.  The ovaries appear within normal limits.  The osseous structures demonstrate a mild levoscoliosis of the lumbar spine, no osseous lesions.   Impression       Liver lesion  "consistent with biopsy-proven metastatic neuroendocrine tumor is unchanged in size.  No new lesions identified.  Left kidney cyst.  Calcified uterine fibroid.     MRI:    Results: Axial gradient T2, axial T1 in and out of phase, axial T2 SSFSE, coronal T2 gradient and T2 SSFSE followed by pre-contrast axial T1 gradient fat sat and dynamic post contrast images were obtained. The patient received  10 cc of IV Gadovist contrast.    The lung bases appear normal.  Index lesion #1, segment 4A, biopsy proven metastatic well-differentiated neuroendocrine tumor.  Measuring 4.0 cm on the 4 minutes postcontrast.  (Previous MRI measurements are not seen on the MRI films, I personally measured the lesion on the prior MRI in the same plane as the MRI performed today and it is unchanged).    No additional liver lesions seen.  The biliary ducts are not dilated.  The gallbladder is present.  The stomach, pancreas, spleen, adrenal glands and bilateral kidneys appear normal.  Small left kidney cyst, unchanged.  No osseous lesions.  There is a mild levoscoliosis of the lumbar spine.   Impression         1.Unchanged liver lesion consistent with metastatic neuroendocrine tumor.  No new liver lesions seen.  2.  Left kidney cyst.     CT shows 3/.7 ->3.9 _> 4.0 cms.  Slow progression until 2017.  Now 3.7 cms on most recent scan.    Impression: Gastrinoma, metastatic to liver,some regression? symptoms currently controlled, good response to lanreotide with near normalization of markers  Previously.  Needs restage.  Refuses surgery @ this time  Now wants to "wait until it gets cold outside"  But is willing to consider percutaneous ablation.   Undiscover "paNCREAS" PRIMARY.  Ga 69 scan may be the answer. Anemic low B12 level.  Gastrin levels normalized!   Plan:       Long d/W patiertn.> 50% of visit. Discussed numerous no shows, and procrastination.  Informed patient that if she does not want to do anything she will need to see another " physician as I cannot continue to make recommendations and have conferences about her that are not going to be followed, as that is not an appropriate use of resources.   Has a lot of anxiety and unrealistic fears. Tried to expliain fully. Pt voices understanding.   Continue lanreotide  ga68 scan  b 12 shot  Appt with Juvenal for medical management & assume care  RTC see me if patient ever decided to proceeed with surgery.      JANES Riddle MD, FACS  Professor of Surgery, [facility]  Neuroendocrine Surgery, Hepatic/Pancreatic & General Surgery  [address]  [place]  [address]  ph. [phone]; [phone]  fax. [phone]

## 2019-04-05 ENCOUNTER — INFUSION (OUTPATIENT)
Dept: INFUSION THERAPY | Facility: HOSPITAL | Age: 64
End: 2019-04-05
Attending: SURGERY
Payer: COMMERCIAL

## 2019-04-05 VITALS — BODY MASS INDEX: 29.42 KG/M2 | WEIGHT: 172.31 LBS | HEIGHT: 64 IN

## 2019-04-05 DIAGNOSIS — C7A.00 MALIGNANT CARCINOID TUMOR OF UNKNOWN PRIMARY SITE: Primary | ICD-10-CM

## 2019-04-05 DIAGNOSIS — C7B.02 METASTATIC MALIGNANT CARCINOID TUMOR TO LIVER: ICD-10-CM

## 2019-04-05 PROCEDURE — 96372 THER/PROPH/DIAG INJ SC/IM: CPT

## 2019-04-05 PROCEDURE — 63600175 PHARM REV CODE 636 W HCPCS: Mod: JG | Performed by: SURGERY

## 2019-04-05 RX ORDER — LANREOTIDE ACETATE 120 MG/.5ML
120 INJECTION SUBCUTANEOUS
Status: CANCELLED | OUTPATIENT
Start: 2019-04-28

## 2019-04-05 RX ORDER — CYANOCOBALAMIN 1000 UG/ML
1000 INJECTION, SOLUTION INTRAMUSCULAR; SUBCUTANEOUS
Status: CANCELLED
Start: 2019-04-28

## 2019-04-05 RX ORDER — CYANOCOBALAMIN 1000 UG/ML
1000 INJECTION, SOLUTION INTRAMUSCULAR; SUBCUTANEOUS
Status: DISCONTINUED | OUTPATIENT
Start: 2019-04-05 | End: 2019-04-05 | Stop reason: HOSPADM

## 2019-04-05 RX ORDER — LANREOTIDE ACETATE 120 MG/.5ML
120 INJECTION SUBCUTANEOUS
Status: DISCONTINUED | OUTPATIENT
Start: 2019-04-05 | End: 2019-04-05 | Stop reason: HOSPADM

## 2019-04-05 RX ADMIN — LANREOTIDE ACETATE 120 MG: 120 INJECTION SUBCUTANEOUS at 09:04

## 2019-04-05 NOTE — NURSING
Tolerated Lanreotide injection well.  Discharged with next appointment scheduled.  Pt. Did not want her B12 injection said it was only for one time and she is now taking pills.  Left a message with Dr. Riddle to let him know and to have the treatment plan changed.

## 2019-04-11 ENCOUNTER — TELEPHONE (OUTPATIENT)
Dept: NEUROLOGY | Facility: HOSPITAL | Age: 64
End: 2019-04-11

## 2019-04-11 NOTE — TELEPHONE ENCOUNTER
----- Message from JANES Riddle MD sent at 4/10/2019 12:13 PM CDT -----  Regarding: RE: B12  sure  ----- Message -----  From: Alisha Tobias RN  Sent: 4/5/2019   9:57 AM  To: JANES Riddle MD  Subject: B12                                              Do you want her to get B12 monthly? She said it was a one dose thing last month. Just clarifying.    Thanks

## 2019-04-12 ENCOUNTER — HOSPITAL ENCOUNTER (OUTPATIENT)
Dept: RADIOLOGY | Facility: HOSPITAL | Age: 64
Discharge: HOME | End: 2019-04-12
Attending: SURGERY
Payer: COMMERCIAL

## 2019-04-12 DIAGNOSIS — C7B.02 METASTATIC MALIGNANT CARCINOID TUMOR TO LIVER: ICD-10-CM

## 2019-04-12 DIAGNOSIS — C25.4 GASTRINOMA, MALIGNANT: ICD-10-CM

## 2019-04-12 PROCEDURE — 78815 PET IMAGE W/CT SKULL-THIGH: CPT | Mod: 26,PI,, | Performed by: RADIOLOGY

## 2019-04-12 PROCEDURE — A9587 GALLIUM GA-68: HCPCS | Mod: TB

## 2019-04-12 PROCEDURE — 78815 PET IMAGE W/CT SKULL-THIGH: CPT | Mod: TC

## 2019-04-12 PROCEDURE — 78815 NM PET 68GA DOTATATE WHOLE BODY: ICD-10-PCS | Mod: 26,PI,, | Performed by: RADIOLOGY

## 2019-04-16 RX ORDER — PANTOPRAZOLE SODIUM 40 MG/1
TABLET, DELAYED RELEASE ORAL
Qty: 90 TABLET | Refills: 0 | Status: SHIPPED | OUTPATIENT
Start: 2019-04-16 | End: 2019-07-11 | Stop reason: SDUPTHER

## 2019-04-23 ENCOUNTER — TELEPHONE (OUTPATIENT)
Dept: NEUROLOGY | Facility: HOSPITAL | Age: 64
End: 2019-04-23

## 2019-04-23 NOTE — TELEPHONE ENCOUNTER
----- Message from Shruthi Marsha sent at 4/23/2019 10:52 AM CDT -----  Contact: Veronica, [facility], [phone]  States she is faxing a drug enrollment form needing the doctor's signature to your office right now. Her fax number is [phone].

## 2019-04-23 NOTE — TELEPHONE ENCOUNTER
Returned Beulah's call. Advised that we have received the document, will have Dr. Riddle to sign and fax it back to them ASAP. Beulah has no further questions at this time.

## 2019-05-03 ENCOUNTER — INFUSION (OUTPATIENT)
Dept: INFUSION THERAPY | Facility: HOSPITAL | Age: 64
End: 2019-05-03
Attending: INTERNAL MEDICINE
Payer: COMMERCIAL

## 2019-05-03 VITALS — HEIGHT: 64 IN | BODY MASS INDEX: 29.42 KG/M2 | WEIGHT: 172.31 LBS

## 2019-05-03 DIAGNOSIS — C7A.00 MALIGNANT CARCINOID TUMOR OF UNKNOWN PRIMARY SITE: Primary | ICD-10-CM

## 2019-05-03 DIAGNOSIS — C7B.02 METASTATIC MALIGNANT CARCINOID TUMOR TO LIVER: ICD-10-CM

## 2019-05-03 PROCEDURE — 96372 THER/PROPH/DIAG INJ SC/IM: CPT

## 2019-05-03 PROCEDURE — 63600175 PHARM REV CODE 636 W HCPCS: Performed by: SURGERY

## 2019-05-03 RX ORDER — LANREOTIDE ACETATE 120 MG/.5ML
120 INJECTION SUBCUTANEOUS
Status: CANCELLED | OUTPATIENT
Start: 2019-05-28

## 2019-05-03 RX ORDER — CYANOCOBALAMIN 1000 UG/ML
1000 INJECTION, SOLUTION INTRAMUSCULAR; SUBCUTANEOUS
Status: CANCELLED
Start: 2019-05-28

## 2019-05-03 RX ORDER — LANREOTIDE ACETATE 120 MG/.5ML
120 INJECTION SUBCUTANEOUS
Status: DISCONTINUED | OUTPATIENT
Start: 2019-05-03 | End: 2019-05-03 | Stop reason: HOSPADM

## 2019-05-03 RX ORDER — CYANOCOBALAMIN 1000 UG/ML
1000 INJECTION, SOLUTION INTRAMUSCULAR; SUBCUTANEOUS
Status: COMPLETED | OUTPATIENT
Start: 2019-05-03 | End: 2019-05-03

## 2019-05-03 RX ADMIN — CYANOCOBALAMIN 1000 MCG: 1000 INJECTION, SOLUTION INTRAMUSCULAR; SUBCUTANEOUS at 10:05

## 2019-05-03 RX ADMIN — LANREOTIDE ACETATE 120 MG: 120 INJECTION SUBCUTANEOUS at 10:05

## 2019-05-03 NOTE — NURSING
Tolerated Lanreotide and [id number] well. Discharged home with next appointment scheduled.  Pt does not want to get the B12 injection monthly as she is starting B12 PO. Will notify Dr. Riddle.

## 2019-05-10 ENCOUNTER — OFFICE VISIT (OUTPATIENT)
Dept: NEUROLOGY | Facility: HOSPITAL | Age: 64
End: 2019-05-10
Attending: INTERNAL MEDICINE
Payer: COMMERCIAL

## 2019-05-10 VITALS
TEMPERATURE: 99 F | HEIGHT: 65 IN | OXYGEN SATURATION: 100 % | WEIGHT: 180.44 LBS | SYSTOLIC BLOOD PRESSURE: 111 MMHG | HEART RATE: 84 BPM | DIASTOLIC BLOOD PRESSURE: 71 MMHG | BODY MASS INDEX: 30.06 KG/M2

## 2019-05-10 DIAGNOSIS — C7B.02 METASTATIC MALIGNANT CARCINOID TUMOR TO LIVER: Primary | ICD-10-CM

## 2019-05-10 DIAGNOSIS — E16.4 HYPERGASTRINEMIA: ICD-10-CM

## 2019-05-10 DIAGNOSIS — D57.3 SICKLE CELL TRAIT: ICD-10-CM

## 2019-05-10 DIAGNOSIS — C25.4 GASTRINOMA, MALIGNANT: ICD-10-CM

## 2019-05-10 PROCEDURE — 99205 PR OFFICE/OUTPT VISIT, NEW, LEVL V, 60-74 MIN: ICD-10-PCS | Mod: ,,, | Performed by: INTERNAL MEDICINE

## 2019-05-10 PROCEDURE — 99205 OFFICE O/P NEW HI 60 MIN: CPT | Mod: ,,, | Performed by: INTERNAL MEDICINE

## 2019-05-10 PROCEDURE — 3008F PR BODY MASS INDEX (BMI) DOCUMENTED: ICD-10-PCS | Mod: CPTII,,, | Performed by: INTERNAL MEDICINE

## 2019-05-10 PROCEDURE — 3008F BODY MASS INDEX DOCD: CPT | Mod: CPTII,,, | Performed by: INTERNAL MEDICINE

## 2019-05-10 PROCEDURE — 99214 OFFICE O/P EST MOD 30 MIN: CPT | Performed by: INTERNAL MEDICINE

## 2019-05-10 NOTE — PROGRESS NOTES
NOLANETS:  [facility]  A collaboration between [facility] and [facility]    PATIENT: Lena Brantley  MRN: [id number]  DATE: 5/10/2019      Diagnosis:   1. 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 Gastrinoma, malignant    3. Hypergastrinemia        Chief Complaint: Consult (ref by Dr Riddle/ non surgical candidate)      Oncologic History:      Oncologic History Neuroendocrine tumor unknown primary diagnosed 09/2016  Metastatic disease to liver at presentation    Oncologic Treatment Lanreotide 10/2016    Pathology Well-differentiated neuroendocrine tumor, Ki-67 1%          Subjective:    Interval History: Ms. Brantley is a 63 y.o. female who who is seen as an initial visit for a neuroendocrine tumor of unknown primary.  Her history dates to 9/2016 when she was undergoing workup for episodic nausea, vomiting and diarrhea.  A CT scan was performed showing a solitary and had seeing liver mass.  A biopsy was performed showing a well-differentiated neuroendocrine tumor, Ki-67 of 1%.  Conventional imaging an octreotide scan did not reveal a primary site disease.  Gene expression profiling was performed indicating a possible primary site pancreas.  She was started on Lanreotide in 2016.  She was offered surgical resection and also liver directed therapy and declined.    She states that she has been feeling well.  She denies any abdominal pain, nausea, vomiting, diarrhea.  She denies any weight loss.  She does admit to some occasional dark stools.  She is otherwise fully active and without complaints.    Past Medical History:   Past Medical History:   Diagnosis Date    Cancer     GERD (gastroesophageal reflux disease)     Liver mass 06/2016    Mass of colon 06/2016    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     Sickle cell trait        Past Surgical HIstory:   Past Surgical History:   Procedure  Laterality Date    BIOPSY-LIVER N/A 9/8/2016    Performed by [facility] OR    ULTRASOUND-ENDOSCOPIC-UPPER W/BX AND TATTOO, GASTRIC PH N/A 9/7/2016    Performed by Carlos James MD at [facility] ENDO       Family History:   Family History   Problem Relation Age of Onset    Cancer Maternal Grandmother     Cancer Other        Social History:  reports that she has never smoked. She has never used smokeless tobacco. She reports that she does not drink alcohol or use drugs.    Allergies:  Review of patient's allergies indicates:   Allergen Reactions    Epinephrine Anaphylaxis     Can Cause Carcinoid Crisis       Medications:  Current Outpatient Medications   Medication Sig Dispense Refill    lanreotide (SOMATULINE DEPOT) 120 mg/0.5 mL Syrg Inject 120 mg into the skin every 28 days.      pantoprazole (PROTONIX) 40 MG tablet TAKE 1 TABLET BY MOUTH EVERY DAY 90 tablet 0    pantoprazole (PROTONIX) 40 MG tablet TAKE 1 TABLET(40 MG) BY MOUTH EVERY DAY 90 tablet 0     No current facility-administered medications for this visit.        Review of Systems   Constitutional: Negative for chills, fever and unexpected weight change.   HENT: Negative for congestion, hearing loss and nosebleeds.    Eyes: Negative for visual disturbance.   Respiratory: Negative for cough and shortness of breath.    Cardiovascular: Negative for chest pain and palpitations.   Gastrointestinal: Negative for abdominal pain, blood in stool, constipation, diarrhea, nausea and vomiting.   Genitourinary: Negative for dysuria.   Musculoskeletal: Negative for back pain and gait problem.   Skin: Negative for color change and rash.   Neurological: Negative for dizziness, weakness and headaches.   Hematological: Negative for adenopathy. Does not bruise/bleed easily.   Psychiatric/Behavioral: Negative for confusion.       ECOG Performance Status: 0   Objective:      Vitals:   Vitals:    05/10/19 1351   BP: 111/71   Pulse: 84   Temp: 99.2 °F  "(37.3 °C)   TempSrc: Oral   SpO2: 100%   Weight: 81.8 kg (180 lb 7.1 oz)   Height: 5' 5" (1.651 m)     BMI: Body mass index is 30.03 kg/m².    Physical Exam   Constitutional: She is oriented to person, place, and time. She appears well-developed and well-nourished. No distress.   HENT:   Head: Normocephalic.   Mouth/Throat: No oropharyngeal exudate.   Eyes: EOM are normal. No scleral icterus.   Neck: Neck supple. No tracheal deviation present. No thyromegaly present.   Cardiovascular: Normal rate and regular rhythm.   Pulmonary/Chest: Effort normal and breath sounds normal. No respiratory distress. She has no wheezes. She has no rales.   Abdominal: Soft. She exhibits no distension and no mass. There is no tenderness. There is no rebound and no guarding.   Musculoskeletal: Normal range of motion. She exhibits no edema.   Lymphadenopathy:     She has no cervical adenopathy.   Neurological: She is alert and oriented to person, place, and time. No cranial nerve deficit.   Skin: Skin is warm and dry.   Psychiatric: She has a normal mood and affect.       Laboratory Data:  No visits with results within 1 Month(s) from this visit.   Latest known visit with results is:   Abstract on 02/22/2019   Component Date Value Ref Range Status    EXT 5 HIAA BLOOD 02/15/2019 8  0 - 22 ng/ml Corrected    thais    EXT GASTRIN 02/15/2019 132* 0 - 100 pg/ml Final    quest    EXT SEROTONIN 02/15/2019 149  56 - 244 ng/ml Final    quest    EXT PANCREASTATIN THAIS 02/15/2019 78  10 - 135 pg/ml Corrected    thais    EXT ANTI PARIETAL CELL AB 02/15/2019 NEG  NEG Final    EXT ANTI ISLET CELL AB 02/15/2019 NEG  NEG Final    EXT Folate 02/15/2019 7.1  >5.4 ng/ml Final    quest    EXT VITAMIN B12 02/15/2019 627* 650 - 1,340 pg/ml Final    amd    EXT WBC 02/15/2019 3.6* 3.8 - 10.8 1000/ul Final    EXT Hemoglobin 02/15/2019 10.1* 11.7 - 15.5 g/dl Final    EXT Hematocrit 02/15/2019 33.8* 35.0 - 45.0 % Final    EXT Platelets 02/15/2019 297  140 - " 400 1000/ul Final    EXT Glucose 02/15/2019 103* 65 - 99 mg/dl Final    EXT BUN 02/15/2019 9  7 - 25 mg/dl Final    EXT Creatinine 02/15/2019 0.84  0.50 - 0.99 mg/dl Final    EXT Sodium 02/15/2019 138  135 - 146 mmol/l Final    EXT Potassium 02/15/2019 4.5  3.5 - 5.3 mmol/l Final    EXT Chloride 02/15/2019 103  98 - 110 mmol/l Final    EXT CO2 02/15/2019 29  20 - 32 mmol/l Final    EXT Calcium 02/15/2019 9.5  8.6 - 10.4 mg/dl Final    EXT Protein total 02/15/2019 7.3  6.1 - 8.1 g/dl Final    EXT Albumin 02/15/2019 4.1  3.6 - 5.1 g/dl Final    EXT BilirubiN Total 02/15/2019 0.8  0.2 - 1.2 mg/dl Final    EXT Alkaline Phosphatase 02/15/2019 142* 33 - 130 u/l Final    EXT AST 02/15/2019 21  10 - 35 u/l Final    EXT ALT 02/15/2019 17  6 - 29 u/l Final       Imaging:   Gallium 68 PET-CT 04/12/2019    COMPARISON:  CT of the abdomen and pelvis 02/22/2019.  Nondiagnostic images from octreotide scan 08/11/2017.    FINDINGS:  Quality of the study: Adequate.    Single somatostatin avid metastatic focus is present within segment 4 of the liver, with SUV max 26.50.    Physiologic uptake of the tracer is seen within the pituitary, liver, spleen, kidneys, adrenal glands and bowel.    Incidental CT findings: None.      Impression       Single somatostatin avid metastatic focus is present within segment 4 of the liver, with SUV max 26.50.                Assessment:       1. 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 Gastrinoma, malignant    3. Hypergastrinemia           Plan:     I have had a long discussion with Mrs. Brantley concerning her disease.  She has a well-differentiated neuroendocrine tumor, likely pancreas primary with metastatic disease to the liver.  She has been maintained on treatment with Lanreotide since her diagnosis and has no evidence of progressive disease.  She was offered surgery and also liver directed therapy and she declined.  We will plan to continue her on Lanreotide alone in follow  with scans at six-month intervals.  It is also noted she has a microcytic anemia which has been otherwise stable.  She does have a history of sickle cell trait.  I will repeat labs on her to include CBC and iron studies.  She was also noted to be B12 deficient and is on B12 replacement and will also check a B12 level now.  Follow back up with me in 6 months and report any new symptoms in the interim.  Multiple questions were answered and she is agreeable with this plan.      Sandeep Sanford DO, [facility]  Hematology & Oncology, [facility]  [address]  [place]  [address]  ph. [phone]; [phone]  fax. [phone]      60 minutes were spent in coordination of patient's care, record review and counseling.  More than 50% of the time was face-to-face.

## 2019-05-10 NOTE — PATIENT INSTRUCTIONS
Have labs today and in 3 months (when you have your shot in August)    CT and follow up appointment in October

## 2019-05-13 ENCOUNTER — TELEPHONE (OUTPATIENT)
Dept: HEMATOLOGY/ONCOLOGY | Facility: CLINIC | Age: 64
End: 2019-05-13

## 2019-05-13 NOTE — TELEPHONE ENCOUNTER
Have attempted to call patient to notify her lab results indicating iron deficiency.  Voice mailbox was full. She needs GI workup and will place referral.

## 2019-05-31 ENCOUNTER — INFUSION (OUTPATIENT)
Dept: INFUSION THERAPY | Facility: HOSPITAL | Age: 64
End: 2019-05-31
Attending: INTERNAL MEDICINE
Payer: COMMERCIAL

## 2019-05-31 VITALS — WEIGHT: 180.44 LBS | HEIGHT: 65 IN | BODY MASS INDEX: 30.06 KG/M2

## 2019-05-31 DIAGNOSIS — C7A.00 MALIGNANT CARCINOID TUMOR OF UNKNOWN PRIMARY SITE: Primary | ICD-10-CM

## 2019-05-31 DIAGNOSIS — C7B.02 METASTATIC MALIGNANT CARCINOID TUMOR TO LIVER: ICD-10-CM

## 2019-05-31 PROCEDURE — 96372 THER/PROPH/DIAG INJ SC/IM: CPT

## 2019-05-31 PROCEDURE — 63600175 PHARM REV CODE 636 W HCPCS: Mod: JG | Performed by: SURGERY

## 2019-05-31 RX ORDER — LANREOTIDE ACETATE 120 MG/.5ML
120 INJECTION SUBCUTANEOUS
Status: CANCELLED | OUTPATIENT
Start: 2019-05-31

## 2019-05-31 RX ORDER — LANREOTIDE ACETATE 120 MG/.5ML
120 INJECTION SUBCUTANEOUS
Status: DISCONTINUED | OUTPATIENT
Start: 2019-05-31 | End: 2019-05-31 | Stop reason: HOSPADM

## 2019-05-31 RX ADMIN — LANREOTIDE ACETATE 120 MG: 120 INJECTION SUBCUTANEOUS at 10:05

## 2019-06-20 ENCOUNTER — TELEPHONE (OUTPATIENT)
Dept: NEUROLOGY | Facility: HOSPITAL | Age: 64
End: 2019-06-20

## 2019-06-20 NOTE — TELEPHONE ENCOUNTER
----- Message from Sandeep Sanford DO, FACP sent at 6/20/2019  9:00 AM CDT -----  She has an appointment for her injection tomorrow.  Since we haven't been able to get her via phone do you think that we can have her come over when she's there?  She is iron deficient and needs referral to GI.

## 2019-06-28 ENCOUNTER — INFUSION (OUTPATIENT)
Dept: INFUSION THERAPY | Facility: HOSPITAL | Age: 64
End: 2019-06-28
Attending: INTERNAL MEDICINE
Payer: COMMERCIAL

## 2019-06-28 ENCOUNTER — PATIENT MESSAGE (OUTPATIENT)
Dept: NEUROLOGY | Facility: HOSPITAL | Age: 64
End: 2019-06-28

## 2019-06-28 VITALS — WEIGHT: 180.44 LBS | BODY MASS INDEX: 30.06 KG/M2 | HEIGHT: 65 IN

## 2019-06-28 DIAGNOSIS — C7A.00 MALIGNANT CARCINOID TUMOR OF UNKNOWN PRIMARY SITE: Primary | ICD-10-CM

## 2019-06-28 DIAGNOSIS — C7B.02 METASTATIC MALIGNANT CARCINOID TUMOR TO LIVER: ICD-10-CM

## 2019-06-28 PROCEDURE — 63600175 PHARM REV CODE 636 W HCPCS: Mod: JG | Performed by: SURGERY

## 2019-06-28 PROCEDURE — 96372 THER/PROPH/DIAG INJ SC/IM: CPT

## 2019-06-28 RX ORDER — LANREOTIDE ACETATE 120 MG/.5ML
120 INJECTION SUBCUTANEOUS
Status: CANCELLED | OUTPATIENT
Start: 2019-06-28

## 2019-06-28 RX ORDER — LANREOTIDE ACETATE 120 MG/.5ML
120 INJECTION SUBCUTANEOUS
Status: DISCONTINUED | OUTPATIENT
Start: 2019-06-28 | End: 2019-06-28 | Stop reason: HOSPADM

## 2019-06-28 RX ADMIN — LANREOTIDE ACETATE 120 MG: 120 INJECTION SUBCUTANEOUS at 10:06

## 2019-07-12 RX ORDER — PANTOPRAZOLE SODIUM 40 MG/1
TABLET, DELAYED RELEASE ORAL
Qty: 90 TABLET | Refills: 0 | Status: SHIPPED | OUTPATIENT
Start: 2019-07-12 | End: 2019-10-06 | Stop reason: SDUPTHER

## 2019-07-29 ENCOUNTER — TELEPHONE (OUTPATIENT)
Dept: NEUROLOGY | Facility: HOSPITAL | Age: 64
End: 2019-07-29

## 2019-07-29 ENCOUNTER — INFUSION (OUTPATIENT)
Dept: INFUSION THERAPY | Facility: HOSPITAL | Age: 64
End: 2019-07-29
Attending: SURGERY
Payer: COMMERCIAL

## 2019-07-29 VITALS — WEIGHT: 180.44 LBS | BODY MASS INDEX: 30.06 KG/M2 | HEIGHT: 65 IN

## 2019-07-29 DIAGNOSIS — C7B.02 METASTATIC MALIGNANT CARCINOID TUMOR TO LIVER: ICD-10-CM

## 2019-07-29 DIAGNOSIS — C7A.00 MALIGNANT CARCINOID TUMOR OF UNKNOWN PRIMARY SITE: Primary | ICD-10-CM

## 2019-07-29 PROCEDURE — 63600175 PHARM REV CODE 636 W HCPCS: Mod: JG | Performed by: SURGERY

## 2019-07-29 PROCEDURE — 96372 THER/PROPH/DIAG INJ SC/IM: CPT

## 2019-07-29 RX ORDER — LANREOTIDE ACETATE 120 MG/.5ML
120 INJECTION SUBCUTANEOUS
Status: CANCELLED | OUTPATIENT
Start: 2019-07-29

## 2019-07-29 RX ORDER — LANREOTIDE ACETATE 120 MG/.5ML
120 INJECTION SUBCUTANEOUS
Status: DISCONTINUED | OUTPATIENT
Start: 2019-07-29 | End: 2019-07-29 | Stop reason: HOSPADM

## 2019-07-29 RX ADMIN — LANREOTIDE ACETATE 120 MG: 120 INJECTION SUBCUTANEOUS at 11:07

## 2019-07-29 NOTE — TELEPHONE ENCOUNTER
----- Message from Kya James sent at 7/29/2019 11:58 AM CDT -----  Contact: Patient  RR- Patient would like a call back to see if she needs to have tumor markers done. Please call the patient at [phone].

## 2019-08-21 ENCOUNTER — LAB VISIT (OUTPATIENT)
Dept: LAB | Facility: HOSPITAL | Age: 64
End: 2019-08-21
Attending: INTERNAL MEDICINE
Payer: COMMERCIAL

## 2019-08-21 ENCOUNTER — OFFICE VISIT (OUTPATIENT)
Dept: NEUROLOGY | Facility: HOSPITAL | Age: 64
End: 2019-08-21
Attending: INTERNAL MEDICINE
Payer: COMMERCIAL

## 2019-08-21 VITALS
WEIGHT: 172.63 LBS | DIASTOLIC BLOOD PRESSURE: 84 MMHG | HEIGHT: 65 IN | SYSTOLIC BLOOD PRESSURE: 161 MMHG | HEART RATE: 106 BPM | TEMPERATURE: 98 F | BODY MASS INDEX: 28.76 KG/M2

## 2019-08-21 DIAGNOSIS — D50.9 IRON DEFICIENCY ANEMIA, UNSPECIFIED IRON DEFICIENCY ANEMIA TYPE: ICD-10-CM

## 2019-08-21 DIAGNOSIS — D50.9 IRON DEFICIENCY ANEMIA, UNSPECIFIED IRON DEFICIENCY ANEMIA TYPE: Primary | ICD-10-CM

## 2019-08-21 LAB — IGA SERPL-MCNC: 193 MG/DL (ref 40–350)

## 2019-08-21 PROCEDURE — 99213 OFFICE O/P EST LOW 20 MIN: CPT | Performed by: INTERNAL MEDICINE

## 2019-08-21 PROCEDURE — 86340 INTRINSIC FACTOR ANTIBODY: CPT

## 2019-08-21 PROCEDURE — 36415 COLL VENOUS BLD VENIPUNCTURE: CPT

## 2019-08-21 PROCEDURE — 83516 IMMUNOASSAY NONANTIBODY: CPT

## 2019-08-21 PROCEDURE — 82784 ASSAY IGA/IGD/IGG/IGM EACH: CPT

## 2019-08-21 RX ORDER — SODIUM, POTASSIUM,MAG SULFATES 17.5-3.13G
1 SOLUTION, RECONSTITUTED, ORAL ORAL ONCE
Qty: 1 BOTTLE | Refills: 0 | Status: SHIPPED | OUTPATIENT
Start: 2019-08-21 | End: 2019-08-21

## 2019-08-21 NOTE — PROGRESS NOTES
"[facility]    CC: anemia     HPI 64 y.o. female with a history of NET metastatic to the liver (unknown primary) is referred for a >6 month history of moderate iron deficiency anemia which is unexplained. She also a has a history of sickle cell trait. She denies overt bleeding symptoms such as melena or hematochezia. She has no other complaints.     PMH:   Sickle cell trait   NET metastatic to the liver   GERD     Social History: no IMAN abuse   Family History: negative for IBD or CRC     Review of Systems  General ROS: negative for chills, fever; positive fatigue   Ophthalmic ROS: negative for blurry vision, photophobia or eye pain  ENT ROS: negative for epistaxis, sore throat or rhinorrhea  Respiratory ROS: no cough, shortness of breath, or wheezing  Cardiovascular ROS: no chest pain or dyspnea on exertion  Gastrointestinal ROS: no abdominal pain, change in bowel habits, or black/ bloody stools  Genito-Urinary ROS: no dysuria, trouble voiding, or hematuria  Musculoskeletal ROS: negative for gait disturbance or muscular weakness  Neurological ROS: no syncope or seizures; no ataxia  Dermatological ROS: negative for pruritis, rash and jaundice    Physical Examination  BP (!) 161/84 (BP Location: Right arm, Patient Position: Sitting, BP Method: Medium (Automatic))   Pulse 106   Temp 98 °F (36.7 °C) (Oral)   Ht 5' 5" (1.651 m)   Wt 78.3 kg (172 lb 9.9 oz)   BMI 28.73 kg/m²   General appearance: alert, cooperative, no distress  HENT: Normocephalic, atraumatic, neck symmetrical, no nasal discharge   Eyes: conjunctivae/corneas clear, PERRL, EOM's intact  Lungs: clear to auscultation bilaterally, no dullness to percussion bilaterally  Heart: regular rate and rhythm without rub; no displacement of the PMI   Abdomen: soft, non-tender; bowel sounds normoactive; no organomegaly  Extremities: extremities symmetric; no clubbing, cyanosis, or edema  Integument: Skin color, texture, turgor normal; no rashes; hair " distrubution normal  Neurologic: Alert and oriented X 3, normal strength, normal coordination and gait  Psychiatric: no pressured speech; normal affect; no evidence of impaired cognition     Labs:  Lab Results   Component Value Date    WBC 5.47 05/10/2019    HGB 9.2 (L) 05/10/2019    HCT 32.2 (L) 05/10/2019    MCV 71 (L) 05/10/2019     05/10/2019       Previous medical records reviewed   Previous imaging reviewed     Assessment:   64yrF with metastatic NET of the liver of unknown primary and history of sickle cell trait has unexplained iron deficiency anemia     Plan:   EGD and colonoscopy   Serologic testing for celiac disease and atrophic gastritis         Jordin Hobbs MD   [address] [place] [address], [address]   [place] [address] [phone]

## 2019-08-21 NOTE — PATIENT INSTRUCTIONS
"Labs today, 1st floor [facility].    MOVIPREP Instructions  You are scheduled for a colonoscopy with Dr. Hobbs on Thursday, September 5, 2019 at [facility] with 845am arrival.  To ensure that your test is accurate and complete, you MUST follow these instructions listed below.  If you have any questions, please call our office at [phone].  Plan on being at the hospital for your procedure for 3-4 hours.    1.  Follow a CLEAR LIQUID DIET for the entire day before your scheduled colonoscopy.  This means no solid food the entire day starting when you wake.  You may have as much of the clear liquids as you want throughout the day.   CLEAR LIQUID DIET:   - Avoid Red, Orange, Purple, and/or Blue food coloring   - NO DAIRY   - You can have:  Coffee with sugar (no creamer), tea, water, soda, apple or white grape juice, chicken or beef broth/bouillon (no meat, noodles, or veggies), green/yellow popsicles, green/yellow Jell-O, lemonade.    2.  AT 5 pm the evening before your colonoscopy, EMPTY ONE PACKET OF "A" AND ONE PACKET OF "B" (which is inside your Moviprep box) INTO THE CONTAINER PROVIDED INSIDE THE BOX.  ADD LUKEWARM WATER TO THE TOP LINE OF THE CONTAINER.  MIX WELL TO DISSOLVE, AND THEN DRINK THE MIXTURE OVER THE NEXT HOUR.  This is sometimes easier to drink if this solution is cold, so you can mix the solution a few hours ahead of time and place in the refrigerator prior to drinking.  You have to drink the solution within 24 hours of mixing it.  Do NOT put this solution over ice.  It IS ok to drink with a straw.    3.  The endoscopy department will call you 2 days before your colonoscopy to tell you the exact time to arrive, AND to tell you the exact time to drink the 2nd portion of your prep (which will be FIVE HOURS BEFORE YOUR ARRIVAL TIME).  At this time given to you, EMPTY ONE PACKET OF "A" AND ONE PACKET OF "B" (which is inside your Moviprep box) INTO THE CONTAINER PROVIDED INSIDE THE " BOX.  ADD LUKEWARM WATER TO THE TOP LINE OF THE CONTAINER.  MIX WELL TO DISSOLVE, AND THEN DRINK THE MIXTURE OVER THE NEXT HOUR.  This is sometimes easier to drink if this solution is cold, so you can mix the solution a few hours ahead of time and place in the refrigerator prior to drinking.  You have to drink the solution within 24 hours of mixing it.  Do NOT put this solution over ice.  It IS ok to drink with a straw. Once this is complete, you may not have ANYTHING else by mouth!    4.  You must have someone with you to DRIVE YOU HOME since you will be receiving IV sedation for the colonoscopy.    5.  It is ok to take your heart, blood pressure, and seizure medications in the morning of your test with a SIP of water.  Hold other medications until after your procedure.  Do NOT have anything else to eat or drink the morning of your colonoscopy.  It is ok to brush your teeth.    6.  If you are on blood thinners THAT YOU HAVE BEEN INSTRUCTED TO HOLD BY YOUR DOCTOR FOR THIS PROCEDURE, then do NOT take this the morning of your colonoscopy.  Do NOT stop these medications on your own, they must be approved to be held by your doctor.  Your colonoscopy can NOT be done if you are on these medications.  Examples of blood thinners include: Coumadin, Aggrenox, Plavix, Pradaxa, Reapro, Pletal, Xarelto, Ticagrelor, Brilinta, Eliquis, and high dose aspirin (325 mg).  You do not have to stop baby aspirin 81 mg.    7.  IF YOU ARE DIABETIC:  NO INSULIN OR ORAL MEDICATIONS THE MORNING OF THE COLONOSCOPY.  TAKE ONLY HALF THE DOSE OF YOUR INSULIN THE DAY BEFORE THE COLONOSCOPY.  DO NOT TAKE ANY ORAL DIABETIC MEDICATIONS THE DAY BEFORE THE COLONOSCOPY.  IF YOU ARE AN INSULIN DEPENDENT DIABETIC WITH UNSTABLE BLOOD SUGARS, NOTIFY YOUR PRIMARY CARE PHYSICIAN FOR INSTRUCTIONS.

## 2019-08-23 ENCOUNTER — TELEPHONE (OUTPATIENT)
Dept: INFUSION THERAPY | Facility: HOSPITAL | Age: 64
End: 2019-08-23

## 2019-08-23 ENCOUNTER — TELEPHONE (OUTPATIENT)
Dept: NEUROLOGY | Facility: HOSPITAL | Age: 64
End: 2019-08-23

## 2019-08-23 LAB
ANNOTATION COMMENT IMP: NORMAL
IF BLOCK AB SER QL: NEGATIVE
TTG IGA SER-ACNC: 6 UNITS

## 2019-08-23 NOTE — TELEPHONE ENCOUNTER
Spoke to patient, she questioned why she had to have labs.  I explained it was to test her HgB and iron levels with her history of anemia.  She stated she didn't want to have more blood drawn because she was concerned it would lower her iron. I explained it would not, but it would let us know if she needed IV iron. She admits to not being compliant with her oral iron supplement.  She states she does not want to have the labs done.  I explained she has the right to refuse anything.  She states will take her iron and will get labs another time.

## 2019-08-23 NOTE — TELEPHONE ENCOUNTER
----- Message from Va Tidwell sent at 8/23/2019  1:20 PM CDT -----  Contact: self [phone]  RR - Patient is confused about her lab and injection. Please call

## 2019-08-26 ENCOUNTER — INFUSION (OUTPATIENT)
Dept: INFUSION THERAPY | Facility: HOSPITAL | Age: 64
End: 2019-08-26
Attending: INTERNAL MEDICINE
Payer: COMMERCIAL

## 2019-08-26 VITALS — HEIGHT: 65 IN | BODY MASS INDEX: 28.76 KG/M2 | WEIGHT: 172.63 LBS

## 2019-08-26 DIAGNOSIS — C7A.00 MALIGNANT CARCINOID TUMOR OF UNKNOWN PRIMARY SITE: Primary | ICD-10-CM

## 2019-08-26 DIAGNOSIS — C7B.02 METASTATIC MALIGNANT CARCINOID TUMOR TO LIVER: ICD-10-CM

## 2019-08-26 PROCEDURE — 96372 THER/PROPH/DIAG INJ SC/IM: CPT

## 2019-08-26 PROCEDURE — 63600175 PHARM REV CODE 636 W HCPCS: Mod: JG | Performed by: SURGERY

## 2019-08-26 RX ORDER — LANREOTIDE ACETATE 120 MG/.5ML
120 INJECTION SUBCUTANEOUS
Status: CANCELLED | OUTPATIENT
Start: 2019-08-26

## 2019-08-26 RX ORDER — LANREOTIDE ACETATE 120 MG/.5ML
120 INJECTION SUBCUTANEOUS
Status: DISCONTINUED | OUTPATIENT
Start: 2019-08-26 | End: 2019-08-26 | Stop reason: HOSPADM

## 2019-08-26 RX ADMIN — LANREOTIDE ACETATE 120 MG: 120 INJECTION SUBCUTANEOUS at 12:08

## 2019-09-03 ENCOUNTER — TELEPHONE (OUTPATIENT)
Dept: ENDOSCOPY | Facility: HOSPITAL | Age: 64
End: 2019-09-03

## 2019-09-03 NOTE — TELEPHONE ENCOUNTER
Left message instructing patient to call dept @ [phone] between 8am-4pm.    Arrival time to be given @0900  (Message sent via [facility] portal)

## 2019-09-04 ENCOUNTER — TELEPHONE (OUTPATIENT)
Dept: ENDOSCOPY | Facility: HOSPITAL | Age: 64
End: 2019-09-04

## 2019-09-04 ENCOUNTER — TELEPHONE (OUTPATIENT)
Dept: NEUROLOGY | Facility: HOSPITAL | Age: 64
End: 2019-09-04

## 2019-09-04 NOTE — TELEPHONE ENCOUNTER
Left message instructing patient to call dept @ [phone] between 8am-4pm.    Arrival time to be given @0900  (Message sent via [facility])

## 2019-09-13 ENCOUNTER — TELEPHONE (OUTPATIENT)
Dept: NEUROLOGY | Facility: HOSPITAL | Age: 64
End: 2019-09-13

## 2019-09-13 NOTE — TELEPHONE ENCOUNTER
----- Message from Farrah Easley RN sent at 9/4/2019 11:59 AM CDT -----  Regarding: reschedule  Pt needs to reschedule bc ride is unavailable    Thanks

## 2019-09-19 ENCOUNTER — TELEPHONE (OUTPATIENT)
Dept: NEUROLOGY | Facility: HOSPITAL | Age: 64
End: 2019-09-19

## 2019-09-19 NOTE — TELEPHONE ENCOUNTER
Clarified with patient she is still seeing Dr. Sanford, dr. Hobbs was just to see why she would be bleeding.  Dr. Sanford is who she is seeing for her cancer.

## 2019-09-19 NOTE — TELEPHONE ENCOUNTER
----- Message from Nicole Acevedo sent at 9/19/2019  9:37 AM CDT -----  Contact: Patient/ [phone]  RR-- Patient is requesting a callback in regards to wanting to know if she have to complete the CT Scan after being switched to another doctor.  Pt stated that she completed a CT Scan the early part of the year.    Please call and Advise.

## 2019-09-20 ENCOUNTER — HOSPITAL ENCOUNTER (OUTPATIENT)
Dept: RADIOLOGY | Facility: HOSPITAL | Age: 64
Discharge: HOME | End: 2019-09-20
Attending: INTERNAL MEDICINE
Payer: COMMERCIAL

## 2019-09-20 LAB
CREAT SERPL-MCNC: 0.9 MG/DL (ref 0.5–1.4)
SAMPLE: NORMAL

## 2019-09-20 PROCEDURE — 74177 CT ABD & PELVIS W/CONTRAST: CPT | Mod: 26,,, | Performed by: RADIOLOGY

## 2019-09-20 PROCEDURE — 74177 CT ABDOMEN PELVIS WITH CONTRAST: ICD-10-PCS | Mod: 26,,, | Performed by: RADIOLOGY

## 2019-09-20 PROCEDURE — 74177 CT ABD & PELVIS W/CONTRAST: CPT | Mod: TC

## 2019-09-20 PROCEDURE — 25500020 PHARM REV CODE 255: Performed by: INTERNAL MEDICINE

## 2019-09-20 RX ADMIN — IOHEXOL 75 ML: 350 INJECTION, SOLUTION INTRAVENOUS at 10:09

## 2019-09-23 ENCOUNTER — INFUSION (OUTPATIENT)
Dept: INFUSION THERAPY | Facility: HOSPITAL | Age: 64
End: 2019-09-23
Attending: SURGERY
Payer: COMMERCIAL

## 2019-09-23 VITALS — HEIGHT: 65 IN | WEIGHT: 171.94 LBS | BODY MASS INDEX: 28.65 KG/M2

## 2019-09-23 DIAGNOSIS — C7A.00 MALIGNANT CARCINOID TUMOR OF UNKNOWN PRIMARY SITE: Primary | ICD-10-CM

## 2019-09-23 DIAGNOSIS — C7B.02 METASTATIC MALIGNANT CARCINOID TUMOR TO LIVER: ICD-10-CM

## 2019-09-23 PROCEDURE — 96372 THER/PROPH/DIAG INJ SC/IM: CPT

## 2019-09-23 PROCEDURE — 63600175 PHARM REV CODE 636 W HCPCS: Mod: JG | Performed by: SURGERY

## 2019-09-23 RX ORDER — LANREOTIDE ACETATE 120 MG/.5ML
120 INJECTION SUBCUTANEOUS
Status: DISCONTINUED | OUTPATIENT
Start: 2019-09-23 | End: 2019-09-23 | Stop reason: HOSPADM

## 2019-09-23 RX ORDER — LANREOTIDE ACETATE 120 MG/.5ML
120 INJECTION SUBCUTANEOUS
Status: CANCELLED | OUTPATIENT
Start: 2019-09-23

## 2019-09-23 RX ADMIN — LANREOTIDE ACETATE 120 MG: 120 INJECTION SUBCUTANEOUS at 01:09

## 2019-10-06 RX ORDER — PANTOPRAZOLE SODIUM 40 MG/1
TABLET, DELAYED RELEASE ORAL
Qty: 90 TABLET | Refills: 0 | Status: SHIPPED | OUTPATIENT
Start: 2019-10-06 | End: 2020-01-03

## 2019-10-25 ENCOUNTER — TELEPHONE (OUTPATIENT)
Dept: NEUROLOGY | Facility: HOSPITAL | Age: 64
End: 2019-10-25

## 2019-10-25 ENCOUNTER — INFUSION (OUTPATIENT)
Dept: INFUSION THERAPY | Facility: HOSPITAL | Age: 64
End: 2019-10-25
Attending: SURGERY
Payer: COMMERCIAL

## 2019-10-25 VITALS — WEIGHT: 171.94 LBS | HEIGHT: 65 IN | BODY MASS INDEX: 28.65 KG/M2

## 2019-10-25 DIAGNOSIS — C7A.00 MALIGNANT CARCINOID TUMOR OF UNKNOWN PRIMARY SITE: Primary | ICD-10-CM

## 2019-10-25 DIAGNOSIS — C7B.02 METASTATIC MALIGNANT CARCINOID TUMOR TO LIVER: ICD-10-CM

## 2019-10-25 PROCEDURE — 63600175 PHARM REV CODE 636 W HCPCS: Mod: JG | Performed by: SURGERY

## 2019-10-25 PROCEDURE — 96372 THER/PROPH/DIAG INJ SC/IM: CPT

## 2019-10-25 RX ORDER — LANREOTIDE ACETATE 120 MG/.5ML
120 INJECTION SUBCUTANEOUS
Status: DISCONTINUED | OUTPATIENT
Start: 2019-10-25 | End: 2019-10-25 | Stop reason: HOSPADM

## 2019-10-25 RX ORDER — LANREOTIDE ACETATE 120 MG/.5ML
120 INJECTION SUBCUTANEOUS
Status: CANCELLED | OUTPATIENT
Start: 2019-10-25

## 2019-10-25 RX ADMIN — LANREOTIDE ACETATE 120 MG: 120 INJECTION SUBCUTANEOUS at 11:10

## 2019-10-25 NOTE — TELEPHONE ENCOUNTER
----- Message from Kya James sent at 10/25/2019 12:01 PM CDT -----  Contact: Patient  RR- Patient would like her latest scan results. Please call the patient at [phone].

## 2019-11-22 ENCOUNTER — INFUSION (OUTPATIENT)
Dept: INFUSION THERAPY | Facility: HOSPITAL | Age: 64
End: 2019-11-22
Attending: SURGERY
Payer: COMMERCIAL

## 2019-11-22 VITALS — HEIGHT: 65 IN | BODY MASS INDEX: 28.65 KG/M2 | WEIGHT: 171.94 LBS

## 2019-11-22 DIAGNOSIS — C7B.02 METASTATIC MALIGNANT CARCINOID TUMOR TO LIVER: ICD-10-CM

## 2019-11-22 DIAGNOSIS — C7A.00 MALIGNANT CARCINOID TUMOR OF UNKNOWN PRIMARY SITE: Primary | ICD-10-CM

## 2019-11-22 PROCEDURE — 96372 THER/PROPH/DIAG INJ SC/IM: CPT

## 2019-11-22 PROCEDURE — 63600175 PHARM REV CODE 636 W HCPCS: Mod: JG | Performed by: SURGERY

## 2019-11-22 RX ORDER — LANREOTIDE ACETATE 120 MG/.5ML
120 INJECTION SUBCUTANEOUS
Status: CANCELLED | OUTPATIENT
Start: 2019-11-22

## 2019-11-22 RX ORDER — LANREOTIDE ACETATE 120 MG/.5ML
120 INJECTION SUBCUTANEOUS
Status: DISCONTINUED | OUTPATIENT
Start: 2019-11-22 | End: 2019-11-22 | Stop reason: HOSPADM

## 2019-11-22 RX ADMIN — LANREOTIDE ACETATE 120 MG: 120 INJECTION SUBCUTANEOUS at 12:11

## 2019-12-19 ENCOUNTER — TELEPHONE (OUTPATIENT)
Dept: NEUROLOGY | Facility: HOSPITAL | Age: 64
End: 2019-12-19

## 2019-12-19 NOTE — TELEPHONE ENCOUNTER
----- Message from Carie Ruvalcaba sent at 12/19/2019  3:54 PM CST -----  Contact: Sherry Nurse with [employer] Kaz Powell is calling for progress notes for pt     Sherry contact [phone]  Fax [phone]

## 2019-12-19 NOTE — TELEPHONE ENCOUNTER
Left message with Sherry at [facility] in regards to her requesting records on this patient. Asked her to call back and to confirm SILAS.

## 2019-12-20 ENCOUNTER — INFUSION (OUTPATIENT)
Dept: INFUSION THERAPY | Facility: HOSPITAL | Age: 64
End: 2019-12-20
Attending: SURGERY
Payer: COMMERCIAL

## 2019-12-20 DIAGNOSIS — C7B.02 METASTATIC MALIGNANT CARCINOID TUMOR TO LIVER: ICD-10-CM

## 2019-12-20 DIAGNOSIS — C7A.00 MALIGNANT CARCINOID TUMOR OF UNKNOWN PRIMARY SITE: Primary | ICD-10-CM

## 2019-12-20 PROCEDURE — 63600175 PHARM REV CODE 636 W HCPCS: Mod: JG | Performed by: SURGERY

## 2019-12-20 PROCEDURE — 96372 THER/PROPH/DIAG INJ SC/IM: CPT

## 2019-12-20 RX ORDER — LANREOTIDE ACETATE 120 MG/.5ML
120 INJECTION SUBCUTANEOUS
Status: DISCONTINUED | OUTPATIENT
Start: 2019-12-20 | End: 2019-12-20 | Stop reason: HOSPADM

## 2019-12-20 RX ORDER — LANREOTIDE ACETATE 120 MG/.5ML
120 INJECTION SUBCUTANEOUS
Status: CANCELLED | OUTPATIENT
Start: 2019-12-20

## 2019-12-20 RX ADMIN — LANREOTIDE ACETATE 120 MG: 120 INJECTION SUBCUTANEOUS at 12:12

## 2020-01-03 RX ORDER — PANTOPRAZOLE SODIUM 40 MG/1
TABLET, DELAYED RELEASE ORAL
Qty: 90 TABLET | Refills: 0 | Status: SHIPPED | OUTPATIENT
Start: 2020-01-03 | End: 2020-03-19 | Stop reason: CLARIF

## 2020-01-17 ENCOUNTER — TELEPHONE (OUTPATIENT)
Dept: FAMILY MEDICINE | Facility: CLINIC | Age: 65
End: 2020-01-17

## 2020-01-17 ENCOUNTER — INFUSION (OUTPATIENT)
Dept: INFUSION THERAPY | Facility: HOSPITAL | Age: 65
End: 2020-01-17
Attending: SURGERY
Payer: COMMERCIAL

## 2020-01-17 VITALS — HEIGHT: 65 IN | BODY MASS INDEX: 28.65 KG/M2 | WEIGHT: 171.94 LBS

## 2020-01-17 DIAGNOSIS — C7A.00 MALIGNANT CARCINOID TUMOR OF UNKNOWN PRIMARY SITE: Primary | ICD-10-CM

## 2020-01-17 DIAGNOSIS — C7B.02 METASTATIC MALIGNANT CARCINOID TUMOR TO LIVER: ICD-10-CM

## 2020-01-17 PROCEDURE — 63600175 PHARM REV CODE 636 W HCPCS: Mod: JG | Performed by: SURGERY

## 2020-01-17 PROCEDURE — 96372 THER/PROPH/DIAG INJ SC/IM: CPT

## 2020-01-17 RX ORDER — LANREOTIDE ACETATE 120 MG/.5ML
120 INJECTION SUBCUTANEOUS
Status: DISCONTINUED | OUTPATIENT
Start: 2020-01-17 | End: 2020-01-17 | Stop reason: HOSPADM

## 2020-01-17 RX ORDER — LANREOTIDE ACETATE 120 MG/.5ML
120 INJECTION SUBCUTANEOUS
Status: CANCELLED | OUTPATIENT
Start: 2020-01-17

## 2020-01-17 RX ADMIN — LANREOTIDE ACETATE 120 MG: 120 INJECTION SUBCUTANEOUS at 12:01

## 2020-01-17 NOTE — TELEPHONE ENCOUNTER
----- Message from Deedee Harmon sent at 1/17/2020 12:55 PM CST -----  Contact: Patient came into office.  Hello, this patient came into the office. I scheduled her an appointment for the earliest appointment on the schedule. This patient would like to establish care and she would like to know if you have any earlier appointments she said that she has a rare cancer disease and she would like to know if their is any early availability. When you get a chance she would like to be contacted in reference to this matter thank you.

## 2020-01-17 NOTE — TELEPHONE ENCOUNTER
Called patient to see if she would like to move her appointment time up to a sooner date.  No answer, left voicemail.

## 2020-01-17 NOTE — TELEPHONE ENCOUNTER
----- Message from Festus Grey sent at 1/17/2020  4:16 PM CST -----  Contact: [phone]/self  Patient returning your call   Please advise

## 2020-01-17 NOTE — TELEPHONE ENCOUNTER
Patient declined sooner appointment due to her work schedule and the time she gets off.  Patient states that Thursday afternoons are best since she is off on that day.  Advised patient that Thursday afternoon is currently booked.  Patient verbalized understanding.  Will call if something opens up.

## 2020-01-18 ENCOUNTER — HOSPITAL ENCOUNTER (EMERGENCY)
Facility: OTHER | Age: 65
Discharge: HOME | End: 2020-01-18
Attending: EMERGENCY MEDICINE
Payer: COMMERCIAL

## 2020-01-18 VITALS
HEART RATE: 84 BPM | BODY MASS INDEX: 27.31 KG/M2 | DIASTOLIC BLOOD PRESSURE: 65 MMHG | RESPIRATION RATE: 18 BRPM | WEIGHT: 160 LBS | HEIGHT: 64 IN | OXYGEN SATURATION: 100 % | TEMPERATURE: 98 F | SYSTOLIC BLOOD PRESSURE: 153 MMHG

## 2020-01-18 DIAGNOSIS — A59.9 TRICHOMONIASIS: ICD-10-CM

## 2020-01-18 DIAGNOSIS — N30.01 ACUTE CYSTITIS WITH HEMATURIA: Primary | ICD-10-CM

## 2020-01-18 LAB
ABO + RH BLD: NORMAL
ALBUMIN SERPL BCP-MCNC: 3.7 G/DL (ref 3.5–5.2)
ALP SERPL-CCNC: 120 U/L (ref 55–135)
ALT SERPL W/O P-5'-P-CCNC: 17 U/L (ref 10–44)
ANION GAP SERPL CALC-SCNC: 10 MMOL/L (ref 8–16)
AST SERPL-CCNC: 18 U/L (ref 10–40)
BACTERIA #/AREA URNS HPF: ABNORMAL /HPF
BASOPHILS # BLD AUTO: 0.04 K/UL (ref 0–0.2)
BASOPHILS NFR BLD: 0.6 % (ref 0–1.9)
BILIRUB SERPL-MCNC: 0.8 MG/DL (ref 0.1–1)
BILIRUB UR QL STRIP: NEGATIVE
BLD GP AB SCN CELLS X3 SERPL QL: NORMAL
BUN SERPL-MCNC: 12 MG/DL (ref 8–23)
CALCIUM SERPL-MCNC: 9.9 MG/DL (ref 8.7–10.5)
CHLORIDE SERPL-SCNC: 103 MMOL/L (ref 95–110)
CLARITY UR: CLEAR
CO2 SERPL-SCNC: 26 MMOL/L (ref 23–29)
COLOR UR: YELLOW
CREAT SERPL-MCNC: 1 MG/DL (ref 0.5–1.4)
DIFFERENTIAL METHOD: ABNORMAL
EOSINOPHIL # BLD AUTO: 0 K/UL (ref 0–0.5)
EOSINOPHIL NFR BLD: 0.1 % (ref 0–8)
ERYTHROCYTE [DISTWIDTH] IN BLOOD BY AUTOMATED COUNT: 16.9 % (ref 11.5–14.5)
EST. GFR  (AFRICAN AMERICAN): >60 ML/MIN/1.73 M^2
EST. GFR  (NON AFRICAN AMERICAN): >60 ML/MIN/1.73 M^2
GLUCOSE SERPL-MCNC: 98 MG/DL (ref 70–110)
GLUCOSE UR QL STRIP: NEGATIVE
HCT VFR BLD AUTO: 39.7 % (ref 37–48.5)
HGB BLD-MCNC: 12.7 G/DL (ref 12–16)
HGB UR QL STRIP: ABNORMAL
IMM GRANULOCYTES # BLD AUTO: 0.02 K/UL (ref 0–0.04)
IMM GRANULOCYTES NFR BLD AUTO: 0.3 % (ref 0–0.5)
KETONES UR QL STRIP: NEGATIVE
LEUKOCYTE ESTERASE UR QL STRIP: ABNORMAL
LYMPHOCYTES # BLD AUTO: 0.7 K/UL (ref 1–4.8)
LYMPHOCYTES NFR BLD: 9.9 % (ref 18–48)
MCH RBC QN AUTO: 25.7 PG (ref 27–31)
MCHC RBC AUTO-ENTMCNC: 32 G/DL (ref 32–36)
MCV RBC AUTO: 80 FL (ref 82–98)
MICROSCOPIC COMMENT: ABNORMAL
MONOCYTES # BLD AUTO: 0.5 K/UL (ref 0.3–1)
MONOCYTES NFR BLD: 7.4 % (ref 4–15)
NEUTROPHILS # BLD AUTO: 5.7 K/UL (ref 1.8–7.7)
NEUTROPHILS NFR BLD: 81.7 % (ref 38–73)
NITRITE UR QL STRIP: NEGATIVE
NRBC BLD-RTO: 0 /100 WBC
PH UR STRIP: 8 [PH] (ref 5–8)
PLATELET # BLD AUTO: 218 K/UL (ref 150–350)
PMV BLD AUTO: 10.5 FL (ref 9.2–12.9)
POCT GLUCOSE: 92 MG/DL (ref 70–110)
POTASSIUM SERPL-SCNC: 4.3 MMOL/L (ref 3.5–5.1)
PROT SERPL-MCNC: 7.2 G/DL (ref 6–8.4)
PROT UR QL STRIP: NEGATIVE
RBC # BLD AUTO: 4.94 M/UL (ref 4–5.4)
RBC #/AREA URNS HPF: 12 /HPF (ref 0–4)
SODIUM SERPL-SCNC: 139 MMOL/L (ref 136–145)
SP GR UR STRIP: 1.01 (ref 1–1.03)
SQUAMOUS #/AREA URNS HPF: 6 /HPF
TRICHOMONAS UR QL MICRO: ABNORMAL
URN SPEC COLLECT METH UR: ABNORMAL
UROBILINOGEN UR STRIP-ACNC: NEGATIVE EU/DL
WBC # BLD AUTO: 6.99 K/UL (ref 3.9–12.7)
WBC #/AREA URNS HPF: >100 /HPF (ref 0–5)
WBC CLUMPS URNS QL MICRO: ABNORMAL

## 2020-01-18 PROCEDURE — 81000 URINALYSIS NONAUTO W/SCOPE: CPT

## 2020-01-18 PROCEDURE — 87186 SC STD MICRODIL/AGAR DIL: CPT

## 2020-01-18 PROCEDURE — 85025 COMPLETE CBC W/AUTO DIFF WBC: CPT

## 2020-01-18 PROCEDURE — 63600175 PHARM REV CODE 636 W HCPCS: Performed by: PHYSICIAN ASSISTANT

## 2020-01-18 PROCEDURE — 87077 CULTURE AEROBIC IDENTIFY: CPT

## 2020-01-18 PROCEDURE — 96365 THER/PROPH/DIAG IV INF INIT: CPT

## 2020-01-18 PROCEDURE — 80053 COMPREHEN METABOLIC PANEL: CPT

## 2020-01-18 PROCEDURE — 86901 BLOOD TYPING SEROLOGIC RH(D): CPT

## 2020-01-18 PROCEDURE — 87086 URINE CULTURE/COLONY COUNT: CPT

## 2020-01-18 PROCEDURE — 99284 EMERGENCY DEPT VISIT MOD MDM: CPT | Mod: 25

## 2020-01-18 PROCEDURE — 82962 GLUCOSE BLOOD TEST: CPT

## 2020-01-18 PROCEDURE — 87088 URINE BACTERIA CULTURE: CPT

## 2020-01-18 PROCEDURE — 96375 TX/PRO/DX INJ NEW DRUG ADDON: CPT

## 2020-01-18 RX ORDER — CEPHALEXIN 500 MG/1
500 CAPSULE ORAL EVERY 12 HOURS
Qty: 10 CAPSULE | Refills: 0 | Status: SHIPPED | OUTPATIENT
Start: 2020-01-18 | End: 2020-01-23

## 2020-01-18 RX ORDER — METRONIDAZOLE 500 MG/1
500 TABLET ORAL EVERY 12 HOURS
Qty: 14 TABLET | Refills: 0 | Status: SHIPPED | OUTPATIENT
Start: 2020-01-18 | End: 2020-01-25

## 2020-01-18 RX ORDER — KETOROLAC TROMETHAMINE 30 MG/ML
10 INJECTION, SOLUTION INTRAMUSCULAR; INTRAVENOUS
Status: COMPLETED | OUTPATIENT
Start: 2020-01-18 | End: 2020-01-18

## 2020-01-18 RX ADMIN — KETOROLAC TROMETHAMINE 10 MG: 30 INJECTION, SOLUTION INTRAMUSCULAR; INTRAVENOUS at 04:01

## 2020-01-18 RX ADMIN — CEFTRIAXONE 1 G: 1 INJECTION, SOLUTION INTRAVENOUS at 03:01

## 2020-01-18 RX ADMIN — SODIUM CHLORIDE 1000 ML: 0.9 INJECTION, SOLUTION INTRAVENOUS at 03:01

## 2020-01-18 NOTE — ED NOTES
ROUNDING:  Lying on the stretcher with HOB at semi-fowlers. AAOx4. States lower back pain 1/10. Denies dizziness, lightheadedness, sob, cp, n/v or any other discomfort at this time. Skin is warm and dry. Resp:18 even and unlabored. Comfort and BR needs addressed. Plan of care updated. Family at BS. NADN. Bed locked in low position, side rails up x2 and call light within reach. Will continue to monitor.

## 2020-01-18 NOTE — ED TRIAGE NOTES
"Pt presents to ER via POV w/ daughter reporting + dark colored blood "down below". Pt is unsure if blood is from rectum or vagina. Pt reports + intermittent episodes of weakness. No fever or chills.   "

## 2020-01-18 NOTE — ED NOTES
Two patient identifiers have been checked and are correct.      Appearance: Pt awake, alert & oriented to person, place & time. Pt in no acute distress at present time. Pt is clean and well groomed with clothes appropriately fastened.   Skin: Skin warm, dry & intact. Color consistent with ethnicity. Mucous membranes moist. No breakdown or brusing noted.   Musculoskeletal: Patient moving all extremities well, no obvious swelling or deformities noted.   Respiratory: Respirations spontaneous, even, and non-labored. Visible chest rise noted. Airway is open and patent. No accessory muscle use noted.   Neurologic: Sensation is intact. Speech is clear and appropriate. Eyes open spontaneously, behavior appropriate to situation, follows commands, facial expression symmetrical, bilateral hand grasp equal and even, purposeful motor response noted.  Cardiac: All peripheral pulses present. No Bilateral lower extremity edema. Cap refill is <3 seconds.  Abdomen: Abdomen soft, non-tender to palpation.   : Pt reports + intermittent dysuria. Pt unsure of hematuria or rectal bleeding but reports + dark red blood when she wipes.

## 2020-01-18 NOTE — ED PROVIDER NOTES
Encounter Date: 1/18/2020       History     Chief Complaint   Patient presents with    Hematuria     pt with pain on urination and blood in urine last few days. pt also with back pain      Patient is a 64-year-old female with a past medical history of neuroendocrine tumor, presenting to the emergency department with complaints of hematuria.  The patient states that for the past week, she started noticing some dysuria.  She states that over the past few days she has developed to having hematuria.  She states she believes is coming from her urine, as she only notices when she wipes, but cannot be sure.  She denies any blood in her stool.  She states that she no longer has menstrual cycles.  The patient's daughter states that she has been in out of it for the past day.  The patient admits that she recently received her shots for her neuroendocrine syndrome yesterday, received this every 28 days.  No nausea or vomiting. No dizziness.This is the extent of the patient's complaints at this time.       The history is provided by the patient.     Review of patient's allergies indicates:   Allergen Reactions    Epinephrine Anaphylaxis     Can Cause Carcinoid Crisis     Past Medical History:   Diagnosis Date    Cancer     GERD (gastroesophageal reflux disease)     Liver mass 06/2016    Mass of colon 06/2016    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     Sickle cell trait      History reviewed. No pertinent surgical history.  Family History   Problem Relation Age of Onset    Cancer Maternal Grandmother     Cancer Other      Social History     Tobacco Use    Smoking status: Never Smoker    Smokeless tobacco: Never Used   Substance Use Topics    Alcohol use: No    Drug use: No     Review of Systems   Constitutional: Negative for activity change, appetite change, chills, fatigue and fever.   HENT: Negative for congestion, rhinorrhea and sore throat.    Eyes: Negative for photophobia and visual disturbance.    Respiratory: Negative for cough, shortness of breath and wheezing.    Cardiovascular: Negative for chest pain.   Gastrointestinal: Negative for abdominal pain, diarrhea, nausea and vomiting.   Genitourinary: Positive for dysuria and hematuria. Negative for urgency.   Musculoskeletal: Negative for back pain, myalgias and neck pain.   Skin: Negative for color change and wound.   Neurological: Positive for light-headedness. Negative for weakness and headaches.   Psychiatric/Behavioral: Negative for agitation and confusion.       Physical Exam     Initial Vitals [01/18/20 1353]   BP Pulse Resp Temp SpO2   (!) 167/89 93 18 98 °F (36.7 °C) 100 %      MAP       --         Physical Exam    Nursing note and vitals reviewed.  Constitutional: She appears well-developed and well-nourished. She is not diaphoretic. She is cooperative.  Non-toxic appearance. She does not have a sickly appearance. She does not appear ill. No distress.   Well-appearing,  female accompanied by her daughter in the emergency room.  Speaking clearly in full sentences.  No acute distress.   HENT:   Head: Normocephalic and atraumatic.   Right Ear: External ear normal.   Left Ear: External ear normal.   Nose: Nose normal.   Mouth/Throat: Oropharynx is clear and moist.   Eyes: Conjunctivae and EOM are normal.   Neck: Normal range of motion. Neck supple.   Cardiovascular: Normal rate, regular rhythm and normal heart sounds.   Pulmonary/Chest: Breath sounds normal. No respiratory distress. She has no wheezes.   Abdominal: Soft. Bowel sounds are normal. She exhibits no distension. There is no tenderness. There is no rebound and no guarding.   Genitourinary: Rectum normal. Rectal exam shows guaiac negative stool. Guaiac negative stool.   Musculoskeletal: Normal range of motion.   Neurological: She is alert and oriented to person, place, and time. GCS eye subscore is 4. GCS verbal subscore is 5. GCS motor subscore is 6.   Skin: Skin is warm.    Psychiatric: She has a normal mood and affect. Her behavior is normal. Judgment and thought content normal.         ED Course   Procedures  Labs Reviewed   URINALYSIS, REFLEX TO URINE CULTURE - Abnormal; Notable for the following components:       Result Value    Occult Blood UA 3+ (*)     Leukocytes, UA 3+ (*)     All other components within normal limits    Narrative:     Preferred Collection Type->Urine, Clean Catch   CBC W/ AUTO DIFFERENTIAL - Abnormal; Notable for the following components:    Mean Corpuscular Volume 80 (*)     Mean Corpuscular Hemoglobin 25.7 (*)     RDW 16.9 (*)     Lymph # 0.7 (*)     Gran% 81.7 (*)     Lymph% 9.9 (*)     All other components within normal limits   URINALYSIS MICROSCOPIC - Abnormal; Notable for the following components:    RBC, UA 12 (*)     WBC, UA >100 (*)     WBC Clumps, UA Few (*)     Bacteria Few (*)     Trichomonas, UA Rare (*)     All other components within normal limits    Narrative:     Preferred Collection Type->Urine, Clean Catch   CULTURE, URINE   COMPREHENSIVE METABOLIC PANEL   TYPE & SCREEN   POCT GLUCOSE             Medical Decision Making:   Initial Assessment:     Urgent evaluation a 64-year-old female with a past medical history of neuroendocrine syndrome, presenting to the emergency department for evaluation of hematuria.  Patient is afebrile, nontoxic appearing, hemodynamically stable. Physical exam reveals regular rate rhythm, lungs are clear to auscultation bilaterally. Guaiac negative stool.  Will plan for labs, fluids and reassess.    Clinical Tests:   Lab Tests: Ordered and Reviewed  ED Management:    CBC shows no leukocytosis, normal H&H.  CMP is unremarkable.  Accu-Chek is normal. UA shows significant evidence of urinary tract infection, will treat with 1 g of IV Rocephin.    Patient will be discharged home with a prescription for Keflex as well as Flagyl to treat Trichomonas.  Had a lengthy discussion with her.  She is counseled on home care  treatment.  Urged to obtain follow-up with PCP.  Given a note to stay home from work.The patient was instructed to follow up with a primary care provider in 2 days or to return to the emergency department for worsening symptoms. The treatment plan was discussed with the patient who demonstrated understanding and comfort with plan.      This note was created using [employer] Fluency Direct. There may be typographical errors secondary to dictation.                                    Clinical Impression:     1. Acute cystitis with hematuria    2. Trichomoniasis           Disposition:   Disposition: Discharged  Condition: Stable                     Kaleigh Eagle PA-C  01/18/20 0222

## 2020-01-19 ENCOUNTER — NURSE TRIAGE (OUTPATIENT)
Dept: ADMINISTRATIVE | Facility: CLINIC | Age: 65
End: 2020-01-19

## 2020-01-19 NOTE — TELEPHONE ENCOUNTER
Reason for Disposition   Health Information question, no triage required and triager able to answer question    Protocols used: INFORMATION ONLY CALL-A-    Pt called to discuss her dx of UTI and Trichomoniasis from her ER visit on yesterday. Educated on infections and antibiotics that were prescribed. Pt verbalized understanding.

## 2020-01-21 LAB — BACTERIA UR CULT: ABNORMAL

## 2020-01-23 ENCOUNTER — TELEPHONE (OUTPATIENT)
Dept: NEUROLOGY | Facility: HOSPITAL | Age: 65
End: 2020-01-23

## 2020-01-23 NOTE — TELEPHONE ENCOUNTER
----- Message from Georgiana Calderon sent at 1/23/2020 10:35 AM CST -----  Contact: Vicenta Gonsalves RN/ [facility] nurse with [facility] /[phone]  Vicenta called to get the patient's last visit, upcoming visit and review of current treatment plan.    Please call [phone], which is her secure line.

## 2020-01-23 NOTE — TELEPHONE ENCOUNTER
Spoke to Vicenta kahn pt care team and treatment plan. Pt has no record of recent mammogram/ pap smear.

## 2020-02-06 ENCOUNTER — TELEPHONE (OUTPATIENT)
Dept: FAMILY MEDICINE | Facility: CLINIC | Age: 65
End: 2020-02-06

## 2020-02-06 NOTE — TELEPHONE ENCOUNTER
Called pt to reschedule her appt beings that Dr Diallo is out today, left a message requesting a call back

## 2020-02-14 ENCOUNTER — INFUSION (OUTPATIENT)
Dept: INFUSION THERAPY | Facility: HOSPITAL | Age: 65
End: 2020-02-14
Attending: SURGERY
Payer: COMMERCIAL

## 2020-02-14 VITALS — BODY MASS INDEX: 27.33 KG/M2 | HEIGHT: 64 IN | WEIGHT: 160.06 LBS

## 2020-02-14 DIAGNOSIS — C7B.02 METASTATIC MALIGNANT CARCINOID TUMOR TO LIVER: ICD-10-CM

## 2020-02-14 DIAGNOSIS — C7A.00 MALIGNANT CARCINOID TUMOR OF UNKNOWN PRIMARY SITE: Primary | ICD-10-CM

## 2020-02-14 PROCEDURE — 63600175 PHARM REV CODE 636 W HCPCS: Mod: JG | Performed by: SURGERY

## 2020-02-14 PROCEDURE — 96372 THER/PROPH/DIAG INJ SC/IM: CPT

## 2020-02-14 RX ORDER — LANREOTIDE ACETATE 120 MG/.5ML
120 INJECTION SUBCUTANEOUS
Status: CANCELLED | OUTPATIENT
Start: 2020-02-14

## 2020-02-14 RX ORDER — LANREOTIDE ACETATE 120 MG/.5ML
120 INJECTION SUBCUTANEOUS
Status: DISCONTINUED | OUTPATIENT
Start: 2020-02-14 | End: 2020-02-14 | Stop reason: HOSPADM

## 2020-02-14 RX ADMIN — LANREOTIDE ACETATE 120 MG: 120 INJECTION SUBCUTANEOUS at 12:02

## 2020-02-14 NOTE — NURSING
Tolerated Lanreotdie injection well. Pt c/o feeling tired and experiencing a lot of diarrhea.  Notified Dr. Riddle and he recommended Immodium and Gatorade. Pt verbalized understanding. Discharged home with next appointment scheduled.

## 2020-02-27 ENCOUNTER — TELEPHONE (OUTPATIENT)
Dept: FAMILY MEDICINE | Facility: CLINIC | Age: 65
End: 2020-02-27

## 2020-02-27 ENCOUNTER — OFFICE VISIT (OUTPATIENT)
Dept: FAMILY MEDICINE | Facility: CLINIC | Age: 65
End: 2020-02-27
Payer: COMMERCIAL

## 2020-02-27 VITALS
OXYGEN SATURATION: 99 % | WEIGHT: 155 LBS | SYSTOLIC BLOOD PRESSURE: 144 MMHG | HEART RATE: 79 BPM | DIASTOLIC BLOOD PRESSURE: 88 MMHG | BODY MASS INDEX: 26.46 KG/M2 | HEIGHT: 64 IN

## 2020-02-27 DIAGNOSIS — D50.9 IRON DEFICIENCY ANEMIA, UNSPECIFIED IRON DEFICIENCY ANEMIA TYPE: ICD-10-CM

## 2020-02-27 DIAGNOSIS — C7B.02 METASTATIC MALIGNANT CARCINOID TUMOR TO LIVER: ICD-10-CM

## 2020-02-27 DIAGNOSIS — E16.4 HYPERGASTRINEMIA: ICD-10-CM

## 2020-02-27 DIAGNOSIS — R03.0 ELEVATED BLOOD PRESSURE READING WITHOUT DIAGNOSIS OF HYPERTENSION: ICD-10-CM

## 2020-02-27 DIAGNOSIS — C7A.00 MALIGNANT CARCINOID TUMOR OF UNKNOWN PRIMARY SITE: Primary | ICD-10-CM

## 2020-02-27 DIAGNOSIS — Z00.00 ANNUAL PHYSICAL EXAM: ICD-10-CM

## 2020-02-27 PROCEDURE — 99999 PR PBB SHADOW E&M-EST. PATIENT-LVL III: CPT | Mod: PBBFAC,,, | Performed by: FAMILY MEDICINE

## 2020-02-27 PROCEDURE — 3008F BODY MASS INDEX DOCD: CPT | Mod: CPTII,S$GLB,, | Performed by: FAMILY MEDICINE

## 2020-02-27 PROCEDURE — 3008F PR BODY MASS INDEX (BMI) DOCUMENTED: ICD-10-PCS | Mod: CPTII,S$GLB,, | Performed by: FAMILY MEDICINE

## 2020-02-27 PROCEDURE — 99999 PR PBB SHADOW E&M-EST. PATIENT-LVL III: ICD-10-PCS | Mod: PBBFAC,,, | Performed by: FAMILY MEDICINE

## 2020-02-27 PROCEDURE — 99204 PR OFFICE/OUTPT VISIT, NEW, LEVL IV, 45-59 MIN: ICD-10-PCS | Mod: S$GLB,,, | Performed by: FAMILY MEDICINE

## 2020-02-27 PROCEDURE — 99204 OFFICE O/P NEW MOD 45 MIN: CPT | Mod: S$GLB,,, | Performed by: FAMILY MEDICINE

## 2020-02-27 RX ORDER — OMEPRAZOLE 20 MG/1
20 CAPSULE, DELAYED RELEASE ORAL
COMMUNITY
End: 2020-02-27

## 2020-02-27 NOTE — PROGRESS NOTES
Subjective:       Patient ID: Lena Brantley is a 64 y.o. female.    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 She presents to establish care.     Carcinoid tumor: Treated with Lanreotide 120 mg q 28 days since 2016. She was last evaluated by Dr. Jordin Hobbs 8/2019. Advised EGD and colonoscopy and serologic testing for celiac disease and atrophic gastritis. Pt states she did not follow-up with GI testing as she has been afraid of findings; also has not thought of logistics of scheduling with work and transportation.       Review of Systems   Constitutional: Negative for activity change, appetite change, chills, fever and unexpected weight change.   HENT: Negative for congestion, sneezing and sore throat.    Eyes: Negative for redness, itching and visual disturbance.   Respiratory: Negative for cough, chest tightness, shortness of breath and wheezing.    Cardiovascular: Negative for chest pain.   Gastrointestinal: Negative for abdominal pain, constipation, diarrhea, nausea and vomiting.   Genitourinary: Negative for dysuria, frequency, hematuria, urgency, vaginal bleeding and vaginal discharge.   Skin: Negative for rash.   Neurological: Negative for dizziness, syncope and headaches.   Psychiatric/Behavioral: Negative for dysphoric mood and suicidal ideas. The patient is not nervous/anxious.          Past Medical History:   Diagnosis Date    Cancer     GERD (gastroesophageal reflux disease)     Liver mass 06/2016    Mass of colon 06/2016    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     Sickle cell trait        Patient Active Problem List   Diagnosis    Malignant carcinoid tumor of unknown primary site    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    Hypergastrinemia     "Carcinoid (except of appendix)    Gastrinoma, malignant       No past surgical history on file.    Family History   Problem Relation Age of Onset    Cancer Maternal Grandmother     Cancer Other        Social History     Tobacco Use   Smoking Status Never Smoker   Smokeless Tobacco Never Used       Objective:        Vitals:    02/27/20 1501   BP: (!) 144/88   Pulse: 79   SpO2: 99%   Weight: 70.3 kg (154 lb 15.7 oz)   Height: 5' 4" (1.626 m)       Physical Exam   Constitutional: She is oriented to person, place, and time. She appears well-developed and well-nourished. No distress.   HENT:   Head: Normocephalic and atraumatic.   Right Ear: External ear normal.   Left Ear: External ear normal.   Nose: Nose normal.   Mouth/Throat: Oropharynx is clear and moist. No oropharyngeal exudate.   Eyes: Conjunctivae and EOM are normal. No scleral icterus.   Neck: Normal range of motion. Neck supple.   Cardiovascular: Normal rate, regular rhythm and normal heart sounds. Exam reveals no gallop and no friction rub.   No murmur heard.  Pulmonary/Chest: Effort normal and breath sounds normal. She has no wheezes. She has no rales.   Abdominal: Soft. She exhibits no distension and no mass. There is no tenderness.   Musculoskeletal: Normal range of motion. She exhibits no edema.   Lymphadenopathy:     She has no cervical adenopathy.   Neurological: She is alert and oriented to person, place, and time. No cranial nerve deficit.   Skin: Skin is warm and dry. No rash noted. No erythema.   Psychiatric: She has a normal mood and affect. Her behavior is normal.       Assessment:       1. Malignant carcinoid tumor of unknown primary site    2. 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    3. Hypergastrinemia    4. Elevated blood pressure reading without diagnosis of hypertension    5. Iron deficiency anemia, unspecified iron deficiency anemia type    6. Annual physical exam        Plan:       Lena was seen today for establish " care.    Diagnoses and all orders for this visit:    Malignant carcinoid tumor of unknown primary site  Comments:  Advised to schedule EGD and colonoscopy with Dr. Hobbs. Continue Lanreotide 120 mg q 28 days since 2016    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  Comments:  Advised to schedule EGD and colonoscopy with Dr. Hobbs. Continue Lanreotide 120 mg q 28 days since 2016    Hypergastrinemia  Comments:  Continue protonix 40 mg daily.     Elevated blood pressure reading without diagnosis of hypertension  Comments:  Will repeat during f/u visit.     Iron deficiency anemia, unspecified iron deficiency anemia type  Comments:  H/H 9.2/32.2; MCV 71; Ferritin 6; Tsat 3% 5/2019. Needs EGD/colonoscopy and possible evaluation with hematology.    Annual physical exam  -     Hemoglobin A1c; Future  -     TSH; Future  -     Vitamin D; Future  -     Hepatitis C antibody; Future  -     HIV 1/2 Ag/Ab (4th Gen); Future      Nurse mentioned that after visit was over that pt had concerns regarding anxiety. Given community behavioral health resource handout with counselors in region. Will discuss over subsequent visit(s)  Plan to RTC for labs above in anticipation of annual wellness exam in 1 month     Patient Instructions   Colonoscopy Suite: [phone]      Follow up in about 4 weeks (around 3/26/2020) for annual physical exam and lab review.

## 2020-02-29 ENCOUNTER — LAB VISIT (OUTPATIENT)
Dept: LAB | Facility: HOSPITAL | Age: 65
End: 2020-02-29
Attending: INTERNAL MEDICINE
Payer: COMMERCIAL

## 2020-02-29 DIAGNOSIS — Z00.00 ANNUAL PHYSICAL EXAM: ICD-10-CM

## 2020-02-29 LAB
25(OH)D3+25(OH)D2 SERPL-MCNC: 24 NG/ML (ref 30–96)
ESTIMATED AVG GLUCOSE: 126 MG/DL (ref 68–131)
HBA1C MFR BLD HPLC: 6 % (ref 4–5.6)
TSH SERPL DL<=0.005 MIU/L-ACNC: 1.28 UIU/ML (ref 0.4–4)

## 2020-02-29 PROCEDURE — 82306 VITAMIN D 25 HYDROXY: CPT

## 2020-02-29 PROCEDURE — 84443 ASSAY THYROID STIM HORMONE: CPT

## 2020-02-29 PROCEDURE — 83036 HEMOGLOBIN GLYCOSYLATED A1C: CPT

## 2020-02-29 PROCEDURE — 86703 HIV-1/HIV-2 1 RESULT ANTBDY: CPT

## 2020-02-29 PROCEDURE — 86803 HEPATITIS C AB TEST: CPT

## 2020-02-29 PROCEDURE — 36415 COLL VENOUS BLD VENIPUNCTURE: CPT

## 2020-03-03 LAB
HCV AB SERPL QL IA: NEGATIVE
HIV 1+2 AB+HIV1 P24 AG SERPL QL IA: NEGATIVE

## 2020-03-13 ENCOUNTER — INFUSION (OUTPATIENT)
Dept: INFUSION THERAPY | Facility: HOSPITAL | Age: 65
End: 2020-03-13
Attending: SURGERY
Payer: COMMERCIAL

## 2020-03-13 VITALS — WEIGHT: 155 LBS | BODY MASS INDEX: 26.46 KG/M2 | HEIGHT: 64 IN

## 2020-03-13 DIAGNOSIS — C7A.00 MALIGNANT CARCINOID TUMOR OF UNKNOWN PRIMARY SITE: Primary | ICD-10-CM

## 2020-03-13 DIAGNOSIS — C7B.02 METASTATIC MALIGNANT CARCINOID TUMOR TO LIVER: ICD-10-CM

## 2020-03-13 PROCEDURE — 63600175 PHARM REV CODE 636 W HCPCS: Mod: JG | Performed by: SURGERY

## 2020-03-13 PROCEDURE — 96372 THER/PROPH/DIAG INJ SC/IM: CPT

## 2020-03-13 RX ORDER — LANREOTIDE ACETATE 120 MG/.5ML
120 INJECTION SUBCUTANEOUS
Status: DISCONTINUED | OUTPATIENT
Start: 2020-03-13 | End: 2020-03-13 | Stop reason: HOSPADM

## 2020-03-13 RX ADMIN — LANREOTIDE ACETATE 120 MG: 120 INJECTION SUBCUTANEOUS at 12:03

## 2020-03-17 ENCOUNTER — HOSPITAL ENCOUNTER (OUTPATIENT)
Dept: RADIOLOGY | Facility: HOSPITAL | Age: 65
Discharge: HOME | End: 2020-03-17
Attending: FAMILY MEDICINE
Payer: COMMERCIAL

## 2020-03-17 ENCOUNTER — TELEPHONE (OUTPATIENT)
Dept: FAMILY MEDICINE | Facility: CLINIC | Age: 65
End: 2020-03-17

## 2020-03-17 ENCOUNTER — OFFICE VISIT (OUTPATIENT)
Dept: FAMILY MEDICINE | Facility: CLINIC | Age: 65
End: 2020-03-17
Payer: COMMERCIAL

## 2020-03-17 VITALS
HEIGHT: 64 IN | SYSTOLIC BLOOD PRESSURE: 112 MMHG | HEART RATE: 76 BPM | DIASTOLIC BLOOD PRESSURE: 60 MMHG | WEIGHT: 155 LBS | BODY MASS INDEX: 26.46 KG/M2 | OXYGEN SATURATION: 97 %

## 2020-03-17 DIAGNOSIS — S00.03XA CONTUSION OF SCALP, INITIAL ENCOUNTER: ICD-10-CM

## 2020-03-17 DIAGNOSIS — R55 SYNCOPE, UNSPECIFIED SYNCOPE TYPE: ICD-10-CM

## 2020-03-17 DIAGNOSIS — H93.13 TINNITUS AURIUM, BILATERAL: ICD-10-CM

## 2020-03-17 DIAGNOSIS — M27.0 TORUS PALATINUS: ICD-10-CM

## 2020-03-17 DIAGNOSIS — R55 SYNCOPE, UNSPECIFIED SYNCOPE TYPE: Primary | ICD-10-CM

## 2020-03-17 PROCEDURE — 99214 PR OFFICE/OUTPT VISIT, EST, LEVL IV, 30-39 MIN: ICD-10-PCS | Mod: S$GLB,,, | Performed by: FAMILY MEDICINE

## 2020-03-17 PROCEDURE — 99999 PR PBB SHADOW E&M-EST. PATIENT-LVL III: ICD-10-PCS | Mod: PBBFAC,,, | Performed by: FAMILY MEDICINE

## 2020-03-17 PROCEDURE — 3008F PR BODY MASS INDEX (BMI) DOCUMENTED: ICD-10-PCS | Mod: CPTII,S$GLB,, | Performed by: FAMILY MEDICINE

## 2020-03-17 PROCEDURE — 3008F BODY MASS INDEX DOCD: CPT | Mod: CPTII,S$GLB,, | Performed by: FAMILY MEDICINE

## 2020-03-17 PROCEDURE — 93005 ELECTROCARDIOGRAM TRACING: CPT | Mod: S$GLB,,, | Performed by: FAMILY MEDICINE

## 2020-03-17 PROCEDURE — 70450 CT HEAD WITHOUT CONTRAST: ICD-10-PCS | Mod: 26,,, | Performed by: RADIOLOGY

## 2020-03-17 PROCEDURE — 70450 CT HEAD/BRAIN W/O DYE: CPT | Mod: 26,,, | Performed by: RADIOLOGY

## 2020-03-17 PROCEDURE — 70450 CT HEAD/BRAIN W/O DYE: CPT | Mod: TC

## 2020-03-17 PROCEDURE — 99999 PR PBB SHADOW E&M-EST. PATIENT-LVL III: CPT | Mod: PBBFAC,,, | Performed by: FAMILY MEDICINE

## 2020-03-17 PROCEDURE — 93010 EKG 12-LEAD: ICD-10-PCS | Mod: S$GLB,,, | Performed by: INTERNAL MEDICINE

## 2020-03-17 PROCEDURE — 99214 OFFICE O/P EST MOD 30 MIN: CPT | Mod: S$GLB,,, | Performed by: FAMILY MEDICINE

## 2020-03-17 PROCEDURE — 93010 ELECTROCARDIOGRAM REPORT: CPT | Mod: S$GLB,,, | Performed by: INTERNAL MEDICINE

## 2020-03-17 PROCEDURE — 93005 EKG 12-LEAD: ICD-10-PCS | Mod: S$GLB,,, | Performed by: FAMILY MEDICINE

## 2020-03-17 NOTE — TELEPHONE ENCOUNTER
----- Message from Earnest Levine sent at 3/17/2020  9:23 AM CDT -----  Contact: [phone] / self   Patient would like to speak with your office regarding a fall she had last night, Pt  States she blacked out for a few minutes. Also patient states she is dealing with anxiety. Please Advise.

## 2020-03-17 NOTE — PROGRESS NOTES
Subjective:       Patient ID: Lena Brantley is a 64 y.o. female.    Chief Complaint: Fall    64 years old female came to the clinic after recent syncope yesterday.  Patient reported head contusion over the right side.  Patient with a hematoma around the right eye.  Patient did not remember how long she was passing out.  Patient with generalized weakness.  Patient did not go to the emergency room.  Patient reports persistent ringing in the ears bilaterally.  No recent ENT follow-up.  Blood pressure today stable.  No chest pain, palpitation, orthopnea or PND.    Review of Systems   Constitutional: Negative.    HENT: Negative.    Eyes: Negative.    Respiratory: Negative.    Cardiovascular: Negative.  Negative for chest pain, palpitations and leg swelling.   Gastrointestinal: Negative.    Genitourinary: Negative.    Musculoskeletal: Negative.    Skin: Negative.    Neurological: Positive for syncope, weakness and headaches.   Psychiatric/Behavioral: Negative.        Objective:      Physical Exam   Constitutional: She is oriented to person, place, and time. She appears well-developed and well-nourished. No distress.   HENT:   Head: Normocephalic and atraumatic.   Right Ear: External ear normal.   Left Ear: External ear normal.   Nose: Nose normal.   Mouth/Throat: Oropharynx is clear and moist. No oropharyngeal exudate.       Eyes: Pupils are equal, round, and reactive to light. Conjunctivae and EOM are normal. Right eye exhibits no discharge. Left eye exhibits no discharge. No scleral icterus.   Neck: Normal range of motion. Neck supple. No JVD present. No tracheal deviation present. No thyromegaly present.   Cardiovascular: Normal rate, regular rhythm, normal heart sounds and intact distal pulses. Exam reveals no gallop and no friction rub.   No murmur heard.  Pulmonary/Chest: Effort normal and breath sounds normal. No stridor. No respiratory distress. She has no wheezes. She has no rales. She exhibits no  tenderness.   Abdominal: Soft. Bowel sounds are normal. She exhibits no distension and no mass. There is no tenderness. There is no rebound and no guarding.   Musculoskeletal: Normal range of motion. She exhibits no edema or tenderness.   Lymphadenopathy:     She has no cervical adenopathy.   Neurological: She is alert and oriented to person, place, and time. She has normal reflexes. No cranial nerve deficit. She exhibits normal muscle tone. Coordination normal.   Skin: Skin is warm and dry. No rash noted. She is not diaphoretic. No erythema. No pallor.   Psychiatric: She has a normal mood and affect. Her behavior is normal. Judgment and thought content normal.       Assessment:       1. Syncope, unspecified syncope type    2. Contusion of scalp, initial encounter    3. Tinnitus aurium, bilateral    4. Torus palatinus        Plan:         Lena was seen today for fall.    Diagnoses and all orders for this visit:    Syncope, unspecified syncope type  -     IN OFFICE EKG 12-LEAD (to [place])  -     CT Head Without Contrast; Future  -     CBC auto differential; Future  -     Basic metabolic panel; Future    Contusion of scalp, initial encounter  -     IN OFFICE EKG 12-LEAD (to [place])  -     CT Head Without Contrast; Future    Tinnitus aurium, bilateral  -     Ambulatory referral/consult to ENT; Future    Torus palatinus

## 2020-03-18 ENCOUNTER — OFFICE VISIT (OUTPATIENT)
Dept: FAMILY MEDICINE | Facility: CLINIC | Age: 65
End: 2020-03-18
Payer: COMMERCIAL

## 2020-03-18 VITALS
HEIGHT: 64 IN | HEART RATE: 94 BPM | WEIGHT: 153.44 LBS | BODY MASS INDEX: 26.2 KG/M2 | OXYGEN SATURATION: 98 % | TEMPERATURE: 98 F | DIASTOLIC BLOOD PRESSURE: 84 MMHG | SYSTOLIC BLOOD PRESSURE: 128 MMHG

## 2020-03-18 DIAGNOSIS — E16.4 HYPERGASTRINEMIA: ICD-10-CM

## 2020-03-18 DIAGNOSIS — D50.9 IRON DEFICIENCY ANEMIA, UNSPECIFIED IRON DEFICIENCY ANEMIA TYPE: ICD-10-CM

## 2020-03-18 DIAGNOSIS — R55 SYNCOPE, UNSPECIFIED SYNCOPE TYPE: Primary | ICD-10-CM

## 2020-03-18 DIAGNOSIS — C7A.00 MALIGNANT CARCINOID TUMOR OF UNKNOWN PRIMARY SITE: ICD-10-CM

## 2020-03-18 DIAGNOSIS — C7B.02 METASTATIC MALIGNANT CARCINOID TUMOR TO LIVER: ICD-10-CM

## 2020-03-18 DIAGNOSIS — F41.1 GENERALIZED ANXIETY DISORDER: ICD-10-CM

## 2020-03-18 DIAGNOSIS — F32.1 CURRENT MODERATE EPISODE OF MAJOR DEPRESSIVE DISORDER WITHOUT PRIOR EPISODE: ICD-10-CM

## 2020-03-18 PROCEDURE — 99214 PR OFFICE/OUTPT VISIT, EST, LEVL IV, 30-39 MIN: ICD-10-PCS | Mod: S$GLB,,, | Performed by: FAMILY MEDICINE

## 2020-03-18 PROCEDURE — 99999 PR PBB SHADOW E&M-EST. PATIENT-LVL III: CPT | Mod: PBBFAC,,, | Performed by: FAMILY MEDICINE

## 2020-03-18 PROCEDURE — 99214 OFFICE O/P EST MOD 30 MIN: CPT | Mod: S$GLB,,, | Performed by: FAMILY MEDICINE

## 2020-03-18 PROCEDURE — 3008F BODY MASS INDEX DOCD: CPT | Mod: CPTII,S$GLB,, | Performed by: FAMILY MEDICINE

## 2020-03-18 PROCEDURE — 99999 PR PBB SHADOW E&M-EST. PATIENT-LVL III: ICD-10-PCS | Mod: PBBFAC,,, | Performed by: FAMILY MEDICINE

## 2020-03-18 PROCEDURE — 3008F PR BODY MASS INDEX (BMI) DOCUMENTED: ICD-10-PCS | Mod: CPTII,S$GLB,, | Performed by: FAMILY MEDICINE

## 2020-03-18 RX ORDER — ESCITALOPRAM OXALATE 5 MG/1
5 TABLET ORAL DAILY
Qty: 30 TABLET | Refills: 1 | Status: SHIPPED | OUTPATIENT
Start: 2020-03-18 | End: 2020-06-03

## 2020-03-18 NOTE — PROGRESS NOTES
"Subjective:       Patient ID: Lena Brantley is a 64 y.o. female.    Chief Complaint: Fall (Had fall on Monday and hit her head on chair.  dark circles around eyes and experienced ringing in ears.  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 She presents to f/u fall.     Pt states on 3/15/2020, she experienced dizziness while taking a shower. Subsequently experienced loud ringing in her ears and felt that she was going to pass out. Was able to make it out of the shower and on to the bed to rest; no LOC. Reports feeling "generalized weakness" for the remainder of the day. On 3/16/2020 she experienced similar symptoms while urinating. At that time she did lose concsiousness for an unknown amount of time and woke up on her left side. Upon waking she was not confused; she did have pain to right frontal lobe. On 3/17/2020 she woke up with bruising to medial aspect of right eye. No further episodes of dizziness/syncope. Evaluated by PCP (Dr. Steven) on 3/17/2020: Had CT 3/17/2020 head which showed Mild supraorbital/right frontal extracranial soft tissue swelling without evidence of underlying calvarial fracture or acute intracranial hemorrhage; CBC and BMP normal; EKG with sinus bradycardia. Finds  that she has been breathing a little faster than normal. Denies cough, wheezing, or palpitations. She has been experiencing increased anxiety. Recently found out that her sister was diagnosed with endometrial cancer and is s/p hysterectomy. Also she has a niece that is being treated for cervical cancer. She is worried about the health of her daughter.    Review of Systems   Constitutional: Negative for activity change, appetite change, chills, fever and unexpected weight change.   HENT: " "Negative for congestion, sneezing and sore throat.    Eyes: Negative for redness, itching and visual disturbance.   Respiratory: Negative for cough, chest tightness, shortness of breath and wheezing.    Cardiovascular: Negative for chest pain.   Gastrointestinal: Negative for abdominal pain, constipation, diarrhea, nausea and vomiting.   Genitourinary: Negative for dysuria, frequency, hematuria, urgency, vaginal bleeding and vaginal discharge.   Skin: Negative for rash.   Neurological: Positive for headaches. Negative for dizziness, seizures and syncope.   Psychiatric/Behavioral: Negative for dysphoric mood and suicidal ideas. The patient is nervous/anxious.          Past Medical History:   Diagnosis Date    Cancer     GERD (gastroesophageal reflux disease)     Liver mass 06/2016    Mass of colon 06/2016    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     Sickle cell trait        Patient Active Problem List   Diagnosis    Malignant carcinoid tumor of unknown primary site    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    Hypergastrinemia    Carcinoid (except of appendix)    Gastrinoma, malignant    Generalized anxiety disorder    Current moderate episode of major depressive disorder without prior episode       No past surgical history on file.    Family History   Problem Relation Age of Onset    Cancer Maternal Grandmother     Cancer Other        Social History     Tobacco Use   Smoking Status Never Smoker   Smokeless Tobacco Never Used       Objective:        Vitals:    03/18/20 1507   BP: 128/84   Pulse: 94   Temp: 98.2 °F (36.8 °C)   TempSrc: Oral   SpO2: 98%   Weight: 69.6 kg (153 lb 7 oz)   Height: 5' 4" (1.626 m)           Physical Exam   Constitutional: She is oriented to person, place, and time. She appears well-developed and well-nourished. No distress.   HENT:   Head: Normocephalic and atraumatic.   Right Ear: External ear normal.   Left Ear: External ear normal.   Nose: Nose normal. "   Mouth/Throat: Oropharynx is clear and moist. No oropharyngeal exudate.   Eyes: Conjunctivae and EOM are normal. No scleral icterus.   Eyes:       + ecchymosis to areas outlined in red.    Neck: Normal range of motion. Neck supple.   Cardiovascular: Normal rate, regular rhythm and normal heart sounds. Exam reveals no gallop and no friction rub.   No murmur heard.  Pulmonary/Chest: Effort normal and breath sounds normal. She has no wheezes. She has no rales.   Abdominal: Soft. She exhibits no distension and no mass. There is no tenderness.   Musculoskeletal: Normal range of motion. She exhibits no edema.   Lymphadenopathy:     She has no cervical adenopathy.   Neurological: She is alert and oriented to person, place, and time. No cranial nerve deficit.   Skin: Skin is warm and dry. No rash noted. No erythema.   Psychiatric: She has a normal mood and affect. Her behavior is normal.   Assessment:       1. Syncope, unspecified syncope type    2. Current moderate episode of major depressive disorder without prior episode    3. Generalized anxiety disorder    4. Malignant carcinoid tumor of unknown primary site    5. 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    6. Hypergastrinemia    7. Iron deficiency anemia, unspecified iron deficiency anemia type        Plan:       Lena was seen today for fall.    Diagnoses and all orders for this visit:    Syncope, unspecified syncope type    Current moderate episode of major depressive disorder without prior episode  -     escitalopram oxalate (LEXAPRO) 5 MG Tab; Take 1 tablet (5 mg total) by mouth once daily.    Generalized anxiety disorder  -     escitalopram oxalate (LEXAPRO) 5 MG Tab; Take 1 tablet (5 mg total) by mouth once daily.    Malignant carcinoid tumor of unknown primary site    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    Hypergastrinemia    Iron deficiency anemia, unspecified iron deficiency anemia type      Syncope  Etiology unclear   [facility] 3/17/2020 with no  intracranial pathology   CBC, BMP normal 3/17/2020  EKG with sinus maribel only   Pt now feeling well--symptoms could be vasovagal and associated with recent stressors--see below  May need to consider Holter vs event monitor if symptoms recur.     Moderate MDD/Severe MINH   PHQ-9 14; MINH-7 16  Discussed treatment options including pharmacotherapy vs psychotherapy vs combination of both   Pt has agreed to both   Start escitalopram 5 mg daily. F/U 3 weeks for titration  Given community behavioral health resource handout with counselors in region. Also can try [email or web address]       Malignant carcinoid tumor of unknown primary site  Comments:  Advised to schedule EGD and colonoscopy with Dr. Hobbs. Continue Lanreotide 120 mg q 28 days since 2016    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  Comments:  Advised to schedule EGD and colonoscopy with Dr. Hobbs. Continue Lanreotide 120 mg q 28 days since 2016    Hypergastrinemia  Comments:  Continue protonix 40 mg daily.     Elevated blood pressure reading without diagnosis of hypertension  Comments:  BP okay today    Iron deficiency anemia, unspecified iron deficiency anemia type  Comments:  H/H 9.2/32.2; MCV 71; Ferritin 6; Tsat 3% 5/2019. Needs EGD/colonoscopy and possible evaluation with hematology.    Annual physical exam  -     Hemoglobin A1c; Future  -     TSH; Future  -     Vitamin D; Future  -     Hepatitis C antibody; Future  -     HIV 1/2 Ag/Ab (4th Gen); Future    Follow up in about 3 weeks (around 4/8/2020).---Anxiety/Depression. AWV + labs also due at future visit.

## 2020-03-19 ENCOUNTER — HOSPITAL ENCOUNTER (EMERGENCY)
Facility: HOSPITAL | Age: 65
Discharge: HOME | End: 2020-03-19
Attending: EMERGENCY MEDICINE
Payer: COMMERCIAL

## 2020-03-19 VITALS
TEMPERATURE: 99 F | BODY MASS INDEX: 26.29 KG/M2 | HEIGHT: 64 IN | WEIGHT: 154 LBS | OXYGEN SATURATION: 96 % | DIASTOLIC BLOOD PRESSURE: 73 MMHG | RESPIRATION RATE: 18 BRPM | SYSTOLIC BLOOD PRESSURE: 125 MMHG | HEART RATE: 66 BPM

## 2020-03-19 DIAGNOSIS — R55 NEAR SYNCOPE: Primary | ICD-10-CM

## 2020-03-19 DIAGNOSIS — E86.0 DEHYDRATION: ICD-10-CM

## 2020-03-19 LAB
ALBUMIN SERPL BCP-MCNC: 3.4 G/DL (ref 3.5–5.2)
ALP SERPL-CCNC: 108 U/L (ref 55–135)
ALT SERPL W/O P-5'-P-CCNC: 30 U/L (ref 10–44)
AMMONIA PLAS-SCNC: 44 UMOL/L (ref 10–50)
ANION GAP SERPL CALC-SCNC: 8 MMOL/L (ref 8–16)
AST SERPL-CCNC: 37 U/L (ref 10–40)
BASOPHILS # BLD AUTO: 0.02 K/UL (ref 0–0.2)
BASOPHILS NFR BLD: 0.4 % (ref 0–1.9)
BILIRUB SERPL-MCNC: 0.8 MG/DL (ref 0.1–1)
BILIRUB UR QL STRIP: NEGATIVE
BNP SERPL-MCNC: <10 PG/ML (ref 0–99)
BUN SERPL-MCNC: 7 MG/DL (ref 8–23)
CALCIUM SERPL-MCNC: 9.1 MG/DL (ref 8.7–10.5)
CHLORIDE SERPL-SCNC: 105 MMOL/L (ref 95–110)
CLARITY UR REFRACT.AUTO: CLEAR
CO2 SERPL-SCNC: 26 MMOL/L (ref 23–29)
COLOR UR AUTO: ABNORMAL
CREAT SERPL-MCNC: 0.9 MG/DL (ref 0.5–1.4)
DIFFERENTIAL METHOD: ABNORMAL
EOSINOPHIL # BLD AUTO: 0 K/UL (ref 0–0.5)
EOSINOPHIL NFR BLD: 0.2 % (ref 0–8)
ERYTHROCYTE [DISTWIDTH] IN BLOOD BY AUTOMATED COUNT: 15 % (ref 11.5–14.5)
EST. GFR  (AFRICAN AMERICAN): >60 ML/MIN/1.73 M^2
EST. GFR  (NON AFRICAN AMERICAN): >60 ML/MIN/1.73 M^2
GLUCOSE SERPL-MCNC: 106 MG/DL (ref 70–110)
GLUCOSE UR QL STRIP: NEGATIVE
HCT VFR BLD AUTO: 41.1 % (ref 37–48.5)
HGB BLD-MCNC: 13.3 G/DL (ref 12–16)
HGB UR QL STRIP: NEGATIVE
IMM GRANULOCYTES # BLD AUTO: 0.01 K/UL (ref 0–0.04)
IMM GRANULOCYTES NFR BLD AUTO: 0.2 % (ref 0–0.5)
KETONES UR QL STRIP: NEGATIVE
LEUKOCYTE ESTERASE UR QL STRIP: NEGATIVE
LYMPHOCYTES # BLD AUTO: 0.8 K/UL (ref 1–4.8)
LYMPHOCYTES NFR BLD: 17.4 % (ref 18–48)
MAGNESIUM SERPL-MCNC: 1.9 MG/DL (ref 1.6–2.6)
MCH RBC QN AUTO: 27.4 PG (ref 27–31)
MCHC RBC AUTO-ENTMCNC: 32.4 G/DL (ref 32–36)
MCV RBC AUTO: 85 FL (ref 82–98)
MONOCYTES # BLD AUTO: 0.4 K/UL (ref 0.3–1)
MONOCYTES NFR BLD: 8.7 % (ref 4–15)
NEUTROPHILS # BLD AUTO: 3.5 K/UL (ref 1.8–7.7)
NEUTROPHILS NFR BLD: 73.1 % (ref 38–73)
NITRITE UR QL STRIP: NEGATIVE
NRBC BLD-RTO: 0 /100 WBC
PH UR STRIP: 7 [PH] (ref 5–8)
PLATELET # BLD AUTO: 178 K/UL (ref 150–350)
PMV BLD AUTO: 11.8 FL (ref 9.2–12.9)
POTASSIUM SERPL-SCNC: 3.7 MMOL/L (ref 3.5–5.1)
PROT SERPL-MCNC: 7.4 G/DL (ref 6–8.4)
PROT UR QL STRIP: NEGATIVE
RBC # BLD AUTO: 4.85 M/UL (ref 4–5.4)
SODIUM SERPL-SCNC: 139 MMOL/L (ref 136–145)
SP GR UR STRIP: 1 (ref 1–1.03)
TROPONIN I SERPL DL<=0.01 NG/ML-MCNC: 0.01 NG/ML (ref 0–0.03)
URN SPEC COLLECT METH UR: ABNORMAL
WBC # BLD AUTO: 4.83 K/UL (ref 3.9–12.7)

## 2020-03-19 PROCEDURE — 80053 COMPREHEN METABOLIC PANEL: CPT

## 2020-03-19 PROCEDURE — 83880 ASSAY OF NATRIURETIC PEPTIDE: CPT

## 2020-03-19 PROCEDURE — 83735 ASSAY OF MAGNESIUM: CPT

## 2020-03-19 PROCEDURE — 93010 EKG 12-LEAD: ICD-10-PCS | Mod: ,,, | Performed by: INTERNAL MEDICINE

## 2020-03-19 PROCEDURE — 84484 ASSAY OF TROPONIN QUANT: CPT

## 2020-03-19 PROCEDURE — 99285 PR EMERGENCY DEPT VISIT,LEVEL V: ICD-10-PCS | Mod: ,,, | Performed by: EMERGENCY MEDICINE

## 2020-03-19 PROCEDURE — 99285 EMERGENCY DEPT VISIT HI MDM: CPT | Mod: ,,, | Performed by: EMERGENCY MEDICINE

## 2020-03-19 PROCEDURE — 85025 COMPLETE CBC W/AUTO DIFF WBC: CPT

## 2020-03-19 PROCEDURE — 63600175 PHARM REV CODE 636 W HCPCS: Performed by: EMERGENCY MEDICINE

## 2020-03-19 PROCEDURE — 99285 EMERGENCY DEPT VISIT HI MDM: CPT | Mod: 25

## 2020-03-19 PROCEDURE — 81003 URINALYSIS AUTO W/O SCOPE: CPT

## 2020-03-19 PROCEDURE — 96360 HYDRATION IV INFUSION INIT: CPT

## 2020-03-19 PROCEDURE — 93005 ELECTROCARDIOGRAM TRACING: CPT

## 2020-03-19 PROCEDURE — 82140 ASSAY OF AMMONIA: CPT

## 2020-03-19 PROCEDURE — 93010 ELECTROCARDIOGRAM REPORT: CPT | Mod: ,,, | Performed by: INTERNAL MEDICINE

## 2020-03-19 RX ADMIN — SODIUM CHLORIDE 1000 ML: 0.9 INJECTION, SOLUTION INTRAVENOUS at 01:03

## 2020-03-19 NOTE — ED TRIAGE NOTES
"Lena Brantley, a 64 y.o. female presents to the ED w/ complaint of weakness and dizziness. She lost consciousness today with her daughter. She recently had a fall Monday hitting her head which caused bruising to her right eye. Pt does not remember falling but waking up on the floor. She went to a PCP on Tuesday for evaluation.     Triage note:  Chief Complaint   Patient presents with    Syncopal Episode     Patient was in the passenger in the car returning home with daughter. Daughter reports that patient "lost consciousness" for less than one minute. Patient is A&Ox4 and following commands.      Review of patient's allergies indicates:   Allergen Reactions    Epinephrine Anaphylaxis     Can Cause Carcinoid Crisis     Past Medical History:   Diagnosis Date    Cancer     GERD (gastroesophageal reflux disease)     Liver mass 06/2016    Mass of colon 06/2016    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     Sickle cell trait      LOC: The patient is awake, alert, and oriented to place, time, situation. Affect is appropriate.  Speech is appropriate and clear.   APPEARANCE: Patient resting comfortably in no acute distress.  Patient is clean and well groomed.  SKIN: The skin is warm and dry; color consistent with ethnicity.  Patient has normal skin turgor and moist mucus membranes.  Skin intact; Bruising to her right eye noted. Pt states bruising is from her fall on Monday.   MUSCULOSKELETAL: Patient moving upper and lower extremities without difficulty. Complains of weakness.   RESPIRATORY: Airway is open and patent. Respirations spontaneous, even, easy, and non-labored.  Patient has a normal effort and rate.  No accessory muscle use noted. Denies cough.   CARDIAC:  Normal rhythm and rate noted.  No peripheral edema noted. No complaints of chest pain.    ABDOMEN: No distention noted.   NEUROLOGIC: Eyes open spontaneously.  Behavior appropriate to situation.  Follows commands; facial expression " symmetrical.  Purposeful motor response noted; normal sensation in all extremities.

## 2020-03-19 NOTE — ED PROVIDER NOTES
"Encounter Date: 3/19/2020       History     Chief Complaint   Patient presents with    Syncopal Episode     Patient was in the passenger in the car returning home with daughter. Daughter reports that patient "lost consciousness" for less than one minute. Patient is A&Ox4 and following commands.      The patient is a 64-year-old female who has a history of GERD, sickle cell trait, malignant carcinoid tumor of the liver.  She had a syncopal episode at home three days ago.  She was lying down when she suddenly had ringing in her left ear.  She then began to have lightheadedness and dizziness.  She loss consciousness for an unknown period of time.  She was home alone when this occurred.  She had swelling to her forehead from presumed head injury.  The next day, she followed up with PCP.  She was evaluated with EKG, basic labs, and a CT of the brain. She followed up in clinic yesterday. She has been staying with her daughter for the past couple of days. Her daughter was driving her home this afternoon when she began to feel lightheaded and like she was going to faint. Her daughter immediately brought her to the ED. She is feeling much better now.  She denies headache.  She denies changes in her vision.  She denies neck pain and stiffness.  She denies chest pain and palpitations.  She denies cough and shortness of breath.  She denies abdominal pain, nausea, vomiting, diarrhea, and constipation.  She denies urinary symptoms including difficulty urinating, pain with urination, and hematuria.  She denies weakness and numbness to her extremities.    The history is provided by the patient. No  was used.     Review of patient's allergies indicates:   Allergen Reactions    Epinephrine Anaphylaxis     Can Cause Carcinoid Crisis     Past Medical History:   Diagnosis Date    Cancer     GERD (gastroesophageal reflux disease)     Liver mass 06/2016    Mass of colon 06/2016    Metastatic malignant carcinoid " tumor to liver     Sickle cell trait      No past surgical history on file.  Family History   Problem Relation Age of Onset    Cancer Maternal Grandmother     Cancer Other      Social History     Tobacco Use    Smoking status: Never Smoker    Smokeless tobacco: Never Used   Substance Use Topics    Alcohol use: No    Drug use: No     Review of Systems   Constitutional: Negative for chills, diaphoresis, fatigue and fever.   HENT: Negative for congestion, hearing loss, rhinorrhea, sinus pressure, sinus pain, sore throat and trouble swallowing.    Eyes: Negative for visual disturbance.   Respiratory: Negative for cough and shortness of breath.    Cardiovascular: Negative for chest pain and palpitations.   Gastrointestinal: Negative for abdominal pain, constipation, diarrhea, nausea and vomiting.   Endocrine: Negative for polydipsia and polyuria.   Genitourinary: Negative for dysuria.   Musculoskeletal: Negative for arthralgias and myalgias.   Skin: Negative for rash.   Allergic/Immunologic: Negative for immunocompromised state.   Neurological: Positive for light-headedness. Negative for tremors, syncope, speech difficulty, weakness, numbness and headaches.        + near syncope   Hematological: Does not bruise/bleed easily.       Physical Exam     Initial Vitals [03/19/20 1225]   BP Pulse Resp Temp SpO2   (!) 95/55 74 16 98.6 °F (37 °C) 98 %      MAP       --         Physical Exam    Nursing note and vitals reviewed.  Constitutional: She appears well-developed and well-nourished. She is not diaphoretic. No distress.   HENT:   Head: Normocephalic and atraumatic.   Right Ear: Tympanic membrane, external ear and ear canal normal.   Left Ear: Tympanic membrane, external ear and ear canal normal.   Mouth/Throat: Oropharynx is clear and moist.   Eyes: Conjunctivae and EOM are normal. Pupils are equal, round, and reactive to light. No scleral icterus. Right eye exhibits no nystagmus. Left eye exhibits no nystagmus.    Neck: Full passive range of motion without pain. No spinous process tenderness and no muscular tenderness present. No neck rigidity.   Cardiovascular: Normal rate, regular rhythm and normal heart sounds. Exam reveals no gallop and no friction rub.    No murmur heard.  Pulmonary/Chest: Breath sounds normal. No respiratory distress. She has no wheezes. She has no rhonchi. She has no rales.   Abdominal: Soft. Bowel sounds are normal. She exhibits no distension. There is no tenderness.   Musculoskeletal: Normal range of motion. She exhibits no edema or tenderness.   Neurological: She is alert and oriented to person, place, and time. She has normal strength. No cranial nerve deficit. Coordination and gait normal. GCS score is 15. GCS eye subscore is 4. GCS verbal subscore is 5. GCS motor subscore is 6.   Skin: Skin is warm. No pallor.         ED Course   Procedures  Labs Reviewed   CBC W/ AUTO DIFFERENTIAL - Abnormal; Notable for the following components:       Result Value    RDW 15.0 (*)     Lymph # 0.8 (*)     Gran% 73.1 (*)     Lymph% 17.4 (*)     All other components within normal limits   COMPREHENSIVE METABOLIC PANEL - Abnormal; Notable for the following components:    BUN, Bld 7 (*)     Albumin 3.4 (*)     All other components within normal limits   URINALYSIS, REFLEX TO URINE CULTURE - Abnormal; Notable for the following components:    Specific Gravity, UA 1.000 (*)     All other components within normal limits    Narrative:     Preferred Collection Type->Urine, Clean Catch   TROPONIN I   B-TYPE NATRIURETIC PEPTIDE   MAGNESIUM   AMMONIA     EKG Readings: (Independently Interpreted)   03/19/2020 12:44  Sinus rhythm with frequent PVCs.  Ventricular rate 70 beats per minute.  Normal axis.  Normal QRS and QT intervals.  No ST segment elevation or depression.  Poor septal R-wave progression.  Normal T-wave morphology.       Imaging Results          X-Ray Chest AP Portable (Final result)  Result time 03/19/20  14:21:56    Final result by Alejandro Campvoerde MD (03/19/20 14:21:56)                 Impression:      Subtle patchy area of increased density within the mid to lower right lung.  This may be related to subtle consolidation/pneumonia.  Correlation with clinical findings and close follow-up is recommended.  Follow-up PA and lateral radiographs versus CT examination of the chest should also be considered.      Electronically signed by: Alejandro Campoverde MD  Date:    03/19/2020  Time:    14:21             Narrative:    EXAMINATION:  XR CHEST AP PORTABLE    CLINICAL HISTORY:  Syncope;    TECHNIQUE:  Single frontal view of the chest was performed.    COMPARISON:  None    FINDINGS:  The heart is not enlarged.  Superior mediastinal structures are unremarkable.  Pulmonary vasculature is within normal limits.  There is patchy increased density within the mid to lower right lung laterally.  The remainder of the right lung and left lung are clear.  There is no evidence for pneumothorax or large pleural effusions.                                 Medical Decision Making:   History:   Old Medical Records: I decided to obtain old medical records.  Old Records Summarized: other records.       <> Summary of Records: Reviewed past medical history, see HPI.  Independently Interpreted Test(s):   I have ordered and independently interpreted EKG Reading(s) - see prior notes  Clinical Tests:   Lab Tests: Ordered and Reviewed  Radiological Study: Ordered and Reviewed  Medical Tests: Ordered and Reviewed    Medical decision making:  This patient underwent evaluation following a near syncopal episode.    She was awake and alert.  She had no focal neurological deficits.  She denied headache.  Differential diagnoses included dehydration, cardiac arrhythmia, acute coronary syndromes, urinary tract in other infections.  She was evaluated with cardiopulmonary monitoring, serial exams, labs, EKG, and chest x-ray.  There were no concerning findings on  workup. Her blood pressure was a little low on arrival. She was treated with IV fluids.  She felt better with IV fluid hydration and was discharged.                                 Clinical Impression:       ICD-10-CM ICD-9-CM   1. Near syncope R55 780.2   2. Dehydration E86.0 276.51         Disposition:   Disposition: Discharged  Condition: Stable                        Deniz Arias III, MD  03/25/20 0257

## 2020-03-19 NOTE — ED NOTES
Orthostatic vital signs obtained as documented.  Pt reports dizziness with position change. Dr. Arias made aware.

## 2020-03-22 PROBLEM — F41.1 GENERALIZED ANXIETY DISORDER: Status: ACTIVE | Noted: 2020-03-22

## 2020-03-22 PROBLEM — F32.1 CURRENT MODERATE EPISODE OF MAJOR DEPRESSIVE DISORDER WITHOUT PRIOR EPISODE: Status: ACTIVE | Noted: 2020-03-22

## 2020-03-23 ENCOUNTER — TELEPHONE (OUTPATIENT)
Dept: NEUROLOGY | Facility: HOSPITAL | Age: 65
End: 2020-03-23

## 2020-03-24 ENCOUNTER — TELEPHONE (OUTPATIENT)
Dept: FAMILY MEDICINE | Facility: CLINIC | Age: 65
End: 2020-03-24

## 2020-04-02 ENCOUNTER — TELEPHONE (OUTPATIENT)
Dept: FAMILY MEDICINE | Facility: CLINIC | Age: 65
End: 2020-04-02

## 2020-04-02 NOTE — TELEPHONE ENCOUNTER
Phone Call Visit 4/2/2020 [not seeing patients in office due to COVID-19 pandemic]   Reason for Visit: F/U   Length of Visit: 11 minutes    Reviewed details of last visit from 3/18/2020. Noted that patient was evaluated in the ED on 3/19/2020 for near syncope episodes. Vitals at that time significant for BP 95/55. Exam within normal limits. Labs showed normal CBC, CMP, Troponin I, BNP, Mag, NH3, and UA. EKG showed: Sinus rhythm with frequent PVCs.  Ventricular rate 70 beats per minute.  Normal axis.  Normal QRS and QT intervals.  No ST segment elevation or depression.  Poor septal R-wave progression.  Normal T-wave morphology.   CXR with no concerning findings. She was treated with IVFs and advised f/u with PCP.     Today patient states she has been overall feeling well since this time. Bruising to her right eye has significantly improved and there is only a small area to the inferior eyelid with bruising.     Pt states she started taking escitalopram 5 mg daily consistently for the past 4-5 days. She tried starting medication on 3/18/2020, but was initially concerned that it could be contributing to her dizziness/syncope. She reports that she has been feeling well since taking the medication.     Due for Lanreotide injection 4/9/2020.   Due for EGD/Colonoscopy  Aware that any plans for elective procedures are being postponed until improvement of COVID-19 pandemic.     Dr. Diallo

## 2020-04-06 RX ORDER — PANTOPRAZOLE SODIUM 40 MG/1
TABLET, DELAYED RELEASE ORAL
Qty: 90 TABLET | Refills: 0 | Status: SHIPPED | OUTPATIENT
Start: 2020-04-06 | End: 2021-04-13

## 2020-04-06 RX ORDER — PANTOPRAZOLE SODIUM 40 MG/1
40 TABLET, DELAYED RELEASE ORAL DAILY
Qty: 90 TABLET | Refills: 8 | Status: SHIPPED | OUTPATIENT
Start: 2020-04-06 | End: 2020-07-05

## 2020-04-09 ENCOUNTER — INFUSION (OUTPATIENT)
Dept: INFUSION THERAPY | Facility: HOSPITAL | Age: 65
End: 2020-04-09
Attending: SURGERY
Payer: COMMERCIAL

## 2020-04-09 VITALS — HEIGHT: 64 IN | BODY MASS INDEX: 26.29 KG/M2 | WEIGHT: 154 LBS

## 2020-04-09 DIAGNOSIS — C7A.00 MALIGNANT CARCINOID TUMOR OF UNKNOWN PRIMARY SITE: Primary | ICD-10-CM

## 2020-04-09 DIAGNOSIS — C7B.02 METASTATIC MALIGNANT CARCINOID TUMOR TO LIVER: ICD-10-CM

## 2020-04-09 PROCEDURE — 96372 THER/PROPH/DIAG INJ SC/IM: CPT

## 2020-04-09 PROCEDURE — 63600175 PHARM REV CODE 636 W HCPCS: Mod: JG | Performed by: SURGERY

## 2020-04-09 RX ORDER — LANREOTIDE ACETATE 120 MG/.5ML
120 INJECTION SUBCUTANEOUS
Status: DISCONTINUED | OUTPATIENT
Start: 2020-04-09 | End: 2020-04-09 | Stop reason: HOSPADM

## 2020-04-09 RX ADMIN — LANREOTIDE ACETATE 120 MG: 120 INJECTION SUBCUTANEOUS at 02:04

## 2020-04-20 ENCOUNTER — PATIENT OUTREACH (OUTPATIENT)
Dept: ADMINISTRATIVE | Facility: HOSPITAL | Age: 65
End: 2020-04-20

## 2020-04-20 NOTE — PROGRESS NOTES
Deleted dead orders.   No mammogram records in [facility] or .  Last mammogram 4/9/13 according to care everywhere.

## 2020-04-21 ENCOUNTER — PATIENT OUTREACH (OUTPATIENT)
Dept: ADMINISTRATIVE | Facility: HOSPITAL | Age: 65
End: 2020-04-21

## 2020-04-29 ENCOUNTER — PATIENT OUTREACH (OUTPATIENT)
Dept: ADMINISTRATIVE | Facility: HOSPITAL | Age: 65
End: 2020-04-29

## 2020-04-29 NOTE — LETTER
AUTHORIZATION FOR RELEASE OF   CONFIDENTIAL INFORMATION    Dear [facility]. [facility],    We are seeing Lena Brantley, date of birth 1955, in the clinic at [facility]. Swapnil Diallo MD is the patient's PCP. Lena Brantley has an outstanding lab/procedure at the time we reviewed her chart. In order to help keep her health information updated, she has authorized us to request the following medical record(s):       ( X )  COLONOSCOPY         Please fax records to us at [phone].     Attention: Monique Joiner      If you have any questions, please contact me at [phone].          Patient Name: Lena Brantley  : 1955  Patient Phone #: [phone]

## 2020-05-04 ENCOUNTER — PATIENT OUTREACH (OUTPATIENT)
Dept: ADMINISTRATIVE | Facility: HOSPITAL | Age: 65
End: 2020-05-04

## 2020-05-04 DIAGNOSIS — Z13.9 SCREENING FOR UNSPECIFIED CONDITION: Primary | ICD-10-CM

## 2020-05-04 NOTE — PROGRESS NOTES
Saved colonoscopy photos in .   Sent E-fax to [facility] for colonoscopy report.

## 2020-05-04 NOTE — LETTER
AUTHORIZATION FOR RELEASE OF   CONFIDENTIAL INFORMATION    Dear Dr. Machuca,    We are seeing Lena Brantley, date of birth 1955, in the clinic at [facility]. Swapnil Diallo MD is the patient's PCP. Lena Brantley has an outstanding lab/procedure at the time we reviewed her chart. In order to help keep her health information updated, she has authorized us to request the following medical record(s):              ( X )  COLONOSCOPY REPORT        -Received photo sheet but no report was attached.           Please fax records to us at [phone].     Attention: Monique Joiner     If you have any questions, please contact me at [phone].          Patient Name: Lena Brantley  : 1955  Patient Phone #: [phone]

## 2020-05-07 ENCOUNTER — LAB VISIT (OUTPATIENT)
Dept: LAB | Facility: HOSPITAL | Age: 65
End: 2020-05-07
Attending: FAMILY MEDICINE
Payer: COMMERCIAL

## 2020-05-07 DIAGNOSIS — Z13.9 SCREENING FOR UNSPECIFIED CONDITION: ICD-10-CM

## 2020-05-07 LAB — SARS-COV-2 RNA RESP QL NAA+PROBE: NOT DETECTED

## 2020-05-07 PROCEDURE — U0002 COVID-19 LAB TEST NON-CDC: HCPCS

## 2020-05-08 ENCOUNTER — INFUSION (OUTPATIENT)
Dept: INFUSION THERAPY | Facility: HOSPITAL | Age: 65
End: 2020-05-08
Attending: SURGERY
Payer: COMMERCIAL

## 2020-05-08 VITALS — HEIGHT: 64 IN | BODY MASS INDEX: 26.29 KG/M2 | WEIGHT: 154 LBS

## 2020-05-08 DIAGNOSIS — C7A.00 MALIGNANT CARCINOID TUMOR OF UNKNOWN PRIMARY SITE: Primary | ICD-10-CM

## 2020-05-08 DIAGNOSIS — Z12.39 BREAST CANCER SCREENING: ICD-10-CM

## 2020-05-08 DIAGNOSIS — C7B.02 METASTATIC MALIGNANT CARCINOID TUMOR TO LIVER: ICD-10-CM

## 2020-05-08 PROCEDURE — 63600175 PHARM REV CODE 636 W HCPCS: Mod: JG | Performed by: SURGERY

## 2020-05-08 PROCEDURE — 96372 THER/PROPH/DIAG INJ SC/IM: CPT

## 2020-05-08 RX ORDER — LANREOTIDE ACETATE 120 MG/.5ML
120 INJECTION SUBCUTANEOUS
Status: DISCONTINUED | OUTPATIENT
Start: 2020-05-08 | End: 2020-05-08 | Stop reason: HOSPADM

## 2020-05-08 RX ADMIN — LANREOTIDE ACETATE 120 MG: 120 INJECTION SUBCUTANEOUS at 11:05

## 2020-06-03 ENCOUNTER — LAB VISIT (OUTPATIENT)
Dept: FAMILY MEDICINE | Facility: CLINIC | Age: 65
End: 2020-06-03
Payer: COMMERCIAL

## 2020-06-03 DIAGNOSIS — Z13.9 SCREENING FOR UNSPECIFIED CONDITION: ICD-10-CM

## 2020-06-03 PROCEDURE — U0003 INFECTIOUS AGENT DETECTION BY NUCLEIC ACID (DNA OR RNA); SEVERE ACUTE RESPIRATORY SYNDROME CORONAVIRUS 2 (SARS-COV-2) (CORONAVIRUS DISEASE [COVID-19]), AMPLIFIED PROBE TECHNIQUE, MAKING USE OF HIGH THROUGHPUT TECHNOLOGIES AS DESCRIBED BY CMS-2020-01-R: HCPCS

## 2020-06-04 LAB — SARS-COV-2 RNA RESP QL NAA+PROBE: NOT DETECTED

## 2020-06-05 ENCOUNTER — INFUSION (OUTPATIENT)
Dept: INFUSION THERAPY | Facility: HOSPITAL | Age: 65
End: 2020-06-05
Attending: SURGERY
Payer: COMMERCIAL

## 2020-06-05 VITALS — WEIGHT: 154.13 LBS | HEIGHT: 64 IN | BODY MASS INDEX: 26.31 KG/M2

## 2020-06-05 DIAGNOSIS — C7A.00 MALIGNANT CARCINOID TUMOR OF UNKNOWN PRIMARY SITE: Primary | ICD-10-CM

## 2020-06-05 DIAGNOSIS — C7B.02 METASTATIC MALIGNANT CARCINOID TUMOR TO LIVER: ICD-10-CM

## 2020-06-05 PROCEDURE — 63600175 PHARM REV CODE 636 W HCPCS: Mod: JG | Performed by: SURGERY

## 2020-06-05 PROCEDURE — 96372 THER/PROPH/DIAG INJ SC/IM: CPT

## 2020-06-05 RX ORDER — LANREOTIDE ACETATE 120 MG/.5ML
120 INJECTION SUBCUTANEOUS
Status: DISCONTINUED | OUTPATIENT
Start: 2020-06-05 | End: 2020-06-05 | Stop reason: HOSPADM

## 2020-06-05 RX ADMIN — LANREOTIDE ACETATE 120 MG: 120 INJECTION SUBCUTANEOUS at 09:06

## 2020-07-02 ENCOUNTER — INFUSION (OUTPATIENT)
Dept: INFUSION THERAPY | Facility: HOSPITAL | Age: 65
End: 2020-07-02
Attending: SURGERY
Payer: COMMERCIAL

## 2020-07-02 VITALS — HEIGHT: 64 IN | WEIGHT: 154.13 LBS | BODY MASS INDEX: 26.31 KG/M2

## 2020-07-02 DIAGNOSIS — C7A.00 MALIGNANT CARCINOID TUMOR OF UNKNOWN PRIMARY SITE: Primary | ICD-10-CM

## 2020-07-02 DIAGNOSIS — C7B.02 METASTATIC MALIGNANT CARCINOID TUMOR TO LIVER: ICD-10-CM

## 2020-07-02 PROCEDURE — 96372 THER/PROPH/DIAG INJ SC/IM: CPT

## 2020-07-02 PROCEDURE — 63600175 PHARM REV CODE 636 W HCPCS: Mod: JG | Performed by: SURGERY

## 2020-07-02 RX ORDER — LANREOTIDE ACETATE 120 MG/.5ML
120 INJECTION SUBCUTANEOUS
Status: DISCONTINUED | OUTPATIENT
Start: 2020-07-02 | End: 2020-07-02 | Stop reason: HOSPADM

## 2020-07-02 RX ADMIN — LANREOTIDE ACETATE 120 MG: 120 INJECTION SUBCUTANEOUS at 09:07

## 2020-07-30 ENCOUNTER — INFUSION (OUTPATIENT)
Dept: INFUSION THERAPY | Facility: HOSPITAL | Age: 65
End: 2020-07-30
Attending: SURGERY
Payer: COMMERCIAL

## 2020-07-30 VITALS — HEIGHT: 64 IN | BODY MASS INDEX: 26.31 KG/M2 | WEIGHT: 154.13 LBS

## 2020-07-30 DIAGNOSIS — C7A.00 MALIGNANT CARCINOID TUMOR OF UNKNOWN PRIMARY SITE: Primary | ICD-10-CM

## 2020-07-30 DIAGNOSIS — C7B.02 METASTATIC MALIGNANT CARCINOID TUMOR TO LIVER: ICD-10-CM

## 2020-07-30 PROCEDURE — 63600175 PHARM REV CODE 636 W HCPCS: Mod: JG | Performed by: SURGERY

## 2020-07-30 PROCEDURE — 96372 THER/PROPH/DIAG INJ SC/IM: CPT

## 2020-07-30 RX ORDER — LANREOTIDE ACETATE 120 MG/.5ML
120 INJECTION SUBCUTANEOUS
Status: DISCONTINUED | OUTPATIENT
Start: 2020-07-30 | End: 2020-07-30 | Stop reason: HOSPADM

## 2020-07-30 RX ADMIN — LANREOTIDE ACETATE 120 MG: 120 INJECTION SUBCUTANEOUS at 09:07

## 2020-08-28 ENCOUNTER — INFUSION (OUTPATIENT)
Dept: INFUSION THERAPY | Facility: HOSPITAL | Age: 65
End: 2020-08-28
Attending: SURGERY
Payer: COMMERCIAL

## 2020-08-28 ENCOUNTER — TELEPHONE (OUTPATIENT)
Dept: NEUROLOGY | Facility: HOSPITAL | Age: 65
End: 2020-08-28

## 2020-08-28 DIAGNOSIS — C7B.02 METASTATIC MALIGNANT CARCINOID TUMOR TO LIVER: ICD-10-CM

## 2020-08-28 DIAGNOSIS — C7A.00 MALIGNANT CARCINOID TUMOR OF UNKNOWN PRIMARY SITE: Primary | ICD-10-CM

## 2020-08-28 DIAGNOSIS — C25.4 GASTRINOMA, MALIGNANT: ICD-10-CM

## 2020-08-28 PROCEDURE — 96372 THER/PROPH/DIAG INJ SC/IM: CPT

## 2020-08-28 PROCEDURE — 63600175 PHARM REV CODE 636 W HCPCS: Mod: JG | Performed by: INTERNAL MEDICINE

## 2020-08-28 RX ORDER — LANREOTIDE ACETATE 120 MG/.5ML
120 INJECTION SUBCUTANEOUS
Status: DISCONTINUED | OUTPATIENT
Start: 2020-08-28 | End: 2020-08-28 | Stop reason: HOSPADM

## 2020-08-28 RX ORDER — LANREOTIDE ACETATE 120 MG/.5ML
120 INJECTION SUBCUTANEOUS
Status: CANCELLED | OUTPATIENT
Start: 2020-08-28

## 2020-08-28 RX ADMIN — LANREOTIDE ACETATE 120 MG: 120 INJECTION SUBCUTANEOUS at 09:08

## 2020-09-02 ENCOUNTER — HOSPITAL ENCOUNTER (OUTPATIENT)
Dept: RADIOLOGY | Facility: HOSPITAL | Age: 65
Discharge: HOME | End: 2020-09-02
Attending: INTERNAL MEDICINE
Payer: COMMERCIAL

## 2020-09-02 DIAGNOSIS — C7B.02 METASTATIC MALIGNANT CARCINOID TUMOR TO LIVER: ICD-10-CM

## 2020-09-02 DIAGNOSIS — C25.4 GASTRINOMA, MALIGNANT: ICD-10-CM

## 2020-09-02 PROCEDURE — 25500020 PHARM REV CODE 255: Performed by: INTERNAL MEDICINE

## 2020-09-02 PROCEDURE — 74177 CT ABDOMEN PELVIS WITH CONTRAST: ICD-10-PCS | Mod: 26,,, | Performed by: RADIOLOGY

## 2020-09-02 PROCEDURE — 74177 CT ABD & PELVIS W/CONTRAST: CPT | Mod: 26,,, | Performed by: RADIOLOGY

## 2020-09-02 PROCEDURE — 74177 CT ABD & PELVIS W/CONTRAST: CPT | Mod: TC

## 2020-09-02 RX ADMIN — IOHEXOL 75 ML: 350 INJECTION, SOLUTION INTRAVENOUS at 11:09

## 2020-09-09 ENCOUNTER — OFFICE VISIT (OUTPATIENT)
Dept: NEUROLOGY | Facility: HOSPITAL | Age: 65
End: 2020-09-09
Attending: INTERNAL MEDICINE
Payer: COMMERCIAL

## 2020-09-09 VITALS
DIASTOLIC BLOOD PRESSURE: 76 MMHG | WEIGHT: 147.81 LBS | HEIGHT: 64 IN | HEART RATE: 77 BPM | OXYGEN SATURATION: 100 % | BODY MASS INDEX: 25.24 KG/M2 | TEMPERATURE: 98 F | SYSTOLIC BLOOD PRESSURE: 143 MMHG

## 2020-09-09 DIAGNOSIS — C7B.8 SECONDARY NEUROENDOCRINE TUMOR OF LIVER: ICD-10-CM

## 2020-09-09 DIAGNOSIS — R17 ELEVATED BILIRUBIN: ICD-10-CM

## 2020-09-09 DIAGNOSIS — C7A.00 MALIGNANT CARCINOID TUMOR OF UNKNOWN PRIMARY SITE: Primary | ICD-10-CM

## 2020-09-09 DIAGNOSIS — Z86.2 HISTORY OF IRON DEFICIENCY ANEMIA: ICD-10-CM

## 2020-09-09 PROCEDURE — 3008F BODY MASS INDEX DOCD: CPT | Mod: CPTII,,, | Performed by: INTERNAL MEDICINE

## 2020-09-09 PROCEDURE — 99215 PR OFFICE/OUTPT VISIT, EST, LEVL V, 40-54 MIN: ICD-10-PCS | Mod: ,,, | Performed by: INTERNAL MEDICINE

## 2020-09-09 PROCEDURE — 99215 OFFICE O/P EST HI 40 MIN: CPT | Performed by: INTERNAL MEDICINE

## 2020-09-09 PROCEDURE — 3008F PR BODY MASS INDEX (BMI) DOCUMENTED: ICD-10-PCS | Mod: CPTII,,, | Performed by: INTERNAL MEDICINE

## 2020-09-09 PROCEDURE — 99215 OFFICE O/P EST HI 40 MIN: CPT | Mod: ,,, | Performed by: INTERNAL MEDICINE

## 2020-09-09 NOTE — LETTER
September 9, 2020        Swapnil Diallo MD  [address]  [address]  [facility] [address]             [facility]  [address]  [facility] [address]  Phone: [phone]  Fax: [phone]   Patient: Lena Brantley   MR Number: [id number]   YOB: 1955   Date of Visit: 9/9/2020       Dear Dr. Diallo:    Thank you for referring Lena Brantley to me for evaluation. Attached you will find relevant portions of my assessment and plan of care.    If you have questions, please do not hesitate to call me. I look forward to following Lena Brantley along with you.    Sincerely,      Sandeep Sanford DO, FACP            CC  No Recipients    Enclosure          What Type Of Note Output Would You Prefer (Optional)?: Bullet Format How Severe Is Your Acne?: mild Is This A New Presentation, Or A Follow-Up?: Acne

## 2020-09-09 NOTE — PATIENT INSTRUCTIONS
Have labs in 1 month    We will have Kenna call you to make an appointment to speak to Dr. Bruno about liver directed therapy    Follow up in 6 months

## 2020-09-09 NOTE — PROGRESS NOTES
[place]:  [facility]  A collaboration between [facility] and [facility]    PATIENT: Lena Brantley  MRN: [id number]  DATE: 9/9/2020      Diagnosis:   1. Malignant carcinoid tumor of unknown primary site    2. Secondary neuroendocrine tumor of liver    3. History of iron deficiency anemia    4. Elevated bilirubin        Chief Complaint: Follow-up      Oncologic History:      Oncologic History Neuroendocrine tumor unknown primary diagnosed 09/2016  Metastatic disease to liver at presentation    Oncologic Treatment Lanreotide 10/2016    Pathology Well-differentiated neuroendocrine tumor, Ki-67 1%          Subjective:    Interval History: Ms. Brantley is a 65 y.o. female who who is seen in follow-up for a neuroendocrine tumor of unknown primary.  I have not seen her since 05/2019.  She states that she generally has been feeling well.  She has occasional indigestion and diarrhea.  She has lost some weight but states that she had been trying to do this and previously had been eating a bag of Doritos daily but has stopped doing this.  She remains on treatment with lanreotide and tolerating well.  She does admit to some anxiety about her diagnosis.  She has no other new complaints.    Her history dates to 9/2016 when she was undergoing workup for episodic nausea, vomiting and diarrhea.  A CT scan was performed showing a solitary and had seeing liver mass.  A biopsy was performed showing a well-differentiated neuroendocrine tumor, Ki-67 of 1%.  Conventional imaging an octreotide scan did not reveal a primary site disease.  Gene expression profiling was performed indicating a possible primary site pancreas.  She was started on Lanreotide in 2016.  She was offered surgical resection and also liver directed therapy and declined.      Past Medical History:   Past Medical History:   Diagnosis Date    Cancer     Elevated bilirubin  "9/9/2020    GERD (gastroesophageal reflux disease)     History of iron deficiency anemia 9/9/2020    Liver mass 06/2016    Mass of colon 06/2016    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     Secondary neuroendocrine tumor of liver 9/9/2020    Sickle cell trait        Past Surgical HIstory:   History reviewed. No pertinent surgical history.    Family History:   Family History   Problem Relation Age of Onset    Cancer Maternal Grandmother     Cancer Other        Social History:  reports that she has never smoked. She has never used smokeless tobacco. She reports that she does not drink alcohol or use drugs.    Allergies:  Review of patient's allergies indicates:   Allergen Reactions    Epinephrine Anaphylaxis     Can Cause Carcinoid Crisis       Medications:  Current Outpatient Medications   Medication Sig Dispense Refill    escitalopram oxalate (LEXAPRO) 5 MG Tab TAKE 1 TABLET(5 MG) BY MOUTH EVERY DAY 30 tablet 1    lanreotide (SOMATULINE DEPOT) 120 mg/0.5 mL Syrg Inject 120 mg into the skin every 28 days.      pantoprazole (PROTONIX) 40 MG tablet TAKE 1 TABLET BY MOUTH EVERY DAY 90 tablet 0     No current facility-administered medications for this visit.      Review of Systems   Constitutional: Positive for weight loss.   Respiratory: Negative for cough and shortness of breath.    Cardiovascular: Negative for chest pain and leg swelling.   Gastrointestinal: Positive for diarrhea. Negative for nausea and vomiting.   Skin: Negative for rash.   Neurological: Negative for dizziness and headaches.   Psychiatric/Behavioral: The patient is nervous/anxious.          ECOG Performance Status: 0   Objective:      Vitals:   Vitals:    09/09/20 1133   BP: (!) 143/76   BP Location: Right arm   Patient Position: Sitting   BP Method: Medium (Automatic)   Pulse: 77   Temp: 97.7 °F (36.5 °C)   TempSrc: Oral   SpO2: 100%   Weight: 67 kg (147 lb 13.1 oz)   Height: 5' 4" (1.626 m)     BMI: Body mass index is 25.37 " kg/m².    Physical Exam   Constitutional: She is oriented to person, place, and time. She appears well-developed and well-nourished. No distress.   HENT:   Head: Normocephalic.   Mouth/Throat: No oropharyngeal exudate.   Eyes: EOM are normal. No scleral icterus.   Neck: Neck supple. No tracheal deviation present. No thyromegaly present.   Cardiovascular: Normal rate and regular rhythm.   Pulmonary/Chest: Effort normal and breath sounds normal. No respiratory distress. She has no wheezes. She has no rales.   Abdominal: Soft. She exhibits no distension and no mass. There is no tenderness. There is no rebound and no guarding.   Musculoskeletal: Normal range of motion. She exhibits no edema.   Lymphadenopathy:     She has no cervical adenopathy.   Neurological: She is alert and oriented to person, place, and time. No cranial nerve deficit.   Skin: Skin is warm and dry.   Psychiatric: She has a normal mood and affect.       Laboratory Data:  Lab Visit on 09/02/2020   Component Date Value Ref Range Status    WBC 09/02/2020 3.98  3.90 - 12.70 K/uL Final    RBC 09/02/2020 4.89  4.00 - 5.40 M/uL Final    Hemoglobin 09/02/2020 13.7  12.0 - 16.0 g/dL Final    Hematocrit 09/02/2020 42.2  37.0 - 48.5 % Final    Mean Corpuscular Volume 09/02/2020 86  82 - 98 fL Final    Mean Corpuscular Hemoglobin 09/02/2020 28.0  27.0 - 31.0 pg Final    Mean Corpuscular Hemoglobin Conc 09/02/2020 32.5  32.0 - 36.0 g/dL Final    RDW 09/02/2020 13.6  11.5 - 14.5 % Final    Platelets 09/02/2020 217  150 - 350 K/uL Final    MPV 09/02/2020 11.7  9.2 - 12.9 fL Final    Gran # (ANC) 09/02/2020 2.4  1.8 - 7.7 K/uL Final    Comment: The ANC is based on a white cell differential from an   automated cell counter. It has not been microscopically   reviewed for the presence of abnormal cells. Clinical   correlation is required.      Immature Grans (Abs) 09/02/2020 0.02  0.00 - 0.04 K/uL Final    Comment: Mild elevation in immature granulocytes is  non specific and   can be seen in a variety of conditions including stress response,   acute inflammation, trauma and pregnancy. Correlation with other   laboratory and clinical findings is essential.      Sodium 09/02/2020 140  136 - 145 mmol/L Final    Potassium 09/02/2020 3.9  3.5 - 5.1 mmol/L Final    Chloride 09/02/2020 104  95 - 110 mmol/L Final    CO2 09/02/2020 27  23 - 29 mmol/L Final    Glucose 09/02/2020 84  70 - 110 mg/dL Final    BUN, Bld 09/02/2020 10  8 - 23 mg/dL Final    Creatinine 09/02/2020 1.0  0.5 - 1.4 mg/dL Final    Calcium 09/02/2020 9.6  8.7 - 10.5 mg/dL Final    Total Protein 09/02/2020 7.2  6.0 - 8.4 g/dL Final    Albumin 09/02/2020 3.7  3.5 - 5.2 g/dL Final    Total Bilirubin 09/02/2020 1.2* 0.1 - 1.0 mg/dL Final    Comment: For infants and newborns, interpretation of results should be based  on gestational age, weight and in agreement with clinical  observations.  Premature Infant recommended reference ranges:  Up to 24 hours.............<8.0 mg/dL  Up to 48 hours............<12.0 mg/dL  3-5 days..................<15.0 mg/dL  6-29 days.................<15.0 mg/dL      Alkaline Phosphatase 09/02/2020 104  55 - 135 U/L Final    AST 09/02/2020 23  10 - 40 U/L Final    Specimen slightly hemolyzed    ALT 09/02/2020 15  10 - 44 U/L Final    Anion Gap 09/02/2020 9  8 - 16 mmol/L Final    eGFR if African American 09/02/2020 >60  >60 mL/min/1.73 m^2 Final    eGFR if non African American 09/02/2020 >60  >60 mL/min/1.73 m^2 Final    Comment: Calculation used to obtain the estimated glomerular filtration  rate (eGFR) is the CKD-EPI equation.       Vitamin B-12 09/02/2020 844  210 - 950 pg/mL Final    Ferritin 09/02/2020 23  20.0 - 300.0 ng/mL Final    Iron 09/02/2020 66  30 - 160 ug/dL Final    Transferrin 09/02/2020 284  200 - 375 mg/dL Final    TIBC 09/02/2020 420  250 - 450 ug/dL Final    Saturated Iron 09/02/2020 16* 20 - 50 % Final       Imaging:       CT  09/02/2020  COMPARISON:  CT abdomen pelvis with contrast dated 09/20/2019 and PET-CT dated 04/12/2019.     FINDINGS:  Limited images chest are unremarkable.     Abdomen and pelvis:     Liver is nonenlarged.  Index lesion:     *heterogeneous hypoattenuating lesion within the left hepatic lobe (segment IV), similar to slightly decreased measuring 3.2 cm (series 3, image 16), previously 3.7 cm.     No definite new liver lesion.     The hepatic veins and main portal vein are patent.  Small stones versus debris in the gallbladder.  No biliary dilatation.  The spleen, pancreas and adrenal glands appear normal.  Similar hypodense cortical lesion in the left upper renal pole, too small for definitive characterization though possible cyst.  Hydronephrosis.  Urinary bladder is unremarkable.  The partially calcified uterine fibroid.  No suspicious adnexal lesion.     The GI tract and appendix are normal in caliber.  No free fluid or adenopathy.  Soft tissue gluteal nodules as can be seen with sequela of prior injection.  Stable sclerotic focus of the right iliac wing.  No aggressive osseous lesion.     Impression:     Redemonstration of left hepatic lobe lesion, similar to slightly decreased in size.  Otherwise, no significant interval detrimental change.     Additional findings as above.     RECIST SUMMARY:     Date of prior examination for comparison: CT abdomen pelvis with contrast dated 09/20/2019     Lesion 1: Left hepatic lobe.  3.2 cm.  Series 3, image 16.  Previously 3.7 cm.               Assessment:       1. Malignant carcinoid tumor of unknown primary site    2. Secondary neuroendocrine tumor of liver    3. History of iron deficiency anemia    4. Elevated bilirubin           Plan:       I had a long conversation with Mrs. Brantley and have reviewed her images and labs with her today.  Clinically, she is doing well and review of her CT shows a mild decrease in the size of her tumor within the liver.  This does not  demonstrate any evidence of extrahepatic disease.  She has previously undergone a gallium 68 PET-CT which was negative except the liver.  She remains on treatment with lanreotide in tolerating well.  She did discuss other roles for treatment so that she could potentially get off lanreotide and I have told her in the absence of definitive therapy I would recommend we continue on with lanreotide.  We had previously discussed her case and thought that she may be a candidate for surgical resection but she was not interested in this and continues to be very anxious about surgery.  Other alternatives could be liver directed therapy but will discuss her case in tumor Board next week prior to an appointment with our interventional radiologist.  Her bilirubin is mildly elevated and will plan to recheck.  She does have a history of iron deficiency and has had negative EGD and colonoscopy recently and her hemoglobin has recovered well since last year and she is no longer anemic.  We will need to continue to monitor this.  Finally, the last time I saw her was in 05/2019 and I have told her she will need to be seen on a regular basis, generally every 6 months if she wishes to continue on with treatment as she is aware that this treatment can cause severe side effects and without regular follow-up we may miss progressive disease.  The patient voiced understanding.  Multiple questions were answered and she is agreeable with this plan.    Sandeep Sanford DO, FACP  Hematology & Oncology, [facility]  [address] [place] [address], [address]  [place]  [id number]  ph. [phone]; [phone]  fax. [phone]      40 minutes were spent in coordination of patient's care, record review and counseling.  More than 50% of the time was face-to-face.

## 2020-09-11 ENCOUNTER — TELEPHONE (OUTPATIENT)
Dept: NEUROLOGY | Facility: HOSPITAL | Age: 65
End: 2020-09-11

## 2020-09-11 DIAGNOSIS — C7A.00 MALIGNANT CARCINOID TUMOR OF UNKNOWN PRIMARY SITE: Primary | ICD-10-CM

## 2020-09-11 DIAGNOSIS — C25.4 GASTRINOMA, MALIGNANT: ICD-10-CM

## 2020-09-11 DIAGNOSIS — C7B.02 METASTATIC MALIGNANT CARCINOID TUMOR TO LIVER: ICD-10-CM

## 2020-09-15 ENCOUNTER — TELEPHONE (OUTPATIENT)
Dept: INTERVENTIONAL RADIOLOGY/VASCULAR | Facility: CLINIC | Age: 65
End: 2020-09-15

## 2020-09-15 NOTE — TELEPHONE ENCOUNTER
----- Message from Onelia Newman RN sent at 9/9/2020 12:18 PM CDT -----  Please call to schedule appt with dr. Bruno to discuss LDT per DR. Sanford. We will be discussing in tumor board next week.

## 2020-09-22 ENCOUNTER — OFFICE VISIT (OUTPATIENT)
Dept: INTERVENTIONAL RADIOLOGY/VASCULAR | Facility: CLINIC | Age: 65
End: 2020-09-22
Payer: COMMERCIAL

## 2020-09-22 VITALS
OXYGEN SATURATION: 96 % | BODY MASS INDEX: 24.87 KG/M2 | WEIGHT: 149.25 LBS | HEIGHT: 65 IN | SYSTOLIC BLOOD PRESSURE: 115 MMHG | HEART RATE: 81 BPM | TEMPERATURE: 98 F | DIASTOLIC BLOOD PRESSURE: 79 MMHG

## 2020-09-22 DIAGNOSIS — C7B.02 METASTATIC MALIGNANT CARCINOID TUMOR TO LIVER: ICD-10-CM

## 2020-09-22 DIAGNOSIS — C7A.00 MALIGNANT CARCINOID TUMOR OF UNKNOWN PRIMARY SITE: ICD-10-CM

## 2020-09-22 PROCEDURE — 99999 PR PBB SHADOW E&M-EST. PATIENT-LVL IV: ICD-10-PCS | Mod: PBBFAC,,, | Performed by: RADIOLOGY

## 2020-09-22 PROCEDURE — 99999 PR PBB SHADOW E&M-EST. PATIENT-LVL IV: CPT | Mod: PBBFAC,,, | Performed by: RADIOLOGY

## 2020-09-22 PROCEDURE — 99204 PR OFFICE/OUTPT VISIT, NEW, LEVL IV, 45-59 MIN: ICD-10-PCS | Mod: S$GLB,,, | Performed by: RADIOLOGY

## 2020-09-22 PROCEDURE — 3008F PR BODY MASS INDEX (BMI) DOCUMENTED: ICD-10-PCS | Mod: CPTII,S$GLB,, | Performed by: RADIOLOGY

## 2020-09-22 PROCEDURE — 3008F BODY MASS INDEX DOCD: CPT | Mod: CPTII,S$GLB,, | Performed by: RADIOLOGY

## 2020-09-22 PROCEDURE — 99204 OFFICE O/P NEW MOD 45 MIN: CPT | Mod: S$GLB,,, | Performed by: RADIOLOGY

## 2020-09-22 NOTE — PROGRESS NOTES
OUTPATIENT INTERVENTIONAL RADIOLOGY CONSULTATION    PATIENT: Lena Brantley  MRN: [id number]  : 1955  DATE OF SERVICE: 2020    REFERRING PROVIDER:   Sandeep Sanford Do, Facp  [address]  [address]  [place] [address]     CHIEF COMPLAINT: Liver metastasis    HISTORY OF PRESENT ILLNESS: Lena Brantley is a 65 y.o. female with NET who presents with a single liver metastasis measuring 3.5 x 1.9 cm in segment 4. This was present at Dx in 2016 and is essentially stable. She was discussed at NET board and the decision was made to offer LDT which could allow lanreotide to be stopped.     Her history dates to 2016 when she was undergoing workup for episodic nausea, vomiting and diarrhea. A CT scan was performed showing a solitary and had seeing liver mass. A biopsy was performed showing a well-differentiated neuroendocrine tumor, Ki-67 of 1%. Conventional imaging an octreotide scan did not reveal a primary site disease. Gene expression profiling was performed indicating a possible primary site pancreas. She was started on Lanreotide in . She was offered surgical resection and also liver directed therapy and declined at that time.    Pt endorses intentional weight loss and occasional diarrhea which may also be associated with diet.    Past Medical History:   Diagnosis Date    Cancer     Elevated bilirubin 2020    GERD (gastroesophageal reflux disease)     History of iron deficiency anemia 2020    Liver mass 2016    Mass of colon 2016    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     Secondary neuroendocrine tumor of liver 2020    Sickle cell trait       No past surgical history on file.   Family History   Problem Relation Age of Onset    Cancer Maternal Grandmother     Cancer Other       Social History     Socioeconomic History    Marital status:      Spouse name: Not on file    Number of children: Not on file    Years of education: Not on file     Highest education level: Not on file   Occupational History    Not on file   Social Needs    Financial resource strain: Not on file    Food insecurity     Worry: Not on file     Inability: Not on file    Transportation needs     Medical: No     Non-medical: No   Tobacco Use    Smoking status: Never Smoker    Smokeless tobacco: Never Used   Substance and Sexual Activity    Alcohol use: No     Frequency: Never     Drinks per session: Patient refused     Binge frequency: Never    Drug use: No    Sexual activity: Not on file   Lifestyle    Physical activity     Days per week: 5 days     Minutes per session: 60 min    Stress: Very much   Relationships    Social connections     Talks on phone: Three times a week     Gets together: Patient refused     Attends Hoahaoism service: Not on file     Active member of club or organization: Yes     Attends meetings of clubs or organizations: Never     Relationship status:    Other Topics Concern    Not on file   Social History Narrative    Not on file      Review of patient's allergies indicates:   Allergen Reactions    Epinephrine Anaphylaxis     Can Cause Carcinoid Crisis     Current Outpatient Medications   Medication Sig    escitalopram oxalate (LEXAPRO) 5 MG Tab TAKE 1 TABLET(5 MG) BY MOUTH EVERY DAY    lanreotide (SOMATULINE DEPOT) 120 mg/0.5 mL Syrg Inject 120 mg into the skin every 28 days.    pantoprazole (PROTONIX) 40 MG tablet TAKE 1 TABLET BY MOUTH EVERY DAY     No current facility-administered medications for this visit.         REVIEW OF SYSTEMS:  General: + for weight loss, negative for chills, fever  Psychological: negative for hallucination, depression and suicidal ideation  Ophthalmic: negative for blurry vision, photophobia and eye pain  ENT: negative for epistaxis, sore throat and rhinorrhea  Respiratory: negative for cough, shortness of breath, and wheezing  Cardiovascular: negative for chest pain and dyspnea on  "exertion  Gastrointestinal: + for diarrhea, negative for abdominal pain and black/bloody stools  Genitourinary: negative for dysuria, trouble voiding, and hematuria  Musculoskeletal: negative for gait disturbance, and muscle weakness  Neurological: negative for syncope, seizure, and impaired balance or coordination  Dermatological: negative for pruritis, rash, and jaundice    PHYSICAL EXAM:    Vitals:    09/22/20 1406   BP: 115/79   Pulse: 81   Temp: 98.4 °F (36.9 °C)   TempSrc: Oral   SpO2: 96%   Weight: 67.7 kg (149 lb 4 oz)   Height: 5' 4.5" (1.638 m)       Constitutional: She is oriented to person, place, and time. She appears well-developed and well-nourished. No distress.   HENT:   Head: Normocephalic.   Mouth/Throat: No oropharyngeal exudate.   Eyes: EOM are normal. No scleral icterus.   Neck: Neck supple. No tracheal deviation present. No thyromegaly present.   Cardiovascular: Normal rate and regular rhythm.   Pulmonary/Chest: Effort normal and breath sounds normal. No respiratory distress. She has no wheezes. She has no rales.   Abdominal: Soft. She exhibits no distension and no mass. There is no tenderness. There is no rebound and no guarding.   Musculoskeletal: Normal range of motion. She exhibits no edema.   Lymphadenopathy:     She has no cervical adenopathy.   Neurological: She is alert and oriented to person, place, and time. No cranial nerve deficit.   Skin: Skin is warm and dry.   Psychiatric: She has a normal mood and affect.     ECOG Score: 0    LABS:  Lab Results   Component Value Date     09/02/2020    K 3.9 09/02/2020     09/02/2020    CO2 27 09/02/2020    BUN 10 09/02/2020      Lab Results   Component Value Date    CALCIUM 9.6 09/02/2020    PHOS 2.9 06/16/2017      Lab Results   Component Value Date    ALKPHOS 104 09/02/2020    AST 23 09/02/2020    ALT 15 09/02/2020    BILITOT 1.2 (H) 09/02/2020    ALBUMIN 3.7 09/02/2020      Lab Results   Component Value Date    WBC 3.98 " 09/02/2020    HGB 13.7 09/02/2020    HCT 42.2 09/02/2020    MCV 86 09/02/2020     09/02/2020        Lab Results   Component Value Date    INR 1.1 09/08/2016     No results found for: AFP  No results found for this or any previous visit (from the past 24 hour(s)).  No results found for this or any previous visit (from the past 168 hour(s)).    IMAGING:  CT AP 9/2/20, 9/20/20, 2/22/20, 7/24/16, 6/3/19, Ga-68 PET/CT 4/12/19, MRI abd 8/10/17    ASSESSMENT/PLAN:   Lena Brantley is a 65 y.o. female with a well-differentiated NET, unknown primary, with a single liver met. She is essentially asymptomatic and disease is stable on Landreotide, but if this is to be stopped then she may likely benefit from definitive treatment. She is not interested in surgery at this time. The lesions is rather central, so perc ablation would not be my first choice. LDT may provide some local control in the absence of systemic therapy. If systemic therapy is to continue, then there is no compelling reason to perform LDT.    Chemoembolization would be my LDT of choice, though Y-90 segmentectomy is a possibility, depending on vascular supply to the tumor. Chemoembolization and radioembolization was discussed at length, including technical details, rationale for one over the other, rationale to treat or not treat, risks (including, but not limited to, pain, bleeding, infection, damage to nearby structures, treatment failure/recurrence, the need for additional procedures), potential benefits, and alternatives were discussed with the patient. All questions were answered to the best of my abilities.     The patient verbalized understanding and wishes to consider these options.    Pt will contact me if she decides to proceed. Literature on the procedure, my contact info and that of our  was given. RTC PRN.    Thank you for the opportunity to participate in the care of this patient.    I, Sameer Bruno MD, was personally present for  this clinic visit and examined the patient. I agree with the findings, assessment and plan of care as documented in this note. I spent a total of 40 minutes reviewing records and imaging, counseling, discussing the examination, therapy, and treatment plan of the patient's condition.        Sameer Bruno MD  [facility]  Pager [phone]

## 2020-09-22 NOTE — LETTER
October 2, 2020      Sandeep Sanford, DO, FACP  [address]  [address]  [facility] [address]           [place] [facility]  [address], [address]  [place] [address]  Phone: [phone]          Patient: Lena Brantley   MR Number: [id number]   YOB: 1955   Date of Visit: 9/22/2020       Dear Dr. Sandeep Sanford:    Thank you for referring Lena Brantley to me for evaluation. Attached you will find relevant portions of my assessment and plan of care.    If you have questions, please do not hesitate to call me. I look forward to following Lena Brantley along with you.    Sincerely,    Sameer Bruno II, MD    Enclosure  CC:  No Recipients    If you would like to receive this communication electronically, please contact [email or web address] or [phone] to request more information on [facility] access.    For providers and/or their staff who would like to refer a patient to [facility], please contact us through our one-stop-shop provider referral line, [facility], at [phone].    If you feel you have received this communication in error or would no longer like to receive these types of communications, please e-mail [email or web address]

## 2020-09-25 ENCOUNTER — INFUSION (OUTPATIENT)
Dept: INFUSION THERAPY | Facility: HOSPITAL | Age: 65
End: 2020-09-25
Attending: SURGERY
Payer: COMMERCIAL

## 2020-09-25 VITALS — BODY MASS INDEX: 24.87 KG/M2 | WEIGHT: 149.25 LBS | HEIGHT: 65 IN

## 2020-09-25 DIAGNOSIS — C7B.02 METASTATIC MALIGNANT CARCINOID TUMOR TO LIVER: ICD-10-CM

## 2020-09-25 DIAGNOSIS — C7A.00 MALIGNANT CARCINOID TUMOR OF UNKNOWN PRIMARY SITE: Primary | ICD-10-CM

## 2020-09-25 PROCEDURE — 96372 THER/PROPH/DIAG INJ SC/IM: CPT

## 2020-09-25 PROCEDURE — 63600175 PHARM REV CODE 636 W HCPCS: Mod: JG | Performed by: INTERNAL MEDICINE

## 2020-09-25 RX ORDER — LANREOTIDE ACETATE 120 MG/.5ML
120 INJECTION SUBCUTANEOUS
Status: CANCELLED | OUTPATIENT
Start: 2020-09-25

## 2020-09-25 RX ORDER — LANREOTIDE ACETATE 120 MG/.5ML
120 INJECTION SUBCUTANEOUS
Status: DISCONTINUED | OUTPATIENT
Start: 2020-09-25 | End: 2020-09-25 | Stop reason: HOSPADM

## 2020-09-25 RX ADMIN — LANREOTIDE ACETATE 120 MG: 120 INJECTION SUBCUTANEOUS at 09:09

## 2020-09-25 NOTE — NURSING
Patient tolerated Lanreotide injection well, next appointment scheduled and pt d/c in no acute distress.

## 2020-10-02 ENCOUNTER — OFFICE VISIT (OUTPATIENT)
Dept: URGENT CARE | Facility: CLINIC | Age: 65
End: 2020-10-02
Payer: COMMERCIAL

## 2020-10-02 VITALS
HEART RATE: 104 BPM | OXYGEN SATURATION: 98 % | RESPIRATION RATE: 18 BRPM | BODY MASS INDEX: 24.83 KG/M2 | TEMPERATURE: 98 F | DIASTOLIC BLOOD PRESSURE: 88 MMHG | HEIGHT: 65 IN | SYSTOLIC BLOOD PRESSURE: 125 MMHG | WEIGHT: 149 LBS

## 2020-10-02 DIAGNOSIS — R31.9 URINARY TRACT INFECTION WITH HEMATURIA, SITE UNSPECIFIED: Primary | ICD-10-CM

## 2020-10-02 DIAGNOSIS — N39.0 URINARY TRACT INFECTION WITH HEMATURIA, SITE UNSPECIFIED: Primary | ICD-10-CM

## 2020-10-02 LAB
BILIRUB UR QL STRIP: NEGATIVE
GLUCOSE UR QL STRIP: NEGATIVE
KETONES UR QL STRIP: NEGATIVE
LEUKOCYTE ESTERASE UR QL STRIP: POSITIVE
PH, POC UA: 7 (ref 5–8)
POC BLOOD, URINE: POSITIVE
POC NITRATES, URINE: NEGATIVE
PROT UR QL STRIP: NEGATIVE
SP GR UR STRIP: 1 (ref 1–1.03)
UROBILINOGEN UR STRIP-ACNC: NORMAL (ref 0.1–1.1)

## 2020-10-02 PROCEDURE — 99214 PR OFFICE/OUTPT VISIT, EST, LEVL IV, 30-39 MIN: ICD-10-PCS | Mod: 25,S$GLB,, | Performed by: FAMILY MEDICINE

## 2020-10-02 PROCEDURE — 81003 URINALYSIS AUTO W/O SCOPE: CPT | Mod: QW,S$GLB,, | Performed by: FAMILY MEDICINE

## 2020-10-02 PROCEDURE — 99214 OFFICE O/P EST MOD 30 MIN: CPT | Mod: 25,S$GLB,, | Performed by: FAMILY MEDICINE

## 2020-10-02 PROCEDURE — 81003 POCT URINALYSIS, DIPSTICK, AUTOMATED, W/O SCOPE: ICD-10-PCS | Mod: QW,S$GLB,, | Performed by: FAMILY MEDICINE

## 2020-10-02 PROCEDURE — 87086 URINE CULTURE/COLONY COUNT: CPT

## 2020-10-02 RX ORDER — CIPROFLOXACIN 500 MG/1
500 TABLET ORAL 2 TIMES DAILY
Qty: 14 TABLET | Refills: 0 | Status: SHIPPED | OUTPATIENT
Start: 2020-10-02 | End: 2020-10-09

## 2020-10-02 NOTE — PROGRESS NOTES
"Subjective:       Patient ID: Lena Brantley is a 65 y.o. female.    Vitals:  height is 5' 4.5" (1.638 m) and weight is 67.6 kg (149 lb). Her temperature is 97.6 °F (36.4 °C). Her blood pressure is 125/88 and her pulse is 104. Her respiration is 18 and oxygen saturation is 98%.     Chief Complaint: Urinary Tract Infection    This is a 65 y.o. female   with Past Medical History:  No date: Cancer  9/9/2020: Elevated bilirubin  No date: GERD (gastroesophageal reflux disease)  9/9/2020: History of iron deficiency anemia  06/2016: Liver mass  06/2016: Mass of colon  No date: 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  9/9/2020: Secondary neuroendocrine tumor of liver  No date: Sickle cell trait   and History reviewed. No pertinent surgical history.  who presents today with a chief complaint of a urinary tract infection that began three days ago. She's complaining of pain, hematuria and diarrhea. She's been taking pepto bismol and increased her fluid intake to help relieve her symptoms.     Urinary Tract Infection   This is a new problem. The current episode started in the past 7 days. The problem occurs every urination. The problem has been gradually worsening. The pain is at a severity of 5/10. The pain is moderate. There has been no fever. She is not sexually active. There is no history of pyelonephritis. Associated symptoms include hematuria. Pertinent negatives include no chills, frequency, nausea, urgency, vomiting or rash. She has tried increased fluids (pepto bismol) for the symptoms. The treatment provided mild relief.       Constitution: Negative for chills and fever.   Neck: Negative for painful lymph nodes.   Gastrointestinal: Positive for diarrhea. Negative for abdominal pain, nausea and vomiting.   Genitourinary: Positive for hematuria. Negative for dysuria, frequency, urgency, urine decreased, history of kidney stones, painful menstruation, irregular menstruation, missed menses, heavy menstrual " bleeding, ovarian cysts, genital trauma, vaginal pain, vaginal discharge, vaginal bleeding, vaginal odor, painful intercourse, genital sore, painful ejaculation and pelvic pain.   Musculoskeletal: Positive for pain. Negative for back pain.   Skin: Negative for rash and lesion.   Hematologic/Lymphatic: Negative for swollen lymph nodes.       Objective:      Physical Exam   Constitutional: normal  HENT:   Head: Normocephalic and atraumatic.   Nose: Nose normal.   Mouth/Throat: Mucous membranes are moist.   Eyes: Pupils are equal, round, and reactive to light.   Neck: Normal range of motion. Neck supple.   Cardiovascular: Normal rate and regular rhythm.   Pulmonary/Chest: Effort normal and breath sounds normal.   Abdominal: Soft. Normal appearance. There is no left CVA tenderness and no right CVA tenderness.   Neurological: She is alert.   Nursing note and vitals reviewed.    Results for orders placed or performed in visit on 10/02/20   POCT Urinalysis, Dipstick, Automated, W/O Scope   Result Value Ref Range    POC Blood, Urine Positive (A) Negative    POC Bilirubin, Urine Negative Negative    POC Urobilinogen, Urine normal 0.1 - 1.1    POC Ketones, Urine Negative Negative    POC Protein, Urine Negative Negative    POC Nitrates, Urine Negative Negative    POC Glucose, Urine Negative Negative    pH, UA 7.0 5 - 8    POC Specific Gravity, Urine 1.005 1.003 - 1.029    POC Leukocytes, Urine Positive (A) Negative         Assessment:       1. Urinary tract infection with hematuria, site unspecified        Plan:         Urinary tract infection with hematuria, site unspecified  -     POCT Urinalysis, Dipstick, Automated, W/O Scope  -     ciprofloxacin HCl (CIPRO) 500 MG tablet; Take 1 tablet (500 mg total) by mouth 2 (two) times daily. for 7 days  Dispense: 14 tablet; Refill: 0  -     Culture, Urine

## 2020-10-02 NOTE — LETTER
October 2, 2020      [facility]  [address]  [facility] [place] [address]  Phone: [phone]  Fax: [phone]       Patient: Lena Brantley   YOB: 1955  Date of Visit: 10/02/2020    To Whom It May Concern:    Oswaldo Brantley  was at [facility] on 10/02/2020. She may return to work/school on 10/05/2020 with no restrictions. If you have any questions or concerns, or if I can be of further assistance, please do not hesitate to contact me.    Sincerely,            Sukhi Calderon MD

## 2020-10-02 NOTE — PATIENT INSTRUCTIONS

## 2020-10-04 LAB
BACTERIA UR CULT: NORMAL
BACTERIA UR CULT: NORMAL

## 2020-10-05 ENCOUNTER — PATIENT MESSAGE (OUTPATIENT)
Dept: ADMINISTRATIVE | Facility: HOSPITAL | Age: 65
End: 2020-10-05

## 2020-10-07 ENCOUNTER — LAB VISIT (OUTPATIENT)
Dept: LAB | Facility: HOSPITAL | Age: 65
End: 2020-10-07
Attending: INTERNAL MEDICINE
Payer: COMMERCIAL

## 2020-10-07 DIAGNOSIS — C7B.8 SECONDARY NEUROENDOCRINE TUMOR OF LIVER: ICD-10-CM

## 2020-10-07 DIAGNOSIS — Z86.2 HISTORY OF IRON DEFICIENCY ANEMIA: ICD-10-CM

## 2020-10-07 DIAGNOSIS — C7A.00 MALIGNANT CARCINOID TUMOR OF UNKNOWN PRIMARY SITE: ICD-10-CM

## 2020-10-07 DIAGNOSIS — R17 ELEVATED BILIRUBIN: ICD-10-CM

## 2020-10-07 LAB
ALBUMIN SERPL BCP-MCNC: 3.7 G/DL (ref 3.5–5.2)
ALP SERPL-CCNC: 102 U/L (ref 55–135)
ALT SERPL W/O P-5'-P-CCNC: 16 U/L (ref 10–44)
ANION GAP SERPL CALC-SCNC: 9 MMOL/L (ref 8–16)
AST SERPL-CCNC: 20 U/L (ref 10–40)
BILIRUB SERPL-MCNC: 1.1 MG/DL (ref 0.1–1)
BUN SERPL-MCNC: 13 MG/DL (ref 8–23)
CALCIUM SERPL-MCNC: 9.2 MG/DL (ref 8.7–10.5)
CHLORIDE SERPL-SCNC: 104 MMOL/L (ref 95–110)
CO2 SERPL-SCNC: 27 MMOL/L (ref 23–29)
CREAT SERPL-MCNC: 1 MG/DL (ref 0.5–1.4)
ERYTHROCYTE [DISTWIDTH] IN BLOOD BY AUTOMATED COUNT: 13.7 % (ref 11.5–14.5)
EST. GFR  (AFRICAN AMERICAN): >60 ML/MIN/1.73 M^2
EST. GFR  (NON AFRICAN AMERICAN): >60 ML/MIN/1.73 M^2
GLUCOSE SERPL-MCNC: 125 MG/DL (ref 70–110)
HCT VFR BLD AUTO: 42.4 % (ref 37–48.5)
HGB BLD-MCNC: 13.5 G/DL (ref 12–16)
IMM GRANULOCYTES # BLD AUTO: 0.03 K/UL (ref 0–0.04)
MCH RBC QN AUTO: 28.4 PG (ref 27–31)
MCHC RBC AUTO-ENTMCNC: 31.8 G/DL (ref 32–36)
MCV RBC AUTO: 89 FL (ref 82–98)
NEUTROPHILS # BLD AUTO: 2.5 K/UL (ref 1.8–7.7)
PLATELET # BLD AUTO: 216 K/UL (ref 150–350)
PMV BLD AUTO: 11.7 FL (ref 9.2–12.9)
POTASSIUM SERPL-SCNC: 3.8 MMOL/L (ref 3.5–5.1)
PROT SERPL-MCNC: 7.1 G/DL (ref 6–8.4)
RBC # BLD AUTO: 4.75 M/UL (ref 4–5.4)
SODIUM SERPL-SCNC: 140 MMOL/L (ref 136–145)
WBC # BLD AUTO: 4.05 K/UL (ref 3.9–12.7)

## 2020-10-07 PROCEDURE — 85027 COMPLETE CBC AUTOMATED: CPT

## 2020-10-07 PROCEDURE — 36415 COLL VENOUS BLD VENIPUNCTURE: CPT

## 2020-10-07 PROCEDURE — 80053 COMPREHEN METABOLIC PANEL: CPT

## 2020-10-11 ENCOUNTER — TELEPHONE (OUTPATIENT)
Dept: URGENT CARE | Facility: CLINIC | Age: 65
End: 2020-10-11

## 2020-10-11 NOTE — TELEPHONE ENCOUNTER
----- Message from Leila Oleary PA-C sent at 10/6/2020  7:59 AM CDT -----  Please call the patient regarding her nonspecific urine culture results

## 2020-10-16 ENCOUNTER — TELEPHONE (OUTPATIENT)
Dept: FAMILY MEDICINE | Facility: CLINIC | Age: 65
End: 2020-10-16

## 2020-10-16 ENCOUNTER — TELEPHONE (OUTPATIENT)
Dept: INTERNAL MEDICINE | Facility: CLINIC | Age: 65
End: 2020-10-16

## 2020-10-16 NOTE — TELEPHONE ENCOUNTER
Called patient to notify that Dr. Diallo will be out of office on 10/29/20.  Left detailed voicemail.

## 2020-10-16 NOTE — TELEPHONE ENCOUNTER
Returned patient's call.  Rescheduled patient for 10/22/20 at 10:30am.  Patient accepted date/time.

## 2020-10-16 NOTE — TELEPHONE ENCOUNTER
----- Message from Luis Baker sent at 10/16/2020  3:17 PM CDT -----  Type:  Patient Returning Call    Who Called: Lena  Who Left Message for Patient: Davida  Does the patient know what this is regarding?: rescheduling appt  Would the patient rather a call back or a response via [facility]? Call back  Best Call Back Number: [phone]  Additional Information: none

## 2020-10-22 ENCOUNTER — OFFICE VISIT (OUTPATIENT)
Dept: FAMILY MEDICINE | Facility: CLINIC | Age: 65
End: 2020-10-22
Payer: COMMERCIAL

## 2020-10-22 VITALS
DIASTOLIC BLOOD PRESSURE: 60 MMHG | OXYGEN SATURATION: 98 % | WEIGHT: 150.81 LBS | BODY MASS INDEX: 25.13 KG/M2 | SYSTOLIC BLOOD PRESSURE: 100 MMHG | TEMPERATURE: 98 F | HEART RATE: 73 BPM | HEIGHT: 65 IN

## 2020-10-22 DIAGNOSIS — C7B.02 METASTATIC MALIGNANT CARCINOID TUMOR TO LIVER: ICD-10-CM

## 2020-10-22 DIAGNOSIS — E16.4 HYPERGASTRINEMIA: ICD-10-CM

## 2020-10-22 DIAGNOSIS — C7A.00 MALIGNANT CARCINOID TUMOR OF UNKNOWN PRIMARY SITE: ICD-10-CM

## 2020-10-22 DIAGNOSIS — F32.1 CURRENT MODERATE EPISODE OF MAJOR DEPRESSIVE DISORDER WITHOUT PRIOR EPISODE: ICD-10-CM

## 2020-10-22 DIAGNOSIS — Z01.419 WELL WOMAN EXAM: Primary | ICD-10-CM

## 2020-10-22 DIAGNOSIS — E55.9 VITAMIN D DEFICIENCY: ICD-10-CM

## 2020-10-22 DIAGNOSIS — F41.1 GENERALIZED ANXIETY DISORDER: ICD-10-CM

## 2020-10-22 DIAGNOSIS — R73.09 ABNORMAL GLUCOSE: ICD-10-CM

## 2020-10-22 PROCEDURE — 87481 CANDIDA DNA AMP PROBE: CPT | Mod: 59

## 2020-10-22 PROCEDURE — 90471 FLU VACCINE (QUAD) GREATER THAN OR EQUAL TO 3YO PRESERVATIVE FREE IM: ICD-10-PCS | Mod: S$GLB,,, | Performed by: FAMILY MEDICINE

## 2020-10-22 PROCEDURE — 99999 PR PBB SHADOW E&M-EST. PATIENT-LVL III: ICD-10-PCS | Mod: PBBFAC,,, | Performed by: FAMILY MEDICINE

## 2020-10-22 PROCEDURE — 99396 PREV VISIT EST AGE 40-64: CPT | Mod: 25,S$GLB,, | Performed by: FAMILY MEDICINE

## 2020-10-22 PROCEDURE — 90686 FLU VACCINE (QUAD) GREATER THAN OR EQUAL TO 3YO PRESERVATIVE FREE IM: ICD-10-PCS | Mod: S$GLB,,, | Performed by: FAMILY MEDICINE

## 2020-10-22 PROCEDURE — 3008F BODY MASS INDEX DOCD: CPT | Mod: CPTII,S$GLB,, | Performed by: FAMILY MEDICINE

## 2020-10-22 PROCEDURE — 3008F PR BODY MASS INDEX (BMI) DOCUMENTED: ICD-10-PCS | Mod: CPTII,S$GLB,, | Performed by: FAMILY MEDICINE

## 2020-10-22 PROCEDURE — 87801 DETECT AGNT MULT DNA AMPLI: CPT

## 2020-10-22 PROCEDURE — 88175 CYTOPATH C/V AUTO FLUID REDO: CPT

## 2020-10-22 PROCEDURE — 90686 IIV4 VACC NO PRSV 0.5 ML IM: CPT | Mod: S$GLB,,, | Performed by: FAMILY MEDICINE

## 2020-10-22 PROCEDURE — 99396 PR PREVENTIVE VISIT,EST,40-64: ICD-10-PCS | Mod: 25,S$GLB,, | Performed by: FAMILY MEDICINE

## 2020-10-22 PROCEDURE — 90471 IMMUNIZATION ADMIN: CPT | Mod: S$GLB,,, | Performed by: FAMILY MEDICINE

## 2020-10-22 PROCEDURE — 87624 HPV HI-RISK TYP POOLED RSLT: CPT

## 2020-10-22 PROCEDURE — 99999 PR PBB SHADOW E&M-EST. PATIENT-LVL III: CPT | Mod: PBBFAC,,, | Performed by: FAMILY MEDICINE

## 2020-10-22 NOTE — PROGRESS NOTES
Subjective:       Patient ID: Lena Brantley is a 65 y.o. female.    Chief Complaint: Annual Exam    66 yo F pt, new to me, with PMH significant for malignant carcinoid tumor of unknown primary site (? malignant gastrinoma), with metastasis to liver, hypergastrinemia, GERD, and Sickle Cell trait, unexplained Fe deficiency anemia. She presents for wellness exam.     BP: 100/60  Body mass index is 25.48 kg/m². 21 lb intentional weight loss over the past 1 year  Diet/Lifestyle: Has healthy diet; limiting high calorie snacks; walks for exercise  Last Eye Exam: 1 year ago; not wearing rx'd lenses; wears reading glasses  Last Dental Exam: > 6 months ago  LMP: 57/59 yo  Last Pap neg 11/2012. Trichomonas noted--states she was treated at that time, but told she had trichomonas again a few years later.   Last Mammogram: 10/7/2020 BIRADs Cat 1 neg  Last Colonoscopy: DUE  Last Flu Vaccine: Has not had  Last Tdap Vaccine: 11/6/2012  Has never had pneumonia vaccine  Last Shingrix: DUE    Review of Systems   Constitutional: Negative for activity change, appetite change, chills, fever and unexpected weight change.   HENT: Negative for congestion, sneezing and sore throat.    Eyes: Negative for redness, itching and visual disturbance.   Respiratory: Negative for cough, chest tightness, shortness of breath and wheezing.    Cardiovascular: Negative for chest pain.   Gastrointestinal: Negative for abdominal pain, constipation, diarrhea, nausea and vomiting.   Genitourinary: Negative for dysuria, frequency, hematuria, urgency, vaginal bleeding and vaginal discharge.   Skin: Negative for rash.   Neurological: Negative for dizziness, syncope and headaches.   Psychiatric/Behavioral: Negative for dysphoric mood and suicidal ideas. The patient is not nervous/anxious.          Past Medical History:   Diagnosis Date    Cancer     Elevated bilirubin 9/9/2020    GERD (gastroesophageal reflux disease)     History of iron deficiency  "anemia 9/9/2020    Liver mass 06/2016    Mass of colon 06/2016    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     Secondary neuroendocrine tumor of liver 9/9/2020    Sickle cell trait        Patient Active Problem List   Diagnosis    Malignant carcinoid tumor of unknown primary site    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    Hypergastrinemia    Carcinoid (except of appendix)    Gastrinoma, malignant    Generalized anxiety disorder    Current moderate episode of major depressive disorder without prior episode    Secondary neuroendocrine tumor of liver    History of iron deficiency anemia    Elevated bilirubin       History reviewed. No pertinent surgical history.    Family History   Problem Relation Age of Onset    Cancer Maternal Grandmother     Cancer Other        Social History     Tobacco Use   Smoking Status Never Smoker   Smokeless Tobacco Never Used       Social History     Social History Narrative    Not on file       Medications have been reviewed and reconciled.     Review of patient's allergies indicates:   Allergen Reactions    Epinephrine Anaphylaxis     Can Cause Carcinoid Crisis        Objective:        Vitals:    10/22/20 1041   BP: 100/60   Pulse: 73   Temp: 98.4 °F (36.9 °C)   SpO2: 98%   Weight: 68.4 kg (150 lb 12.7 oz)   Height: 5' 4.5" (1.638 m)       Physical Exam  Exam conducted with a chaperone present.   Constitutional:       General: She is not in acute distress.     Appearance: She is well-developed.   HENT:      Head: Normocephalic and atraumatic.      Right Ear: External ear normal.      Left Ear: External ear normal.   Eyes:      General: No scleral icterus.     Extraocular Movements: Extraocular movements intact.      Conjunctiva/sclera: Conjunctivae normal.   Neck:      Musculoskeletal: Normal range of motion and neck supple.   Cardiovascular:      Rate and Rhythm: Normal rate and regular rhythm.      Pulses:           Dorsalis pedis pulses are 2+ on the " right side and 2+ on the left side.      Heart sounds: Normal heart sounds. No murmur. No friction rub. No gallop.    Pulmonary:      Effort: Pulmonary effort is normal.      Breath sounds: Normal breath sounds. No wheezing or rales.   Abdominal:      General: Abdomen is flat. There is no distension.      Palpations: Abdomen is soft. There is no mass.      Tenderness: There is no abdominal tenderness.   Genitourinary:     Labia:         Right: No rash, tenderness or lesion.         Left: No rash, tenderness or lesion.       Vagina: No vaginal discharge, tenderness or lesions.      Cervix: No cervical motion tenderness, friability or erythema.      Uterus: Normal.       Adnexa:         Right: No mass, tenderness or fullness.          Left: No mass or fullness.     Musculoskeletal: Normal range of motion.   Lymphadenopathy:      Cervical: No cervical adenopathy.   Skin:     General: Skin is warm and dry.      Findings: No erythema or rash.   Neurological:      Mental Status: She is alert and oriented to person, place, and time.      Cranial Nerves: No cranial nerve deficit.   Psychiatric:         Behavior: Behavior normal.         Assessment:       1. Well woman exam    2. Abnormal glucose    3. Vitamin D deficiency    4. Current moderate episode of major depressive disorder without prior episode    5. Generalized anxiety disorder    6. Malignant carcinoid tumor of unknown primary site    7. 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    8. Hypergastrinemia        Plan:       Lena was seen today for annual exam.    Diagnoses and all orders for this visit:    Well woman exam  -     Influenza - Quadrivalent (PF)  -     Liquid-Based Pap Smear, Screening  -     HPV High Risk Genotypes, PCR  -     Hemoglobin A1C; Future  -     Lipid Panel; Future  -     Vitamin D; Future  -     Vaginosis Screen by DNA Probe    Abnormal glucose  -     Hemoglobin A1C; Future  -     Lipid Panel; Future    Vitamin D deficiency  -      Vitamin D; Future    Current moderate episode of major depressive disorder without prior episode    Generalized anxiety disorder    Malignant carcinoid tumor of unknown primary site    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    Hypergastrinemia    Annual Exam  BP normotensive  Body mass index is 25.48 kg/m². 21 lb intentional weight loss over the past 1 year. Encouraged continue healthy diet/lifestyle modifications   Advised eye exam q1-2 years   Advised  dental exams q6 months.  Pap/HPV screen today 10/22/2020. Affirm performed given h/o trichomoniasis.   Last Mammogram: 10/7/2020 BIRADs Cat 1 neg  Last Colonoscopy: DUE. Needs to be scheduled via [facility] as she was due for EGD and colonoscopy  Flu Vaccine today 10/22/2020  Last Tdap Vaccine: 11/6/2012  Considering pneumococcal vaccine on f/u visit  Encouraged Shingrix from pharmacy   RTC for repeat fasting labs as above   CMP and CBC wnl 10/7/2020    Prediabetes  A1c 6.0 2/2020  Will have pt return for repeat a1c  Obtain FLP as well     Vitamin D deficiency  Vitamin D 24 2/2020  Will have pt return for repeat vit d    Moderate MDD/Severe MINH   Previously rx'd escitalopram 5 mg daily; no longer taking  Stable without medication.    Malignant carcinoid tumor of unknown primary site  Comments:  Advised to schedule EGD and colonoscopy with Dr. Hobbs. Continue Lanreotide 120 mg q 28 days since 2016    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  Comments:  Advised to schedule EGD and colonoscopy with Dr. Hobbs. Continue Lanreotide 120 mg q 28 days since 2016    Hypergastrinemia  Comments:  Continue protonix 40 mg daily.       F/U plan pending fasting labs. If wnl, plan for f/u in 6 months

## 2020-10-23 ENCOUNTER — PATIENT MESSAGE (OUTPATIENT)
Dept: FAMILY MEDICINE | Facility: CLINIC | Age: 65
End: 2020-10-23

## 2020-10-23 ENCOUNTER — INFUSION (OUTPATIENT)
Dept: INFUSION THERAPY | Facility: HOSPITAL | Age: 65
End: 2020-10-23
Attending: SURGERY
Payer: COMMERCIAL

## 2020-10-23 VITALS — BODY MASS INDEX: 25.75 KG/M2 | HEIGHT: 64 IN | WEIGHT: 150.81 LBS

## 2020-10-23 DIAGNOSIS — C7A.00 MALIGNANT CARCINOID TUMOR OF UNKNOWN PRIMARY SITE: Primary | ICD-10-CM

## 2020-10-23 DIAGNOSIS — C7B.02 METASTATIC MALIGNANT CARCINOID TUMOR TO LIVER: ICD-10-CM

## 2020-10-23 PROCEDURE — 63600175 PHARM REV CODE 636 W HCPCS: Mod: JG | Performed by: INTERNAL MEDICINE

## 2020-10-23 PROCEDURE — 96372 THER/PROPH/DIAG INJ SC/IM: CPT

## 2020-10-23 RX ORDER — LANREOTIDE ACETATE 120 MG/.5ML
120 INJECTION SUBCUTANEOUS
Status: DISCONTINUED | OUTPATIENT
Start: 2020-10-23 | End: 2020-10-23 | Stop reason: HOSPADM

## 2020-10-23 RX ORDER — LANREOTIDE ACETATE 120 MG/.5ML
120 INJECTION SUBCUTANEOUS
Status: CANCELLED | OUTPATIENT
Start: 2020-10-23

## 2020-10-23 RX ADMIN — LANREOTIDE ACETATE 120 MG: 120 INJECTION SUBCUTANEOUS at 09:10

## 2020-10-23 NOTE — TELEPHONE ENCOUNTER
The 10-year ASCVD risk score (Naveen WAYNE Jr., et al., 2013) is: 3.9%    Values used to calculate the score:      Age: 65 years      Sex: Female      Is Non- : Yes      Diabetic: No      Tobacco smoker: No      Systolic Blood Pressure: 100 mmHg      Is BP treated: No      HDL Cholesterol: 48 mg/dL      Total Cholesterol: 205 mg/dL

## 2020-10-26 LAB
BACTERIAL VAGINOSIS DNA: NEGATIVE
CANDIDA GLABRATA DNA: POSITIVE
CANDIDA KRUSEI DNA: NEGATIVE
CANDIDA RRNA VAG QL PROBE: NEGATIVE
T VAGINALIS RRNA GENITAL QL PROBE: NEGATIVE

## 2020-10-27 ENCOUNTER — PATIENT MESSAGE (OUTPATIENT)
Dept: FAMILY MEDICINE | Facility: CLINIC | Age: 65
End: 2020-10-27

## 2020-10-28 ENCOUNTER — PATIENT MESSAGE (OUTPATIENT)
Dept: FAMILY MEDICINE | Facility: CLINIC | Age: 65
End: 2020-10-28

## 2020-10-28 DIAGNOSIS — B37.31 VAGINAL CANDIDIASIS: Primary | ICD-10-CM

## 2020-10-28 RX ORDER — FLUCONAZOLE 150 MG/1
150 TABLET ORAL DAILY
Qty: 1 TABLET | Refills: 0 | Status: SHIPPED | OUTPATIENT
Start: 2020-10-28 | End: 2020-10-29

## 2020-10-30 ENCOUNTER — PATIENT MESSAGE (OUTPATIENT)
Dept: ADMINISTRATIVE | Facility: HOSPITAL | Age: 65
End: 2020-10-30

## 2020-11-05 LAB
HPV HR 12 DNA SPEC QL NAA+PROBE: NEGATIVE
HPV16 AG SPEC QL: NEGATIVE
HPV18 DNA SPEC QL NAA+PROBE: NEGATIVE

## 2020-11-20 ENCOUNTER — TELEPHONE (OUTPATIENT)
Dept: NEUROLOGY | Facility: HOSPITAL | Age: 65
End: 2020-11-20

## 2020-11-20 ENCOUNTER — HOSPITAL ENCOUNTER (EMERGENCY)
Facility: HOSPITAL | Age: 65
Discharge: HOME | End: 2020-11-20
Attending: EMERGENCY MEDICINE
Payer: COMMERCIAL

## 2020-11-20 ENCOUNTER — INFUSION (OUTPATIENT)
Dept: INFUSION THERAPY | Facility: HOSPITAL | Age: 65
End: 2020-11-20
Attending: OPHTHALMOLOGY
Payer: COMMERCIAL

## 2020-11-20 ENCOUNTER — DOCUMENTATION ONLY (OUTPATIENT)
Dept: NEUROLOGY | Facility: HOSPITAL | Age: 65
End: 2020-11-20

## 2020-11-20 VITALS
HEART RATE: 76 BPM | TEMPERATURE: 98 F | OXYGEN SATURATION: 100 % | RESPIRATION RATE: 60 BRPM | SYSTOLIC BLOOD PRESSURE: 156 MMHG | DIASTOLIC BLOOD PRESSURE: 69 MMHG

## 2020-11-20 DIAGNOSIS — R45.89 ANXIETY ABOUT HEALTH: ICD-10-CM

## 2020-11-20 DIAGNOSIS — C7A.00 MALIGNANT CARCINOID TUMOR OF UNKNOWN PRIMARY SITE: Primary | ICD-10-CM

## 2020-11-20 DIAGNOSIS — C7B.02 METASTATIC MALIGNANT CARCINOID TUMOR TO LIVER: ICD-10-CM

## 2020-11-20 DIAGNOSIS — F41.1 GENERALIZED ANXIETY DISORDER: Primary | ICD-10-CM

## 2020-11-20 DIAGNOSIS — F32.1 CURRENT MODERATE EPISODE OF MAJOR DEPRESSIVE DISORDER WITHOUT PRIOR EPISODE: ICD-10-CM

## 2020-11-20 DIAGNOSIS — R53.1 WEAKNESS: ICD-10-CM

## 2020-11-20 LAB
ALBUMIN SERPL BCP-MCNC: 4 G/DL (ref 3.5–5.2)
ALP SERPL-CCNC: 105 U/L (ref 55–135)
ALT SERPL W/O P-5'-P-CCNC: 16 U/L (ref 10–44)
ANION GAP SERPL CALC-SCNC: 13 MMOL/L (ref 8–16)
AST SERPL-CCNC: 22 U/L (ref 10–40)
BACTERIA #/AREA URNS HPF: NORMAL /HPF
BASOPHILS # BLD AUTO: 0.03 K/UL (ref 0–0.2)
BASOPHILS NFR BLD: 0.5 % (ref 0–1.9)
BILIRUB SERPL-MCNC: 1.4 MG/DL (ref 0.1–1)
BILIRUB UR QL STRIP: NEGATIVE
BUN SERPL-MCNC: 12 MG/DL (ref 8–23)
CALCIUM SERPL-MCNC: 9.6 MG/DL (ref 8.7–10.5)
CHLORIDE SERPL-SCNC: 102 MMOL/L (ref 95–110)
CLARITY UR: CLEAR
CO2 SERPL-SCNC: 24 MMOL/L (ref 23–29)
COLOR UR: YELLOW
CREAT SERPL-MCNC: 1 MG/DL (ref 0.5–1.4)
DIFFERENTIAL METHOD: ABNORMAL
EOSINOPHIL # BLD AUTO: 0 K/UL (ref 0–0.5)
EOSINOPHIL NFR BLD: 0.3 % (ref 0–8)
ERYTHROCYTE [DISTWIDTH] IN BLOOD BY AUTOMATED COUNT: 13.7 % (ref 11.5–14.5)
EST. GFR  (AFRICAN AMERICAN): >60 ML/MIN/1.73 M^2
EST. GFR  (NON AFRICAN AMERICAN): >60 ML/MIN/1.73 M^2
GLUCOSE SERPL-MCNC: 172 MG/DL (ref 70–110)
GLUCOSE UR QL STRIP: NEGATIVE
HCT VFR BLD AUTO: 42.6 % (ref 37–48.5)
HGB BLD-MCNC: 14.2 G/DL (ref 12–16)
HGB UR QL STRIP: NEGATIVE
IMM GRANULOCYTES # BLD AUTO: 0.01 K/UL (ref 0–0.04)
IMM GRANULOCYTES NFR BLD AUTO: 0.2 % (ref 0–0.5)
KETONES UR QL STRIP: NEGATIVE
LEUKOCYTE ESTERASE UR QL STRIP: ABNORMAL
LYMPHOCYTES # BLD AUTO: 0.9 K/UL (ref 1–4.8)
LYMPHOCYTES NFR BLD: 15.3 % (ref 18–48)
MAGNESIUM SERPL-MCNC: 1.7 MG/DL (ref 1.6–2.6)
MCH RBC QN AUTO: 28.7 PG (ref 27–31)
MCHC RBC AUTO-ENTMCNC: 33.3 G/DL (ref 32–36)
MCV RBC AUTO: 86 FL (ref 82–98)
MICROSCOPIC COMMENT: NORMAL
MONOCYTES # BLD AUTO: 0.3 K/UL (ref 0.3–1)
MONOCYTES NFR BLD: 5.6 % (ref 4–15)
NEUTROPHILS # BLD AUTO: 4.6 K/UL (ref 1.8–7.7)
NEUTROPHILS NFR BLD: 78.1 % (ref 38–73)
NITRITE UR QL STRIP: NEGATIVE
NRBC BLD-RTO: 0 /100 WBC
PH UR STRIP: 7 [PH] (ref 5–8)
PLATELET # BLD AUTO: 220 K/UL (ref 150–350)
PMV BLD AUTO: 11.5 FL (ref 9.2–12.9)
POCT GLUCOSE: 98 MG/DL (ref 70–110)
POTASSIUM SERPL-SCNC: 3.7 MMOL/L (ref 3.5–5.1)
PROT SERPL-MCNC: 7.7 G/DL (ref 6–8.4)
PROT UR QL STRIP: NEGATIVE
RBC # BLD AUTO: 4.94 M/UL (ref 4–5.4)
RBC #/AREA URNS HPF: 1 /HPF (ref 0–4)
SODIUM SERPL-SCNC: 139 MMOL/L (ref 136–145)
SP GR UR STRIP: 1 (ref 1–1.03)
SQUAMOUS #/AREA URNS HPF: 6 /HPF
TROPONIN I SERPL DL<=0.01 NG/ML-MCNC: <0.006 NG/ML (ref 0–0.03)
URN SPEC COLLECT METH UR: ABNORMAL
UROBILINOGEN UR STRIP-ACNC: NEGATIVE EU/DL
WBC # BLD AUTO: 5.89 K/UL (ref 3.9–12.7)
WBC #/AREA URNS HPF: 1 /HPF (ref 0–5)

## 2020-11-20 PROCEDURE — 80053 COMPREHEN METABOLIC PANEL: CPT

## 2020-11-20 PROCEDURE — 96360 HYDRATION IV INFUSION INIT: CPT

## 2020-11-20 PROCEDURE — 84484 ASSAY OF TROPONIN QUANT: CPT

## 2020-11-20 PROCEDURE — 96372 THER/PROPH/DIAG INJ SC/IM: CPT

## 2020-11-20 PROCEDURE — 85025 COMPLETE CBC W/AUTO DIFF WBC: CPT

## 2020-11-20 PROCEDURE — 83735 ASSAY OF MAGNESIUM: CPT

## 2020-11-20 PROCEDURE — 93005 ELECTROCARDIOGRAM TRACING: CPT

## 2020-11-20 PROCEDURE — 25000003 PHARM REV CODE 250: Performed by: EMERGENCY MEDICINE

## 2020-11-20 PROCEDURE — 63600175 PHARM REV CODE 636 W HCPCS: Mod: JG | Performed by: INTERNAL MEDICINE

## 2020-11-20 PROCEDURE — 81000 URINALYSIS NONAUTO W/SCOPE: CPT

## 2020-11-20 PROCEDURE — 99284 EMERGENCY DEPT VISIT MOD MDM: CPT | Mod: 25

## 2020-11-20 RX ORDER — LANREOTIDE ACETATE 120 MG/.5ML
120 INJECTION SUBCUTANEOUS
Status: DISCONTINUED | OUTPATIENT
Start: 2020-11-20 | End: 2020-11-20 | Stop reason: HOSPADM

## 2020-11-20 RX ORDER — LANREOTIDE ACETATE 120 MG/.5ML
120 INJECTION SUBCUTANEOUS
Status: CANCELLED | OUTPATIENT
Start: 2020-11-20

## 2020-11-20 RX ADMIN — SODIUM CHLORIDE 1000 ML: 0.9 INJECTION, SOLUTION INTRAVENOUS at 11:11

## 2020-11-20 RX ADMIN — LANREOTIDE ACETATE 120 MG: 120 INJECTION SUBCUTANEOUS at 01:11

## 2020-11-20 NOTE — DISCHARGE INSTRUCTIONS
Additional instructions  Be sure to drink plenty of fluids. Follow up with your primary care physician in 2-3 days if you are not improving. Take all your medications as prescribed. Return to the emergency department if you have increasing pain, chest pain, difficulty breathing,  nonstop vomiting, or any other concerns. Be sure to drink plenty of fluids to stay hydrated. Get plenty of rest. Please refer to additional educational material for further instructions.

## 2020-11-20 NOTE — ED PROVIDER NOTES
History       Chief Complaint: weakness     HPI:    Lena Calderon Early 65 y.o.  presents to the emergency department today with a complaint of weakness. Pt has generalized weakness. Feels like she might pass out. She has been tired at home for the past 2 days. She denies any one sided weakness. She denies any one sided sensation issues. She denies headache. No speech difficulties. No gait abnormalities. She denies fever, chills, sweats, cough, sputum production, nausea, vomiting, diarrhea, change in urination. No chest pain, palpitations, diaphoresis.     Pt was brought down to the ED by someone from her clinic and the report was that she had left sided weakness but the pt denies ever having one sided weakness. It has been a general weakness.       ROS    Constitutional: No fever, no chills.  Eyes: No discharge. No pain.  HENT: No nasal drainage. No ear ache. No sore throat.  Cardiovascular: No chest pain, no palpitations.  Respiratory: No cough, no shortness of breath.  Gastrointestinal: No abdominal pain, no vomiting. No diarrhea.  Genitourinary: No hematuria, dysuria, urgency.  Musculoskeletal: No back pain.   Skin: No rashes, no lesions.  Neurological: See above.     Otherwise remaining ROS negative     The history is provided by the patient     ALLERGIES REVIEWED  MEDICATIONS REVIEWED  PMH/PSH/SOC/FH REVIEWED     Past Medical History:   Diagnosis Date    Cancer     Elevated bilirubin 9/9/2020    GERD (gastroesophageal reflux disease)     History of iron deficiency anemia 9/9/2020    Liver mass 06/2016    Mass of colon 06/2016    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     Secondary neuroendocrine tumor of liver 9/9/2020    Sickle cell trait        No past surgical history on file.    Family History   Problem Relation Age of Onset    Cancer Maternal Grandmother     Cancer Other        Social History     Tobacco Use    Smoking status: Never Smoker    Smokeless tobacco: Never Used   Substance  Use Topics    Alcohol use: No     Frequency: Never     Drinks per session: Patient refused     Binge frequency: Never    Drug use: No       Nursing/Ancillary staff note reviewed.  VS reviewed         Physical Exam   BP (!) 156/69 (BP Location: Left arm, Patient Position: Lying)   Pulse 76   Temp 98 °F (36.7 °C) (Oral)   Resp (!) 60   SpO2 100%     Physical Exam  General Appearance: The patient is alert, has no immediate need for airway protection and no signs of toxicity.     HEENT: Head: NCAT.     Eyes: Pupils equal and round no pallor or injection. Extra ocular movements intact. No nystagmus. No drainage.       Mouth: Mucous membranes are dry. Oropharynx clear.   Neck:Neck is supple non-tender. No lymphadenopathy. No stridor.   Respiratory: There are no retractions, lungs are clear to auscultation. No wheezing, no crackles. Chest wall nontender to palpation.   Cardiovascular: Regular rate and rhythm. No murmurs, rubs or gallops.  Gastrointestinal:  Abdomen is soft and non-tender, no masses, bowel sounds normal. No guarding, no rebound.  No pulsatile mass.   Neurological: Alert and oriented x 4.   Speech: Normal.   CN: CN grossly intact   Strength: Intact bilaterally   Drift: No UE or LE drift.   Gait: Normal.  Coordination: normal finger to nose.   Sensation: Intact to light touch bilaterally.     Skin: Warm and dry, no rashes.  Musculoskeletal:Musculoskeletal: Extremities are non-tender, non-swollen and have full range of motion. Back NTTP along the midline.       NIHSS : 0        VAN neg             DIFFERENTIAL DIAGNOSIS: After history and physical exam a differential diagnosis was considered, but was not limited to, dehydration, CVA, TIA, hypertensive encephalopathy, seizure, migraine, hyperglycemia, hypoglycemia, metabolic derangement           Initial management: This is a 65 y.o. female who presents with global weakness. A stroke code was called on her arrival due to report from her clinic however pt  denies having one sided weakness. She has generalized weakness. This has been ongoing for 2 days. She has no neurologic deficits on exam. I have canceled the code stroke due to her symptoms being global and not one sided. On exam she appears dry, dehydration high on my differential.  Will obtain labs to further evaluate.              I decided to obtain old records. Reviewed and summarized the old medical record and it showed :                Orthostatics show increase in her pulse when going from lying to standing.     I independently reviewed the EKG and it showed:   EK bpm, sinus rhythm, PVCs, no STEMI, no WPW read by myself, read by cardiology pending.         I independently reviewed the labs and it showed the following abnormalities:    Labs Reviewed   CBC W/ AUTO DIFFERENTIAL - Abnormal; Notable for the following components:       Result Value    Lymph # 0.9 (*)     Gran % 78.1 (*)     Lymph % 15.3 (*)     All other components within normal limits   COMPREHENSIVE METABOLIC PANEL - Abnormal; Notable for the following components:    Glucose 172 (*)     Total Bilirubin 1.4 (*)     All other components within normal limits   URINALYSIS, REFLEX TO URINE CULTURE - Abnormal; Notable for the following components:    Leukocytes, UA Trace (*)     All other components within normal limits    Narrative:     Specimen Source->Urine   MAGNESIUM   TROPONIN I   URINALYSIS MICROSCOPIC    Narrative:     Specimen Source->Urine   POCT GLUCOSE   POCT GLUCOSE MONITORING CONTINUOUS         ED Course     ED Course     1030 Pt is feeling better. Her daughter is at bedside. She has had issues like this in the past when she is dehydrated. Discussed with them that that could also be the reason today given her elevated HR with standing. Her urine is also dark and her MM are dry. She is receiving IVFs. She is also having some issues with anxiety over her NETS. Discussed holopathic ways to manage as she feels her meds for anxiety lower  her BP too much and she doesn't like to take it.        1215 Pt is feeling much improved. I have discussed the results of the workup with her. She'll increase her fluids at home. She is feeling comfortable going home at this time. Patient improved with treatment in the emergency department and comfortable going home. Discussed reasons to return and importance of followup.  Patient understands that the emergency visit today is primarily to address immediate concerns and to rule out emergent cause of symptoms and that they may require further workup and evaluation as an outpatient. All questions addressed and patient given discharge instructions and followup information.       CINDY Calderon Early  presents to the ED today with weakness. This is a global weakness not a one sided weakness. Unlikely to be a central etiology. She did appear dry on physical exam and she had increase in HR with standing. Pt feels better after receiving IVFs. No signs of infection on workup. No signs of MICHAELA. No other abnormality that would require admission. She'll be discharged home to follow up with PCP. After taking into careful account the historical factors and physical exam findings of the patient's presentation today, in conjunction with the empirical and objective data obtained on ED workup, no acute emergent medical condition requiring admission has been identified. The patient appears to be low risk for an emergent medical condition and I feel it is safe and appropriate at this time for the patient to be discharged to follow-up as detailed in their discharge instructions for reevaluation and possible continued outpatient workup and management. I have discussed the specifics of the workup with the patient and the patient has verbalized understanding of the details of the workup, the diagnosis, the treatment plan, and the need for outpatient follow-up.  Although the patient has no emergent etiology today this does not  preclude the development of an emergent condition so in addition, I have advised the patient that they can return to the ED and/or activate EMS at any time with worsening of their symptoms, change of their symptoms, or with any other medical complaint.  The patient remained comfortable and stable during their visit in the ED.  Discharge and follow-up instructions discussed with the patient who expressed understanding and willingness to comply with my recommendations.     This medical record was prepared using voice dictation software. There may be phonetic errors.                    Impression        The encounter diagnosis was Weakness.        Follow-up Information     Schedule an appointment as soon as possible for a visit  with Swapnil Diallo MD.    Specialty: Family Medicine  Why: As needed  Contact information:  [address]  [address]  [facility] [address]  [phone]             [facility].    Specialty: Emergency Medicine  Why: As needed  Contact information:  [address]  [place] [id number] [phone]                    Sherry Roland MD  11/20/20 2152

## 2020-11-20 NOTE — PROGRESS NOTES
"Called to lobby of clinic, patient sitting slumped in chair.  She stated her feet were tingling and she felt weak.  After asking other questions while waiting for wheelchair she endorsed SOB and tingling right hand.  /100, pulse 98.  Her head kept "rolling" side to side and she kept saying "I am so tired".  Placed in wheelchair with assistance, brought to ED. On the way to ER, patient's head kept slumping forward, she kept trying to speak but did not related to visit or trip to ER.  When we arrived at ER, asked Patient access rep to get nurse ASAP, report given, let patient know I would call family, she repeatedly asked for her daughter.  Called brother who is listed as her emergency contact in her chart.  Will let Dr. Sanford know of incident.     "

## 2020-11-20 NOTE — ED NOTES
Present to ED with fatigue. Reports while at her doctor carlos. Today she began feeling fatigue with tingling to ble and palms of both hand and weakness to left side. Tingling to BLE and palms have resolved. AAO x 4.

## 2020-11-25 ENCOUNTER — PATIENT MESSAGE (OUTPATIENT)
Dept: FAMILY MEDICINE | Facility: CLINIC | Age: 65
End: 2020-11-25

## 2020-11-25 LAB
FINAL PATHOLOGIC DIAGNOSIS: NORMAL
Lab: NORMAL

## 2020-12-02 ENCOUNTER — TELEPHONE (OUTPATIENT)
Dept: INFUSION THERAPY | Facility: HOSPITAL | Age: 65
End: 2020-12-02

## 2020-12-04 ENCOUNTER — PATIENT MESSAGE (OUTPATIENT)
Dept: FAMILY MEDICINE | Facility: CLINIC | Age: 65
End: 2020-12-04

## 2020-12-04 ENCOUNTER — OFFICE VISIT (OUTPATIENT)
Dept: FAMILY MEDICINE | Facility: CLINIC | Age: 65
End: 2020-12-04
Payer: COMMERCIAL

## 2020-12-04 DIAGNOSIS — C7B.02 METASTATIC MALIGNANT CARCINOID TUMOR TO LIVER: ICD-10-CM

## 2020-12-04 DIAGNOSIS — F32.1 CURRENT MODERATE EPISODE OF MAJOR DEPRESSIVE DISORDER WITHOUT PRIOR EPISODE: Primary | ICD-10-CM

## 2020-12-04 DIAGNOSIS — E16.4 HYPERGASTRINEMIA: ICD-10-CM

## 2020-12-04 DIAGNOSIS — C7A.00 MALIGNANT CARCINOID TUMOR OF UNKNOWN PRIMARY SITE: ICD-10-CM

## 2020-12-04 DIAGNOSIS — R73.09 ABNORMAL GLUCOSE: ICD-10-CM

## 2020-12-04 DIAGNOSIS — E55.9 VITAMIN D DEFICIENCY: ICD-10-CM

## 2020-12-04 DIAGNOSIS — F41.1 GENERALIZED ANXIETY DISORDER: ICD-10-CM

## 2020-12-04 PROCEDURE — 99214 PR OFFICE/OUTPT VISIT, EST, LEVL IV, 30-39 MIN: ICD-10-PCS | Mod: 95,,, | Performed by: FAMILY MEDICINE

## 2020-12-04 PROCEDURE — 99214 OFFICE O/P EST MOD 30 MIN: CPT | Mod: 95,,, | Performed by: FAMILY MEDICINE

## 2020-12-04 NOTE — PROGRESS NOTES
The patient location is: [facility]   The chief complaint leading to consultation is: ED discharge f/u visit    Visit type: audiovisual    Face to Face time with patient: 25 minutes of total time spent on the encounter, which includes face to face time and non-face to face time preparing to see the patient (eg, review of tests), Obtaining and/or reviewing separately obtained history, Documenting clinical information in the electronic or other health record, Independently interpreting results (not separately reported) and communicating results to the patient/family/caregiver, or Care coordination (not separately reported).     Each patient to whom he or she provides medical services by telemedicine is:  (1) informed of the relationship between the physician and patient and the respective role of any other health care provider with respect to management of the patient; and (2) notified that he or she may decline to receive medical services by telemedicine and may withdraw from such care at any time.    Notes: See Below    Subjective:       Patient ID: Lena Brantley is a 65 y.o. female.    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 She presents for evaluation via virtual visit for ED discharge f/u. She was evaluated in the ED on 11/20/2020 with c/o generalized weakness. Pt [facility] when she complained that she felt like she might pass out. She had been tired at home 2 days prior to admission. She denied any one sided weakness or sensation issues. No headache. No speech difficulties. No gait abnormalities. She denied fever, chills, sweats, cough, sputum production, nausea, vomiting, diarrhea, change in urination. No chest pain, palpitations, diaphoresis.   "Vitals in ED significant for /69.  Orthostatics showed increase in her pulse when going from lying to standing. PE showed dry mucus membranes.  Labs showed normal Troponin I, Mg, CMP, CBC, and UA. EKG showed NSR with no  ST-T wave changes.  Symptoms improved with IVFs. Pt discharged home.    Today patient states she is feeling much better. She thinks that symptoms were brought on 2/2 feeling anxious. She was previously rx'd escitalopram 5 mg daily, but stopped taking medication as it caused her to feel sleepy and "disoriented". She is not sure what is making her feel anxious, thinks it may be related to have a NET. She was referred to NET support group for counseling in the past, however, this was not helpful---more like a seminar per pt. She is scheduled to see Elena Gurrola with [facility] for counseling on 12/23/2020.     Review of Systems   Constitutional: Negative for activity change.   HENT: Negative for hearing loss, rhinorrhea and trouble swallowing.    Eyes: Positive for discharge (left eye itching and discharge. Improved wiht OTC visine. Has upcoming optometry appointment). Negative for visual disturbance.   Respiratory: Negative for chest tightness and wheezing.    Cardiovascular: Negative for chest pain and palpitations.   Gastrointestinal: Negative for blood in stool, constipation, diarrhea and vomiting.   Genitourinary: Negative for difficulty urinating, dysuria, hematuria and menstrual problem.   Musculoskeletal: Negative for arthralgias, joint swelling and neck pain.   Neurological: Positive for weakness. Negative for headaches.   Psychiatric/Behavioral: Positive for dysphoric mood.         Past Medical History:   Diagnosis Date    Cancer     Elevated bilirubin 9/9/2020    GERD (gastroesophageal reflux disease)     History of iron deficiency anemia 9/9/2020    Liver mass 06/2016    Mass of colon 06/2016    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     Secondary neuroendocrine tumor " of liver 9/9/2020    Sickle cell trait        Patient Active Problem List   Diagnosis    Malignant carcinoid tumor of unknown primary site    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    Hypergastrinemia    Carcinoid (except of appendix)    Gastrinoma, malignant    Generalized anxiety disorder    Current moderate episode of major depressive disorder without prior episode    Secondary neuroendocrine tumor of liver    History of iron deficiency anemia    Elevated bilirubin    Vitamin D deficiency    Abnormal glucose       No past surgical history on file.    Family History   Problem Relation Age of Onset    Cancer Maternal Grandmother     Cancer Other        Social History     Tobacco Use   Smoking Status Never Smoker   Smokeless Tobacco Never Used       Medications have been reviewed and reconciled.     Review of patient's allergies indicates:   Allergen Reactions    Epinephrine Anaphylaxis     Can Cause Carcinoid Crisis        Objective:      Vitals Unable to be Obtained    Physical Exam  Constitutional:       General: She is not in acute distress.     Appearance: Normal appearance. She is not toxic-appearing.   HENT:      Head: Normocephalic and atraumatic.      Right Ear: External ear normal.      Left Ear: External ear normal.   Eyes:      Extraocular Movements: Extraocular movements intact.   Pulmonary:      Effort: Pulmonary effort is normal. No respiratory distress.   Neurological:      Mental Status: She is alert and oriented to person, place, and time.   Psychiatric:         Mood and Affect: Mood normal.         Behavior: Behavior normal.         Assessment:       1. Current moderate episode of major depressive disorder without prior episode    2. Generalized anxiety disorder    3. Abnormal glucose    4. Vitamin D deficiency    5. Malignant carcinoid tumor of unknown primary site    6. 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    7. Hypergastrinemia        Plan:       Lena was seen  today for follow-up.    Diagnoses and all orders for this visit:    Current moderate episode of major depressive disorder without prior episode    Generalized anxiety disorder    Abnormal glucose    Vitamin D deficiency    Malignant carcinoid tumor of unknown primary site    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    Hypergastrinemia      Moderate MDD/Severe MINH   Previously rx'd escitalopram 5 mg daily; no longer taking as she experienced fatigue and feeling disoriented with medication   Scheduled to initiate counseling with [facility] (Carmella Gurrola) on 12/23/2020   Will f/u with patient after visit to determine if we need to re-initiate anxiolytics. Pt advised that she can take SSRIs hs.      Prediabetes  A1c 5.6 10/2020  A1c 6.0 2/2020  Monitor on annual basis    Vitamin D deficiency  Vitamin D 28 10/2020  Advised Vitamin D3 1000 IU daily       Malignant carcinoid tumor of unknown primary site  Comments:  Advised to schedule EGD and colonoscopy with Dr. Hobbs. Continue Lanreotide 120 mg q 28 days since 2016    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  Comments:  Advised to schedule EGD and colonoscopy with Dr. Hobbs. Continue Lanreotide 120 mg q 28 days since 2016    Hypergastrinemia  Comments:  Continue protonix 40 mg daily.     Health Mainteance  BP normotensive  Body mass index is 25.48 kg/m². 21 lb intentional weight loss over the past 1 year. Encouraged continue healthy diet/lifestyle modifications   Advised eye exam q1-2 years   Advised  dental exams q6 months.  Pap/HPV screen today 10/22/2020. Affirm performed given h/o trichomoniasis.   Last Mammogram: 10/7/2020 BIRADs Cat 1 neg  Last Colonoscopy: DUE. Needs to be scheduled via [facility] as she was due for EGD and colonoscopy  Flu Vaccine 10/22/2020  Last Tdap Vaccine: 11/6/2012  Considering pneumococcal vaccine on f/u visit  Encouraged Shingrix from pharmacy   CMP and CBC wnl 10/7/2020      Follow up in about 6 weeks (around  1/15/2021) for F/U Mood.

## 2020-12-18 ENCOUNTER — INFUSION (OUTPATIENT)
Dept: INFUSION THERAPY | Facility: HOSPITAL | Age: 65
End: 2020-12-18
Attending: SURGERY
Payer: COMMERCIAL

## 2020-12-18 VITALS — HEIGHT: 64 IN | BODY MASS INDEX: 25.75 KG/M2 | WEIGHT: 150.81 LBS

## 2020-12-18 DIAGNOSIS — C7A.00 MALIGNANT CARCINOID TUMOR OF UNKNOWN PRIMARY SITE: Primary | ICD-10-CM

## 2020-12-18 DIAGNOSIS — C7B.02 METASTATIC MALIGNANT CARCINOID TUMOR TO LIVER: ICD-10-CM

## 2020-12-18 PROCEDURE — 96372 THER/PROPH/DIAG INJ SC/IM: CPT

## 2020-12-18 PROCEDURE — 63600175 PHARM REV CODE 636 W HCPCS: Mod: JG | Performed by: INTERNAL MEDICINE

## 2020-12-18 RX ORDER — LANREOTIDE ACETATE 120 MG/.5ML
120 INJECTION SUBCUTANEOUS
Status: DISCONTINUED | OUTPATIENT
Start: 2020-12-18 | End: 2020-12-18 | Stop reason: HOSPADM

## 2020-12-18 RX ORDER — LANREOTIDE ACETATE 120 MG/.5ML
120 INJECTION SUBCUTANEOUS
Status: CANCELLED | OUTPATIENT
Start: 2020-12-18

## 2020-12-18 RX ADMIN — LANREOTIDE ACETATE 120 MG: 120 INJECTION SUBCUTANEOUS at 09:12

## 2020-12-23 ENCOUNTER — INITIAL CONSULT (OUTPATIENT)
Dept: PSYCHIATRY | Facility: CLINIC | Age: 65
End: 2020-12-23
Payer: COMMERCIAL

## 2020-12-23 DIAGNOSIS — F41.1 GENERALIZED ANXIETY DISORDER: Primary | ICD-10-CM

## 2020-12-23 DIAGNOSIS — R45.89 ANXIETY ABOUT HEALTH: ICD-10-CM

## 2020-12-23 PROCEDURE — 99999 PR PBB SHADOW E&M-EST. PATIENT-LVL II: CPT | Mod: PBBFAC,,, | Performed by: PSYCHOLOGIST

## 2020-12-23 PROCEDURE — 99999 PR PBB SHADOW E&M-EST. PATIENT-LVL II: ICD-10-PCS | Mod: PBBFAC,,, | Performed by: PSYCHOLOGIST

## 2020-12-23 PROCEDURE — 90791 PSYCH DIAGNOSTIC EVALUATION: CPT | Mod: S$GLB,,, | Performed by: PSYCHOLOGIST

## 2020-12-23 PROCEDURE — 90791 PR PSYCHIATRIC DIAGNOSTIC EVALUATION: ICD-10-PCS | Mod: S$GLB,,, | Performed by: PSYCHOLOGIST

## 2020-12-23 NOTE — PROGRESS NOTES
INFORMED CONSENT/ LIMITS of CONFIDENTIALITY: Prior to beginning the interview, the patient's identification was confirmed via name and date of birth. Lena Brantley  was informed of the possible risks and benefits of psychological interventions (e.g., counseling, psychotherapy, testing) and provided information regarding the handling of protected health records and   the limits of confidentiality, including the importance of reporting any suicidal or homicidal ideation to ensure safety of all parties. This provider explained the purpose of today's appointment and the patient was provided with time to ask questions regarding this information.  Acceptance and understanding of these conditions was expressed, and Lena Brantley freely consented to this evaluation.     PSYCHO-ONCOLOGY INTAKE    Diagnostic Interview - CPT 77267    Date: 12/23/2020  Site: [facility]     Evaluation Length (direct face-to-face time):  1 hour     Referral Source: Sandeep Sanford DO,*   Oncologist:   PCP: Swapnil Diallo MD    Clinical status of patient: Outpatient    Lena Brantley, a 65 y.o. female, seen for initial evaluation visit.  Met with patient.    Chief complaint/reason for encounter: adjustment to illness, anxiety and Psychological Evaluation and treatment recommendations    Medical/Surgical History:    Patient Active Problem List   Diagnosis    Malignant carcinoid tumor of unknown primary site    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    Hypergastrinemia    Carcinoid (except of appendix)    Gastrinoma, malignant    Generalized anxiety disorder    Current moderate episode of major depressive disorder without prior episode    Secondary neuroendocrine tumor of liver    History of iron deficiency anemia    Elevated bilirubin    Vitamin D deficiency    Abnormal glucose       Health Behaviors:       ETOH Use: No (rare)       Tobacco Use: No   Illicit Drug Use:  No     Prescription  "Misuse:No   Caffeine: minimal   Exercise:The patient engages in little, if any physical activity.   Firearms:  No   Advanced directives:No     Family History:   Psychiatric illness: Yes Sister with unknown psych issues; younger brother lives in group home; Mother had a "nervous breakdown"     Alcohol/Drug Abuse: No     Suicide: No      Past Psychiatric History:   Inpatient treatment: No     Outpatient treatment: No     Prior substance abuse treatment: No     Suicide Attempts: No     Psychotropic Medications:  Current: Has a prescription for Lexapro but has not taken in a month due to side effects.        Past: none    Current medications as per below, allergies reviewed in chart.    Current Outpatient Medications   Medication    lanreotide (SOMATULINE DEPOT) 120 mg/0.5 mL Syrg    pantoprazole (PROTONIX) 40 MG tablet     No current facility-administered medications for this visit.        CAM Therapies: None     Screening: Depression: Positive  Yadi: Denies Psychosis: Denies    Generalized anxiety: Positive Panic Disorder: Denies    Social/specific phobia: Denies OCD: Denies   Sexual Dysfunction:  No Desire Trauma: Mother with mental health issues; Patient ended up in foster care. No [place]a      Social situation/Stressors: Lena Brantley lives alone in [facility].  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  She has been in her job for years.    Lena Brantley has been  once 6 years ending in divorce and has  1 adult daughter.  The patient reports good social support. Lena Brantley is an active member of the [employer] and [employer].  Lena Brantley's hobbies include sewing.    Additional stressors:  Work Stress    Strengths:Housing stability, Setting and pursuing goals, hopes, dreams, aspirations, Resources - social, interpersonal, monetary, Vocational interests, hobbies and/or talents, Interpersonal relationships and supports available - family, relatives, friends and " Cultural/spiritual/Taoism and community involvement  Liabilities: No interests, Complicated medical illness and Financial strain    Current Evaluation:     Mental Status Exam: Lena Brantley arrived promptly for the assessment session.  The patient was fully cooperative throughout the interview and was an adequate historian   Appearance: age appropriate, appropriately  dressed, adequately  groomed  Behavior/Cooperation: friendly and cooperative  Speech: normal in rate, volume, and tone and appropriate quality, quantity and organization of sentences  Mood: anxious  Affect: increased in intensity  Thought Process: goal-directed, logical  Thought Content: normal, no suicidally, no homicidality, delusions, or paranoia;did not appear to be responding to internal stimuli during the interview.   Orientation: grossly intact  Memory: Grossly intact  Attention Span/Concentration: Attends to interview without distraction; reports no difficulty  Fund of Knowledge: average  Estimate of Intelligence: average from verbal skills and history  Cognition: grossly intact  Insight: patient has awareness of illness; good insight into own behavior and behavior of others  Judgment: the patient's behavior is adequate to circumstances    Distress Score    Distress Score: 7        Practical Problems Physical Problems   : No Appearance: No   Housing: No Bathing / Dressing: No   Insurance / Financial: Yes Breathing: Yes    Transportation: No  Changes in Urination: No    Work / School: Yes  Constipation: No   Treatment Decisions: Yes  Diarrhea: No     Eating: Yes    Family Problems Fatigue: Yes    Dealing with Children: No Feeling Swollen: No    Dealing with Partner: No Fevers: No    Ability to Have Children: No  Getting Around: No       Indigestion: No     Memory / Concentration: Yes   Emotional Problems Mouth Sores: No    Depression: Yes  Nausea: No    Fears: Yes  Nose Dry / Congested: No    Nervousness: Yes  Pain: No     Sadness: Yes Sexual: No    Worry: Yes Skin Dry / Itchy: No    Loss of Interest in Usual Activities: Yes Sleep: Yes     Substance Abuse: No    Spiritual/Religions Concerns Tingling in Hands / Feet: No   Spritual / Yarsani Concerns: No         Other Problems    Other Problems:: Sometimes experience dehydration; anxiety and worry often.         PHQ ANSWERS    Q1. Little interest or pleasure in doing things: (P) Several days (12/22/20 1459)  Q2. Feeling down, depressed, or hopeless: (P) Several days (12/22/20 1459)  Q3. Trouble falling or staying asleep, or sleeping too much: (P) Several days (12/22/20 1459)  Q4. Feeling tired or having little energy: (P) Several days (12/22/20 1459)  Q5. Poor appetite or overeating: (P) Several days (12/22/20 1459)  Q6. Feeling bad about yourself - or that you are a failure or have let yourself or your family down: (P) Several days (12/22/20 1459)  Q7. Trouble concentrating on things, such as reading the newspaper or watching television: (P) Not at all (12/22/20 1459)  Q8. Moving or speaking so slowly that other people could have noticed. Or the opposite - being so fidgety or restless that you have been moving around a lot more than usual: (P) Not at all (12/22/20 1459)  Q9.  No SI    PHQ8 Score : (P) 6 (12/22/20 1459)  PHQ-9 Total Score: (P) 6 (12/22/20 1459)       MINH-7     GAD7 12/22/2020   1. Feeling nervous, anxious, or on edge? 2   2. Not being able to stop or control worrying? 1   3. Worrying too much about different things? 2   4. Trouble relaxing? 1   5. Being so restless that it is hard to sit still? 0   6. Becoming easily annoyed or irritable? 1   7. Feeling afraid as if something awful might happen? 1   MINH-7 Score 8          History of present illness:    Well-differentiated neuroendocrine tumor, Ki-67 of 1%.  Conventional imaging an octreotide scan did not reveal a primary site disease.  Gene expression profiling was performed indicating a possible primary site  pancreas.  She was started on Lanreotide in 2016.  She was offered surgical resection and also liver directed therapy and declined.    She did discuss other roles for treatment so that she could potentially get off lanreotide and I have told her in the absence of definitive therapy I would recommend we continue on with lanreotide.  We had previously discussed her case and thought that she may be a candidate for surgical resection but she was not interested in this and continues to be very anxious about surgery.       Patient referred by Dr. Sanford for anxiety, history of nonadherence to appointments and follow-ups. Has declined definitive therapy and surgery. Sister with cancer. Patient reported psychosocial stress related to work and fear of the unknown related to her job switching contracts and transitioning her work. Daughter (25 yrs) is a source of stress due to her unmanaged ADHD. Patient is also struggling with debilitation anxiety, and often avoids daily tasks, medical recommendations, and interactions.       Lena Kvng Early has adjusted to illness with significant difficulty primarily through avoidance. She has engaged in appropriate information gathering.  The patient has good family/friend support.  Her support system is coping adequately with the diagnosis/treatment/prognosis. Illness-related psychosocial stressors include absence from work, difficulty meeting family responsibilities and changes in ability to engage in leisure activities.  The patient has a fair partnership with her [facility] oncology treatment team. The patient reports the following barriers to cancer care:avoidance.   Symptoms:   · Mood: depressed mood, diminished interest, insomnia, fatigue, worthlessness/guilt, poor concentration, tearfulness and social isolation;  prior depression:several times and no SI/HI  · Anxiety: Feeling nervous, anxious, or on edge, Uncontrollable worry (about health), Excessive worry (interfering with  adherence), Difficulty relaxing, Irritability and Fear of unknown; lifelong anxiety  · Substance abuse: denied  · Cognitive functioning: denied  · Health behaviors: Avoidance  · Sleep: Trouble with quality, trouble falling asleep; Anxious Cognitions   interrupted sleep and non-restorative sleep; Tried melatonin.   · Pain: Ms. Early reports no pain.  Symptoms interfere with daily activity, sleeping and work.       Assessment - Diagnosis - Goals:       ICD-10-CM ICD-9-CM   1. Generalized anxiety disorder  F41.1 300.02   2. Anxiety about health  F41.8 300.09     Plan:individual psychotherapy and consult psychiatrist for medication evaluation    Summary and Recommendations  Lena Brantley is a 65 y.o. female referred by Sandeep Sanford DO,* for psychological evaluation and treatment.  Ms. Brantley appears to be coping poorly through avoidance with her diagnosis and proposed treatment course.  Patient was encouraged to speak to her primary care physician or oncologist regarding psychotropic medication options. She is interested in CBT to address depression/anxiety/insomnia and will follow up with me for that purpose. Mood protective strategies during cancer treatment were discussed.   She was encouraged to continue with pleasant events scheduling and to increase exercise. .    GOALS:   Referral to Dr. Orat for medication evaluation  Relaxation exercises (instructions and options sent to patient)      Carmella Gurrola, PhD  Clinical Psychologist  [place] License #[id number]

## 2021-01-11 ENCOUNTER — TELEPHONE (OUTPATIENT)
Dept: NEUROLOGY | Facility: HOSPITAL | Age: 66
End: 2021-01-11

## 2021-01-12 ENCOUNTER — OFFICE VISIT (OUTPATIENT)
Dept: PSYCHIATRY | Facility: CLINIC | Age: 66
End: 2021-01-12
Payer: COMMERCIAL

## 2021-01-12 DIAGNOSIS — F41.1 GENERALIZED ANXIETY DISORDER: Primary | ICD-10-CM

## 2021-01-12 DIAGNOSIS — C7B.02 METASTATIC MALIGNANT CARCINOID TUMOR TO LIVER: ICD-10-CM

## 2021-01-12 PROCEDURE — 90834 PSYTX W PT 45 MINUTES: CPT | Mod: S$GLB,,, | Performed by: PSYCHOLOGIST

## 2021-01-12 PROCEDURE — 99999 PR PBB SHADOW E&M-EST. PATIENT-LVL I: CPT | Mod: PBBFAC,,, | Performed by: PSYCHOLOGIST

## 2021-01-12 PROCEDURE — 99999 PR PBB SHADOW E&M-EST. PATIENT-LVL I: ICD-10-PCS | Mod: PBBFAC,,, | Performed by: PSYCHOLOGIST

## 2021-01-12 PROCEDURE — 90834 PR PSYCHOTHERAPY W/PATIENT, 45 MIN: ICD-10-PCS | Mod: S$GLB,,, | Performed by: PSYCHOLOGIST

## 2021-01-14 ENCOUNTER — TELEPHONE (OUTPATIENT)
Dept: NEUROLOGY | Facility: HOSPITAL | Age: 66
End: 2021-01-14

## 2021-01-14 RX ORDER — LANREOTIDE ACETATE 120 MG/.5ML
120 INJECTION SUBCUTANEOUS
Status: CANCELLED
Start: 2021-01-14

## 2021-01-15 ENCOUNTER — INFUSION (OUTPATIENT)
Dept: INFUSION THERAPY | Facility: HOSPITAL | Age: 66
End: 2021-01-15
Attending: INTERNAL MEDICINE
Payer: COMMERCIAL

## 2021-01-15 ENCOUNTER — TELEPHONE (OUTPATIENT)
Dept: NEUROLOGY | Facility: HOSPITAL | Age: 66
End: 2021-01-15

## 2021-01-15 DIAGNOSIS — C7A.00 MALIGNANT CARCINOID TUMOR OF UNKNOWN PRIMARY SITE: Primary | ICD-10-CM

## 2021-01-15 DIAGNOSIS — C7B.02 METASTATIC MALIGNANT CARCINOID TUMOR TO LIVER: ICD-10-CM

## 2021-01-15 PROCEDURE — 63600175 PHARM REV CODE 636 W HCPCS: Mod: JG | Performed by: SURGERY

## 2021-01-15 PROCEDURE — 96372 THER/PROPH/DIAG INJ SC/IM: CPT

## 2021-01-15 RX ORDER — LANREOTIDE ACETATE 120 MG/.5ML
120 INJECTION SUBCUTANEOUS
Status: DISCONTINUED | OUTPATIENT
Start: 2021-01-15 | End: 2021-01-15 | Stop reason: HOSPADM

## 2021-01-15 RX ORDER — LANREOTIDE ACETATE 120 MG/.5ML
120 INJECTION SUBCUTANEOUS
Status: CANCELLED
Start: 2021-01-15

## 2021-01-15 RX ADMIN — LANREOTIDE ACETATE 120 MG: 120 INJECTION SUBCUTANEOUS at 10:01

## 2021-02-02 ENCOUNTER — OFFICE VISIT (OUTPATIENT)
Dept: PSYCHIATRY | Facility: CLINIC | Age: 66
End: 2021-02-02
Payer: COMMERCIAL

## 2021-02-02 ENCOUNTER — TELEPHONE (OUTPATIENT)
Dept: FAMILY MEDICINE | Facility: CLINIC | Age: 66
End: 2021-02-02

## 2021-02-02 DIAGNOSIS — C7B.02 METASTATIC MALIGNANT CARCINOID TUMOR TO LIVER: ICD-10-CM

## 2021-02-02 DIAGNOSIS — F32.1 CURRENT MODERATE EPISODE OF MAJOR DEPRESSIVE DISORDER WITHOUT PRIOR EPISODE: ICD-10-CM

## 2021-02-02 DIAGNOSIS — F41.1 GENERALIZED ANXIETY DISORDER: Primary | ICD-10-CM

## 2021-02-02 PROCEDURE — 99999 PR PBB SHADOW E&M-EST. PATIENT-LVL I: ICD-10-PCS | Mod: PBBFAC,,, | Performed by: PSYCHOLOGIST

## 2021-02-02 PROCEDURE — 90837 PR PSYCHOTHERAPY W/PATIENT, 60 MIN: ICD-10-PCS | Mod: S$GLB,,, | Performed by: PSYCHOLOGIST

## 2021-02-02 PROCEDURE — 90837 PSYTX W PT 60 MINUTES: CPT | Mod: S$GLB,,, | Performed by: PSYCHOLOGIST

## 2021-02-02 PROCEDURE — 99999 PR PBB SHADOW E&M-EST. PATIENT-LVL I: CPT | Mod: PBBFAC,,, | Performed by: PSYCHOLOGIST

## 2021-02-02 RX ORDER — SERTRALINE HYDROCHLORIDE 25 MG/1
25 TABLET, FILM COATED ORAL DAILY
Qty: 30 TABLET | Refills: 1 | Status: SHIPPED | OUTPATIENT
Start: 2021-02-02 | End: 2021-03-05

## 2021-02-12 ENCOUNTER — HOSPITAL ENCOUNTER (EMERGENCY)
Facility: HOSPITAL | Age: 66
Discharge: HOME | End: 2021-02-12
Attending: EMERGENCY MEDICINE
Payer: COMMERCIAL

## 2021-02-12 ENCOUNTER — INFUSION (OUTPATIENT)
Dept: INFUSION THERAPY | Facility: HOSPITAL | Age: 66
End: 2021-02-12
Attending: SURGERY
Payer: COMMERCIAL

## 2021-02-12 VITALS
OXYGEN SATURATION: 100 % | HEIGHT: 64 IN | SYSTOLIC BLOOD PRESSURE: 150 MMHG | HEART RATE: 66 BPM | BODY MASS INDEX: 23.05 KG/M2 | RESPIRATION RATE: 18 BRPM | DIASTOLIC BLOOD PRESSURE: 71 MMHG | WEIGHT: 135 LBS | TEMPERATURE: 98 F

## 2021-02-12 VITALS — HEIGHT: 64 IN | WEIGHT: 150.81 LBS | BODY MASS INDEX: 25.75 KG/M2

## 2021-02-12 DIAGNOSIS — C7A.00 MALIGNANT CARCINOID TUMOR OF UNKNOWN PRIMARY SITE: Primary | ICD-10-CM

## 2021-02-12 DIAGNOSIS — C7B.02 METASTATIC MALIGNANT CARCINOID TUMOR TO LIVER: ICD-10-CM

## 2021-02-12 DIAGNOSIS — R53.83 FATIGUE, UNSPECIFIED TYPE: Primary | ICD-10-CM

## 2021-02-12 DIAGNOSIS — R11.0 NAUSEA: ICD-10-CM

## 2021-02-12 DIAGNOSIS — R20.2 PARESTHESIAS: ICD-10-CM

## 2021-02-12 DIAGNOSIS — R10.9 ABDOMINAL PAIN: ICD-10-CM

## 2021-02-12 DIAGNOSIS — R10.84 GENERALIZED ABDOMINAL PAIN: ICD-10-CM

## 2021-02-12 LAB
ALBUMIN SERPL BCP-MCNC: 4.1 G/DL (ref 3.5–5.2)
ALP SERPL-CCNC: 95 U/L (ref 55–135)
ALT SERPL W/O P-5'-P-CCNC: 16 U/L (ref 10–44)
ANION GAP SERPL CALC-SCNC: 10 MMOL/L (ref 8–16)
AST SERPL-CCNC: 20 U/L (ref 10–40)
BASOPHILS # BLD AUTO: 0.03 K/UL (ref 0–0.2)
BASOPHILS NFR BLD: 0.6 % (ref 0–1.9)
BILIRUB SERPL-MCNC: 1.3 MG/DL (ref 0.1–1)
BILIRUB UR QL STRIP: NEGATIVE
BUN SERPL-MCNC: 8 MG/DL (ref 8–23)
CALCIUM SERPL-MCNC: 9.6 MG/DL (ref 8.7–10.5)
CHLORIDE SERPL-SCNC: 103 MMOL/L (ref 95–110)
CLARITY UR: CLEAR
CO2 SERPL-SCNC: 25 MMOL/L (ref 23–29)
COLOR UR: YELLOW
CREAT SERPL-MCNC: 0.9 MG/DL (ref 0.5–1.4)
CTP QC/QA: YES
DIFFERENTIAL METHOD: ABNORMAL
EOSINOPHIL # BLD AUTO: 0 K/UL (ref 0–0.5)
EOSINOPHIL NFR BLD: 0.2 % (ref 0–8)
ERYTHROCYTE [DISTWIDTH] IN BLOOD BY AUTOMATED COUNT: 13 % (ref 11.5–14.5)
EST. GFR  (AFRICAN AMERICAN): >60 ML/MIN/1.73 M^2
EST. GFR  (NON AFRICAN AMERICAN): >60 ML/MIN/1.73 M^2
GLUCOSE SERPL-MCNC: 106 MG/DL (ref 70–110)
GLUCOSE UR QL STRIP: NEGATIVE
HCT VFR BLD AUTO: 42.2 % (ref 37–48.5)
HGB BLD-MCNC: 14.4 G/DL (ref 12–16)
HGB UR QL STRIP: NEGATIVE
IMM GRANULOCYTES # BLD AUTO: 0.02 K/UL (ref 0–0.04)
IMM GRANULOCYTES NFR BLD AUTO: 0.4 % (ref 0–0.5)
KETONES UR QL STRIP: NEGATIVE
LEUKOCYTE ESTERASE UR QL STRIP: NEGATIVE
LYMPHOCYTES # BLD AUTO: 0.8 K/UL (ref 1–4.8)
LYMPHOCYTES NFR BLD: 17.4 % (ref 18–48)
MAGNESIUM SERPL-MCNC: 1.7 MG/DL (ref 1.6–2.6)
MCH RBC QN AUTO: 29.1 PG (ref 27–31)
MCHC RBC AUTO-ENTMCNC: 34.1 G/DL (ref 32–36)
MCV RBC AUTO: 85 FL (ref 82–98)
MONOCYTES # BLD AUTO: 0.4 K/UL (ref 0.3–1)
MONOCYTES NFR BLD: 8.7 % (ref 4–15)
NEUTROPHILS # BLD AUTO: 3.5 K/UL (ref 1.8–7.7)
NEUTROPHILS NFR BLD: 72.7 % (ref 38–73)
NITRITE UR QL STRIP: NEGATIVE
NRBC BLD-RTO: 0 /100 WBC
PH UR STRIP: 5 [PH] (ref 5–8)
PHOSPHATE SERPL-MCNC: 2.8 MG/DL (ref 2.7–4.5)
PLATELET # BLD AUTO: 219 K/UL (ref 150–350)
PMV BLD AUTO: 11.5 FL (ref 9.2–12.9)
POTASSIUM SERPL-SCNC: 3.9 MMOL/L (ref 3.5–5.1)
PROT SERPL-MCNC: 7.5 G/DL (ref 6–8.4)
PROT UR QL STRIP: NEGATIVE
RBC # BLD AUTO: 4.95 M/UL (ref 4–5.4)
SARS-COV-2 RDRP RESP QL NAA+PROBE: NEGATIVE
SODIUM SERPL-SCNC: 138 MMOL/L (ref 136–145)
SP GR UR STRIP: 1 (ref 1–1.03)
URN SPEC COLLECT METH UR: NORMAL
UROBILINOGEN UR STRIP-ACNC: NEGATIVE EU/DL
WBC # BLD AUTO: 4.82 K/UL (ref 3.9–12.7)

## 2021-02-12 PROCEDURE — 81003 URINALYSIS AUTO W/O SCOPE: CPT

## 2021-02-12 PROCEDURE — 96372 THER/PROPH/DIAG INJ SC/IM: CPT

## 2021-02-12 PROCEDURE — 93005 ELECTROCARDIOGRAM TRACING: CPT

## 2021-02-12 PROCEDURE — 93010 ELECTROCARDIOGRAM REPORT: CPT | Mod: ,,, | Performed by: INTERNAL MEDICINE

## 2021-02-12 PROCEDURE — 63600175 PHARM REV CODE 636 W HCPCS: Mod: JG | Performed by: SURGERY

## 2021-02-12 PROCEDURE — 80053 COMPREHEN METABOLIC PANEL: CPT

## 2021-02-12 PROCEDURE — 96374 THER/PROPH/DIAG INJ IV PUSH: CPT

## 2021-02-12 PROCEDURE — 84100 ASSAY OF PHOSPHORUS: CPT

## 2021-02-12 PROCEDURE — 99285 EMERGENCY DEPT VISIT HI MDM: CPT | Mod: 25

## 2021-02-12 PROCEDURE — U0002 COVID-19 LAB TEST NON-CDC: HCPCS | Performed by: EMERGENCY MEDICINE

## 2021-02-12 PROCEDURE — 25000003 PHARM REV CODE 250: Performed by: EMERGENCY MEDICINE

## 2021-02-12 PROCEDURE — 85025 COMPLETE CBC W/AUTO DIFF WBC: CPT

## 2021-02-12 PROCEDURE — 63600175 PHARM REV CODE 636 W HCPCS: Performed by: EMERGENCY MEDICINE

## 2021-02-12 PROCEDURE — 93010 EKG 12-LEAD: ICD-10-PCS | Mod: ,,, | Performed by: INTERNAL MEDICINE

## 2021-02-12 PROCEDURE — 96361 HYDRATE IV INFUSION ADD-ON: CPT

## 2021-02-12 PROCEDURE — 83735 ASSAY OF MAGNESIUM: CPT

## 2021-02-12 RX ORDER — PROCHLORPERAZINE EDISYLATE 5 MG/ML
10 INJECTION INTRAMUSCULAR; INTRAVENOUS
Status: COMPLETED | OUTPATIENT
Start: 2021-02-12 | End: 2021-02-12

## 2021-02-12 RX ORDER — LANREOTIDE ACETATE 120 MG/.5ML
120 INJECTION SUBCUTANEOUS
Status: CANCELLED
Start: 2021-02-12

## 2021-02-12 RX ORDER — PROCHLORPERAZINE MALEATE 10 MG
10 TABLET ORAL EVERY 8 HOURS PRN
Qty: 14 TABLET | Refills: 0 | Status: SHIPPED | OUTPATIENT
Start: 2021-02-12 | End: 2021-03-05

## 2021-02-12 RX ORDER — LANREOTIDE ACETATE 120 MG/.5ML
120 INJECTION SUBCUTANEOUS
Status: DISCONTINUED | OUTPATIENT
Start: 2021-02-12 | End: 2021-02-12 | Stop reason: HOSPADM

## 2021-02-12 RX ADMIN — LANREOTIDE ACETATE 120 MG: 120 INJECTION SUBCUTANEOUS at 09:02

## 2021-02-12 RX ADMIN — PROCHLORPERAZINE EDISYLATE 10 MG: 5 INJECTION INTRAMUSCULAR; INTRAVENOUS at 10:02

## 2021-02-12 RX ADMIN — SODIUM CHLORIDE 1000 ML: 0.9 INJECTION, SOLUTION INTRAVENOUS at 10:02

## 2021-02-22 ENCOUNTER — PATIENT MESSAGE (OUTPATIENT)
Dept: FAMILY MEDICINE | Facility: CLINIC | Age: 66
End: 2021-02-22

## 2021-02-22 ENCOUNTER — TELEPHONE (OUTPATIENT)
Dept: FAMILY MEDICINE | Facility: CLINIC | Age: 66
End: 2021-02-22

## 2021-02-24 ENCOUNTER — PATIENT MESSAGE (OUTPATIENT)
Dept: FAMILY MEDICINE | Facility: CLINIC | Age: 66
End: 2021-02-24

## 2021-02-24 ENCOUNTER — OFFICE VISIT (OUTPATIENT)
Dept: PSYCHIATRY | Facility: CLINIC | Age: 66
End: 2021-02-24
Payer: COMMERCIAL

## 2021-02-24 DIAGNOSIS — C7B.02 METASTATIC MALIGNANT CARCINOID TUMOR TO LIVER: ICD-10-CM

## 2021-02-24 DIAGNOSIS — F41.1 GENERALIZED ANXIETY DISORDER: Primary | ICD-10-CM

## 2021-02-24 DIAGNOSIS — F54 PSYCHOLOGICAL FACTORS AFFECTING MEDICAL CONDITION: ICD-10-CM

## 2021-02-24 PROCEDURE — 99999 PR PBB SHADOW E&M-EST. PATIENT-LVL I: ICD-10-PCS | Mod: PBBFAC,,, | Performed by: PSYCHOLOGIST

## 2021-02-24 PROCEDURE — 90837 PR PSYCHOTHERAPY W/PATIENT, 60 MIN: ICD-10-PCS | Mod: S$GLB,,, | Performed by: PSYCHOLOGIST

## 2021-02-24 PROCEDURE — 99999 PR PBB SHADOW E&M-EST. PATIENT-LVL I: CPT | Mod: PBBFAC,,, | Performed by: PSYCHOLOGIST

## 2021-02-24 PROCEDURE — 90837 PSYTX W PT 60 MINUTES: CPT | Mod: S$GLB,,, | Performed by: PSYCHOLOGIST

## 2021-02-25 ENCOUNTER — PATIENT MESSAGE (OUTPATIENT)
Dept: NEUROLOGY | Facility: HOSPITAL | Age: 66
End: 2021-02-25

## 2021-02-25 ENCOUNTER — OFFICE VISIT (OUTPATIENT)
Dept: URGENT CARE | Facility: CLINIC | Age: 66
End: 2021-02-25
Payer: COMMERCIAL

## 2021-02-25 VITALS
WEIGHT: 135 LBS | SYSTOLIC BLOOD PRESSURE: 120 MMHG | OXYGEN SATURATION: 100 % | DIASTOLIC BLOOD PRESSURE: 81 MMHG | RESPIRATION RATE: 20 BRPM | HEART RATE: 94 BPM | HEIGHT: 64 IN | TEMPERATURE: 98 F | BODY MASS INDEX: 23.05 KG/M2

## 2021-02-25 DIAGNOSIS — N39.0 URINARY TRACT INFECTION WITHOUT HEMATURIA, SITE UNSPECIFIED: Primary | ICD-10-CM

## 2021-02-25 LAB
BILIRUB UR QL STRIP: NEGATIVE
GLUCOSE UR QL STRIP: NEGATIVE
KETONES UR QL STRIP: NEGATIVE
LEUKOCYTE ESTERASE UR QL STRIP: NEGATIVE
PH, POC UA: 6 (ref 5–8)
POC BLOOD, URINE: NEGATIVE
POC NITRATES, URINE: NEGATIVE
PROT UR QL STRIP: NEGATIVE
SP GR UR STRIP: 1.01 (ref 1–1.03)
UROBILINOGEN UR STRIP-ACNC: NORMAL (ref 0.1–1.1)

## 2021-02-25 PROCEDURE — 81003 URINALYSIS AUTO W/O SCOPE: CPT | Mod: QW,S$GLB,, | Performed by: FAMILY MEDICINE

## 2021-02-25 PROCEDURE — 81003 POCT URINALYSIS, DIPSTICK, AUTOMATED, W/O SCOPE: ICD-10-PCS | Mod: QW,S$GLB,, | Performed by: FAMILY MEDICINE

## 2021-02-25 PROCEDURE — 3008F BODY MASS INDEX DOCD: CPT | Mod: CPTII,S$GLB,, | Performed by: FAMILY MEDICINE

## 2021-02-25 PROCEDURE — 3008F PR BODY MASS INDEX (BMI) DOCUMENTED: ICD-10-PCS | Mod: CPTII,S$GLB,, | Performed by: FAMILY MEDICINE

## 2021-02-25 PROCEDURE — 99214 OFFICE O/P EST MOD 30 MIN: CPT | Mod: 25,S$GLB,, | Performed by: FAMILY MEDICINE

## 2021-02-25 PROCEDURE — 99214 PR OFFICE/OUTPT VISIT, EST, LEVL IV, 30-39 MIN: ICD-10-PCS | Mod: 25,S$GLB,, | Performed by: FAMILY MEDICINE

## 2021-02-25 RX ORDER — CIPROFLOXACIN 500 MG/1
500 TABLET ORAL 2 TIMES DAILY
Qty: 14 TABLET | Refills: 0 | Status: SHIPPED | OUTPATIENT
Start: 2021-02-25 | End: 2021-03-04

## 2021-02-25 RX ORDER — FLUCONAZOLE 150 MG/1
TABLET ORAL
COMMUNITY
Start: 2020-11-25 | End: 2021-08-20

## 2021-03-05 ENCOUNTER — OFFICE VISIT (OUTPATIENT)
Dept: FAMILY MEDICINE | Facility: CLINIC | Age: 66
End: 2021-03-05
Payer: COMMERCIAL

## 2021-03-05 DIAGNOSIS — R73.09 ABNORMAL GLUCOSE: ICD-10-CM

## 2021-03-05 DIAGNOSIS — C7B.02 METASTATIC MALIGNANT CARCINOID TUMOR TO LIVER: ICD-10-CM

## 2021-03-05 DIAGNOSIS — F32.1 CURRENT MODERATE EPISODE OF MAJOR DEPRESSIVE DISORDER WITHOUT PRIOR EPISODE: ICD-10-CM

## 2021-03-05 DIAGNOSIS — F41.1 GENERALIZED ANXIETY DISORDER: Primary | ICD-10-CM

## 2021-03-05 DIAGNOSIS — E16.4 HYPERGASTRINEMIA: ICD-10-CM

## 2021-03-05 DIAGNOSIS — E55.9 VITAMIN D DEFICIENCY: ICD-10-CM

## 2021-03-05 DIAGNOSIS — C7A.00 MALIGNANT CARCINOID TUMOR OF UNKNOWN PRIMARY SITE: ICD-10-CM

## 2021-03-05 PROCEDURE — 99214 OFFICE O/P EST MOD 30 MIN: CPT | Mod: 95,,, | Performed by: FAMILY MEDICINE

## 2021-03-05 PROCEDURE — 99214 PR OFFICE/OUTPT VISIT, EST, LEVL IV, 30-39 MIN: ICD-10-PCS | Mod: 95,,, | Performed by: FAMILY MEDICINE

## 2021-03-05 RX ORDER — BUSPIRONE HYDROCHLORIDE 7.5 MG/1
7.5 TABLET ORAL 2 TIMES DAILY
Qty: 60 TABLET | Refills: 1 | Status: SHIPPED | OUTPATIENT
Start: 2021-03-05 | End: 2021-08-10 | Stop reason: SDUPTHER

## 2021-03-09 ENCOUNTER — HOSPITAL ENCOUNTER (OUTPATIENT)
Dept: RADIOLOGY | Facility: HOSPITAL | Age: 66
Discharge: HOME | End: 2021-03-09
Attending: INTERNAL MEDICINE
Payer: COMMERCIAL

## 2021-03-09 PROCEDURE — 74177 CT ABD & PELVIS W/CONTRAST: CPT | Mod: TC

## 2021-03-09 PROCEDURE — 25500020 PHARM REV CODE 255: Performed by: INTERNAL MEDICINE

## 2021-03-09 PROCEDURE — 74177 CT ABDOMEN PELVIS WITH CONTRAST: ICD-10-PCS | Mod: 26,,, | Performed by: RADIOLOGY

## 2021-03-09 PROCEDURE — 74177 CT ABD & PELVIS W/CONTRAST: CPT | Mod: 26,,, | Performed by: RADIOLOGY

## 2021-03-09 RX ADMIN — IOHEXOL 75 ML: 350 INJECTION, SOLUTION INTRAVENOUS at 12:03

## 2021-03-12 ENCOUNTER — INFUSION (OUTPATIENT)
Dept: INFUSION THERAPY | Facility: HOSPITAL | Age: 66
End: 2021-03-12
Attending: SURGERY
Payer: COMMERCIAL

## 2021-03-12 ENCOUNTER — TELEPHONE (OUTPATIENT)
Dept: NEUROLOGY | Facility: HOSPITAL | Age: 66
End: 2021-03-12

## 2021-03-12 VITALS — WEIGHT: 135 LBS | BODY MASS INDEX: 23.05 KG/M2 | HEIGHT: 64 IN

## 2021-03-12 DIAGNOSIS — C7B.02 METASTATIC MALIGNANT CARCINOID TUMOR TO LIVER: ICD-10-CM

## 2021-03-12 DIAGNOSIS — C7A.00 MALIGNANT CARCINOID TUMOR OF UNKNOWN PRIMARY SITE: Primary | ICD-10-CM

## 2021-03-12 PROCEDURE — 63600175 PHARM REV CODE 636 W HCPCS: Mod: JG | Performed by: SURGERY

## 2021-03-12 PROCEDURE — 96372 THER/PROPH/DIAG INJ SC/IM: CPT

## 2021-03-12 RX ORDER — LANREOTIDE ACETATE 120 MG/.5ML
120 INJECTION SUBCUTANEOUS
Status: CANCELLED
Start: 2021-03-12

## 2021-03-12 RX ORDER — LANREOTIDE ACETATE 120 MG/.5ML
120 INJECTION SUBCUTANEOUS
Status: DISCONTINUED | OUTPATIENT
Start: 2021-03-12 | End: 2021-03-12 | Stop reason: HOSPADM

## 2021-03-12 RX ADMIN — LANREOTIDE ACETATE 120 MG: 120 INJECTION SUBCUTANEOUS at 09:03

## 2021-03-16 ENCOUNTER — OFFICE VISIT (OUTPATIENT)
Dept: NEUROLOGY | Facility: HOSPITAL | Age: 66
End: 2021-03-16
Attending: INTERNAL MEDICINE
Payer: COMMERCIAL

## 2021-03-16 ENCOUNTER — LAB VISIT (OUTPATIENT)
Dept: LAB | Facility: HOSPITAL | Age: 66
End: 2021-03-16
Attending: INTERNAL MEDICINE
Payer: COMMERCIAL

## 2021-03-16 VITALS
BODY MASS INDEX: 24.32 KG/M2 | DIASTOLIC BLOOD PRESSURE: 89 MMHG | HEART RATE: 74 BPM | HEIGHT: 64 IN | WEIGHT: 142.44 LBS | TEMPERATURE: 99 F | SYSTOLIC BLOOD PRESSURE: 144 MMHG

## 2021-03-16 DIAGNOSIS — C7B.8 SECONDARY NEUROENDOCRINE TUMOR OF LIVER: ICD-10-CM

## 2021-03-16 DIAGNOSIS — C25.4 GASTRINOMA, MALIGNANT: ICD-10-CM

## 2021-03-16 DIAGNOSIS — C7B.8 SECONDARY NEUROENDOCRINE TUMOR OF LIVER: Primary | ICD-10-CM

## 2021-03-16 PROCEDURE — 86340 INTRINSIC FACTOR ANTIBODY: CPT | Performed by: SURGERY

## 2021-03-16 PROCEDURE — 86316 IMMUNOASSAY TUMOR OTHER: CPT | Performed by: SURGERY

## 2021-03-16 PROCEDURE — 82746 ASSAY OF FOLIC ACID SERUM: CPT | Performed by: SURGERY

## 2021-03-16 PROCEDURE — 86256 FLUORESCENT ANTIBODY TITER: CPT | Performed by: SURGERY

## 2021-03-16 PROCEDURE — 84260 ASSAY OF SEROTONIN: CPT | Performed by: SURGERY

## 2021-03-16 PROCEDURE — 82542 COL CHROMOTOGRAPHY QUAL/QUAN: CPT | Performed by: SURGERY

## 2021-03-16 PROCEDURE — 99213 OFFICE O/P EST LOW 20 MIN: CPT | Performed by: SURGERY

## 2021-03-16 PROCEDURE — 82941 ASSAY OF GASTRIN: CPT | Performed by: SURGERY

## 2021-03-17 LAB — FOLATE SERPL-MCNC: 13.5 NG/ML (ref 4–24)

## 2021-03-18 LAB
GASTRIN SERPL-MCNC: 594 PG/ML
PCA AB TITR SER IF: NORMAL {TITER}

## 2021-03-19 LAB
ANNOTATION COMMENT IMP: NORMAL
IF BLOCK AB SER QL: NEGATIVE

## 2021-03-23 ENCOUNTER — TELEPHONE (OUTPATIENT)
Dept: GASTROENTEROLOGY | Facility: CLINIC | Age: 66
End: 2021-03-23

## 2021-03-24 ENCOUNTER — OFFICE VISIT (OUTPATIENT)
Dept: PSYCHIATRY | Facility: CLINIC | Age: 66
End: 2021-03-24
Payer: COMMERCIAL

## 2021-03-24 DIAGNOSIS — F41.1 GENERALIZED ANXIETY DISORDER: Primary | ICD-10-CM

## 2021-03-24 DIAGNOSIS — F54 PSYCHOLOGICAL FACTORS AFFECTING MEDICAL CONDITION: ICD-10-CM

## 2021-03-24 DIAGNOSIS — C7B.02 METASTATIC MALIGNANT CARCINOID TUMOR TO LIVER: ICD-10-CM

## 2021-03-24 LAB — SEROTONIN: 66 NG/ML

## 2021-03-24 PROCEDURE — 99999 PR PBB SHADOW E&M-EST. PATIENT-LVL I: ICD-10-PCS | Mod: PBBFAC,,, | Performed by: PSYCHOLOGIST

## 2021-03-24 PROCEDURE — 90834 PR PSYCHOTHERAPY W/PATIENT, 45 MIN: ICD-10-PCS | Mod: S$GLB,,, | Performed by: PSYCHOLOGIST

## 2021-03-24 PROCEDURE — 90834 PSYTX W PT 45 MINUTES: CPT | Mod: S$GLB,,, | Performed by: PSYCHOLOGIST

## 2021-03-24 PROCEDURE — 99999 PR PBB SHADOW E&M-EST. PATIENT-LVL I: CPT | Mod: PBBFAC,,, | Performed by: PSYCHOLOGIST

## 2021-03-31 LAB
5OH-INDOLEACETATE SERPL-MCNC: 7 NG/ML
PANCREASTATIN SERPL-MCNC: 84 PG/ML (ref 10–135)

## 2021-04-09 ENCOUNTER — INFUSION (OUTPATIENT)
Dept: INFUSION THERAPY | Facility: HOSPITAL | Age: 66
End: 2021-04-09
Attending: SURGERY
Payer: COMMERCIAL

## 2021-04-09 VITALS — WEIGHT: 142 LBS | HEIGHT: 64 IN | BODY MASS INDEX: 24.24 KG/M2

## 2021-04-09 DIAGNOSIS — C7B.02 METASTATIC MALIGNANT CARCINOID TUMOR TO LIVER: ICD-10-CM

## 2021-04-09 DIAGNOSIS — C7A.00 MALIGNANT CARCINOID TUMOR OF UNKNOWN PRIMARY SITE: Primary | ICD-10-CM

## 2021-04-09 PROCEDURE — 96372 THER/PROPH/DIAG INJ SC/IM: CPT

## 2021-04-09 PROCEDURE — 63600175 PHARM REV CODE 636 W HCPCS: Mod: JG | Performed by: SURGERY

## 2021-04-09 RX ORDER — LANREOTIDE ACETATE 120 MG/.5ML
120 INJECTION SUBCUTANEOUS
Status: CANCELLED | OUTPATIENT
Start: 2021-04-09

## 2021-04-09 RX ORDER — LANREOTIDE ACETATE 120 MG/.5ML
120 INJECTION SUBCUTANEOUS
Status: CANCELLED
Start: 2021-04-09

## 2021-04-09 RX ORDER — LANREOTIDE ACETATE 120 MG/.5ML
120 INJECTION SUBCUTANEOUS
Status: DISCONTINUED | OUTPATIENT
Start: 2021-04-09 | End: 2021-04-09 | Stop reason: HOSPADM

## 2021-04-09 RX ADMIN — LANREOTIDE ACETATE 120 MG: 120 INJECTION SUBCUTANEOUS at 10:04

## 2021-04-14 ENCOUNTER — OFFICE VISIT (OUTPATIENT)
Dept: PSYCHIATRY | Facility: CLINIC | Age: 66
End: 2021-04-14
Payer: COMMERCIAL

## 2021-04-14 DIAGNOSIS — F54 PSYCHOLOGICAL FACTORS AFFECTING MEDICAL CONDITION: ICD-10-CM

## 2021-04-14 DIAGNOSIS — C7B.02 METASTATIC MALIGNANT CARCINOID TUMOR TO LIVER: ICD-10-CM

## 2021-04-14 DIAGNOSIS — F41.1 GENERALIZED ANXIETY DISORDER: Primary | ICD-10-CM

## 2021-04-14 PROCEDURE — 99999 PR PBB SHADOW E&M-EST. PATIENT-LVL I: ICD-10-PCS | Mod: PBBFAC,,, | Performed by: PSYCHOLOGIST

## 2021-04-14 PROCEDURE — 99999 PR PBB SHADOW E&M-EST. PATIENT-LVL I: CPT | Mod: PBBFAC,,, | Performed by: PSYCHOLOGIST

## 2021-04-14 PROCEDURE — 90837 PSYTX W PT 60 MINUTES: CPT | Mod: S$GLB,,, | Performed by: PSYCHOLOGIST

## 2021-04-14 PROCEDURE — 90837 PR PSYCHOTHERAPY W/PATIENT, 60 MIN: ICD-10-PCS | Mod: S$GLB,,, | Performed by: PSYCHOLOGIST

## 2021-04-23 ENCOUNTER — TELEPHONE (OUTPATIENT)
Dept: INFUSION THERAPY | Facility: HOSPITAL | Age: 66
End: 2021-04-23

## 2021-05-07 ENCOUNTER — INFUSION (OUTPATIENT)
Dept: INFUSION THERAPY | Facility: HOSPITAL | Age: 66
End: 2021-05-07
Attending: SURGERY
Payer: COMMERCIAL

## 2021-05-07 VITALS — HEIGHT: 64 IN | BODY MASS INDEX: 24.24 KG/M2 | WEIGHT: 142 LBS

## 2021-05-07 DIAGNOSIS — C7B.02 METASTATIC MALIGNANT CARCINOID TUMOR TO LIVER: ICD-10-CM

## 2021-05-07 DIAGNOSIS — C7A.00 MALIGNANT CARCINOID TUMOR OF UNKNOWN PRIMARY SITE: Primary | ICD-10-CM

## 2021-05-07 PROCEDURE — 96372 THER/PROPH/DIAG INJ SC/IM: CPT

## 2021-05-07 PROCEDURE — 63600175 PHARM REV CODE 636 W HCPCS: Mod: JG | Performed by: SURGERY

## 2021-05-07 RX ORDER — LANREOTIDE ACETATE 120 MG/.5ML
120 INJECTION SUBCUTANEOUS
Status: DISCONTINUED | OUTPATIENT
Start: 2021-05-07 | End: 2021-05-07 | Stop reason: HOSPADM

## 2021-05-07 RX ORDER — LANREOTIDE ACETATE 120 MG/.5ML
120 INJECTION SUBCUTANEOUS
Status: CANCELLED | OUTPATIENT
Start: 2021-05-07

## 2021-05-07 RX ADMIN — LANREOTIDE ACETATE 120 MG: 120 INJECTION SUBCUTANEOUS at 09:05

## 2021-05-19 ENCOUNTER — OFFICE VISIT (OUTPATIENT)
Dept: PSYCHIATRY | Facility: CLINIC | Age: 66
End: 2021-05-19
Payer: COMMERCIAL

## 2021-05-19 ENCOUNTER — IMMUNIZATION (OUTPATIENT)
Dept: PRIMARY CARE CLINIC | Facility: CLINIC | Age: 66
End: 2021-05-19
Payer: COMMERCIAL

## 2021-05-19 DIAGNOSIS — F54 PSYCHOLOGICAL FACTORS AFFECTING MEDICAL CONDITION: ICD-10-CM

## 2021-05-19 DIAGNOSIS — Z23 NEED FOR VACCINATION: Primary | ICD-10-CM

## 2021-05-19 DIAGNOSIS — C7B.02 METASTATIC MALIGNANT CARCINOID TUMOR TO LIVER: ICD-10-CM

## 2021-05-19 DIAGNOSIS — F32.A DEPRESSION, UNSPECIFIED DEPRESSION TYPE: ICD-10-CM

## 2021-05-19 DIAGNOSIS — F41.1 GENERALIZED ANXIETY DISORDER: Primary | ICD-10-CM

## 2021-05-19 PROCEDURE — 90834 PR PSYCHOTHERAPY W/PATIENT, 45 MIN: ICD-10-PCS | Mod: S$GLB,,, | Performed by: PSYCHOLOGIST

## 2021-05-19 PROCEDURE — 91300 COVID-19, MRNA, LNP-S, PF, 30 MCG/0.3 ML DOSE VACCINE: CPT | Mod: PBBFAC | Performed by: INTERNAL MEDICINE

## 2021-05-19 PROCEDURE — 90834 PSYTX W PT 45 MINUTES: CPT | Mod: S$GLB,,, | Performed by: PSYCHOLOGIST

## 2021-05-19 PROCEDURE — 99999 PR PBB SHADOW E&M-EST. PATIENT-LVL I: CPT | Mod: PBBFAC,,, | Performed by: PSYCHOLOGIST

## 2021-05-19 PROCEDURE — 99999 PR PBB SHADOW E&M-EST. PATIENT-LVL I: ICD-10-PCS | Mod: PBBFAC,,, | Performed by: PSYCHOLOGIST

## 2021-05-24 ENCOUNTER — DOCUMENTATION ONLY (OUTPATIENT)
Dept: NEUROLOGY | Facility: HOSPITAL | Age: 66
End: 2021-05-24

## 2021-06-04 ENCOUNTER — INFUSION (OUTPATIENT)
Dept: INFUSION THERAPY | Facility: HOSPITAL | Age: 66
End: 2021-06-04
Attending: SURGERY
Payer: COMMERCIAL

## 2021-06-04 DIAGNOSIS — C7B.02 METASTATIC MALIGNANT CARCINOID TUMOR TO LIVER: ICD-10-CM

## 2021-06-04 DIAGNOSIS — C7A.00 MALIGNANT CARCINOID TUMOR OF UNKNOWN PRIMARY SITE: Primary | ICD-10-CM

## 2021-06-04 PROCEDURE — 96372 THER/PROPH/DIAG INJ SC/IM: CPT

## 2021-06-04 PROCEDURE — 63600175 PHARM REV CODE 636 W HCPCS: Mod: JG | Performed by: SURGERY

## 2021-06-04 RX ORDER — LANREOTIDE ACETATE 120 MG/.5ML
120 INJECTION SUBCUTANEOUS
Status: CANCELLED | OUTPATIENT
Start: 2021-06-04

## 2021-06-04 RX ORDER — LANREOTIDE ACETATE 120 MG/.5ML
120 INJECTION SUBCUTANEOUS
Status: DISCONTINUED | OUTPATIENT
Start: 2021-06-04 | End: 2021-06-04 | Stop reason: HOSPADM

## 2021-06-04 RX ADMIN — LANREOTIDE ACETATE 120 MG: 120 INJECTION SUBCUTANEOUS at 09:06

## 2021-06-08 ENCOUNTER — DOCUMENTATION ONLY (OUTPATIENT)
Dept: NEUROLOGY | Facility: HOSPITAL | Age: 66
End: 2021-06-08

## 2021-06-16 ENCOUNTER — IMMUNIZATION (OUTPATIENT)
Dept: PRIMARY CARE CLINIC | Facility: CLINIC | Age: 66
End: 2021-06-16
Payer: COMMERCIAL

## 2021-06-16 DIAGNOSIS — Z23 NEED FOR VACCINATION: Primary | ICD-10-CM

## 2021-06-16 PROCEDURE — 91300 COVID-19, MRNA, LNP-S, PF, 30 MCG/0.3 ML DOSE VACCINE: ICD-10-PCS | Mod: S$GLB,,, | Performed by: INTERNAL MEDICINE

## 2021-06-16 PROCEDURE — 0002A COVID-19, MRNA, LNP-S, PF, 30 MCG/0.3 ML DOSE VACCINE: CPT | Mod: CV19,S$GLB,, | Performed by: INTERNAL MEDICINE

## 2021-06-16 PROCEDURE — 91300 COVID-19, MRNA, LNP-S, PF, 30 MCG/0.3 ML DOSE VACCINE: CPT | Mod: S$GLB,,, | Performed by: INTERNAL MEDICINE

## 2021-06-16 PROCEDURE — 0002A COVID-19, MRNA, LNP-S, PF, 30 MCG/0.3 ML DOSE VACCINE: ICD-10-PCS | Mod: CV19,S$GLB,, | Performed by: INTERNAL MEDICINE

## 2021-06-22 ENCOUNTER — OFFICE VISIT (OUTPATIENT)
Dept: PSYCHIATRY | Facility: CLINIC | Age: 66
End: 2021-06-22
Payer: COMMERCIAL

## 2021-06-22 DIAGNOSIS — F41.1 GENERALIZED ANXIETY DISORDER: Primary | ICD-10-CM

## 2021-06-22 PROCEDURE — 99999 PR PBB SHADOW E&M-EST. PATIENT-LVL I: CPT | Mod: PBBFAC,,, | Performed by: PSYCHOLOGIST

## 2021-06-22 PROCEDURE — 99999 PR PBB SHADOW E&M-EST. PATIENT-LVL I: ICD-10-PCS | Mod: PBBFAC,,, | Performed by: PSYCHOLOGIST

## 2021-06-22 PROCEDURE — 90834 PR PSYCHOTHERAPY W/PATIENT, 45 MIN: ICD-10-PCS | Mod: S$GLB,,, | Performed by: PSYCHOLOGIST

## 2021-06-22 PROCEDURE — 90834 PSYTX W PT 45 MINUTES: CPT | Mod: S$GLB,,, | Performed by: PSYCHOLOGIST

## 2021-07-02 ENCOUNTER — INFUSION (OUTPATIENT)
Dept: INFUSION THERAPY | Facility: HOSPITAL | Age: 66
End: 2021-07-02
Attending: SURGERY
Payer: COMMERCIAL

## 2021-07-02 VITALS — HEIGHT: 64 IN | BODY MASS INDEX: 24.24 KG/M2 | WEIGHT: 142 LBS

## 2021-07-02 DIAGNOSIS — C7A.00 MALIGNANT CARCINOID TUMOR OF UNKNOWN PRIMARY SITE: Primary | ICD-10-CM

## 2021-07-02 DIAGNOSIS — C7B.02 METASTATIC MALIGNANT CARCINOID TUMOR TO LIVER: ICD-10-CM

## 2021-07-02 PROCEDURE — 63600175 PHARM REV CODE 636 W HCPCS: Mod: JG | Performed by: SURGERY

## 2021-07-02 PROCEDURE — 96372 THER/PROPH/DIAG INJ SC/IM: CPT

## 2021-07-02 RX ORDER — LANREOTIDE ACETATE 120 MG/.5ML
120 INJECTION SUBCUTANEOUS
Status: CANCELLED | OUTPATIENT
Start: 2021-07-02

## 2021-07-02 RX ORDER — LANREOTIDE ACETATE 120 MG/.5ML
120 INJECTION SUBCUTANEOUS
Status: DISCONTINUED | OUTPATIENT
Start: 2021-07-02 | End: 2021-07-02 | Stop reason: HOSPADM

## 2021-07-02 RX ADMIN — LANREOTIDE ACETATE 120 MG: 120 INJECTION SUBCUTANEOUS at 10:07

## 2021-08-02 ENCOUNTER — INFUSION (OUTPATIENT)
Dept: INFUSION THERAPY | Facility: HOSPITAL | Age: 66
End: 2021-08-02
Attending: SURGERY
Payer: COMMERCIAL

## 2021-08-02 VITALS — BODY MASS INDEX: 24.24 KG/M2 | WEIGHT: 142 LBS | HEIGHT: 64 IN

## 2021-08-02 DIAGNOSIS — C7B.02 METASTATIC MALIGNANT CARCINOID TUMOR TO LIVER: ICD-10-CM

## 2021-08-02 DIAGNOSIS — C7A.00 MALIGNANT CARCINOID TUMOR OF UNKNOWN PRIMARY SITE: Primary | ICD-10-CM

## 2021-08-02 PROCEDURE — 63600175 PHARM REV CODE 636 W HCPCS: Mod: JG | Performed by: SURGERY

## 2021-08-02 PROCEDURE — 96372 THER/PROPH/DIAG INJ SC/IM: CPT

## 2021-08-02 RX ORDER — LANREOTIDE ACETATE 120 MG/.5ML
120 INJECTION SUBCUTANEOUS
Status: DISCONTINUED | OUTPATIENT
Start: 2021-08-02 | End: 2021-08-02 | Stop reason: HOSPADM

## 2021-08-02 RX ORDER — LANREOTIDE ACETATE 120 MG/.5ML
120 INJECTION SUBCUTANEOUS
Status: CANCELLED | OUTPATIENT
Start: 2021-08-02

## 2021-08-02 RX ADMIN — LANREOTIDE ACETATE 120 MG: 120 INJECTION SUBCUTANEOUS at 09:08

## 2021-08-03 ENCOUNTER — OFFICE VISIT (OUTPATIENT)
Dept: PSYCHIATRY | Facility: CLINIC | Age: 66
End: 2021-08-03
Payer: COMMERCIAL

## 2021-08-03 DIAGNOSIS — C7B.02 METASTATIC MALIGNANT CARCINOID TUMOR TO LIVER: ICD-10-CM

## 2021-08-03 DIAGNOSIS — F41.1 GENERALIZED ANXIETY DISORDER: Primary | ICD-10-CM

## 2021-08-03 DIAGNOSIS — F54 PSYCHOLOGICAL FACTORS AFFECTING MEDICAL CONDITION: ICD-10-CM

## 2021-08-03 PROCEDURE — 1159F MED LIST DOCD IN RCRD: CPT | Mod: CPTII,S$GLB,, | Performed by: PSYCHOLOGIST

## 2021-08-03 PROCEDURE — 90837 PSYTX W PT 60 MINUTES: CPT | Mod: S$GLB,,, | Performed by: PSYCHOLOGIST

## 2021-08-03 PROCEDURE — 99999 PR PBB SHADOW E&M-EST. PATIENT-LVL I: ICD-10-PCS | Mod: PBBFAC,,, | Performed by: PSYCHOLOGIST

## 2021-08-03 PROCEDURE — 90837 PR PSYCHOTHERAPY W/PATIENT, 60 MIN: ICD-10-PCS | Mod: S$GLB,,, | Performed by: PSYCHOLOGIST

## 2021-08-03 PROCEDURE — 1159F PR MEDICATION LIST DOCUMENTED IN MEDICAL RECORD: ICD-10-PCS | Mod: CPTII,S$GLB,, | Performed by: PSYCHOLOGIST

## 2021-08-03 PROCEDURE — 99999 PR PBB SHADOW E&M-EST. PATIENT-LVL I: CPT | Mod: PBBFAC,,, | Performed by: PSYCHOLOGIST

## 2021-08-10 ENCOUNTER — PATIENT MESSAGE (OUTPATIENT)
Dept: PRIMARY CARE CLINIC | Facility: CLINIC | Age: 66
End: 2021-08-10

## 2021-08-10 DIAGNOSIS — F41.1 GENERALIZED ANXIETY DISORDER: ICD-10-CM

## 2021-08-10 RX ORDER — BUSPIRONE HYDROCHLORIDE 7.5 MG/1
7.5 TABLET ORAL 2 TIMES DAILY
Qty: 60 TABLET | Refills: 0 | Status: SHIPPED | OUTPATIENT
Start: 2021-08-10 | End: 2021-09-07

## 2021-08-10 RX ORDER — BUSPIRONE HYDROCHLORIDE 7.5 MG/1
7.5 TABLET ORAL 2 TIMES DAILY
Qty: 60 TABLET | Refills: 1 | OUTPATIENT
Start: 2021-08-10 | End: 2022-08-10

## 2021-08-17 ENCOUNTER — PATIENT MESSAGE (OUTPATIENT)
Dept: PRIMARY CARE CLINIC | Facility: CLINIC | Age: 66
End: 2021-08-17

## 2021-08-18 ENCOUNTER — PATIENT MESSAGE (OUTPATIENT)
Dept: PRIMARY CARE CLINIC | Facility: CLINIC | Age: 66
End: 2021-08-18

## 2021-08-18 ENCOUNTER — OFFICE VISIT (OUTPATIENT)
Dept: PRIMARY CARE CLINIC | Facility: CLINIC | Age: 66
End: 2021-08-18
Payer: COMMERCIAL

## 2021-08-18 DIAGNOSIS — F41.1 GENERALIZED ANXIETY DISORDER: Primary | ICD-10-CM

## 2021-08-18 DIAGNOSIS — E16.4 HYPERGASTRINEMIA: ICD-10-CM

## 2021-08-18 DIAGNOSIS — E55.9 VITAMIN D DEFICIENCY: ICD-10-CM

## 2021-08-18 DIAGNOSIS — C7B.02 METASTATIC MALIGNANT CARCINOID TUMOR TO LIVER: ICD-10-CM

## 2021-08-18 DIAGNOSIS — F32.1 CURRENT MODERATE EPISODE OF MAJOR DEPRESSIVE DISORDER WITHOUT PRIOR EPISODE: ICD-10-CM

## 2021-08-18 DIAGNOSIS — R73.09 ABNORMAL GLUCOSE: ICD-10-CM

## 2021-08-18 DIAGNOSIS — C25.4 GASTRINOMA, MALIGNANT: ICD-10-CM

## 2021-08-18 PROCEDURE — 99214 OFFICE O/P EST MOD 30 MIN: CPT | Mod: 95,,, | Performed by: FAMILY MEDICINE

## 2021-08-18 PROCEDURE — 99214 PR OFFICE/OUTPT VISIT, EST, LEVL IV, 30-39 MIN: ICD-10-PCS | Mod: 95,,, | Performed by: FAMILY MEDICINE

## 2021-09-01 ENCOUNTER — PATIENT MESSAGE (OUTPATIENT)
Dept: NEUROLOGY | Facility: HOSPITAL | Age: 66
End: 2021-09-01

## 2021-09-01 ENCOUNTER — PATIENT MESSAGE (OUTPATIENT)
Dept: PRIMARY CARE CLINIC | Facility: CLINIC | Age: 66
End: 2021-09-01

## 2021-09-03 ENCOUNTER — TELEPHONE (OUTPATIENT)
Dept: INFUSION THERAPY | Facility: HOSPITAL | Age: 66
End: 2021-09-03

## 2021-09-03 ENCOUNTER — TELEPHONE (OUTPATIENT)
Dept: HEMATOLOGY/ONCOLOGY | Facility: CLINIC | Age: 66
End: 2021-09-03

## 2021-09-07 ENCOUNTER — OFFICE VISIT (OUTPATIENT)
Dept: PSYCHIATRY | Facility: CLINIC | Age: 66
End: 2021-09-07
Payer: COMMERCIAL

## 2021-09-07 DIAGNOSIS — F41.1 GENERALIZED ANXIETY DISORDER: Primary | ICD-10-CM

## 2021-09-07 DIAGNOSIS — F54 PSYCHOLOGICAL FACTORS AFFECTING MEDICAL CONDITION: ICD-10-CM

## 2021-09-07 DIAGNOSIS — C7B.02 METASTATIC MALIGNANT CARCINOID TUMOR TO LIVER: ICD-10-CM

## 2021-09-07 PROCEDURE — 1159F MED LIST DOCD IN RCRD: CPT | Mod: CPTII,95,, | Performed by: PSYCHOLOGIST

## 2021-09-07 PROCEDURE — 90832 PSYTX W PT 30 MINUTES: CPT | Mod: 95,,, | Performed by: PSYCHOLOGIST

## 2021-09-07 PROCEDURE — 1159F PR MEDICATION LIST DOCUMENTED IN MEDICAL RECORD: ICD-10-PCS | Mod: CPTII,95,, | Performed by: PSYCHOLOGIST

## 2021-09-07 PROCEDURE — 90832 PR PSYCHOTHERAPY W/PATIENT, 30 MIN: ICD-10-PCS | Mod: 95,,, | Performed by: PSYCHOLOGIST

## 2021-09-14 ENCOUNTER — INFUSION (OUTPATIENT)
Dept: INFUSION THERAPY | Facility: HOSPITAL | Age: 66
End: 2021-09-14
Attending: SURGERY
Payer: COMMERCIAL

## 2021-09-14 DIAGNOSIS — C7B.02 METASTATIC MALIGNANT CARCINOID TUMOR TO LIVER: ICD-10-CM

## 2021-09-14 DIAGNOSIS — C7A.00 MALIGNANT CARCINOID TUMOR OF UNKNOWN PRIMARY SITE: Primary | ICD-10-CM

## 2021-09-14 PROCEDURE — 63600175 PHARM REV CODE 636 W HCPCS: Mod: JG | Performed by: SURGERY

## 2021-09-14 PROCEDURE — 96372 THER/PROPH/DIAG INJ SC/IM: CPT

## 2021-09-14 RX ORDER — LANREOTIDE ACETATE 120 MG/.5ML
120 INJECTION SUBCUTANEOUS
Status: DISCONTINUED | OUTPATIENT
Start: 2021-09-14 | End: 2021-09-14 | Stop reason: HOSPADM

## 2021-09-14 RX ORDER — LANREOTIDE ACETATE 120 MG/.5ML
120 INJECTION SUBCUTANEOUS
Status: CANCELLED | OUTPATIENT
Start: 2021-09-14

## 2021-09-14 RX ADMIN — LANREOTIDE ACETATE 120 MG: 120 INJECTION SUBCUTANEOUS at 09:09

## 2021-09-29 ENCOUNTER — OFFICE VISIT (OUTPATIENT)
Dept: PSYCHIATRY | Facility: CLINIC | Age: 66
End: 2021-09-29
Payer: COMMERCIAL

## 2021-09-29 DIAGNOSIS — F41.1 GENERALIZED ANXIETY DISORDER: Primary | ICD-10-CM

## 2021-09-29 DIAGNOSIS — C7B.02 METASTATIC MALIGNANT CARCINOID TUMOR TO LIVER: ICD-10-CM

## 2021-09-29 DIAGNOSIS — F54 PSYCHOLOGICAL FACTORS AFFECTING MEDICAL CONDITION: ICD-10-CM

## 2021-09-29 PROCEDURE — 99999 PR PBB SHADOW E&M-EST. PATIENT-LVL I: CPT | Mod: PBBFAC,,, | Performed by: PSYCHOLOGIST

## 2021-09-29 PROCEDURE — 90834 PSYTX W PT 45 MINUTES: CPT | Mod: S$GLB,,, | Performed by: PSYCHOLOGIST

## 2021-09-29 PROCEDURE — 90834 PR PSYCHOTHERAPY W/PATIENT, 45 MIN: ICD-10-PCS | Mod: S$GLB,,, | Performed by: PSYCHOLOGIST

## 2021-09-29 PROCEDURE — 1159F PR MEDICATION LIST DOCUMENTED IN MEDICAL RECORD: ICD-10-PCS | Mod: CPTII,S$GLB,, | Performed by: PSYCHOLOGIST

## 2021-09-29 PROCEDURE — 99999 PR PBB SHADOW E&M-EST. PATIENT-LVL I: ICD-10-PCS | Mod: PBBFAC,,, | Performed by: PSYCHOLOGIST

## 2021-09-29 PROCEDURE — 1159F MED LIST DOCD IN RCRD: CPT | Mod: CPTII,S$GLB,, | Performed by: PSYCHOLOGIST

## 2021-10-11 ENCOUNTER — TELEPHONE (OUTPATIENT)
Dept: INFUSION THERAPY | Facility: HOSPITAL | Age: 66
End: 2021-10-11

## 2021-10-14 ENCOUNTER — INFUSION (OUTPATIENT)
Dept: INFUSION THERAPY | Facility: HOSPITAL | Age: 66
End: 2021-10-14
Attending: SURGERY
Payer: COMMERCIAL

## 2021-10-14 DIAGNOSIS — C7B.02 METASTATIC MALIGNANT CARCINOID TUMOR TO LIVER: ICD-10-CM

## 2021-10-14 DIAGNOSIS — C7A.00 MALIGNANT CARCINOID TUMOR OF UNKNOWN PRIMARY SITE: Primary | ICD-10-CM

## 2021-10-14 PROCEDURE — 63600175 PHARM REV CODE 636 W HCPCS: Mod: JG | Performed by: SURGERY

## 2021-10-14 PROCEDURE — 96372 THER/PROPH/DIAG INJ SC/IM: CPT

## 2021-10-14 RX ORDER — LANREOTIDE ACETATE 120 MG/.5ML
120 INJECTION SUBCUTANEOUS
Status: CANCELLED | OUTPATIENT
Start: 2021-10-14

## 2021-10-14 RX ORDER — LANREOTIDE ACETATE 120 MG/.5ML
120 INJECTION SUBCUTANEOUS
Status: DISCONTINUED | OUTPATIENT
Start: 2021-10-14 | End: 2021-10-14 | Stop reason: HOSPADM

## 2021-10-14 RX ADMIN — LANREOTIDE ACETATE 120 MG: 120 INJECTION SUBCUTANEOUS at 11:10

## 2021-10-25 ENCOUNTER — OFFICE VISIT (OUTPATIENT)
Dept: URGENT CARE | Facility: CLINIC | Age: 66
End: 2021-10-25
Payer: COMMERCIAL

## 2021-10-25 VITALS
DIASTOLIC BLOOD PRESSURE: 86 MMHG | OXYGEN SATURATION: 99 % | HEART RATE: 67 BPM | BODY MASS INDEX: 24.24 KG/M2 | WEIGHT: 142 LBS | SYSTOLIC BLOOD PRESSURE: 131 MMHG | HEIGHT: 64 IN | TEMPERATURE: 97 F | RESPIRATION RATE: 20 BRPM

## 2021-10-25 DIAGNOSIS — R30.0 BURNING WITH URINATION: Primary | ICD-10-CM

## 2021-10-25 DIAGNOSIS — N39.0 URINARY TRACT INFECTION WITH HEMATURIA, SITE UNSPECIFIED: ICD-10-CM

## 2021-10-25 DIAGNOSIS — R31.9 URINARY TRACT INFECTION WITH HEMATURIA, SITE UNSPECIFIED: ICD-10-CM

## 2021-10-25 LAB
BILIRUB UR QL STRIP: NEGATIVE
GLUCOSE UR QL STRIP: NEGATIVE
KETONES UR QL STRIP: NEGATIVE
LEUKOCYTE ESTERASE UR QL STRIP: NEGATIVE
PH, POC UA: 7.5 (ref 5–8)
POC BLOOD, URINE: POSITIVE
POC NITRATES, URINE: NEGATIVE
PROT UR QL STRIP: NEGATIVE
SP GR UR STRIP: 1 (ref 1–1.03)
UROBILINOGEN UR STRIP-ACNC: NORMAL (ref 0.1–1.1)

## 2021-10-25 PROCEDURE — 3075F SYST BP GE 130 - 139MM HG: CPT | Mod: CPTII,S$GLB,, | Performed by: PHYSICIAN ASSISTANT

## 2021-10-25 PROCEDURE — 1160F RVW MEDS BY RX/DR IN RCRD: CPT | Mod: CPTII,S$GLB,, | Performed by: PHYSICIAN ASSISTANT

## 2021-10-25 PROCEDURE — 3008F PR BODY MASS INDEX (BMI) DOCUMENTED: ICD-10-PCS | Mod: CPTII,S$GLB,, | Performed by: PHYSICIAN ASSISTANT

## 2021-10-25 PROCEDURE — 1159F PR MEDICATION LIST DOCUMENTED IN MEDICAL RECORD: ICD-10-PCS | Mod: CPTII,S$GLB,, | Performed by: PHYSICIAN ASSISTANT

## 2021-10-25 PROCEDURE — 3008F BODY MASS INDEX DOCD: CPT | Mod: CPTII,S$GLB,, | Performed by: PHYSICIAN ASSISTANT

## 2021-10-25 PROCEDURE — 99214 OFFICE O/P EST MOD 30 MIN: CPT | Mod: S$GLB,,, | Performed by: PHYSICIAN ASSISTANT

## 2021-10-25 PROCEDURE — 99214 PR OFFICE/OUTPT VISIT, EST, LEVL IV, 30-39 MIN: ICD-10-PCS | Mod: S$GLB,,, | Performed by: PHYSICIAN ASSISTANT

## 2021-10-25 PROCEDURE — 81003 POCT URINALYSIS, DIPSTICK, AUTOMATED, W/O SCOPE: ICD-10-PCS | Mod: QW,S$GLB,, | Performed by: PHYSICIAN ASSISTANT

## 2021-10-25 PROCEDURE — 3075F PR MOST RECENT SYSTOLIC BLOOD PRESS GE 130-139MM HG: ICD-10-PCS | Mod: CPTII,S$GLB,, | Performed by: PHYSICIAN ASSISTANT

## 2021-10-25 PROCEDURE — 1160F PR REVIEW ALL MEDS BY PRESCRIBER/CLIN PHARMACIST DOCUMENTED: ICD-10-PCS | Mod: CPTII,S$GLB,, | Performed by: PHYSICIAN ASSISTANT

## 2021-10-25 PROCEDURE — 3079F PR MOST RECENT DIASTOLIC BLOOD PRESSURE 80-89 MM HG: ICD-10-PCS | Mod: CPTII,S$GLB,, | Performed by: PHYSICIAN ASSISTANT

## 2021-10-25 PROCEDURE — 3079F DIAST BP 80-89 MM HG: CPT | Mod: CPTII,S$GLB,, | Performed by: PHYSICIAN ASSISTANT

## 2021-10-25 PROCEDURE — 1159F MED LIST DOCD IN RCRD: CPT | Mod: CPTII,S$GLB,, | Performed by: PHYSICIAN ASSISTANT

## 2021-10-25 PROCEDURE — 81003 URINALYSIS AUTO W/O SCOPE: CPT | Mod: QW,S$GLB,, | Performed by: PHYSICIAN ASSISTANT

## 2021-10-25 RX ORDER — SULFAMETHOXAZOLE AND TRIMETHOPRIM 800; 160 MG/1; MG/1
1 TABLET ORAL 2 TIMES DAILY
Qty: 10 TABLET | Refills: 1 | Status: SHIPPED | OUTPATIENT
Start: 2021-10-25 | End: 2021-10-30

## 2021-10-26 ENCOUNTER — TELEPHONE (OUTPATIENT)
Dept: PRIMARY CARE CLINIC | Facility: CLINIC | Age: 66
End: 2021-10-26
Payer: COMMERCIAL

## 2021-10-26 ENCOUNTER — PATIENT MESSAGE (OUTPATIENT)
Dept: PRIMARY CARE CLINIC | Facility: CLINIC | Age: 66
End: 2021-10-26
Payer: COMMERCIAL

## 2021-10-26 ENCOUNTER — PATIENT MESSAGE (OUTPATIENT)
Dept: PSYCHIATRY | Facility: CLINIC | Age: 66
End: 2021-10-26
Payer: COMMERCIAL

## 2021-10-26 DIAGNOSIS — Z00.00 ANNUAL PHYSICAL EXAM: ICD-10-CM

## 2021-10-26 DIAGNOSIS — Z12.31 SCREENING MAMMOGRAM FOR BREAST CANCER: Primary | ICD-10-CM

## 2021-11-04 ENCOUNTER — APPOINTMENT (OUTPATIENT)
Dept: RADIOLOGY | Facility: OTHER | Age: 66
End: 2021-11-04
Attending: FAMILY MEDICINE
Payer: COMMERCIAL

## 2021-11-04 VITALS — BODY MASS INDEX: 24.24 KG/M2 | HEIGHT: 64 IN | WEIGHT: 142 LBS

## 2021-11-04 DIAGNOSIS — Z12.31 SCREENING MAMMOGRAM FOR BREAST CANCER: ICD-10-CM

## 2021-11-04 PROCEDURE — 77063 MAMMO DIGITAL SCREENING BILAT WITH TOMO: ICD-10-PCS | Mod: 26,,, | Performed by: RADIOLOGY

## 2021-11-04 PROCEDURE — 77067 SCR MAMMO BI INCL CAD: CPT | Mod: TC,PN

## 2021-11-04 PROCEDURE — 77063 BREAST TOMOSYNTHESIS BI: CPT | Mod: 26,,, | Performed by: RADIOLOGY

## 2021-11-04 PROCEDURE — 77067 MAMMO DIGITAL SCREENING BILAT WITH TOMO: ICD-10-PCS | Mod: 26,,, | Performed by: RADIOLOGY

## 2021-11-04 PROCEDURE — 77067 SCR MAMMO BI INCL CAD: CPT | Mod: 26,,, | Performed by: RADIOLOGY

## 2021-11-06 ENCOUNTER — PATIENT MESSAGE (OUTPATIENT)
Dept: PRIMARY CARE CLINIC | Facility: CLINIC | Age: 66
End: 2021-11-06
Payer: COMMERCIAL

## 2021-11-09 ENCOUNTER — PATIENT OUTREACH (OUTPATIENT)
Dept: ADMINISTRATIVE | Facility: HOSPITAL | Age: 66
End: 2021-11-09
Payer: COMMERCIAL

## 2021-11-09 DIAGNOSIS — Z12.11 COLON CANCER SCREENING: Primary | ICD-10-CM

## 2021-11-11 ENCOUNTER — INFUSION (OUTPATIENT)
Dept: INFUSION THERAPY | Facility: HOSPITAL | Age: 66
End: 2021-11-11
Attending: SURGERY
Payer: COMMERCIAL

## 2021-11-11 VITALS — WEIGHT: 143.94 LBS | BODY MASS INDEX: 24.57 KG/M2 | HEIGHT: 64 IN

## 2021-11-11 DIAGNOSIS — C7A.00 MALIGNANT CARCINOID TUMOR OF UNKNOWN PRIMARY SITE: Primary | ICD-10-CM

## 2021-11-11 DIAGNOSIS — C7B.02 METASTATIC MALIGNANT CARCINOID TUMOR TO LIVER: ICD-10-CM

## 2021-11-11 PROCEDURE — 96372 THER/PROPH/DIAG INJ SC/IM: CPT

## 2021-11-11 PROCEDURE — 63600175 PHARM REV CODE 636 W HCPCS: Mod: JG | Performed by: SURGERY

## 2021-11-11 RX ORDER — LANREOTIDE ACETATE 120 MG/.5ML
120 INJECTION SUBCUTANEOUS
Status: DISCONTINUED | OUTPATIENT
Start: 2021-11-11 | End: 2021-11-11 | Stop reason: HOSPADM

## 2021-11-11 RX ORDER — LANREOTIDE ACETATE 120 MG/.5ML
120 INJECTION SUBCUTANEOUS
Status: CANCELLED | OUTPATIENT
Start: 2021-11-11

## 2021-11-11 RX ADMIN — LANREOTIDE ACETATE 120 MG: 120 INJECTION SUBCUTANEOUS at 10:11

## 2021-11-19 ENCOUNTER — OFFICE VISIT (OUTPATIENT)
Dept: PSYCHIATRY | Facility: CLINIC | Age: 66
End: 2021-11-19
Payer: COMMERCIAL

## 2021-11-19 ENCOUNTER — TELEPHONE (OUTPATIENT)
Dept: INFUSION THERAPY | Facility: HOSPITAL | Age: 66
End: 2021-11-19
Payer: COMMERCIAL

## 2021-11-19 DIAGNOSIS — C7B.02 METASTATIC MALIGNANT CARCINOID TUMOR TO LIVER: ICD-10-CM

## 2021-11-19 DIAGNOSIS — F41.1 GENERALIZED ANXIETY DISORDER: Primary | ICD-10-CM

## 2021-11-19 PROCEDURE — 99999 PR PBB SHADOW E&M-EST. PATIENT-LVL I: ICD-10-PCS | Mod: PBBFAC,,, | Performed by: PSYCHOLOGIST

## 2021-11-19 PROCEDURE — 90837 PR PSYCHOTHERAPY W/PATIENT, 60 MIN: ICD-10-PCS | Mod: S$GLB,,, | Performed by: PSYCHOLOGIST

## 2021-11-19 PROCEDURE — 90837 PSYTX W PT 60 MINUTES: CPT | Mod: S$GLB,,, | Performed by: PSYCHOLOGIST

## 2021-11-19 PROCEDURE — 99999 PR PBB SHADOW E&M-EST. PATIENT-LVL I: CPT | Mod: PBBFAC,,, | Performed by: PSYCHOLOGIST

## 2021-12-01 ENCOUNTER — OFFICE VISIT (OUTPATIENT)
Dept: PRIMARY CARE CLINIC | Facility: CLINIC | Age: 66
End: 2021-12-01
Payer: COMMERCIAL

## 2021-12-01 VITALS
HEIGHT: 64 IN | DIASTOLIC BLOOD PRESSURE: 80 MMHG | TEMPERATURE: 98 F | OXYGEN SATURATION: 100 % | WEIGHT: 148.38 LBS | SYSTOLIC BLOOD PRESSURE: 137 MMHG | BODY MASS INDEX: 25.33 KG/M2 | RESPIRATION RATE: 18 BRPM | HEART RATE: 65 BPM

## 2021-12-01 DIAGNOSIS — E55.9 VITAMIN D DEFICIENCY: ICD-10-CM

## 2021-12-01 DIAGNOSIS — C25.4 GASTRINOMA, MALIGNANT: ICD-10-CM

## 2021-12-01 DIAGNOSIS — Z00.00 ANNUAL PHYSICAL EXAM: Primary | ICD-10-CM

## 2021-12-01 DIAGNOSIS — F41.1 GENERALIZED ANXIETY DISORDER: ICD-10-CM

## 2021-12-01 DIAGNOSIS — C7B.02 METASTATIC MALIGNANT CARCINOID TUMOR TO LIVER: ICD-10-CM

## 2021-12-01 DIAGNOSIS — F32.1 CURRENT MODERATE EPISODE OF MAJOR DEPRESSIVE DISORDER WITHOUT PRIOR EPISODE: ICD-10-CM

## 2021-12-01 DIAGNOSIS — R73.09 ABNORMAL GLUCOSE: ICD-10-CM

## 2021-12-01 PROCEDURE — 90471 FLU VACCINE (QUAD) GREATER THAN OR EQUAL TO 3YO PRESERVATIVE FREE IM: ICD-10-PCS | Mod: S$GLB,,, | Performed by: FAMILY MEDICINE

## 2021-12-01 PROCEDURE — 99999 PR PBB SHADOW E&M-EST. PATIENT-LVL IV: CPT | Mod: PBBFAC,,, | Performed by: FAMILY MEDICINE

## 2021-12-01 PROCEDURE — 99396 PREV VISIT EST AGE 40-64: CPT | Mod: 25,S$GLB,, | Performed by: FAMILY MEDICINE

## 2021-12-01 PROCEDURE — 99396 PR PREVENTIVE VISIT,EST,40-64: ICD-10-PCS | Mod: 25,S$GLB,, | Performed by: FAMILY MEDICINE

## 2021-12-01 PROCEDURE — 99999 PR PBB SHADOW E&M-EST. PATIENT-LVL IV: ICD-10-PCS | Mod: PBBFAC,,, | Performed by: FAMILY MEDICINE

## 2021-12-01 PROCEDURE — 90471 IMMUNIZATION ADMIN: CPT | Mod: S$GLB,,, | Performed by: FAMILY MEDICINE

## 2021-12-01 PROCEDURE — 90686 FLU VACCINE (QUAD) GREATER THAN OR EQUAL TO 3YO PRESERVATIVE FREE IM: ICD-10-PCS | Mod: S$GLB,,, | Performed by: FAMILY MEDICINE

## 2021-12-01 PROCEDURE — 90686 IIV4 VACC NO PRSV 0.5 ML IM: CPT | Mod: S$GLB,,, | Performed by: FAMILY MEDICINE

## 2021-12-06 ENCOUNTER — HOSPITAL ENCOUNTER (EMERGENCY)
Facility: HOSPITAL | Age: 66
Discharge: HOME | End: 2021-12-06
Attending: EMERGENCY MEDICINE
Payer: COMMERCIAL

## 2021-12-06 VITALS
DIASTOLIC BLOOD PRESSURE: 73 MMHG | WEIGHT: 146 LBS | RESPIRATION RATE: 19 BRPM | HEART RATE: 76 BPM | SYSTOLIC BLOOD PRESSURE: 144 MMHG | BODY MASS INDEX: 25.06 KG/M2 | OXYGEN SATURATION: 100 % | TEMPERATURE: 98 F

## 2021-12-06 DIAGNOSIS — R19.7 DIARRHEA, UNSPECIFIED TYPE: Primary | ICD-10-CM

## 2021-12-06 LAB
ALBUMIN SERPL BCP-MCNC: 3.8 G/DL (ref 3.5–5.2)
ALP SERPL-CCNC: 98 U/L (ref 55–135)
ALT SERPL W/O P-5'-P-CCNC: 15 U/L (ref 10–44)
ANION GAP SERPL CALC-SCNC: 8 MMOL/L (ref 8–16)
AST SERPL-CCNC: 21 U/L (ref 10–40)
BASOPHILS # BLD AUTO: 0.03 K/UL (ref 0–0.2)
BASOPHILS NFR BLD: 0.6 % (ref 0–1.9)
BILIRUB SERPL-MCNC: 1.5 MG/DL (ref 0.1–1)
BILIRUB UR QL STRIP: NEGATIVE
BUN SERPL-MCNC: 7 MG/DL (ref 8–23)
CALCIUM SERPL-MCNC: 9.5 MG/DL (ref 8.7–10.5)
CHLORIDE SERPL-SCNC: 104 MMOL/L (ref 95–110)
CLARITY UR: CLEAR
CO2 SERPL-SCNC: 28 MMOL/L (ref 23–29)
COLOR UR: YELLOW
CREAT SERPL-MCNC: 1 MG/DL (ref 0.5–1.4)
DIFFERENTIAL METHOD: ABNORMAL
EOSINOPHIL # BLD AUTO: 0 K/UL (ref 0–0.5)
EOSINOPHIL NFR BLD: 0.6 % (ref 0–8)
ERYTHROCYTE [DISTWIDTH] IN BLOOD BY AUTOMATED COUNT: 13.3 % (ref 11.5–14.5)
EST. GFR  (AFRICAN AMERICAN): >60 ML/MIN/1.73 M^2
EST. GFR  (NON AFRICAN AMERICAN): 60 ML/MIN/1.73 M^2
GLUCOSE SERPL-MCNC: 113 MG/DL (ref 70–110)
GLUCOSE UR QL STRIP: NEGATIVE
HCT VFR BLD AUTO: 41.3 % (ref 37–48.5)
HGB BLD-MCNC: 14 G/DL (ref 12–16)
HGB UR QL STRIP: NEGATIVE
IMM GRANULOCYTES # BLD AUTO: 0.03 K/UL (ref 0–0.04)
IMM GRANULOCYTES NFR BLD AUTO: 0.6 % (ref 0–0.5)
KETONES UR QL STRIP: NEGATIVE
LACTATE SERPL-SCNC: 1.5 MMOL/L (ref 0.5–2.2)
LEUKOCYTE ESTERASE UR QL STRIP: NEGATIVE
LIPASE SERPL-CCNC: 13 U/L (ref 4–60)
LYMPHOCYTES # BLD AUTO: 0.8 K/UL (ref 1–4.8)
LYMPHOCYTES NFR BLD: 15.8 % (ref 18–48)
MCH RBC QN AUTO: 29 PG (ref 27–31)
MCHC RBC AUTO-ENTMCNC: 33.9 G/DL (ref 32–36)
MCV RBC AUTO: 86 FL (ref 82–98)
MONOCYTES # BLD AUTO: 0.4 K/UL (ref 0.3–1)
MONOCYTES NFR BLD: 7.6 % (ref 4–15)
NEUTROPHILS # BLD AUTO: 3.8 K/UL (ref 1.8–7.7)
NEUTROPHILS NFR BLD: 74.8 % (ref 38–73)
NITRITE UR QL STRIP: NEGATIVE
NRBC BLD-RTO: 0 /100 WBC
PH UR STRIP: 7 [PH] (ref 5–8)
PLATELET # BLD AUTO: 214 K/UL (ref 150–450)
PMV BLD AUTO: 11.2 FL (ref 9.2–12.9)
POTASSIUM SERPL-SCNC: 3.8 MMOL/L (ref 3.5–5.1)
PROT SERPL-MCNC: 7 G/DL (ref 6–8.4)
PROT UR QL STRIP: NEGATIVE
RBC # BLD AUTO: 4.83 M/UL (ref 4–5.4)
SODIUM SERPL-SCNC: 140 MMOL/L (ref 136–145)
SP GR UR STRIP: 1.01 (ref 1–1.03)
URN SPEC COLLECT METH UR: NORMAL
UROBILINOGEN UR STRIP-ACNC: NEGATIVE EU/DL
WBC # BLD AUTO: 5.13 K/UL (ref 3.9–12.7)

## 2021-12-06 PROCEDURE — 80053 COMPREHEN METABOLIC PANEL: CPT | Performed by: EMERGENCY MEDICINE

## 2021-12-06 PROCEDURE — 81003 URINALYSIS AUTO W/O SCOPE: CPT | Performed by: EMERGENCY MEDICINE

## 2021-12-06 PROCEDURE — 83690 ASSAY OF LIPASE: CPT | Performed by: EMERGENCY MEDICINE

## 2021-12-06 PROCEDURE — 96361 HYDRATE IV INFUSION ADD-ON: CPT

## 2021-12-06 PROCEDURE — 63600175 PHARM REV CODE 636 W HCPCS: Performed by: EMERGENCY MEDICINE

## 2021-12-06 PROCEDURE — 85025 COMPLETE CBC W/AUTO DIFF WBC: CPT | Performed by: EMERGENCY MEDICINE

## 2021-12-06 PROCEDURE — 99284 EMERGENCY DEPT VISIT MOD MDM: CPT | Mod: 25

## 2021-12-06 PROCEDURE — 25000003 PHARM REV CODE 250: Performed by: EMERGENCY MEDICINE

## 2021-12-06 PROCEDURE — 83605 ASSAY OF LACTIC ACID: CPT | Performed by: EMERGENCY MEDICINE

## 2021-12-06 PROCEDURE — 96374 THER/PROPH/DIAG INJ IV PUSH: CPT

## 2021-12-06 RX ORDER — ONDANSETRON 4 MG/1
4 TABLET, ORALLY DISINTEGRATING ORAL EVERY 8 HOURS PRN
Qty: 15 TABLET | Refills: 0 | Status: SHIPPED | OUTPATIENT
Start: 2021-12-06 | End: 2021-12-13

## 2021-12-06 RX ORDER — DIPHENOXYLATE HYDROCHLORIDE AND ATROPINE SULFATE 2.5; .025 MG/1; MG/1
1 TABLET ORAL 4 TIMES DAILY PRN
Qty: 15 TABLET | Refills: 0 | Status: SHIPPED | OUTPATIENT
Start: 2021-12-06 | End: 2021-12-16

## 2021-12-06 RX ORDER — ONDANSETRON 2 MG/ML
4 INJECTION INTRAMUSCULAR; INTRAVENOUS
Status: COMPLETED | OUTPATIENT
Start: 2021-12-06 | End: 2021-12-06

## 2021-12-06 RX ADMIN — SODIUM CHLORIDE, SODIUM LACTATE, POTASSIUM CHLORIDE, AND CALCIUM CHLORIDE 1000 ML: .6; .31; .03; .02 INJECTION, SOLUTION INTRAVENOUS at 07:12

## 2021-12-06 RX ADMIN — SODIUM CHLORIDE 1000 ML: 0.9 INJECTION, SOLUTION INTRAVENOUS at 09:12

## 2021-12-06 RX ADMIN — ONDANSETRON 4 MG: 2 INJECTION INTRAMUSCULAR; INTRAVENOUS at 07:12

## 2021-12-07 ENCOUNTER — TELEPHONE (OUTPATIENT)
Dept: NEUROLOGY | Facility: HOSPITAL | Age: 66
End: 2021-12-07

## 2021-12-07 NOTE — TELEPHONE ENCOUNTER
----- Message from Alex Millan sent at 12/7/2021 10:04 AM CST -----  Contact: pt.   .Type:  Sooner Apoointment Request    Caller is requesting a sooner appointment.  Caller declined first available appointment listed below.  Caller will not accept being placed on the waitlist and is requesting a message be sent to doctor.  Name of Caller:pt  When is the first available appointment?nothing  Symptoms:hospital f/u   Would the patient rather a call back or a response via [facility]? Call back  Best Call Back Number:[phone]  Additional Information: pt. Has another appt. On Thursday 10:00 a.m. and would like to see if she can come in on Thursday before or after that appt.  Please call the pt. Back.

## 2021-12-07 NOTE — TELEPHONE ENCOUNTER
"Pt requesting ER follow up appt with Dr. Riddle on Thursday, December 9, 2021, following another appt she has.   Informed not available at this time.  Pt informed Dr. Riddle's nurse will contact with available appt.  Pt also informed per last clinic visit, March 2021, recommendations given to complete copper 64 scans, tumor markers in June and September, EUS and colonoscopy prior to returning to see Dr. Riddle.  Per notes pt will call to schedule once all completed.  PT states "I was not told to schedule anything, it's the office/clinic responsibility to schedule all of this.  Copper scan scheduled on Thursday, December 23, 2021 at 1030am at [facility].  Pt given scan instructions and location, also informed information is visible on [facility] portal.   "

## 2021-12-09 ENCOUNTER — TELEPHONE (OUTPATIENT)
Dept: NEUROLOGY | Facility: HOSPITAL | Age: 66
End: 2021-12-09
Payer: COMMERCIAL

## 2021-12-09 ENCOUNTER — INFUSION (OUTPATIENT)
Dept: INFUSION THERAPY | Facility: HOSPITAL | Age: 66
End: 2021-12-09
Attending: SURGERY
Payer: COMMERCIAL

## 2021-12-09 VITALS — HEIGHT: 64 IN | WEIGHT: 146 LBS | BODY MASS INDEX: 24.92 KG/M2

## 2021-12-09 DIAGNOSIS — C7A.00 MALIGNANT CARCINOID TUMOR OF UNKNOWN PRIMARY SITE: Primary | ICD-10-CM

## 2021-12-09 DIAGNOSIS — C7B.02 METASTATIC MALIGNANT CARCINOID TUMOR TO LIVER: ICD-10-CM

## 2021-12-09 PROCEDURE — 96372 THER/PROPH/DIAG INJ SC/IM: CPT

## 2021-12-09 PROCEDURE — 63600175 PHARM REV CODE 636 W HCPCS: Mod: JG | Performed by: SURGERY

## 2021-12-09 RX ORDER — LANREOTIDE ACETATE 120 MG/.5ML
120 INJECTION SUBCUTANEOUS
Status: DISCONTINUED | OUTPATIENT
Start: 2021-12-09 | End: 2021-12-09 | Stop reason: HOSPADM

## 2021-12-09 RX ORDER — LANREOTIDE ACETATE 120 MG/.5ML
120 INJECTION SUBCUTANEOUS
Status: CANCELLED | OUTPATIENT
Start: 2021-12-09

## 2021-12-09 RX ADMIN — LANREOTIDE ACETATE 120 MG: 120 INJECTION SUBCUTANEOUS at 10:12

## 2021-12-14 ENCOUNTER — TELEPHONE (OUTPATIENT)
Dept: INFUSION THERAPY | Facility: HOSPITAL | Age: 66
End: 2021-12-14
Payer: COMMERCIAL

## 2021-12-17 ENCOUNTER — OFFICE VISIT (OUTPATIENT)
Dept: PSYCHIATRY | Facility: CLINIC | Age: 66
End: 2021-12-17
Payer: COMMERCIAL

## 2021-12-17 DIAGNOSIS — C7B.02 METASTATIC MALIGNANT CARCINOID TUMOR TO LIVER: ICD-10-CM

## 2021-12-17 DIAGNOSIS — F54 PSYCHOLOGICAL FACTORS AFFECTING MEDICAL CONDITION: ICD-10-CM

## 2021-12-17 DIAGNOSIS — F43.21 GRIEF: ICD-10-CM

## 2021-12-17 DIAGNOSIS — F41.1 GENERALIZED ANXIETY DISORDER: Primary | ICD-10-CM

## 2021-12-17 PROCEDURE — 90837 PR PSYCHOTHERAPY W/PATIENT, 60 MIN: ICD-10-PCS | Mod: S$GLB,,, | Performed by: PSYCHOLOGIST

## 2021-12-17 PROCEDURE — 99999 PR PBB SHADOW E&M-EST. PATIENT-LVL I: CPT | Mod: PBBFAC,,, | Performed by: PSYCHOLOGIST

## 2021-12-17 PROCEDURE — 90837 PSYTX W PT 60 MINUTES: CPT | Mod: S$GLB,,, | Performed by: PSYCHOLOGIST

## 2021-12-17 PROCEDURE — 99999 PR PBB SHADOW E&M-EST. PATIENT-LVL I: ICD-10-PCS | Mod: PBBFAC,,, | Performed by: PSYCHOLOGIST

## 2021-12-20 ENCOUNTER — DOCUMENTATION ONLY (OUTPATIENT)
Dept: HEMATOLOGY/ONCOLOGY | Facility: CLINIC | Age: 66
End: 2021-12-20
Payer: COMMERCIAL

## 2021-12-23 ENCOUNTER — TELEPHONE (OUTPATIENT)
Dept: NEUROLOGY | Facility: HOSPITAL | Age: 66
End: 2021-12-23
Payer: COMMERCIAL

## 2021-12-23 ENCOUNTER — LAB VISIT (OUTPATIENT)
Dept: LAB | Facility: HOSPITAL | Age: 66
End: 2021-12-23
Attending: SURGERY
Payer: COMMERCIAL

## 2021-12-23 DIAGNOSIS — C7B.8 SECONDARY NEUROENDOCRINE TUMOR OF LIVER: ICD-10-CM

## 2021-12-23 DIAGNOSIS — R17 ELEVATED BILIRUBIN: ICD-10-CM

## 2021-12-23 DIAGNOSIS — C7A.00 MALIGNANT CARCINOID TUMOR OF UNKNOWN PRIMARY SITE: ICD-10-CM

## 2021-12-23 DIAGNOSIS — C25.4 GASTRINOMA, MALIGNANT: ICD-10-CM

## 2021-12-23 DIAGNOSIS — Z86.2 HISTORY OF IRON DEFICIENCY ANEMIA: ICD-10-CM

## 2021-12-23 LAB
ALBUMIN SERPL BCP-MCNC: 3.9 G/DL (ref 3.5–5.2)
ALBUMIN SERPL BCP-MCNC: 3.9 G/DL (ref 3.5–5.2)
ALP SERPL-CCNC: 94 U/L (ref 55–135)
ALP SERPL-CCNC: 94 U/L (ref 55–135)
ALT SERPL W/O P-5'-P-CCNC: 13 U/L (ref 10–44)
ALT SERPL W/O P-5'-P-CCNC: 13 U/L (ref 10–44)
ANION GAP SERPL CALC-SCNC: 4 MMOL/L (ref 8–16)
ANION GAP SERPL CALC-SCNC: 4 MMOL/L (ref 8–16)
AST SERPL-CCNC: 21 U/L (ref 10–40)
AST SERPL-CCNC: 21 U/L (ref 10–40)
BASOPHILS # BLD AUTO: 0.03 K/UL (ref 0–0.2)
BASOPHILS NFR BLD: 0.8 % (ref 0–1.9)
BILIRUB SERPL-MCNC: 1.2 MG/DL (ref 0.1–1)
BILIRUB SERPL-MCNC: 1.2 MG/DL (ref 0.1–1)
BUN SERPL-MCNC: 9 MG/DL (ref 8–23)
BUN SERPL-MCNC: 9 MG/DL (ref 8–23)
CALCIUM SERPL-MCNC: 10 MG/DL (ref 8.7–10.5)
CALCIUM SERPL-MCNC: 10 MG/DL (ref 8.7–10.5)
CHLORIDE SERPL-SCNC: 102 MMOL/L (ref 95–110)
CHLORIDE SERPL-SCNC: 102 MMOL/L (ref 95–110)
CO2 SERPL-SCNC: 32 MMOL/L (ref 23–29)
CO2 SERPL-SCNC: 32 MMOL/L (ref 23–29)
CREAT SERPL-MCNC: 0.8 MG/DL (ref 0.5–1.4)
CREAT SERPL-MCNC: 0.8 MG/DL (ref 0.5–1.4)
DIFFERENTIAL METHOD: ABNORMAL
EOSINOPHIL # BLD AUTO: 0 K/UL (ref 0–0.5)
EOSINOPHIL NFR BLD: 0.5 % (ref 0–8)
ERYTHROCYTE [DISTWIDTH] IN BLOOD BY AUTOMATED COUNT: 13.5 % (ref 11.5–14.5)
ERYTHROCYTE [DISTWIDTH] IN BLOOD BY AUTOMATED COUNT: 13.5 % (ref 11.5–14.5)
EST. GFR  (AFRICAN AMERICAN): >60 ML/MIN/1.73 M^2
EST. GFR  (AFRICAN AMERICAN): >60 ML/MIN/1.73 M^2
EST. GFR  (NON AFRICAN AMERICAN): >60 ML/MIN/1.73 M^2
EST. GFR  (NON AFRICAN AMERICAN): >60 ML/MIN/1.73 M^2
FOLATE SERPL-MCNC: 13.6 NG/ML (ref 4–24)
GLUCOSE SERPL-MCNC: 102 MG/DL (ref 70–110)
GLUCOSE SERPL-MCNC: 102 MG/DL (ref 70–110)
HCT VFR BLD AUTO: 44.1 % (ref 37–48.5)
HCT VFR BLD AUTO: 44.1 % (ref 37–48.5)
HGB BLD-MCNC: 14.5 G/DL (ref 12–16)
HGB BLD-MCNC: 14.5 G/DL (ref 12–16)
IMM GRANULOCYTES # BLD AUTO: 0.01 K/UL (ref 0–0.04)
IMM GRANULOCYTES # BLD AUTO: 0.01 K/UL (ref 0–0.04)
IMM GRANULOCYTES NFR BLD AUTO: 0.3 % (ref 0–0.5)
LYMPHOCYTES # BLD AUTO: 0.9 K/UL (ref 1–4.8)
LYMPHOCYTES NFR BLD: 23.9 % (ref 18–48)
MCH RBC QN AUTO: 28.8 PG (ref 27–31)
MCH RBC QN AUTO: 28.8 PG (ref 27–31)
MCHC RBC AUTO-ENTMCNC: 32.9 G/DL (ref 32–36)
MCHC RBC AUTO-ENTMCNC: 32.9 G/DL (ref 32–36)
MCV RBC AUTO: 88 FL (ref 82–98)
MCV RBC AUTO: 88 FL (ref 82–98)
MONOCYTES # BLD AUTO: 0.4 K/UL (ref 0.3–1)
MONOCYTES NFR BLD: 9.2 % (ref 4–15)
NEUTROPHILS # BLD AUTO: 2.6 K/UL (ref 1.8–7.7)
NEUTROPHILS # BLD AUTO: 2.6 K/UL (ref 1.8–7.7)
NEUTROPHILS NFR BLD: 65.3 % (ref 38–73)
NRBC BLD-RTO: 0 /100 WBC
PLATELET # BLD AUTO: 195 K/UL (ref 150–450)
PLATELET # BLD AUTO: 195 K/UL (ref 150–450)
PMV BLD AUTO: 11.3 FL (ref 9.2–12.9)
PMV BLD AUTO: 11.3 FL (ref 9.2–12.9)
POTASSIUM SERPL-SCNC: 4.2 MMOL/L (ref 3.5–5.1)
POTASSIUM SERPL-SCNC: 4.2 MMOL/L (ref 3.5–5.1)
PROT SERPL-MCNC: 7.5 G/DL (ref 6–8.4)
PROT SERPL-MCNC: 7.5 G/DL (ref 6–8.4)
RBC # BLD AUTO: 5.03 M/UL (ref 4–5.4)
RBC # BLD AUTO: 5.03 M/UL (ref 4–5.4)
SODIUM SERPL-SCNC: 138 MMOL/L (ref 136–145)
SODIUM SERPL-SCNC: 138 MMOL/L (ref 136–145)
WBC # BLD AUTO: 3.93 K/UL (ref 3.9–12.7)
WBC # BLD AUTO: 3.93 K/UL (ref 3.9–12.7)

## 2021-12-23 PROCEDURE — 82542 COL CHROMOTOGRAPHY QUAL/QUAN: CPT | Performed by: SURGERY

## 2021-12-23 PROCEDURE — 80053 COMPREHEN METABOLIC PANEL: CPT | Performed by: INTERNAL MEDICINE

## 2021-12-23 PROCEDURE — 86256 FLUORESCENT ANTIBODY TITER: CPT | Performed by: SURGERY

## 2021-12-23 PROCEDURE — 82941 ASSAY OF GASTRIN: CPT | Performed by: SURGERY

## 2021-12-23 PROCEDURE — 84260 ASSAY OF SEROTONIN: CPT | Performed by: SURGERY

## 2021-12-23 PROCEDURE — 83519 RIA NONANTIBODY: CPT | Performed by: SURGERY

## 2021-12-23 PROCEDURE — 82746 ASSAY OF FOLIC ACID SERUM: CPT | Performed by: SURGERY

## 2021-12-23 PROCEDURE — 85025 COMPLETE CBC W/AUTO DIFF WBC: CPT | Performed by: SURGERY

## 2021-12-23 PROCEDURE — 86340 INTRINSIC FACTOR ANTIBODY: CPT | Performed by: SURGERY

## 2021-12-27 LAB
ANNOTATION COMMENT IMP: NORMAL
GASTRIN SERPL-MCNC: 129 PG/ML
IF BLOCK AB SER QL: NEGATIVE

## 2021-12-28 LAB
PCA AB TITR SER IF: NORMAL {TITER}
SEROTONIN: 138 NG/ML

## 2022-01-04 ENCOUNTER — TELEPHONE (OUTPATIENT)
Dept: NEUROLOGY | Facility: HOSPITAL | Age: 67
End: 2022-01-04
Payer: COMMERCIAL

## 2022-01-04 DIAGNOSIS — C7B.8 SECONDARY NEUROENDOCRINE TUMOR OF LIVER: Primary | ICD-10-CM

## 2022-01-04 DIAGNOSIS — C7A.00 MALIGNANT CARCINOID TUMOR OF UNKNOWN PRIMARY SITE: ICD-10-CM

## 2022-01-04 NOTE — TELEPHONE ENCOUNTER
Left message for patient to call back in regards to her PET not being authorized but the insurance company would like her to have a CT first which they did approve and I did scheduled just in case it will work out for her. Auth#[id number] valid until 2/18/2022.

## 2022-01-06 ENCOUNTER — HOSPITAL ENCOUNTER (OUTPATIENT)
Dept: RADIOLOGY | Facility: HOSPITAL | Age: 67
Discharge: HOME | End: 2022-01-06
Attending: SURGERY
Payer: COMMERCIAL

## 2022-01-06 ENCOUNTER — INFUSION (OUTPATIENT)
Dept: INFUSION THERAPY | Facility: HOSPITAL | Age: 67
End: 2022-01-06
Attending: SURGERY
Payer: COMMERCIAL

## 2022-01-06 VITALS — WEIGHT: 146 LBS | HEIGHT: 64 IN | BODY MASS INDEX: 24.92 KG/M2

## 2022-01-06 DIAGNOSIS — C7A.00 MALIGNANT CARCINOID TUMOR OF UNKNOWN PRIMARY SITE: Primary | ICD-10-CM

## 2022-01-06 DIAGNOSIS — C7A.00 MALIGNANT CARCINOID TUMOR OF UNKNOWN PRIMARY SITE: ICD-10-CM

## 2022-01-06 DIAGNOSIS — C7B.8 SECONDARY NEUROENDOCRINE TUMOR OF LIVER: ICD-10-CM

## 2022-01-06 DIAGNOSIS — C7B.02 METASTATIC MALIGNANT CARCINOID TUMOR TO LIVER: ICD-10-CM

## 2022-01-06 PROCEDURE — 74177 CT ABDOMEN PELVIS WITH CONTRAST: ICD-10-PCS | Mod: 26,,, | Performed by: RADIOLOGY

## 2022-01-06 PROCEDURE — 96372 THER/PROPH/DIAG INJ SC/IM: CPT | Mod: 59

## 2022-01-06 PROCEDURE — 25500020 PHARM REV CODE 255: Performed by: SURGERY

## 2022-01-06 PROCEDURE — 74177 CT ABD & PELVIS W/CONTRAST: CPT | Mod: 26,,, | Performed by: RADIOLOGY

## 2022-01-06 PROCEDURE — 63600175 PHARM REV CODE 636 W HCPCS: Mod: JG | Performed by: SURGERY

## 2022-01-06 PROCEDURE — A9698 NON-RAD CONTRAST MATERIALNOC: HCPCS | Performed by: SURGERY

## 2022-01-06 PROCEDURE — 74177 CT ABD & PELVIS W/CONTRAST: CPT | Mod: TC

## 2022-01-06 RX ORDER — LANREOTIDE ACETATE 120 MG/.5ML
120 INJECTION SUBCUTANEOUS
Status: DISCONTINUED | OUTPATIENT
Start: 2022-01-06 | End: 2022-01-06 | Stop reason: HOSPADM

## 2022-01-06 RX ORDER — LANREOTIDE ACETATE 120 MG/.5ML
120 INJECTION SUBCUTANEOUS
Status: CANCELLED | OUTPATIENT
Start: 2022-01-06

## 2022-01-06 RX ADMIN — IOHEXOL 75 ML: 350 INJECTION, SOLUTION INTRAVENOUS at 11:01

## 2022-01-06 RX ADMIN — IOHEXOL 1000 ML: 9 SOLUTION ORAL at 10:01

## 2022-01-06 RX ADMIN — LANREOTIDE ACETATE 120 MG: 120 INJECTION SUBCUTANEOUS at 12:01

## 2022-01-06 NOTE — NURSING
Patient tolerated Lanreotide injection well, no complaints at this time. Next appointment scheduled and pt d/c in no acute distress.

## 2022-01-13 LAB
5OH-INDOLEACETATE SERPL-MCNC: 10 NG/ML
PANCREASTATIN SERPL-MCNC: 67 PG/ML (ref 10–135)

## 2022-01-14 ENCOUNTER — OFFICE VISIT (OUTPATIENT)
Dept: PSYCHIATRY | Facility: CLINIC | Age: 67
End: 2022-01-14
Payer: COMMERCIAL

## 2022-01-14 DIAGNOSIS — C7B.02 METASTATIC MALIGNANT CARCINOID TUMOR TO LIVER: ICD-10-CM

## 2022-01-14 DIAGNOSIS — F43.21 GRIEF: ICD-10-CM

## 2022-01-14 DIAGNOSIS — F41.1 GENERALIZED ANXIETY DISORDER: Primary | ICD-10-CM

## 2022-01-14 PROCEDURE — 1159F PR MEDICATION LIST DOCUMENTED IN MEDICAL RECORD: ICD-10-PCS | Mod: CPTII,S$GLB,, | Performed by: PSYCHOLOGIST

## 2022-01-14 PROCEDURE — 1159F MED LIST DOCD IN RCRD: CPT | Mod: CPTII,S$GLB,, | Performed by: PSYCHOLOGIST

## 2022-01-14 PROCEDURE — 90837 PSYTX W PT 60 MINUTES: CPT | Mod: S$GLB,,, | Performed by: PSYCHOLOGIST

## 2022-01-14 PROCEDURE — 90837 PR PSYCHOTHERAPY W/PATIENT, 60 MIN: ICD-10-PCS | Mod: S$GLB,,, | Performed by: PSYCHOLOGIST

## 2022-01-14 PROCEDURE — 99999 PR PBB SHADOW E&M-EST. PATIENT-LVL II: ICD-10-PCS | Mod: PBBFAC,,, | Performed by: PSYCHOLOGIST

## 2022-01-14 PROCEDURE — 99999 PR PBB SHADOW E&M-EST. PATIENT-LVL II: CPT | Mod: PBBFAC,,, | Performed by: PSYCHOLOGIST

## 2022-01-14 NOTE — PROGRESS NOTES
INFORMED CONSENT: Lena Brantley   is known to this provider and identity was confirmed via NAME and .  The patient has been informed of the risks and benefits associated with engaging in psychotherapy, the handling of protected health information, the rights of privacy and the limits of confidentiality. The patient has also been informed of the importance of reporting any suicidal or homicidal ideation to this or any provider to ensure safety of all parties, and the Lena Brantley expressed understanding. The patient was agreeable to these terms and freely participates in individual psychotherapy.    PSYCHO-ONCOLOGY NOTE/ Individual Psychotherapy     Date: 2022   Site:  [facility]        Therapeutic Intervention: Met with patient.  Outpatient - Behavior modifying psychotherapy 60 min - CPT code 36080      Patient was last seen by me on 2021    Problem list  Patient Active Problem List   Diagnosis    Malignant carcinoid tumor of unknown primary site    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    Hypergastrinemia    Carcinoid (except of appendix)    Gastrinoma, malignant    Generalized anxiety disorder    Current moderate episode of major depressive disorder without prior episode    Secondary neuroendocrine tumor of liver    History of iron deficiency anemia    Elevated bilirubin    Vitamin D deficiency    Abnormal glucose       Chief complaint/reason for encounter: anxiety and grief   Met with patient to evaluate psychosocial adaptation to diagnosis/treatment/survivorship of death of sister to ovarian cancer    Current Medications  Current Outpatient Medications   Medication    busPIRone (BUSPAR) 7.5 MG tablet    lanreotide (SOMATULINE DEPOT) 120 mg/0.5 mL Syrg    pantoprazole (PROTONIX) 40 MG tablet     No current facility-administered medications for this visit.       Objective:  Lena Brantley arrived promptly for the session.   Ms. Brantley was independently  ambulatory at the time of session. The patient was fully cooperative throughout the session.  Appearance: age appropriate, appropriately  dressed, adequately  groomed  Behavior/Cooperation: friendly and cooperative  Speech: normal in rate, volume, and tone and appropriate quality, quantity and organization of sentences  Mood: anxious, sad  Affect: mood congruent  Thought Process: goal-directed, logical  Thought Content: normal,  No delusions or paranoia; did not appear to be responding to internal stimuli during the session  Orientation: grossly intact  Memory: Grossly intact  Attention Span/Concentration: Attends to session without distraction; reports no difficulty  Fund of Knowledge: average  Estimate of Intelligence: average from verbal skills and history  Cognition: grossly intact  Insight: patient has awareness of illness; good insight into own behavior and behavior of others  Judgment: the patient's behavior is adequate to circumstances    Interval history and content of current session: Patient with continued grief and stress following holidays.  Discussed grief. Reports to be coping with avoidance and with great difficulty. Evaluated cognitive response, paying particular attention to negative intrusive thoughts of a persistent and detrimental nature. Thoughts of this type are in evidence with severe distress. Provided cognitive behavioral therapy to address negative cognitions. Identified and evaluated psychosocial and environmental stressors secondary to diagnosis and treatment.  Examined proactive behaviors that may be implemented to minimize or ameliorate psychosocial stressors secondary to diagnosis and treatment.     Risk parameters:   Patient reports no suicidal ideation  Patient reports no homicidal ideation  Patient reports no self-injurious behavior  Patient reports no violent behavior   Safety needs:  None at this time      Verbal deficits: None     Patient's response to intervention:The patient's  response to intervention is accepting.     Progress toward goals and other mental status changes:  The patient's progress toward goals is fair .      Progress to date:Progress - Ongoing, but Slow      Goals from last visit: Attempted, partially met     Patient reported outcomes:    Distress Thermometer:   Distress Score    Distress Score: 9        Practical Problems Physical Problems   : No Appearance: No   Housing: No Bathing / Dressing: Yes   Insurance / Financial: Yes Breathing: No    Transportation: No  Changes in Urination: No    Work / School: No  Constipation: No   Treatment Decisions: Yes  Diarrhea: No     Eating: Yes    Family Problems Fatigue: Yes    Dealing with Children: Yes Feeling Swollen: No    Dealing with Partner: No Fevers: No    Ability to Have Children: No  Getting Around: No       Indigestion: No     Memory / Concentration: Yes   Emotional Problems Mouth Sores: No    Depression: Yes  Nausea: No    Fears: Yes  Nose Dry / Congested: No    Nervousness: Yes  Pain: No    Sadness: Yes Sexual: No    Worry: Yes Skin Dry / Itchy: No    Loss of Interest in Usual Activities: No Sleep: No     Substance Abuse: No    Spiritual/Religions Concerns Tingling in Hands / Feet: No   Spritual / Shinto Concerns: No         Other Problems    Other Problems:: Too much sadness and madness going on in the world. Covid, fires, crime, babies getting shot in [place]. My sister   2021 from cancer. Affraid today. Hope tomorrow is better.         PHQ ANSWERS    Q1. Little interest or pleasure in doing things: (P) More than half the days (22)  Q2. Feeling down, depressed, or hopeless: (P) Several days (22)  Q3. Trouble falling or staying asleep, or sleeping too much: (P) Several days (22)  Q4. Feeling tired or having little energy: (P) More than half the days (22)  Q5. Poor appetite or overeating: (P) More than half the days (22)  Q6. Feeling bad about  yourself - or that you are a failure or have let yourself or your family down: (P) More than half the days (01/14/22 1112)  Q7. Trouble concentrating on things, such as reading the newspaper or watching television: (P) Not at all (01/14/22 1112)  Q8. Moving or speaking so slowly that other people could have noticed. Or the opposite - being so fidgety or restless that you have been moving around a lot more than usual: (P) Not at all (01/14/22 1112)  Q9.  No SI    PHQ8 Score : (P) 10 (01/14/22 1112)  PHQ-9 Total Score: (P) 10 (01/14/22 1112)     MINH-7 Answers    GAD7 1/14/2022   1. Feeling nervous, anxious, or on edge? 1   2. Not being able to stop or control worrying? 1   3. Worrying too much about different things? 3   4. Trouble relaxing? 2   5. Being so restless that it is hard to sit still? 0   6. Becoming easily annoyed or irritable? 1   7. Feeling afraid as if something awful might happen? 1   IMNH-7 Score 9     MINH-7 Score: (P) 9  Interpretation: (P) Mild Anxiety     Client Strengths: verbal, intelligent, successful, good social support, good insight, commitment to wellness, strong [place], strong cultural traditions     Diagnosis:     ICD-10-CM ICD-9-CM   1. Generalized anxiety disorder  F41.1 300.02   2. Grief  F43.21 309.0   3. 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  C7B.02 209.72     Treatment Plan:individual psychotherapy and medication management by physician  · Target symptoms: anxiety , grief  · Why chosen therapy is appropriate versus another modality: relevant to diagnosis, patient responds to this modality, evidence based practice  · Outcome monitoring methods: self-report, observation, checklist/rating scale  · Therapeutic intervention type: behavior modifying psychotherapy  · Prognosis: Good      Behavioral goals:    Exercise:   Stress management: [place]   Social engagement:   Nutrition:   Smoking Cessation:   Therapy:    Return to clinic: 3 weeks      Length of Service  (minutes direct face-to-face contact): 60    Carmella Gurrola, PhD  Clinical Psychologist  [place] License #[id number]  [place] License #[id number]

## 2022-01-21 ENCOUNTER — IMMUNIZATION (OUTPATIENT)
Dept: PRIMARY CARE CLINIC | Facility: CLINIC | Age: 67
End: 2022-01-21
Payer: COMMERCIAL

## 2022-01-21 ENCOUNTER — OFFICE VISIT (OUTPATIENT)
Dept: NEUROLOGY | Facility: HOSPITAL | Age: 67
End: 2022-01-21
Attending: SURGERY
Payer: COMMERCIAL

## 2022-01-21 VITALS
TEMPERATURE: 98 F | SYSTOLIC BLOOD PRESSURE: 143 MMHG | BODY MASS INDEX: 24.89 KG/M2 | HEIGHT: 64 IN | WEIGHT: 145.81 LBS | DIASTOLIC BLOOD PRESSURE: 89 MMHG | HEART RATE: 76 BPM

## 2022-01-21 DIAGNOSIS — C25.4 GASTRINOMA, MALIGNANT: ICD-10-CM

## 2022-01-21 DIAGNOSIS — C7B.02 METASTATIC MALIGNANT CARCINOID TUMOR TO LIVER: Primary | ICD-10-CM

## 2022-01-21 DIAGNOSIS — Z23 NEED FOR VACCINATION: Primary | ICD-10-CM

## 2022-01-21 PROCEDURE — 0004A COVID-19, MRNA, LNP-S, PF, 30 MCG/0.3 ML DOSE VACCINE: CPT | Mod: CV19,PBBFAC | Performed by: INTERNAL MEDICINE

## 2022-01-21 PROCEDURE — 99213 OFFICE O/P EST LOW 20 MIN: CPT | Performed by: SURGERY

## 2022-01-21 NOTE — PATIENT INSTRUCTIONS
Tumor Markers: every 3 months  --- due March 2022 and June 2022    Scans: [place] PET Scan due NOW  Call Scheduling Department at [phone] to schedule scans prior to next appointment:      Colonoscopy: recommend having done now or colorguard    Notes From Physician:   - recommend COVID Vaccine & Booster     Return Clinic Appointment: returned after above done with Dr. Riddle --- patient will call to schedule

## 2022-01-21 NOTE — PROGRESS NOTES
[facility]:  [facility]  A collaboration between [facility] and [facility]    PATIENT: Lena Brantley  MRN: [id number]  DATE: 1/21/2022      Diagnosis:   1. 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 Gastrinoma, malignant        Chief Complaint: Follow-up      Oncologic History:      Oncologic History Neuroendocrine tumor unknown primary diagnosed 09/2016  Metastatic disease to liver at presentation    Oncologic Treatment Lanreotide 10/2016    Pathology Well-differentiated neuroendocrine tumor, Ki-67 1%          Subjective:    Interval History  : Ms. Brantley is a 66 y.o. female who who is seen in follow-up for a neuroendocrine tumor of unknown primary.  I have not seen her since 03/2021.  She has  recent images. She states that she generally has been feeling well.  She has occasional indigestion and diarrhea.  She has refused surgery in the past although has been recommended on numerous occasions.  She has lost some weight but states that she had been trying to do this and previously had been eating a bag of Doritos daily but has stopped doing this.  She remains on treatment with lanreotide and tolerating well.  She does admit to some anxiety about her diagnosis.  She has no other new complaints.    Her history dates to 9/2016 when she was undergoing workup for episodic nausea, vomiting and diarrhea.  A CT scan was performed showing a solitary and had seeing liver mass.  A biopsy was performed showing a well-differentiated neuroendocrine tumor, Ki-67 of 1%.  Conventional imaging an octreotide scan did not reveal a primary site disease.  Gene expression profiling was performed indicating a possible primary site pancreas.  She was started on Lanreotide in 2016.  She was offered surgical resection and also liver directed therapy and declined.      Past Medical History:   Past Medical History:   Diagnosis Date     Anxiety     Cancer     Depression     Elevated bilirubin 9/9/2020    Fatigue     GERD (gastroesophageal reflux disease)     History of iron deficiency anemia 9/9/2020    Hx of psychiatric care     Liver mass 06/2016    Mass of colon 06/2016    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     Psychiatric problem     Secondary neuroendocrine tumor of liver 9/9/2020    Sickle cell trait     Sleep difficulties        Past Surgical HIstory:   History reviewed. No pertinent surgical history.    Family History:   Family History   Problem Relation Age of Onset    Cancer Maternal Grandmother     Cancer Other     Depression Mother     Anxiety disorder Mother        Social History:  reports that she has never smoked. She has never used smokeless tobacco. She reports that she does not drink alcohol and does not use drugs.    Allergies:  Review of patient's allergies indicates:   Allergen Reactions    Epinephrine Anaphylaxis     Can Cause Carcinoid Crisis       Medications:  Current Outpatient Medications   Medication Sig Dispense Refill    busPIRone (BUSPAR) 7.5 MG tablet Take 1 tablet (7.5 mg total) by mouth every evening. 90 tablet 3    lanreotide (SOMATULINE DEPOT) 120 mg/0.5 mL Syrg Inject 120 mg into the skin every 28 days.      pantoprazole (PROTONIX) 40 MG tablet TAKE 1 TABLET BY MOUTH EVERY DAY 90 tablet 0     No current facility-administered medications for this visit.     Review of Systems   Constitutional: Positive for weight loss.   Respiratory: Negative for cough and shortness of breath.    Cardiovascular: Negative for chest pain and leg swelling.   Gastrointestinal: Positive for diarrhea. Negative for nausea and vomiting.   Skin: Negative for rash.   Neurological: Negative for dizziness and headaches.   Psychiatric/Behavioral: The patient is nervous/anxious.          ECOG Performance Status: 0   Objective:      Vitals:   Vitals:    01/21/22 0931   BP: (!) 143/89   Pulse: 76   Temp: 97.9 °F  "(36.6 °C)   Weight: 66.2 kg (145 lb 13.4 oz)   Height: 5' 4" (1.626 m)     BMI: Body mass index is 25.03 kg/m².    Physical Exam   Constitutional: She is oriented to person, place, and time. She appears well-developed and well-nourished. No distress.   HENT:   Head: Normocephalic.   Mouth/Throat: No oropharyngeal exudate.   Eyes: EOM are normal. No scleral icterus.   Neck: Neck supple. No tracheal deviation present. No thyromegaly present.   Cardiovascular: Normal rate and regular rhythm.   Pulmonary/Chest: Effort normal and breath sounds normal. No respiratory distress. She has no wheezes. She has no rales.   Abdominal: Soft. She exhibits no distension and no mass. There is no tenderness. There is no rebound and no guarding.   Musculoskeletal: Normal range of motion. She exhibits no edema.   Lymphadenopathy:     She has no cervical adenopathy.   Neurological: She is alert and oriented to person, place, and time. No cranial nerve deficit.   Skin: Skin is warm and dry.   Psychiatric: She has a normal mood and affect.       Laboratory Data:  Lab Visit on 12/23/2021   Component Date Value Ref Range Status    WBC 12/23/2021 3.93  3.90 - 12.70 K/uL Final    RBC 12/23/2021 5.03  4.00 - 5.40 M/uL Final    Hemoglobin 12/23/2021 14.5  12.0 - 16.0 g/dL Final    Hematocrit 12/23/2021 44.1  37.0 - 48.5 % Final    MCV 12/23/2021 88  82 - 98 fL Final    MCH 12/23/2021 28.8  27.0 - 31.0 pg Final    MCHC 12/23/2021 32.9  32.0 - 36.0 g/dL Final    RDW 12/23/2021 13.5  11.5 - 14.5 % Final    Platelets 12/23/2021 195  150 - 450 K/uL Final    MPV 12/23/2021 11.3  9.2 - 12.9 fL Final    Gran # (ANC) 12/23/2021 2.6  1.8 - 7.7 K/uL Final    Immature Grans (Abs) 12/23/2021 0.01  0.00 - 0.04 K/uL Final    Comment: Mild elevation in immature granulocytes is non specific and   can be seen in a variety of conditions including stress response,   acute inflammation, trauma and pregnancy. Correlation with other   laboratory and " clinical findings is essential.      Sodium 12/23/2021 138  136 - 145 mmol/L Final    Potassium 12/23/2021 4.2  3.5 - 5.1 mmol/L Final    Chloride 12/23/2021 102  95 - 110 mmol/L Final    CO2 12/23/2021 32* 23 - 29 mmol/L Final    Glucose 12/23/2021 102  70 - 110 mg/dL Final    BUN 12/23/2021 9  8 - 23 mg/dL Final    Creatinine 12/23/2021 0.8  0.5 - 1.4 mg/dL Final    Calcium 12/23/2021 10.0  8.7 - 10.5 mg/dL Final    Total Protein 12/23/2021 7.5  6.0 - 8.4 g/dL Final    Albumin 12/23/2021 3.9  3.5 - 5.2 g/dL Final    Total Bilirubin 12/23/2021 1.2* 0.1 - 1.0 mg/dL Final    Comment: For infants and newborns, interpretation of results should be based  on gestational age, weight and in agreement with clinical  observations.    Premature Infant recommended reference ranges:  Up to 24 hours.............<8.0 mg/dL  Up to 48 hours............<12.0 mg/dL  3-5 days..................<15.0 mg/dL  6-29 days.................<15.0 mg/dL      Alkaline Phosphatase 12/23/2021 94  55 - 135 U/L Final    AST 12/23/2021 21  10 - 40 U/L Final    ALT 12/23/2021 13  10 - 44 U/L Final    Anion Gap 12/23/2021 4* 8 - 16 mmol/L Final    eGFR if African American 12/23/2021 >60.0  >60 mL/min/1.73 m^2 Final    eGFR if non African American 12/23/2021 >60.0  >60 mL/min/1.73 m^2 Final    Comment: Calculation used to obtain the estimated glomerular filtration  rate (eGFR) is the CKD-EPI equation.       Gastrin 12/23/2021 129* <100 pg/mL Final    Pancreastatin 12/23/2021 67  10 - 135 pg/mL Final    Comment: -------------------ADDITIONAL INFORMATION-------------------  This radioimmunoassay was developed and its  performance characteristics determined by  [facility]. It has not been  cleared or approved by the FDA and such  approval or clearance is not required at this  time. Values obtained with different methods,  different laboratories or with kits cannot  be used interchangeably with the results on  this report. The  results cannot be interpreted  as absolute evidence of the presence or absence  of malignant disease.    Test Performed by:  [facility] [place]  [address] [place] [address]      5-HIAA, Plasma (Neuroend) 12/23/2021 10  Up to 22 ng/mL Final    Comment: -------------------ADDITIONAL INFORMATION-------------------  This GCMS assay was developed and its performance   characteristics determined by [facility].   It has not been cleared or approved by the FDA and such   approval or clearance is not required at this time. Values   obtained with different methods, different laboratories or   with kits cannot be used interchangeably with the results   on this report. The results cannot be interpreted as   absolute evidence of the presence or absence of malignant   disease.    Test Performed by:  [facility]  [address] [place] [address]      WBC 12/23/2021 3.93  3.90 - 12.70 K/uL Final    RBC 12/23/2021 5.03  4.00 - 5.40 M/uL Final    Hemoglobin 12/23/2021 14.5  12.0 - 16.0 g/dL Final    Hematocrit 12/23/2021 44.1  37.0 - 48.5 % Final    MCV 12/23/2021 88  82 - 98 fL Final    MCH 12/23/2021 28.8  27.0 - 31.0 pg Final    MCHC 12/23/2021 32.9  32.0 - 36.0 g/dL Final    RDW 12/23/2021 13.5  11.5 - 14.5 % Final    Platelets 12/23/2021 195  150 - 450 K/uL Final    MPV 12/23/2021 11.3  9.2 - 12.9 fL Final    Immature Granulocytes 12/23/2021 0.3  0.0 - 0.5 % Final    Gran # (ANC) 12/23/2021 2.6  1.8 - 7.7 K/uL Final    Immature Grans (Abs) 12/23/2021 0.01  0.00 - 0.04 K/uL Final    Comment: Mild elevation in immature granulocytes is non specific and   can be seen in a variety of conditions including stress response,   acute inflammation, trauma and pregnancy. Correlation with other   laboratory and clinical findings is essential.      Lymph # 12/23/2021 0.9* 1.0 - 4.8 K/uL Final    Mono # 12/23/2021 0.4  0.3 - 1.0 K/uL Final    Eos # 12/23/2021 0.0  0.0 - 0.5 K/uL  Final    Baso # 12/23/2021 0.03  0.00 - 0.20 K/uL Final    nRBC 12/23/2021 0  0 /100 WBC Final    Gran % 12/23/2021 65.3  38.0 - 73.0 % Final    Lymph % 12/23/2021 23.9  18.0 - 48.0 % Final    Mono % 12/23/2021 9.2  4.0 - 15.0 % Final    Eosinophil % 12/23/2021 0.5  0.0 - 8.0 % Final    Basophil % 12/23/2021 0.8  0.0 - 1.9 % Final    Differential Method 12/23/2021 Automated   Final    Sodium 12/23/2021 138  136 - 145 mmol/L Final    Potassium 12/23/2021 4.2  3.5 - 5.1 mmol/L Final    Chloride 12/23/2021 102  95 - 110 mmol/L Final    CO2 12/23/2021 32* 23 - 29 mmol/L Final    Glucose 12/23/2021 102  70 - 110 mg/dL Final    BUN 12/23/2021 9  8 - 23 mg/dL Final    Creatinine 12/23/2021 0.8  0.5 - 1.4 mg/dL Final    Calcium 12/23/2021 10.0  8.7 - 10.5 mg/dL Final    Total Protein 12/23/2021 7.5  6.0 - 8.4 g/dL Final    Albumin 12/23/2021 3.9  3.5 - 5.2 g/dL Final    Total Bilirubin 12/23/2021 1.2* 0.1 - 1.0 mg/dL Final    Comment: For infants and newborns, interpretation of results should be based  on gestational age, weight and in agreement with clinical  observations.    Premature Infant recommended reference ranges:  Up to 24 hours.............<8.0 mg/dL  Up to 48 hours............<12.0 mg/dL  3-5 days..................<15.0 mg/dL  6-29 days.................<15.0 mg/dL      Alkaline Phosphatase 12/23/2021 94  55 - 135 U/L Final    AST 12/23/2021 21  10 - 40 U/L Final    ALT 12/23/2021 13  10 - 44 U/L Final    Anion Gap 12/23/2021 4* 8 - 16 mmol/L Final    eGFR if African American 12/23/2021 >60.0  >60 mL/min/1.73 m^2 Final    eGFR if non African American 12/23/2021 >60.0  >60 mL/min/1.73 m^2 Final    Comment: Calculation used to obtain the estimated glomerular filtration  rate (eGFR) is the CKD-EPI equation.       Serotonin 12/23/2021 138  <=230 ng/mL Final    Comment: -------------------ADDITIONAL INFORMATION-------------------  This test was developed and its performance characteristics    determined by [facility] in a manner consistent with CLIA   requirements. This test has not been cleared or approved by   the U.S. Food and Drug Administration.    Test Performed by:  [facility] [place] [address]  : Agapito Akers M.D. Ph.D.; CLIA# [id number]      Parietal Cell Ab 12/23/2021 Negative 1:20  Negative Final    Folate 12/23/2021 13.6  4.0 - 24.0 ng/mL Final    Intrinsic Factor 12/23/2021 Negative  Negative Final    AIF Comment 12/23/2021 SEE BELOW   Final    Comment: Intrinsic Factor Blocking Antibody (IFBA) antibodies are   absent in approximately 50% of individuals with pernicious   anemia (PA). The absence of elevated IFBA antibodies does   not rule out the presence of PA; further studies such as   gastrin testing may be indicated.    Test Performed by:  [facility] [place] [address]  : Agapito Akers M.D. Ph.D.; CLIA# [id number]         Imaging:     CT 3/90/21:     Date of prior examination for comparison: 09/02/2020     Lesion 1: Left hepatic lobe lesion.  2.9 cm. Series 3 image 17.  Prior measurement 3.2 cm.     Lesion 2: organ (max 2 lesions per organ). current measurement - long axis for mass (minimum baseline 1 cm), short axis for node (minimum baseline 1.5)    CT 09/02/2020  COMPARISON:  CT abdomen pelvis with contrast dated 09/20/2019 and PET-CT dated 04/12/2019.     FINDINGS:  Limited images chest are unremarkable.     Abdomen and pelvis:     Liver is nonenlarged.  Index lesion:     *heterogeneous hypoattenuating lesion within the left hepatic lobe (segment IV), similar to slightly decreased measuring 3.2 cm (series 3, image 16), previously 3.7 cm.     No definite new liver lesion.     The hepatic veins and main portal vein are patent.  Small stones versus debris in the gallbladder.  No biliary dilatation.  The spleen,  pancreas and adrenal glands appear normal.  Similar hypodense cortical lesion in the left upper renal pole, too small for definitive characterization though possible cyst.  Hydronephrosis.  Urinary bladder is unremarkable.  The partially calcified uterine fibroid.  No suspicious adnexal lesion.     The GI tract and appendix are normal in caliber.  No free fluid or adenopathy.  Soft tissue gluteal nodules as can be seen with sequela of prior injection.  Stable sclerotic focus of the right iliac wing.  No aggressive osseous lesion.     Impression:     Redemonstration of left hepatic lobe lesion, similar to slightly decreased in size.  Otherwise, no significant interval detrimental change.     Additional findings as above.     RECIST SUMMARY:     Date of prior examination for comparison: CT abdomen pelvis with contrast dated 09/20/2019     Lesion 1: Left hepatic lobe.  3.2 cm.  Series 3, image 16.  Previously 3.7 cm.  3/8/2019:        CT 1/6/22:  left renal cyst.     RECIST SUMMARY:     Date of prior examination for comparison: 03/09/2021     Lesion 1: Left hepatic lobe lesion.  3.0 cm. Series 5 image 22.  Prior measurement 2.9       No significant detrimental interval change when compared to prior exam.     Stable left hepatic lobe lesion.  No new focal lesions.          Assessment:       1. 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 Gastrinoma, malignant      COVID-19 Discussion:     I had long discussion with patient and any applicable family about the COVID-19 coronavirus epidemic and the recommended precautions with regard to cancer and/or hematology patients. I have re-iterated the CDC recommendations for adequate hand washing, use of hand -like products, and coughing into elbow, etc. In addition, especially for our patients who are on chemotherapy and/or our otherwise immunocompromised patients, I have recommended avoidance of crowds, including movie theaters, restaurants, churches, etc. I have  recommended avoidance of any sick or symptomatic family members and/or friends. I have also recommended avoidance of any raw and unwashed food products, and general avoidance of food items that have not been prepared by themselves. The patient has been asked to call us immediately with any symptom developments, issues, questions or other general concerns.   Patient is about ready to have her COVID booster shot       Plan:       I had a long conversation with Mrs. Brantley and have reviewed her images and labs with her today.  Clinically, she is doing well and review of her old CT shows a mild decrease in the size of her tumor within the liver.  This does not demonstrate any evidence of extrahepatic disease.  She has previously undergone a gallium 68 PET-CT which was negative except the liver 2019.  She remains on treatment with lanreotide in tolerating well.  She did discuss other roles for treatment so that she could potentially get off lanreotide and I have told her in the absence of definitive therapy I would recommend we continue on with lanreotide.  We had previously discussed her case and thought that she may be a candidate for surgical resection and she might be interested ion robotic resection, but continues to be very anxious about surgery.  Other alternatives could be liver directed therapy and she was discussed in tumor Board.   She does have a history of iron deficiency and has had negative EGD and colonoscopy recently and her hemoglobin has recovered well  and she is no longer anemic.  We will need to continue to monitor this.  He has maintained a good hemoglobin with vitamin supplementation Finally, the last time I saw her was in 03/2021 and I have told her she will need to be seen on a regular basis, generally every 6 months if she wishes to continue on with treatment as she is aware that this treatment can cause severe side effects and without regular follow-up we may miss progressive disease.  The  patient voiced understanding.  Multiple questions were answered and she is agreeable with this plan.      Covid vaccine.  Booster shot    Cologuard or C-Scope  Dotatate PET scan  RTC after above done.  Gastrin pancreastatin levels every 3 months    40 minutes were spent in coordination of patient's care, record review and counseling.  More than 50% of the time was face-to-face.

## 2022-02-03 ENCOUNTER — PATIENT MESSAGE (OUTPATIENT)
Dept: NEUROLOGY | Facility: HOSPITAL | Age: 67
End: 2022-02-03
Payer: COMMERCIAL

## 2022-02-03 ENCOUNTER — INFUSION (OUTPATIENT)
Dept: INFUSION THERAPY | Facility: HOSPITAL | Age: 67
End: 2022-02-03
Attending: SURGERY
Payer: COMMERCIAL

## 2022-02-03 VITALS — HEIGHT: 64 IN | WEIGHT: 145.81 LBS | BODY MASS INDEX: 24.89 KG/M2

## 2022-02-03 DIAGNOSIS — C7B.02 METASTATIC MALIGNANT CARCINOID TUMOR TO LIVER: ICD-10-CM

## 2022-02-03 DIAGNOSIS — C7A.00 MALIGNANT CARCINOID TUMOR OF UNKNOWN PRIMARY SITE: Primary | ICD-10-CM

## 2022-02-03 PROCEDURE — 96372 THER/PROPH/DIAG INJ SC/IM: CPT

## 2022-02-03 PROCEDURE — 63600175 PHARM REV CODE 636 W HCPCS: Mod: JG | Performed by: SURGERY

## 2022-02-03 RX ORDER — LANREOTIDE ACETATE 120 MG/.5ML
120 INJECTION SUBCUTANEOUS
Status: DISCONTINUED | OUTPATIENT
Start: 2022-02-03 | End: 2022-02-03 | Stop reason: HOSPADM

## 2022-02-03 RX ORDER — LANREOTIDE ACETATE 120 MG/.5ML
120 INJECTION SUBCUTANEOUS
Status: CANCELLED | OUTPATIENT
Start: 2022-02-03

## 2022-02-03 RX ADMIN — LANREOTIDE ACETATE 120 MG: 120 INJECTION SUBCUTANEOUS at 09:02

## 2022-02-11 ENCOUNTER — OFFICE VISIT (OUTPATIENT)
Dept: PSYCHIATRY | Facility: CLINIC | Age: 67
End: 2022-02-11
Payer: COMMERCIAL

## 2022-02-11 DIAGNOSIS — F54 PSYCHOLOGICAL FACTORS AFFECTING MEDICAL CONDITION: ICD-10-CM

## 2022-02-11 DIAGNOSIS — C7B.02 METASTATIC MALIGNANT CARCINOID TUMOR TO LIVER: ICD-10-CM

## 2022-02-11 DIAGNOSIS — F32.A DEPRESSION, UNSPECIFIED DEPRESSION TYPE: ICD-10-CM

## 2022-02-11 DIAGNOSIS — F41.1 GENERALIZED ANXIETY DISORDER: Primary | ICD-10-CM

## 2022-02-11 PROCEDURE — 99999 PR PBB SHADOW E&M-EST. PATIENT-LVL II: ICD-10-PCS | Mod: PBBFAC,,, | Performed by: PSYCHOLOGIST

## 2022-02-11 PROCEDURE — 99999 PR PBB SHADOW E&M-EST. PATIENT-LVL II: CPT | Mod: PBBFAC,,, | Performed by: PSYCHOLOGIST

## 2022-02-11 PROCEDURE — 1159F PR MEDICATION LIST DOCUMENTED IN MEDICAL RECORD: ICD-10-PCS | Mod: CPTII,S$GLB,, | Performed by: PSYCHOLOGIST

## 2022-02-11 PROCEDURE — 1159F MED LIST DOCD IN RCRD: CPT | Mod: CPTII,S$GLB,, | Performed by: PSYCHOLOGIST

## 2022-02-11 PROCEDURE — 90837 PR PSYCHOTHERAPY W/PATIENT, 60 MIN: ICD-10-PCS | Mod: S$GLB,,, | Performed by: PSYCHOLOGIST

## 2022-02-11 PROCEDURE — 90837 PSYTX W PT 60 MINUTES: CPT | Mod: S$GLB,,, | Performed by: PSYCHOLOGIST

## 2022-02-11 NOTE — PROGRESS NOTES
INFORMED CONSENT: Lena Brantley   is known to this provider and identity was confirmed via NAME and .  The patient has been informed of the risks and benefits associated with engaging in psychotherapy, the handling of protected health information, the rights of privacy and the limits of confidentiality. The patient has also been informed of the importance of reporting any suicidal or homicidal ideation to this or any provider to ensure safety of all parties, and the Lena Brantley expressed understanding. The patient was agreeable to these terms and freely participates in individual psychotherapy.    PSYCHO-ONCOLOGY NOTE/ Individual Psychotherapy     Date: 2022   Site:  [facility]        Therapeutic Intervention: Met with patient.  Outpatient - Behavior modifying psychotherapy 60 min - CPT code 55979      Patient was last seen by me on 2022    Problem list  Patient Active Problem List   Diagnosis    Malignant carcinoid tumor of unknown primary site    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    Hypergastrinemia    Carcinoid (except of appendix)    Gastrinoma, malignant    Generalized anxiety disorder    Current moderate episode of major depressive disorder without prior episode    Secondary neuroendocrine tumor of liver    History of iron deficiency anemia    Elevated bilirubin    Vitamin D deficiency    Abnormal glucose       Chief complaint/reason for encounter: depression and anxiety   Met with patient to evaluate psychosocial adaptation to diagnosis/treatment/survivorship of Carcinoid    Current Medications  Current Outpatient Medications   Medication    busPIRone (BUSPAR) 7.5 MG tablet    lanreotide (SOMATULINE DEPOT) 120 mg/0.5 mL Syrg    pantoprazole (PROTONIX) 40 MG tablet     No current facility-administered medications for this visit.       Objective:  Lena Brantley arrived promptly for the session.   Ms. Brantley was independently ambulatory at the  time of session. The patient was fully cooperative throughout the session.  Appearance: age appropriate, appropriately  dressed, adequately  groomed  Behavior/Cooperation: friendly and cooperative  Speech: normal in rate, volume, and tone and appropriate quality, quantity and organization of sentences  Mood: anxious  Affect: mood congruent  Thought Process: goal-directed, logical  Thought Content: normal,  No delusions or paranoia; did not appear to be responding to internal stimuli during the session  Orientation: grossly intact  Memory: Grossly intact  Attention Span/Concentration: Attends to session without distraction; reports no difficulty  Fund of Knowledge: average  Estimate of Intelligence: average from verbal skills and history  Cognition: grossly intact  Insight: patient has awareness of illness; good insight into own behavior and behavior of others  Judgment: the patient's behavior is adequate to circumstances    Interval history and content of current session: Patient with improvement in grief. Discussed next steps in treatment and increasing BA.   Discussed diagnosis, treatment, prognosis, current adaptation to disease and treatment status and family's adaptation to disease and treatment status. Reports to be coping in a passive manner. Evaluated cognitive response, paying particular attention to negative intrusive thoughts of a persistent and detrimental nature. Thoughts of this type are in evidence with mild distress. Provided cognitive behavioral therapy to address negative cognitions. Identified and evaluated psychosocial and environmental stressors secondary to diagnosis and treatment.  Examined proactive behaviors that may be implemented to minimize or ameliorate psychosocial stressors secondary to diagnosis and treatment.     Risk parameters:   Patient reports no suicidal ideation  Patient reports no homicidal ideation  Patient reports no self-injurious behavior  Patient reports no violent  behavior   Safety needs:  None at this time      Verbal deficits: None     Patient's response to intervention:The patient's response to intervention is accepting, reluctant.     Progress toward goals and other mental status changes:  The patient's progress toward goals is fair .      Progress to date:Revise Objectives (Goals)      Goals from last visit: Attempted, not met     Patient reported outcomes:    Distress Score    Distress Score: 5        Practical Problems Physical Problems   : No Appearance: No   Housing: No Bathing / Dressing: No   Insurance / Financial: Yes Breathing: No    Transportation: No  Changes in Urination: No    Work / School: No  Constipation: No   Treatment Decisions: No  Diarrhea: No     Eating: No    Family Problems Fatigue: Yes    Dealing with Children: Yes Feeling Swollen: No    Dealing with Partner: No Fevers: No    Ability to Have Children: No  Getting Around: No       Indigestion: No     Memory / Concentration: No   Emotional Problems Mouth Sores: No    Depression: Yes  Nausea: No    Fears: Yes  Nose Dry / Congested: No    Nervousness: Yes  Pain: No    Sadness: Yes Sexual: No    Worry: Yes Skin Dry / Itchy: No    Loss of Interest in Usual Activities: No Sleep: Yes     Substance Abuse: No    Spiritual/Religions Concerns Tingling in Hands / Feet: No   Spritual / Roman Catholic Concerns: No         Other Problems              PHQ ANSWERS    Q1. Little interest or pleasure in doing things: (P) Not at all (02/10/22 2035)  Q2. Feeling down, depressed, or hopeless: (P) Several days (02/10/22 2035)  Q3. Trouble falling or staying asleep, or sleeping too much: (P) Several days (02/10/22 2035)  Q4. Feeling tired or having little energy: (P) Several days (02/10/22 2035)  Q5. Poor appetite or overeating: (P) Not at all (02/10/22 2035)  Q6. Feeling bad about yourself - or that you are a failure or have let yourself or your family down: (P) Several days (02/10/22 2035)  Q7. Trouble  concentrating on things, such as reading the newspaper or watching television: (P) Not at all (02/10/22 2035)  Q8. Moving or speaking so slowly that other people could have noticed. Or the opposite - being so fidgety or restless that you have been moving around a lot more than usual: (P) Not at all (02/10/22 2035)  Q9.  No SI    PHQ8 Score : (P) 4 (02/10/22 2035)  PHQ-9 Total Score: (P) 4 (02/10/22 2035)     MINH-7 Answers    GAD7 2/10/2022   1. Feeling nervous, anxious, or on edge? 1   2. Not being able to stop or control worrying? 1   3. Worrying too much about different things? 1   4. Trouble relaxing? 1   5. Being so restless that it is hard to sit still? 0   6. Becoming easily annoyed or irritable? 1   7. Feeling afraid as if something awful might happen? 1   MINH-7 Score 6     MINH-7 Score: (P) 6  Interpretation: (P) Mild Anxiety     Client Strengths: verbal, intelligent, successful, good social support, good insight, commitment to wellness, strong [place], strong cultural traditions     Diagnosis:     ICD-10-CM ICD-9-CM   1. Generalized anxiety disorder  F41.1 300.02   2. Depression, unspecified depression type  F32.A 311   3. 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  C7B.02 209.72   4. Psychological factors affecting medical condition  F54 316       Treatment Plan:individual psychotherapy  · Target symptoms: anxiety   · Why chosen therapy is appropriate versus another modality: relevant to diagnosis, patient responds to this modality, evidence based practice  · Outcome monitoring methods: self-report, observation, checklist/rating scale  · Therapeutic intervention type: behavior modifying psychotherapy  · Prognosis: Good      Behavioral goals:    Exercise:   Stress management: Engage in two meetup groups   Social engagement:   Nutrition:   Smoking Cessation:   Therapy:    Return to clinic: 1 month      Length of Service (minutes direct face-to-face contact): 60    Carmella Gurrola, PhD  Clinical  Psychologist  [place] License #[id number]  [place] License #[id number]

## 2022-02-23 NOTE — NURSING
Lanreotide Injection given to Right upper outer area of buttock. Pt tolerated well and reported no issues with injection. Pt reported no issues w/last injection. Pt education reviewed on Lanreotide, and triggers for carcinoid crisis. Pt states she is having some anxiety and depression but denies harming self or others. She states she has an appt with a . Pt verbalized understanding of date and time for next injection and verbalized understanding of Lanreotide education. Pt given [facility] yoga schedule for coping with anxiety. Pt states she has discussed anxiety w/physician.    
Stable

## 2022-02-24 ENCOUNTER — PATIENT MESSAGE (OUTPATIENT)
Dept: NEUROLOGY | Facility: HOSPITAL | Age: 67
End: 2022-02-24
Payer: COMMERCIAL

## 2022-03-02 ENCOUNTER — TELEPHONE (OUTPATIENT)
Dept: NEUROLOGY | Facility: HOSPITAL | Age: 67
End: 2022-03-02
Payer: COMMERCIAL

## 2022-03-02 NOTE — TELEPHONE ENCOUNTER
----- Message from Charisma Murry sent at 3/2/2022  2:10 PM CST -----  Contact: [phone]  Type:  Needs Medical Advice    Who Called: pt called  Would the patient rather a call back or a response via [facility]? Call back  Best Call Back Number: [phone]  Additional Information: Pt calling in regards to her PET scan. Please call pt to advice

## 2022-03-02 NOTE — TELEPHONE ENCOUNTER
Spoke with patient and her insurance still did not approve and a peer to peer has been scheduled. Patient verbalized understanding and we will reschedule once approved.

## 2022-03-03 ENCOUNTER — INFUSION (OUTPATIENT)
Dept: INFUSION THERAPY | Facility: HOSPITAL | Age: 67
End: 2022-03-03
Attending: SURGERY
Payer: COMMERCIAL

## 2022-03-03 VITALS — WEIGHT: 145.81 LBS | BODY MASS INDEX: 24.89 KG/M2 | HEIGHT: 64 IN

## 2022-03-03 DIAGNOSIS — C7A.00 MALIGNANT CARCINOID TUMOR OF UNKNOWN PRIMARY SITE: Primary | ICD-10-CM

## 2022-03-03 DIAGNOSIS — C7B.02 METASTATIC MALIGNANT CARCINOID TUMOR TO LIVER: ICD-10-CM

## 2022-03-03 PROCEDURE — 96372 THER/PROPH/DIAG INJ SC/IM: CPT

## 2022-03-03 PROCEDURE — 63600175 PHARM REV CODE 636 W HCPCS: Mod: JG | Performed by: SURGERY

## 2022-03-03 RX ORDER — LANREOTIDE ACETATE 120 MG/.5ML
120 INJECTION SUBCUTANEOUS
Status: DISCONTINUED | OUTPATIENT
Start: 2022-03-03 | End: 2022-03-03 | Stop reason: HOSPADM

## 2022-03-03 RX ORDER — LANREOTIDE ACETATE 120 MG/.5ML
120 INJECTION SUBCUTANEOUS
Status: CANCELLED | OUTPATIENT
Start: 2022-03-03

## 2022-03-03 RX ADMIN — LANREOTIDE ACETATE 120 MG: 120 INJECTION SUBCUTANEOUS at 10:03

## 2022-03-09 ENCOUNTER — TELEPHONE (OUTPATIENT)
Dept: NEUROLOGY | Facility: HOSPITAL | Age: 67
End: 2022-03-09
Payer: COMMERCIAL

## 2022-03-10 DIAGNOSIS — C7B.02 METASTATIC MALIGNANT CARCINOID TUMOR TO LIVER: ICD-10-CM

## 2022-03-10 DIAGNOSIS — C7A.00 MALIGNANT CARCINOID TUMOR OF UNKNOWN PRIMARY SITE: Primary | ICD-10-CM

## 2022-03-10 DIAGNOSIS — C7B.8 SECONDARY NEUROENDOCRINE TUMOR OF LIVER: ICD-10-CM

## 2022-03-11 ENCOUNTER — TELEPHONE (OUTPATIENT)
Dept: INFUSION THERAPY | Facility: HOSPITAL | Age: 67
End: 2022-03-11
Payer: COMMERCIAL

## 2022-03-14 ENCOUNTER — TELEPHONE (OUTPATIENT)
Dept: INFUSION THERAPY | Facility: HOSPITAL | Age: 67
End: 2022-03-14
Payer: COMMERCIAL

## 2022-03-25 ENCOUNTER — OFFICE VISIT (OUTPATIENT)
Dept: PSYCHIATRY | Facility: CLINIC | Age: 67
End: 2022-03-25
Payer: COMMERCIAL

## 2022-03-25 DIAGNOSIS — F41.1 GENERALIZED ANXIETY DISORDER: Primary | ICD-10-CM

## 2022-03-25 DIAGNOSIS — F41.0 PANIC ATTACKS: ICD-10-CM

## 2022-03-25 DIAGNOSIS — F32.A DEPRESSION, UNSPECIFIED DEPRESSION TYPE: ICD-10-CM

## 2022-03-25 DIAGNOSIS — C7A.00 MALIGNANT CARCINOID TUMOR OF UNKNOWN PRIMARY SITE: ICD-10-CM

## 2022-03-25 PROCEDURE — 1159F PR MEDICATION LIST DOCUMENTED IN MEDICAL RECORD: ICD-10-PCS | Mod: CPTII,S$GLB,, | Performed by: PSYCHOLOGIST

## 2022-03-25 PROCEDURE — 99999 PR PBB SHADOW E&M-EST. PATIENT-LVL II: CPT | Mod: PBBFAC,,, | Performed by: PSYCHOLOGIST

## 2022-03-25 PROCEDURE — 90837 PSYTX W PT 60 MINUTES: CPT | Mod: S$GLB,,, | Performed by: PSYCHOLOGIST

## 2022-03-25 PROCEDURE — 99999 PR PBB SHADOW E&M-EST. PATIENT-LVL II: ICD-10-PCS | Mod: PBBFAC,,, | Performed by: PSYCHOLOGIST

## 2022-03-25 PROCEDURE — 1159F MED LIST DOCD IN RCRD: CPT | Mod: CPTII,S$GLB,, | Performed by: PSYCHOLOGIST

## 2022-03-25 PROCEDURE — 90837 PR PSYCHOTHERAPY W/PATIENT, 60 MIN: ICD-10-PCS | Mod: S$GLB,,, | Performed by: PSYCHOLOGIST

## 2022-03-25 NOTE — PROGRESS NOTES
INFORMED CONSENT: Lena Brantley   is known to this provider and identity was confirmed via NAME and .  The patient has been informed of the risks and benefits associated with engaging in psychotherapy, the handling of protected health information, the rights of privacy and the limits of confidentiality. The patient has also been informed of the importance of reporting any suicidal or homicidal ideation to this or any provider to ensure safety of all parties, and the Lena Brantley expressed understanding. The patient was agreeable to these terms and freely participates in individual psychotherapy.    PSYCHO-ONCOLOGY NOTE/ Individual Psychotherapy     Date: 3/25/2022   [facility]        Therapeutic Intervention: Met with patient.  Outpatient - Behavior modifying psychotherapy 60 min - CPT code 25970      Patient was last seen by me on 2022    Problem list  Patient Active Problem List   Diagnosis    Malignant carcinoid tumor of unknown primary site    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    Hypergastrinemia    Carcinoid (except of appendix)    Gastrinoma, malignant    Generalized anxiety disorder    Current moderate episode of major depressive disorder without prior episode    Secondary neuroendocrine tumor of liver    History of iron deficiency anemia    Elevated bilirubin    Vitamin D deficiency    Abnormal glucose       Chief complaint/reason for encounter: depression and anxiety   Met with patient to evaluate psychosocial adaptation to diagnosis/treatment/survivorship of Carcinoid    Current Medications  Current Outpatient Medications   Medication    busPIRone (BUSPAR) 7.5 MG tablet    lanreotide (SOMATULINE DEPOT) 120 mg/0.5 mL Syrg    pantoprazole (PROTONIX) 40 MG tablet     No current facility-administered medications for this visit.       Objective:  Lena Brantley arrived promptly for the session.  Ms. Brantley was independently ambulatory at the  time of session. The patient was fully cooperative throughout the session.  Appearance: age appropriate, appropriately  dressed, adequately  groomed  Behavior/Cooperation: friendly and cooperative  Speech: normal in rate, volume, and tone and appropriate quality, quantity and organization of sentences  Mood: anxious  Affect: mood congruent  Thought Process: goal-directed, logical  Thought Content: normal,  No delusions or paranoia; did not appear to be responding to internal stimuli during the session  Orientation: grossly intact  Memory: Grossly intact  Attention Span/Concentration: Attends to session without distraction; reports no difficulty  Fund of Knowledge: average  Estimate of Intelligence: average from verbal skills and history  Cognition: grossly intact  Insight: patient has awareness of illness; good insight into own behavior and behavior of others  Judgment: the patient's behavior is adequate to circumstances    Interval history and content of current session: Patient with anxious-avoidance continues to struggle with implementing skills from therapy. Developed new panic attacks- occurred 2xs. Discussed STOP.TIP skills. Reviewed impact of avoidance.  Discussed diagnosis, treatment, prognosis, current adaptation to disease and treatment status and family's adaptation to disease and treatment status. Reports to be coping with significant difficulty. Evaluated cognitive response, paying particular attention to negative intrusive thoughts of a persistent and detrimental nature. Thoughts of this type are in evidence with moderate distress. Provided cognitive behavioral therapy to address negative cognitions. Identified and evaluated psychosocial and environmental stressors secondary to diagnosis and treatment.  Examined proactive behaviors that may be implemented to minimize or ameliorate psychosocial stressors secondary to diagnosis and treatment.     Risk parameters:   Patient reports no suicidal  ideation  Patient reports no homicidal ideation  Patient reports no self-injurious behavior  Patient reports no violent behavior   Safety needs:  None at this time      Verbal deficits: None     Patient's response to intervention:The patient's response to intervention is accepting.     Progress toward goals and other mental status changes:  The patient's progress toward goals is fair .      Progress to date:No Progress - Continue Objectives      Goals from last visit: Attempted, partially met     Patient reported outcomes:    Distress Thermometer:   Distress Score    Distress Score: 8        Practical Problems Physical Problems   : No Appearance: Yes   Housing: No Bathing / Dressing: Yes   Insurance / Financial: Yes Breathing: No    Transportation: No  Changes in Urination: No    Work / School: Yes  Constipation: No   Treatment Decisions: Yes  Diarrhea: No     Eating: Yes    Family Problems Fatigue: Yes    Dealing with Children: No Feeling Swollen: No    Dealing with Partner: No Fevers: No    Ability to Have Children: No  Getting Around: No       Indigestion: No     Memory / Concentration: Yes   Emotional Problems Mouth Sores: No    Depression: Yes  Nausea: No    Fears: Yes  Nose Dry / Congested: No    Nervousness: Yes  Pain: No    Sadness: Yes Sexual: No    Worry: Yes Skin Dry / Itchy: No    Loss of Interest in Usual Activities: No Sleep: Yes     Substance Abuse: No    Spiritual/Religions Concerns Tingling in Hands / Feet: No   Spritual / Evangelical Concerns: No         Other Problems              PHQ ANSWERS    Q1. Little interest or pleasure in doing things: (P) Several days (03/25/22 0907)  Q2. Feeling down, depressed, or hopeless: (P) Nearly every day (03/25/22 0907)  Q3. Trouble falling or staying asleep, or sleeping too much: (P) Nearly every day (03/25/22 0907)  Q4. Feeling tired or having little energy: (P) Nearly every day (03/25/22 0907)  Q5. Poor appetite or overeating: (P) More than half the  days (03/25/22 0907)  Q6. Feeling bad about yourself - or that you are a failure or have let yourself or your family down: (P) Several days (03/25/22 0907)  Q7. Trouble concentrating on things, such as reading the newspaper or watching television: (P) Several days (03/25/22 0907)  Q8. Moving or speaking so slowly that other people could have noticed. Or the opposite - being so fidgety or restless that you have been moving around a lot more than usual: (P) Several days (03/25/22 0907)  Q9.  No SI    PHQ8 Score : (P) 15 (03/25/22 0907)  PHQ-9 Total Score: (P) 15 (03/25/22 0907)     MINH-7 Answers    GAD7 3/25/2022   1. Feeling nervous, anxious, or on edge? 3   2. Not being able to stop or control worrying? 3   3. Worrying too much about different things? 3   4. Trouble relaxing? 1   5. Being so restless that it is hard to sit still? 0   6. Becoming easily annoyed or irritable? 3   7. Feeling afraid as if something awful might happen? 3   MINH-7 Score 16     MINH-7 Score: (P) 16  Interpretation: (P) Severe Anxiety     Client Strengths: verbal, intelligent, successful, good social support, good insight, commitment to wellness, strong [place], strong cultural traditions     Diagnosis:     ICD-10-CM ICD-9-CM   1. Generalized anxiety disorder  F41.1 300.02   2. Malignant carcinoid tumor of unknown primary site  C7A.00 209.20   3. Depression, unspecified depression type  F32.A 311     Treatment Plan:individual psychotherapy and medication management by physician  · Target symptoms: depression, anxiety   · Why chosen therapy is appropriate versus another modality: relevant to diagnosis, patient responds to this modality, evidence based practice  · Outcome monitoring methods: self-report, observation, checklist/rating scale  · Therapeutic intervention type: behavior modifying psychotherapy  · Prognosis: Good      Behavioral goals:   Investigate Volunteer Opportunities  Find [facility]  Consistent medication use as prescribed by  PCP    Return to clinic: 3 weeks    Next Session:  Cognitive Screener    Length of Service (minutes direct face-to-face contact): 60    Carmella Gurrola, PhD  Clinical Psychologist  [place] License #[id number]  [place] License #[id number]

## 2022-03-28 ENCOUNTER — TELEPHONE (OUTPATIENT)
Dept: HEMATOLOGY/ONCOLOGY | Facility: CLINIC | Age: 67
End: 2022-03-28
Payer: COMMERCIAL

## 2022-03-28 NOTE — TELEPHONE ENCOUNTER
Phoned pt to offer group yoga classes. Pt declined due to work schedule. Offered integrative consult to discuss other opportunities of support. Pt declined. Provided call back number.

## 2022-03-28 NOTE — TELEPHONE ENCOUNTER
----- Message from Carmella Gurrola, PhD sent at 3/25/2022 11:33 AM CDT -----  Regarding: Yoga  Heyt here this patient is interested in Yoga!

## 2022-03-31 ENCOUNTER — INFUSION (OUTPATIENT)
Dept: INFUSION THERAPY | Facility: HOSPITAL | Age: 67
End: 2022-03-31
Attending: SURGERY
Payer: COMMERCIAL

## 2022-03-31 ENCOUNTER — HOSPITAL ENCOUNTER (OUTPATIENT)
Dept: RADIOLOGY | Facility: HOSPITAL | Age: 67
Discharge: HOME | End: 2022-03-31
Attending: SURGERY
Payer: COMMERCIAL

## 2022-03-31 VITALS — HEIGHT: 64 IN | BODY MASS INDEX: 24.89 KG/M2 | WEIGHT: 145.81 LBS

## 2022-03-31 DIAGNOSIS — C7B.8 SECONDARY NEUROENDOCRINE TUMOR OF LIVER: ICD-10-CM

## 2022-03-31 DIAGNOSIS — C7B.02 METASTATIC MALIGNANT CARCINOID TUMOR TO LIVER: ICD-10-CM

## 2022-03-31 DIAGNOSIS — C7A.00 MALIGNANT CARCINOID TUMOR OF UNKNOWN PRIMARY SITE: ICD-10-CM

## 2022-03-31 DIAGNOSIS — C7A.00 MALIGNANT CARCINOID TUMOR OF UNKNOWN PRIMARY SITE: Primary | ICD-10-CM

## 2022-03-31 PROCEDURE — 63600175 PHARM REV CODE 636 W HCPCS: Mod: JG | Performed by: SURGERY

## 2022-03-31 PROCEDURE — 78815 PET IMAGE W/CT SKULL-THIGH: CPT | Mod: 26,PS,, | Performed by: STUDENT IN AN ORGANIZED HEALTH CARE EDUCATION/TRAINING PROGRAM

## 2022-03-31 PROCEDURE — 78815 PET IMAGE W/CT SKULL-THIGH: CPT | Mod: TC

## 2022-03-31 PROCEDURE — 96372 THER/PROPH/DIAG INJ SC/IM: CPT

## 2022-03-31 PROCEDURE — 78815 NM PET CU64 DOTATATE, SKULL TO MID THIGH: ICD-10-PCS | Mod: 26,PS,, | Performed by: STUDENT IN AN ORGANIZED HEALTH CARE EDUCATION/TRAINING PROGRAM

## 2022-03-31 RX ORDER — LANREOTIDE ACETATE 120 MG/.5ML
120 INJECTION SUBCUTANEOUS
Status: DISCONTINUED | OUTPATIENT
Start: 2022-03-31 | End: 2022-03-31 | Stop reason: HOSPADM

## 2022-03-31 RX ORDER — LANREOTIDE ACETATE 120 MG/.5ML
120 INJECTION SUBCUTANEOUS
Status: CANCELLED | OUTPATIENT
Start: 2022-03-31

## 2022-03-31 RX ADMIN — LANREOTIDE ACETATE 120 MG: 120 INJECTION SUBCUTANEOUS at 02:03

## 2022-03-31 NOTE — NURSING
Patient tolerated injection well. All questions and concerns addressed. Next appointment scheduled.

## 2022-04-22 ENCOUNTER — OFFICE VISIT (OUTPATIENT)
Dept: NEUROLOGY | Facility: HOSPITAL | Age: 67
End: 2022-04-22
Attending: SURGERY
Payer: COMMERCIAL

## 2022-04-22 VITALS
HEART RATE: 64 BPM | BODY MASS INDEX: 24.6 KG/M2 | SYSTOLIC BLOOD PRESSURE: 120 MMHG | DIASTOLIC BLOOD PRESSURE: 81 MMHG | OXYGEN SATURATION: 98 % | WEIGHT: 143.31 LBS

## 2022-04-22 DIAGNOSIS — C7B.02 METASTATIC MALIGNANT CARCINOID TUMOR TO LIVER: Primary | ICD-10-CM

## 2022-04-22 PROCEDURE — 99213 OFFICE O/P EST LOW 20 MIN: CPT | Performed by: SURGERY

## 2022-04-22 NOTE — PATIENT INSTRUCTIONS
Tumor Markers: every 3 months  --- due June 2022    Consult: with Dr. Bruno about proceeding with TACE    Tumor Board: will discuss your case at Tumor Board to see if TACE is needed at this time.

## 2022-04-22 NOTE — PROGRESS NOTES
NOLANETS:  [facility]  A collaboration between [facility] and [facility]    PATIENT: Lena Brantley  MRN: [id number]  DATE: 4/22/2022      Diagnosis:   1. 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        Chief Complaint: No chief complaint on file.      Oncologic History:      Oncologic History Neuroendocrine tumor unknown primary diagnosed 09/2016  Metastatic disease to liver at presentation    Oncologic Treatment Lanreotide 10/2016    Pathology Well-differentiated neuroendocrine tumor, Ki-67 1%          Subjective:    Interval History  : Ms. Brantley is a 66 y.o. female who who is seen in follow-up for a neuroendocrine tumor of unknown primary.  I have not seen her since 03/2021.  She has  recent images. She states that she generally has been feeling well.  She has occasional indigestion and diarrhea.  She has refused surgery in the past although has been recommended on numerous occasions.  She has lost some weight but states that she had been trying to do this and previously had been eating a bag of Doritos daily but has stopped doing this.  She remains on treatment with lanreotide and tolerating well.  She does admit to some anxiety about her diagnosis.  She has no other new complaints.  He is still refusing surgery but may be willing to undergo chemoembolization.    Her history dates to 9/2016 when she was undergoing workup for episodic nausea, vomiting and diarrhea.  A CT scan was performed showing a solitary and had seeing liver mass.  A biopsy was performed showing a well-differentiated neuroendocrine tumor, Ki-67 of 1%.  Conventional imaging an octreotide scan did not reveal a primary site disease.  Gene expression profiling was performed indicating a possible primary site pancreas.  She was started on Lanreotide in 2016.  She was offered surgical resection and also liver directed therapy and  declined.    Tumor board  DISCUSSION:     Mass in central segment 4 is stable since 9/2019. Can TACE this if she is syndromic.     BOARD RECOMMENDATIONS:      appt with DR. Bruno  TACE    Past Medical History:   Past Medical History:   Diagnosis Date    Anxiety     Cancer     Depression     Elevated bilirubin 9/9/2020    Fatigue     GERD (gastroesophageal reflux disease)     History of iron deficiency anemia 9/9/2020    Hx of psychiatric care     Liver mass 06/2016    Mass of colon 06/2016    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     Psychiatric problem     Secondary neuroendocrine tumor of liver 9/9/2020    Sickle cell trait     Sleep difficulties        Past Surgical HIstory:   No past surgical history on file.    Family History:   Family History   Problem Relation Age of Onset    Cancer Maternal Grandmother     Cancer Other     Depression Mother     Anxiety disorder Mother        Social History:  reports that she has never smoked. She has never used smokeless tobacco. She reports that she does not drink alcohol and does not use drugs.    Allergies:  Review of patient's allergies indicates:   Allergen Reactions    Epinephrine Anaphylaxis     Can Cause Carcinoid Crisis       Medications:  Current Outpatient Medications   Medication Sig Dispense Refill    busPIRone (BUSPAR) 7.5 MG tablet Take 1 tablet (7.5 mg total) by mouth every evening. 90 tablet 3    lanreotide (SOMATULINE DEPOT) 120 mg/0.5 mL Syrg Inject 120 mg into the skin every 28 days.      pantoprazole (PROTONIX) 40 MG tablet TAKE 1 TABLET BY MOUTH EVERY DAY 90 tablet 0     No current facility-administered medications for this visit.     Review of Systems   Constitutional: Positive for weight loss.   Respiratory: Negative for cough and shortness of breath.    Cardiovascular: Negative for chest pain and leg swelling.   Gastrointestinal: Positive for diarrhea. Negative for nausea and vomiting.   Skin: Negative for rash.    Neurological: Negative for dizziness and headaches.   Psychiatric/Behavioral: The patient is nervous/anxious.          ECOG Performance Status: 0   Objective:      Vitals:   There were no vitals filed for this visit.  BMI: There is no height or weight on file to calculate BMI.    Physical Exam   Constitutional: She is oriented to person, place, and time. She appears well-developed and well-nourished. No distress.   HENT:   Head: Normocephalic.   Mouth/Throat: No oropharyngeal exudate.   Eyes: EOM are normal. No scleral icterus.   Neck: Neck supple. No tracheal deviation present. No thyromegaly present.   Cardiovascular: Normal rate and regular rhythm.   Pulmonary/Chest: Effort normal and breath sounds normal. No respiratory distress. She has no wheezes. She has no rales.   Abdominal: Soft. She exhibits no distension and no mass. There is no tenderness. There is no rebound and no guarding.   Musculoskeletal: Normal range of motion. She exhibits no edema.   Lymphadenopathy:     She has no cervical adenopathy.   Neurological: She is alert and oriented to person, place, and time. No cranial nerve deficit.   Skin: Skin is warm and dry.   Psychiatric: She has a normal mood and affect.       Laboratory Data:  No visits with results within 1 Month(s) from this visit.   Latest known visit with results is:   Lab Visit on 03/03/2022   Component Date Value Ref Range Status    Gastrin 03/03/2022 388 (A) <100 pg/mL Final    Pancreastatin 03/03/2022 80  10 - 135 pg/mL Final    Comment: -------------------ADDITIONAL INFORMATION-------------------  This radioimmunoassay was developed and its  performance characteristics determined by  [facility]. It has not been  cleared or approved by the FDA and such  approval or clearance is not required at this  time. Values obtained with different methods,  different laboratories or with kits cannot  be used interchangeably with the results on  this report. The results cannot  be interpreted  as absolute evidence of the presence or absence  of malignant disease.    Test Performed by:  [facility]  [address] [place] [address]      5-HIAA, Plasma (Neuroend) 03/03/2022 10  Up to 22 ng/mL Final    Comment: -------------------ADDITIONAL INFORMATION-------------------  This GCMS assay was developed and its performance   characteristics determined by [facility].   It has not been cleared or approved by the FDA and such   approval or clearance is not required at this time. Values   obtained with different methods, different laboratories or   with kits cannot be used interchangeably with the results   on this report. The results cannot be interpreted as   absolute evidence of the presence or absence of malignant   disease.    Test Performed by:  [facility] [id number] [place] [address]      WBC 03/03/2022 3.53 (A) 3.90 - 12.70 K/uL Final    RBC 03/03/2022 4.92  4.00 - 5.40 M/uL Final    Hemoglobin 03/03/2022 14.1  12.0 - 16.0 g/dL Final    Hematocrit 03/03/2022 42.5  37.0 - 48.5 % Final    MCV 03/03/2022 86  82 - 98 fL Final    MCH 03/03/2022 28.7  27.0 - 31.0 pg Final    MCHC 03/03/2022 33.2  32.0 - 36.0 g/dL Final    RDW 03/03/2022 13.3  11.5 - 14.5 % Final    Platelets 03/03/2022 221  150 - 450 K/uL Final    MPV 03/03/2022 10.9  9.2 - 12.9 fL Final    Immature Granulocytes 03/03/2022 0.3  0.0 - 0.5 % Final    Gran # (ANC) 03/03/2022 2.1  1.8 - 7.7 K/uL Final    Immature Grans (Abs) 03/03/2022 0.01  0.00 - 0.04 K/uL Final    Comment: Mild elevation in immature granulocytes is non specific and   can be seen in a variety of conditions including stress response,   acute inflammation, trauma and pregnancy. Correlation with other   laboratory and clinical findings is essential.      Lymph # 03/03/2022 0.9 (A) 1.0 - 4.8 K/uL Final    Mono # 03/03/2022 0.4  0.3 - 1.0 K/uL Final    Eos # 03/03/2022 0.1  0.0 - 0.5 K/uL Final     Baso # 03/03/2022 0.04  0.00 - 0.20 K/uL Final    nRBC 03/03/2022 0  0 /100 WBC Final    Gran % 03/03/2022 59.7  38.0 - 73.0 % Final    Lymph % 03/03/2022 26.1  18.0 - 48.0 % Final    Mono % 03/03/2022 10.8  4.0 - 15.0 % Final    Eosinophil % 03/03/2022 2.0  0.0 - 8.0 % Final    Basophil % 03/03/2022 1.1  0.0 - 1.9 % Final    Differential Method 03/03/2022 Automated   Final    Sodium 03/03/2022 138  136 - 145 mmol/L Final    Potassium 03/03/2022 4.1  3.5 - 5.1 mmol/L Final    Chloride 03/03/2022 102  95 - 110 mmol/L Final    CO2 03/03/2022 28  23 - 29 mmol/L Final    Glucose 03/03/2022 124 (A) 70 - 110 mg/dL Final    BUN 03/03/2022 13  8 - 23 mg/dL Final    Creatinine 03/03/2022 1.0  0.5 - 1.4 mg/dL Final    Calcium 03/03/2022 9.9  8.7 - 10.5 mg/dL Final    Total Protein 03/03/2022 7.5  6.0 - 8.4 g/dL Final    Albumin 03/03/2022 3.8  3.5 - 5.2 g/dL Final    Total Bilirubin 03/03/2022 1.5 (A) 0.1 - 1.0 mg/dL Final    Comment: For infants and newborns, interpretation of results should be based  on gestational age, weight and in agreement with clinical  observations.    Premature Infant recommended reference ranges:  Up to 24 hours.............<8.0 mg/dL  Up to 48 hours............<12.0 mg/dL  3-5 days..................<15.0 mg/dL  6-29 days.................<15.0 mg/dL      Alkaline Phosphatase 03/03/2022 96  55 - 135 U/L Final    AST 03/03/2022 21  10 - 40 U/L Final    ALT 03/03/2022 20  10 - 44 U/L Final    Anion Gap 03/03/2022 8  8 - 16 mmol/L Final    eGFR if African American 03/03/2022 >60  >60 mL/min/1.73 m^2 Final    eGFR if non African American 03/03/2022 60  >60 mL/min/1.73 m^2 Final    Comment: Calculation used to obtain the estimated glomerular filtration  rate (eGFR) is the CKD-EPI equation.       Serotonin 03/03/2022 101  <=230 ng/mL Final    Comment: -------------------ADDITIONAL INFORMATION-------------------  This test was developed and its performance characteristics    determined by [facility] in a manner consistent with CLIA   requirements. This test has not been cleared or approved by   the U.S. Food and Drug Administration.    Test Performed by:  [facility] [place] [address]  : Agapito Akers M.D. Ph.D.; CLIA# [id number]      Parietal Cell Ab 03/03/2022 Negative 1:20  Negative Final    Folate 03/03/2022 15.5  4.0 - 24.0 ng/mL Final    Intrinsic Factor 03/03/2022 Positive  Negative Final    AIF Comment 03/03/2022 SEE BELOW   Final    Comment: Free B12 resulting from recent B12 injection can cause   false elevation of Intrinsic Factor Blocking Antibody   (IFBA) in this assay. If this is suspected, the Intrinsic   Factor Blocking Antibody (IFBA) test should be repeated   after at least two weeks from cessation of B12 injections   in order to confirm the elevated IFBA result.    Test Performed by:  [facility] [place] [address]  : Agapito Akers M.D. Ph.D.; CLIA# [id number]         Imaging:     CT 3/90/21:     Date of prior examination for comparison: 09/02/2020     Lesion 1: Left hepatic lobe lesion.  2.9 cm. Series 3 image 17.  Prior measurement 3.2 cm.     Lesion 2: organ (max 2 lesions per organ). current measurement - long axis for mass (minimum baseline 1 cm), short axis for node (minimum baseline 1.5)    CT 09/02/2020  COMPARISON:  CT abdomen pelvis with contrast dated 09/20/2019 and PET-CT dated 04/12/2019.     FINDINGS:  Limited images chest are unremarkable.     Abdomen and pelvis:     Liver is nonenlarged.  Index lesion:     *heterogeneous hypoattenuating lesion within the left hepatic lobe (segment IV), similar to slightly decreased measuring 3.2 cm (series 3, image 16), previously 3.7 cm.     No definite new liver lesion.     The hepatic veins and main portal vein are patent.  Small stones versus  debris in the gallbladder.  No biliary dilatation.  The spleen, pancreas and adrenal glands appear normal.  Similar hypodense cortical lesion in the left upper renal pole, too small for definitive characterization though possible cyst.  Hydronephrosis.  Urinary bladder is unremarkable.  The partially calcified uterine fibroid.  No suspicious adnexal lesion.     The GI tract and appendix are normal in caliber.  No free fluid or adenopathy.  Soft tissue gluteal nodules as can be seen with sequela of prior injection.  Stable sclerotic focus of the right iliac wing.  No aggressive osseous lesion.     Impression:     Redemonstration of left hepatic lobe lesion, similar to slightly decreased in size.  Otherwise, no significant interval detrimental change.     Additional findings as above.     RECIST SUMMARY:     Date of prior examination for comparison: CT abdomen pelvis with contrast dated 09/20/2019     Lesion 1: Left hepatic lobe.  3.2 cm.  Series 3, image 16.  Previously 3.7 cm.  3/8/2019:        CT 1/6/22:  left renal cyst.     RECIST SUMMARY:     Date of prior examination for comparison: 03/09/2021     Lesion 1: Left hepatic lobe lesion.  3.0 cm. Series 5 image 22.  Prior measurement 2.9       No significant detrimental interval change when compared to prior exam.     Stable left hepatic lobe lesion.  No new focal lesions.     dotate PET 3/31/22               Assessment:       1. 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      COVID-19 Discussion:     I had long discussion with patient and any applicable family about the COVID-19 coronavirus epidemic and the recommended precautions with regard to cancer and/or hematology patients. I have re-iterated the CDC recommendations for adequate hand washing, use of hand -like products, and coughing into elbow, etc. In addition, especially for our patients who are on chemotherapy and/or our otherwise immunocompromised patients, I have recommended avoidance of  crowds, including movie theaters, restaurants, churches, etc. I have recommended avoidance of any sick or symptomatic family members and/or friends. I have also recommended avoidance of any raw and unwashed food products, and general avoidance of food items that have not been prepared by themselves. The patient has been asked to call us immediately with any symptom developments, issues, questions or other general concerns.   Patient is about ready to have her COVID booster shot       Plan:       I had a long conversation with Mrs. Brantley and have reviewed her images and labs with her today.  Clinically, she is doing well and review of her old CT shows a mild decrease in the size of her tumor within the liver.  This does not demonstrate any evidence of extrahepatic disease.  She has previously undergone a gallium 68 PET-CT which was negative except the liver 2019.  She remains on treatment with lanreotide in tolerating well.  She did discuss other roles for treatment so that she could potentially get off lanreotide and I have told her in the absence of definitive therapy I would recommend we continue on with lanreotide.  We had previously discussed her case and thought that she may be a candidate for surgical resection and she might be interested ion robotic resection, but continues to be very anxious about surgery.  Other alternatives could be liver directed therapy and she was discussed in tumor Board.   She does have a history of iron deficiency and has had negative EGD and colonoscopy recently and her hemoglobin has recovered well  and she is no longer anemic.  We will need to continue to monitor this.  He has maintained a good hemoglobin with vitamin supplementation Finally,  I have told her she will need to be seen on a regular basis, generally every 6 months if she wishes to continue on with treatment as she is aware that this treatment can cause severe side effects and without regular follow-up we may miss  progressive disease.  The patient voiced understanding.  Multiple questions were answered and she is agreeable to proceed with TACE, but is not willing to undergo surgical resection/exploration.    Gastrin pancreastatin levels every 3 months    Patient has finally agreed to proceed with chemoembolization has questions about whether to do this before or after she qualifies for Medicare.  Referred to Dr. Bruno to proceed with TACE      RTC after above done.  40 minutes were spent in coordination of patient's care, record review and counseling.  More than 50% of the time was face-to-face.

## 2022-04-28 ENCOUNTER — INFUSION (OUTPATIENT)
Dept: INFUSION THERAPY | Facility: HOSPITAL | Age: 67
End: 2022-04-28
Attending: SURGERY
Payer: COMMERCIAL

## 2022-04-28 VITALS — BODY MASS INDEX: 24.46 KG/M2 | HEIGHT: 64 IN | WEIGHT: 143.31 LBS

## 2022-04-28 DIAGNOSIS — C7A.00 MALIGNANT CARCINOID TUMOR OF UNKNOWN PRIMARY SITE: Primary | ICD-10-CM

## 2022-04-28 DIAGNOSIS — C7B.02 METASTATIC MALIGNANT CARCINOID TUMOR TO LIVER: ICD-10-CM

## 2022-04-28 PROCEDURE — 96372 THER/PROPH/DIAG INJ SC/IM: CPT

## 2022-04-28 PROCEDURE — 63600175 PHARM REV CODE 636 W HCPCS: Mod: JG | Performed by: SURGERY

## 2022-04-28 RX ORDER — LANREOTIDE ACETATE 120 MG/.5ML
120 INJECTION SUBCUTANEOUS
Status: DISCONTINUED | OUTPATIENT
Start: 2022-04-28 | End: 2022-04-28 | Stop reason: HOSPADM

## 2022-04-28 RX ADMIN — LANREOTIDE ACETATE 120 MG: 120 INJECTION SUBCUTANEOUS at 10:04

## 2022-04-29 ENCOUNTER — PATIENT MESSAGE (OUTPATIENT)
Dept: PSYCHIATRY | Facility: CLINIC | Age: 67
End: 2022-04-29
Payer: COMMERCIAL

## 2022-04-29 ENCOUNTER — OFFICE VISIT (OUTPATIENT)
Dept: PSYCHIATRY | Facility: CLINIC | Age: 67
End: 2022-04-29
Payer: COMMERCIAL

## 2022-04-29 DIAGNOSIS — F41.1 GENERALIZED ANXIETY DISORDER: ICD-10-CM

## 2022-04-29 DIAGNOSIS — F54 PSYCHOLOGICAL FACTORS AFFECTING MEDICAL CONDITION: Primary | ICD-10-CM

## 2022-04-29 DIAGNOSIS — F32.1 CURRENT MODERATE EPISODE OF MAJOR DEPRESSIVE DISORDER WITHOUT PRIOR EPISODE: ICD-10-CM

## 2022-04-29 DIAGNOSIS — C7A.00 MALIGNANT CARCINOID TUMOR OF UNKNOWN PRIMARY SITE: ICD-10-CM

## 2022-04-29 PROCEDURE — 90837 PSYTX W PT 60 MINUTES: CPT | Mod: S$GLB,,, | Performed by: PSYCHOLOGIST

## 2022-04-29 PROCEDURE — 90837 PR PSYCHOTHERAPY W/PATIENT, 60 MIN: ICD-10-PCS | Mod: S$GLB,,, | Performed by: PSYCHOLOGIST

## 2022-04-29 PROCEDURE — 99999 PR PBB SHADOW E&M-EST. PATIENT-LVL II: CPT | Mod: PBBFAC,,, | Performed by: PSYCHOLOGIST

## 2022-04-29 PROCEDURE — 1159F MED LIST DOCD IN RCRD: CPT | Mod: CPTII,S$GLB,, | Performed by: PSYCHOLOGIST

## 2022-04-29 PROCEDURE — 1159F PR MEDICATION LIST DOCUMENTED IN MEDICAL RECORD: ICD-10-PCS | Mod: CPTII,S$GLB,, | Performed by: PSYCHOLOGIST

## 2022-04-29 PROCEDURE — 99999 PR PBB SHADOW E&M-EST. PATIENT-LVL II: ICD-10-PCS | Mod: PBBFAC,,, | Performed by: PSYCHOLOGIST

## 2022-04-29 NOTE — PROGRESS NOTES
INFORMED CONSENT: Lena Brantley   is known to this provider and identity was confirmed via NAME and .  The patient has been informed of the risks and benefits associated with engaging in psychotherapy, the handling of protected health information, the rights of privacy and the limits of confidentiality. The patient has also been informed of the importance of reporting any suicidal or homicidal ideation to this or any provider to ensure safety of all parties, and the Lena Brantley expressed understanding. The patient was agreeable to these terms and freely participates in individual psychotherapy.    PSYCHO-ONCOLOGY NOTE/ Individual Psychotherapy     Date: 2022   Site:  [facility]        Therapeutic Intervention: Met with patient.  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    Hypergastrinemia    Carcinoid (except of appendix)    Gastrinoma, malignant    Generalized anxiety disorder    Current moderate episode of major depressive disorder without prior episode    Secondary neuroendocrine tumor of liver    History of iron deficiency anemia    Elevated bilirubin    Vitamin D deficiency    Abnormal glucose       Chief complaint/reason for encounter: anxiety   Met with patient to evaluate psychosocial adaptation to diagnosis/treatment/survivorship of Carcinoid    Current Medications  Current Outpatient Medications   Medication    busPIRone (BUSPAR) 7.5 MG tablet    lanreotide (SOMATULINE DEPOT) 120 mg/0.5 mL Syrg    pantoprazole (PROTONIX) 40 MG tablet     No current facility-administered medications for this visit.       Objective:  Lena Brantley arrived promptly for the session.  Ms. Brantley was independently ambulatory at the time of session.  The patient was fully cooperative throughout the session.  Appearance: age appropriate, appropriately  dressed, adequately  groomed  Behavior/Cooperation: friendly and cooperative  Speech: normal in rate, volume, and tone and appropriate quality, quantity and organization of sentences  Mood: anxious  Affect: mood congruent  Thought Process: goal-directed, logical  Thought Content: normal,  No delusions or paranoia; did not appear to be responding to internal stimuli during the session  Orientation: grossly intact  Memory: Grossly intact  Attention Span/Concentration: Attends to session without distraction; reports no difficulty  Fund of Knowledge: average  Estimate of Intelligence: average from verbal skills and history  Cognition: grossly intact  Insight: patient has awareness of illness; good insight into own behavior and behavior of others  Judgment: the patient's behavior is adequate to circumstances    Interval history and content of current session: Utilize MI approach to avoidnace of necessary medical procedures.   Discussed diagnosis, treatment, prognosis, current adaptation to disease and treatment status and family's adaptation to disease and treatment status. Reports to be coping with avoidance and in a passive manner. Evaluated cognitive response, paying particular attention to negative intrusive thoughts of a persistent and detrimental nature. Thoughts of this type are in evidence with moderate distress. Provided cognitive behavioral therapy to address negative cognitions. Identified and evaluated psychosocial and environmental stressors secondary to diagnosis and treatment.  Examined proactive behaviors that may be implemented to minimize or ameliorate psychosocial stressors secondary to diagnosis and treatment.     Risk parameters:   Patient reports no suicidal ideation  Patient reports no homicidal ideation  Patient reports no self-injurious behavior  Patient reports no violent behavior   Safety  needs:  None at this time      Verbal deficits: None     Patient's response to intervention:The patient's response to intervention is reluctant.     Progress toward goals and other mental status changes:  The patient's progress toward goals is limited.      Progress to date:Progress - Ongoing, but Slow      Goals from last visit: Attempted, not met     Patient reported outcomes:    Distress Score    Distress Score: 6        Practical Problems Physical Problems   : No Appearance: No   Housing: No Bathing / Dressing: No   Insurance / Financial: Yes Breathing: No    Transportation: No  Changes in Urination: No    Work / School: Yes  Constipation: No   Treatment Decisions: Yes  Diarrhea: No     Eating: No    Family Problems Fatigue: Yes    Dealing with Children: No Feeling Swollen: No    Dealing with Partner: No Fevers: No    Ability to Have Children: No  Getting Around: No       Indigestion: No     Memory / Concentration: Yes   Emotional Problems Mouth Sores: No    Depression: Yes  Nausea: No    Fears: Yes  Nose Dry / Congested: No    Nervousness: Yes  Pain: No    Sadness: Yes Sexual: No    Worry: Yes Skin Dry / Itchy: No    Loss of Interest in Usual Activities: No Sleep: Yes     Substance Abuse: No    Spiritual/Religions Concerns Tingling in Hands / Feet: No   Spritual / Yarsanism Concerns: No         Other Problems              PHQ ANSWERS    Q1. Little interest or pleasure in doing things: (P) Not at all (04/29/22 0955)  Q2. Feeling down, depressed, or hopeless: (P) More than half the days (04/29/22 0955)  Q3. Trouble falling or staying asleep, or sleeping too much: (P) More than half the days (04/29/22 0955)  Q4. Feeling tired or having little energy: (P) Several days (04/29/22 0955)  Q5. Poor appetite or overeating: (P) Not at all (04/29/22 0955)  Q6. Feeling bad about yourself - or that you are a failure or have let yourself or your family down: (P) More than half the days (04/29/22 0955)  Q7. Trouble  concentrating on things, such as reading the newspaper or watching television: (P) More than half the days (04/29/22 0955)  Q8. Moving or speaking so slowly that other people could have noticed. Or the opposite - being so fidgety or restless that you have been moving around a lot more than usual: (P) Not at all (04/29/22 0955)  Q9.  No SI    PHQ8 Score : (P) 9 (04/29/22 0955)  PHQ-9 Total Score: (P) 9 (04/29/22 0955)     MINH-7 Answers    GAD7 4/29/2022   1. Feeling nervous, anxious, or on edge? 1   2. Not being able to stop or control worrying? 1   3. Worrying too much about different things? 1   4. Trouble relaxing? 2   5. Being so restless that it is hard to sit still? 2   6. Becoming easily annoyed or irritable? 2   7. Feeling afraid as if something awful might happen? 1   MINH-7 Score 10     MINH-7 Score: (P) 10  Interpretation: (P) Moderate Anxiety     Client Strengths: verbal, intelligent, successful, good social support, good insight, commitment to wellness, strong [place], strong cultural traditions     Diagnosis:     ICD-10-CM ICD-9-CM   1. Psychological factors affecting medical condition  F54 316   2. Generalized anxiety disorder  F41.1 300.02   3. Malignant carcinoid tumor of unknown primary site  C7A.00 209.20   4. Current moderate episode of major depressive disorder without prior episode  F32.1 296.22     Treatment Plan:individual psychotherapy and medication management by physician  · Target symptoms: anxiety , avoidance  · Why chosen therapy is appropriate versus another modality: relevant to diagnosis, patient responds to this modality, evidence based practice  · Outcome monitoring methods: self-report, observation, checklist/rating scale  · Therapeutic intervention type: behavior modifying psychotherapy  · Prognosis: Good      Behavioral goals:    Exercise:   Stress management:   Social engagement:   Nutrition:   Smoking Cessation:   Therapy:  Identify and reduce avoidance behaviors contributing  negatively to mood  Identify and reduce avoidance behaviors contributing to and maintaining anxiety    Return to clinic: as scheduled    Next Session:   Cognitive Screener     Length of Service (minutes direct face-to-face contact): 60    Carmella Gurrola, PhD  Clinical Psychologist  [place] License #[id number]  [place] License #[id number]

## 2022-05-20 ENCOUNTER — TELEPHONE (OUTPATIENT)
Dept: HEMATOLOGY/ONCOLOGY | Facility: CLINIC | Age: 67
End: 2022-05-20
Payer: COMMERCIAL

## 2022-05-20 ENCOUNTER — OFFICE VISIT (OUTPATIENT)
Dept: PSYCHIATRY | Facility: CLINIC | Age: 67
End: 2022-05-20
Payer: COMMERCIAL

## 2022-05-20 DIAGNOSIS — F54 PSYCHOLOGICAL FACTORS AFFECTING MEDICAL CONDITION: ICD-10-CM

## 2022-05-20 DIAGNOSIS — C7A.00 MALIGNANT CARCINOID TUMOR OF UNKNOWN PRIMARY SITE: ICD-10-CM

## 2022-05-20 DIAGNOSIS — F41.1 GENERALIZED ANXIETY DISORDER: Primary | ICD-10-CM

## 2022-05-20 PROCEDURE — 99999 PR PBB SHADOW E&M-EST. PATIENT-LVL II: CPT | Mod: PBBFAC,,, | Performed by: PSYCHOLOGIST

## 2022-05-20 PROCEDURE — 99999 PR PBB SHADOW E&M-EST. PATIENT-LVL II: ICD-10-PCS | Mod: PBBFAC,,, | Performed by: PSYCHOLOGIST

## 2022-05-20 PROCEDURE — 90837 PR PSYCHOTHERAPY W/PATIENT, 60 MIN: ICD-10-PCS | Mod: S$GLB,,, | Performed by: PSYCHOLOGIST

## 2022-05-20 PROCEDURE — 1159F PR MEDICATION LIST DOCUMENTED IN MEDICAL RECORD: ICD-10-PCS | Mod: CPTII,S$GLB,, | Performed by: PSYCHOLOGIST

## 2022-05-20 PROCEDURE — 1159F MED LIST DOCD IN RCRD: CPT | Mod: CPTII,S$GLB,, | Performed by: PSYCHOLOGIST

## 2022-05-20 PROCEDURE — 90837 PSYTX W PT 60 MINUTES: CPT | Mod: S$GLB,,, | Performed by: PSYCHOLOGIST

## 2022-05-20 NOTE — TELEPHONE ENCOUNTER
----- Message from Carmella Gurrola, PhD sent at 5/20/2022  1:49 PM CDT -----  Regarding: Yoga  Patient interested in Yoga and Meditation

## 2022-05-20 NOTE — PROGRESS NOTES
INFORMED CONSENT: Lena Brantley   is known to this provider and identity was confirmed via NAME and .  The patient has been informed of the risks and benefits associated with engaging in psychotherapy, the handling of protected health information, the rights of privacy and the limits of confidentiality. The patient has also been informed of the importance of reporting any suicidal or homicidal ideation to this or any provider to ensure safety of all parties, and the Lena Brantley expressed understanding. The patient was agreeable to these terms and freely participates in individual psychotherapy.    PSYCHO-ONCOLOGY NOTE/ Individual Psychotherapy     Date: 2022   Site:  [facility]        Therapeutic Intervention: Met with patient.  Outpatient - Behavior modifying psychotherapy 60 min - CPT code 58100      Patient was last seen by me on 2022    Problem list  Patient Active Problem List   Diagnosis    Malignant carcinoid tumor of unknown primary site    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    Hypergastrinemia    Carcinoid (except of appendix)    Gastrinoma, malignant    Generalized anxiety disorder    Current moderate episode of major depressive disorder without prior episode    Secondary neuroendocrine tumor of liver    History of iron deficiency anemia    Elevated bilirubin    Vitamin D deficiency    Abnormal glucose    Psychological factors affecting medical condition       Chief complaint/reason for encounter: anxiety and psych factors affecting [facility]   Met with patient to evaluate psychosocial adaptation to diagnosis/treatment/survivorship of Carcinoid    Current Medications  Current Outpatient Medications   Medication    busPIRone (BUSPAR) 7.5 MG tablet    lanreotide (SOMATULINE DEPOT) 120 mg/0.5 mL Syrg    pantoprazole (PROTONIX) 40 MG tablet     No current facility-administered medications for this visit.       Objective:  Lena Brantley arrived  promptly for the session.    Ms. Brantley was independently ambulatory at the time of session. The patient was fully cooperative throughout the session.  Appearance: age appropriate, appropriately  dressed, adequately  groomed  Behavior/Cooperation: friendly and cooperative  Speech: normal in rate, volume, and tone and appropriate quality, quantity and organization of sentences  Mood: anxious  Affect: mood congruent  Thought Process: goal-directed, logical  Thought Content: normal,  No delusions or paranoia; did not appear to be responding to internal stimuli during the session  Orientation: grossly intact  Memory: Grossly intact  Attention Span/Concentration: Attends to session without distraction; reports no difficulty  Fund of Knowledge: average  Estimate of Intelligence: average from verbal skills and history  Cognition: grossly intact  Insight: fair  Judgment: poor    Interval history and content of current session: Patient feels disheartened about world events.  Discussed diagnosis, treatment, prognosis, current adaptation to disease and treatment status and family's adaptation to disease and treatment status. Reviewed BA and importance of balance and values based living. Reports to be coping with great difficulty. Evaluated cognitive response, paying particular attention to negative intrusive thoughts of a persistent and detrimental nature. Thoughts of this type are in evidence with severe distress. Provided cognitive behavioral therapy to address negative cognitions. Identified and evaluated psychosocial and environmental stressors secondary to diagnosis and treatment.  Examined proactive behaviors that may be implemented to minimize or ameliorate psychosocial stressors secondary to diagnosis and treatment.     Risk parameters:   Patient reports no suicidal ideation  Patient reports no homicidal ideation  Patient reports no self-injurious behavior  Patient reports no violent behavior   Safety needs:  None at  this time      Verbal deficits: None     Patient's response to intervention:The patient's response to intervention is reluctant.     Progress toward goals and other mental status changes:  The patient's progress toward goals is not progressing.      Progress to date:No Progress - Continue Objectives      Goals from last visit: Not attempted     Patient reported outcomes:    Distress Thermometer: 10   PHQ-9= 12   MINH-7=16        Client Strengths: verbal, intelligent, successful, good social support, commitment to wellness, strong [place], strong cultural traditions     Diagnosis:     ICD-10-CM ICD-9-CM   1. Generalized anxiety disorder  F41.1 300.02   2. Psychological factors affecting medical condition  F54 316   3. Malignant carcinoid tumor of unknown primary site  C7A.00 209.20       Treatment Plan:individual psychotherapy and medication management by physician  · Target symptoms: anxiety , avoidance  · Why chosen therapy is appropriate versus another modality: relevant to diagnosis, evidence based practice  · Outcome monitoring methods: self-report, observation, checklist/rating scale  · Therapeutic intervention type: behavior modifying psychotherapy  · Prognosis: Fair      Behavioral goals:     Identify and reduce avoidance behaviors contributing negatively to mood  Identify and reduce avoidance behaviors contributing to and maintaining anxiety  Increase knowledge and understanding of cognitive/emotional impact on physical symptoms    Return to clinic: 1 month    Length of Service (minutes direct face-to-face contact): 60    Carmella Gurrola, PhD  Clinical Psychologist  [place] License #[id number]  [place] License #[id number]

## 2022-05-26 ENCOUNTER — INFUSION (OUTPATIENT)
Dept: INFUSION THERAPY | Facility: HOSPITAL | Age: 67
End: 2022-05-26
Attending: FAMILY MEDICINE
Payer: COMMERCIAL

## 2022-05-26 ENCOUNTER — PATIENT MESSAGE (OUTPATIENT)
Dept: PSYCHIATRY | Facility: CLINIC | Age: 67
End: 2022-05-26
Payer: COMMERCIAL

## 2022-05-26 DIAGNOSIS — C7A.00 MALIGNANT CARCINOID TUMOR OF UNKNOWN PRIMARY SITE: Primary | ICD-10-CM

## 2022-05-26 DIAGNOSIS — C7B.02 METASTATIC MALIGNANT CARCINOID TUMOR TO LIVER: ICD-10-CM

## 2022-05-26 PROCEDURE — 63600175 PHARM REV CODE 636 W HCPCS: Mod: JG | Performed by: SURGERY

## 2022-05-26 PROCEDURE — 96372 THER/PROPH/DIAG INJ SC/IM: CPT

## 2022-05-26 RX ORDER — LANREOTIDE ACETATE 120 MG/.5ML
120 INJECTION SUBCUTANEOUS
Status: DISCONTINUED | OUTPATIENT
Start: 2022-05-26 | End: 2022-05-26 | Stop reason: HOSPADM

## 2022-05-26 RX ADMIN — LANREOTIDE ACETATE 120 MG: 120 INJECTION SUBCUTANEOUS at 10:05

## 2022-06-22 ENCOUNTER — PATIENT MESSAGE (OUTPATIENT)
Dept: PSYCHIATRY | Facility: CLINIC | Age: 67
End: 2022-06-22
Payer: COMMERCIAL

## 2022-06-23 ENCOUNTER — INFUSION (OUTPATIENT)
Dept: INFUSION THERAPY | Facility: HOSPITAL | Age: 67
End: 2022-06-23
Attending: SURGERY
Payer: COMMERCIAL

## 2022-06-23 ENCOUNTER — LAB VISIT (OUTPATIENT)
Dept: LAB | Facility: HOSPITAL | Age: 67
End: 2022-06-23
Attending: SURGERY
Payer: COMMERCIAL

## 2022-06-23 DIAGNOSIS — C7A.00 MALIGNANT CARCINOID TUMOR OF UNKNOWN PRIMARY SITE: Primary | ICD-10-CM

## 2022-06-23 DIAGNOSIS — C25.4 GASTRINOMA, MALIGNANT: ICD-10-CM

## 2022-06-23 DIAGNOSIS — C7B.8 SECONDARY NEUROENDOCRINE TUMOR OF LIVER: ICD-10-CM

## 2022-06-23 DIAGNOSIS — C7B.02 METASTATIC MALIGNANT CARCINOID TUMOR TO LIVER: ICD-10-CM

## 2022-06-23 LAB
ALBUMIN SERPL BCP-MCNC: 4 G/DL (ref 3.5–5.2)
ALP SERPL-CCNC: 102 U/L (ref 55–135)
ALT SERPL W/O P-5'-P-CCNC: 14 U/L (ref 10–44)
ANION GAP SERPL CALC-SCNC: 11 MMOL/L (ref 8–16)
AST SERPL-CCNC: 25 U/L (ref 10–40)
BASOPHILS # BLD AUTO: 0.04 K/UL (ref 0–0.2)
BASOPHILS NFR BLD: 1.3 % (ref 0–1.9)
BILIRUB SERPL-MCNC: 1.4 MG/DL (ref 0.1–1)
BUN SERPL-MCNC: 10 MG/DL (ref 8–23)
CALCIUM SERPL-MCNC: 10.1 MG/DL (ref 8.7–10.5)
CHLORIDE SERPL-SCNC: 103 MMOL/L (ref 95–110)
CO2 SERPL-SCNC: 27 MMOL/L (ref 23–29)
CREAT SERPL-MCNC: 1 MG/DL (ref 0.5–1.4)
DIFFERENTIAL METHOD: ABNORMAL
EOSINOPHIL # BLD AUTO: 0.1 K/UL (ref 0–0.5)
EOSINOPHIL NFR BLD: 1.9 % (ref 0–8)
ERYTHROCYTE [DISTWIDTH] IN BLOOD BY AUTOMATED COUNT: 13 % (ref 11.5–14.5)
EST. GFR  (AFRICAN AMERICAN): >60 ML/MIN/1.73 M^2
EST. GFR  (NON AFRICAN AMERICAN): 60 ML/MIN/1.73 M^2
FOLATE SERPL-MCNC: 18.1 NG/ML (ref 4–24)
GLUCOSE SERPL-MCNC: 105 MG/DL (ref 70–110)
HCT VFR BLD AUTO: 42.6 % (ref 37–48.5)
HGB BLD-MCNC: 14 G/DL (ref 12–16)
IMM GRANULOCYTES # BLD AUTO: 0.01 K/UL (ref 0–0.04)
IMM GRANULOCYTES NFR BLD AUTO: 0.3 % (ref 0–0.5)
LYMPHOCYTES # BLD AUTO: 0.8 K/UL (ref 1–4.8)
LYMPHOCYTES NFR BLD: 25.9 % (ref 18–48)
MCH RBC QN AUTO: 28.7 PG (ref 27–31)
MCHC RBC AUTO-ENTMCNC: 32.9 G/DL (ref 32–36)
MCV RBC AUTO: 88 FL (ref 82–98)
MONOCYTES # BLD AUTO: 0.3 K/UL (ref 0.3–1)
MONOCYTES NFR BLD: 10.6 % (ref 4–15)
NEUTROPHILS # BLD AUTO: 1.9 K/UL (ref 1.8–7.7)
NEUTROPHILS NFR BLD: 60 % (ref 38–73)
NRBC BLD-RTO: 0 /100 WBC
PLATELET # BLD AUTO: 220 K/UL (ref 150–450)
PMV BLD AUTO: 11.4 FL (ref 9.2–12.9)
POTASSIUM SERPL-SCNC: 4.4 MMOL/L (ref 3.5–5.1)
PROT SERPL-MCNC: 7.8 G/DL (ref 6–8.4)
RBC # BLD AUTO: 4.87 M/UL (ref 4–5.4)
SODIUM SERPL-SCNC: 141 MMOL/L (ref 136–145)
WBC # BLD AUTO: 3.2 K/UL (ref 3.9–12.7)

## 2022-06-23 PROCEDURE — 82746 ASSAY OF FOLIC ACID SERUM: CPT | Performed by: SURGERY

## 2022-06-23 PROCEDURE — 86256 FLUORESCENT ANTIBODY TITER: CPT | Performed by: SURGERY

## 2022-06-23 PROCEDURE — 80053 COMPREHEN METABOLIC PANEL: CPT | Performed by: SURGERY

## 2022-06-23 PROCEDURE — 82542 COL CHROMOTOGRAPHY QUAL/QUAN: CPT | Performed by: SURGERY

## 2022-06-23 PROCEDURE — 82941 ASSAY OF GASTRIN: CPT | Performed by: SURGERY

## 2022-06-23 PROCEDURE — 83519 RIA NONANTIBODY: CPT | Performed by: SURGERY

## 2022-06-23 PROCEDURE — 85025 COMPLETE CBC W/AUTO DIFF WBC: CPT | Performed by: SURGERY

## 2022-06-23 PROCEDURE — 84260 ASSAY OF SEROTONIN: CPT | Performed by: SURGERY

## 2022-06-23 PROCEDURE — 86340 INTRINSIC FACTOR ANTIBODY: CPT | Performed by: SURGERY

## 2022-06-23 PROCEDURE — 63600175 PHARM REV CODE 636 W HCPCS: Mod: JG | Performed by: SURGERY

## 2022-06-23 PROCEDURE — 96372 THER/PROPH/DIAG INJ SC/IM: CPT

## 2022-06-23 RX ORDER — LANREOTIDE ACETATE 120 MG/.5ML
120 INJECTION SUBCUTANEOUS
Status: DISCONTINUED | OUTPATIENT
Start: 2022-06-23 | End: 2022-06-23 | Stop reason: HOSPADM

## 2022-06-23 RX ADMIN — LANREOTIDE ACETATE 120 MG: 120 INJECTION SUBCUTANEOUS at 10:06

## 2022-06-24 ENCOUNTER — PATIENT MESSAGE (OUTPATIENT)
Dept: HEMATOLOGY/ONCOLOGY | Facility: CLINIC | Age: 67
End: 2022-06-24
Payer: COMMERCIAL

## 2022-06-24 ENCOUNTER — TELEPHONE (OUTPATIENT)
Dept: HEMATOLOGY/ONCOLOGY | Facility: CLINIC | Age: 67
End: 2022-06-24
Payer: COMMERCIAL

## 2022-06-24 ENCOUNTER — OFFICE VISIT (OUTPATIENT)
Dept: PSYCHIATRY | Facility: CLINIC | Age: 67
End: 2022-06-24
Payer: COMMERCIAL

## 2022-06-24 DIAGNOSIS — R41.3 MEMORY DEFICIT: ICD-10-CM

## 2022-06-24 DIAGNOSIS — F41.1 GENERALIZED ANXIETY DISORDER: Primary | ICD-10-CM

## 2022-06-24 DIAGNOSIS — C7B.02 METASTATIC MALIGNANT CARCINOID TUMOR TO LIVER: ICD-10-CM

## 2022-06-24 DIAGNOSIS — F54 PSYCHOLOGICAL FACTORS AFFECTING MEDICAL CONDITION: ICD-10-CM

## 2022-06-24 DIAGNOSIS — C7A.00 MALIGNANT CARCINOID TUMOR OF UNKNOWN PRIMARY SITE: ICD-10-CM

## 2022-06-24 LAB — PCA AB TITR SER IF: NORMAL {TITER}

## 2022-06-24 PROCEDURE — 1159F PR MEDICATION LIST DOCUMENTED IN MEDICAL RECORD: ICD-10-PCS | Mod: CPTII,S$GLB,, | Performed by: PSYCHOLOGIST

## 2022-06-24 PROCEDURE — 99999 PR PBB SHADOW E&M-EST. PATIENT-LVL III: ICD-10-PCS | Mod: PBBFAC,,, | Performed by: PSYCHOLOGIST

## 2022-06-24 PROCEDURE — 99999 PR PBB SHADOW E&M-EST. PATIENT-LVL III: CPT | Mod: PBBFAC,,, | Performed by: PSYCHOLOGIST

## 2022-06-24 PROCEDURE — 1159F MED LIST DOCD IN RCRD: CPT | Mod: CPTII,S$GLB,, | Performed by: PSYCHOLOGIST

## 2022-06-24 PROCEDURE — 90837 PSYTX W PT 60 MINUTES: CPT | Mod: S$GLB,,, | Performed by: PSYCHOLOGIST

## 2022-06-24 PROCEDURE — 90837 PR PSYCHOTHERAPY W/PATIENT, 60 MIN: ICD-10-PCS | Mod: S$GLB,,, | Performed by: PSYCHOLOGIST

## 2022-06-24 NOTE — TELEPHONE ENCOUNTER
Called patient to discuss follow up for NET diagnosis. LVM with my direct number requesting return call at earliest convenience

## 2022-06-24 NOTE — PROGRESS NOTES
INFORMED CONSENT: Lena Brantley   is known to this provider and identity was confirmed via NAME and .  The patient has been informed of the risks and benefits associated with engaging in psychotherapy, the handling of protected health information, the rights of privacy and the limits of confidentiality. The patient has also been informed of the importance of reporting any suicidal or homicidal ideation to this or any provider to ensure safety of all parties, and the Lena Brantley expressed understanding. The patient was agreeable to these terms and freely participates in individual psychotherapy.    PSYCHO-ONCOLOGY NOTE/ Individual Psychotherapy     Date: 2022   Site:  [facility]        Therapeutic Intervention: Met with patient.  Outpatient - Behavior modifying psychotherapy 60 min - CPT code 43946      Patient was last seen by me on 2022    Problem list  Patient Active Problem List   Diagnosis    Malignant carcinoid tumor of unknown primary site    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    Hypergastrinemia    Carcinoid (except of appendix)    Gastrinoma, malignant    Generalized anxiety disorder    Current moderate episode of major depressive disorder without prior episode    Secondary neuroendocrine tumor of liver    History of iron deficiency anemia    Elevated bilirubin    Vitamin D deficiency    Abnormal glucose    Psychological factors affecting medical condition       Chief complaint/reason for encounter: depression and anxiety   Met with patient to evaluate psychosocial adaptation to diagnosis/treatment/survivorship of Carcinoid    Current Medications  Current Outpatient Medications   Medication    busPIRone (BUSPAR) 7.5 MG tablet    lanreotide (SOMATULINE DEPOT) 120 mg/0.5 mL Syrg    pantoprazole (PROTONIX) 40 MG tablet     No current facility-administered medications for this visit.       Objective:  Lena Brantlye arrived promptly for the  session.    Ms. Brantley was independently ambulatory at the time of session. The patient was fully cooperative throughout the session.  Appearance: age appropriate, appropriately  dressed, adequately  groomed  Behavior/Cooperation: friendly and cooperative  Speech: normal in rate, volume, and tone and appropriate quality, quantity and organization of sentences  Mood: anxious  Affect: mood congruent  Thought Process: goal-directed, logical  Thought Content: normal,  No delusions or paranoia; did not appear to be responding to internal stimuli during the session  Orientation: grossly intact  Memory: Grossly intact  Attention Span/Concentration: Attends to session without distraction; reports no difficulty  Fund of Knowledge: average  Estimate of Intelligence: average from verbal skills and history  Cognition: grossly intact  Insight: patient has awareness of illness; good insight into own behavior and behavior of others  Judgment: the patient's behavior is adequate to circumstances    Interval history and content of current session: Patient completed FMLA and discussed plans to utilizing intermittent leave. Patient with memory concerns and requested cognitive screener.   Discussed diagnosis, treatment, prognosis, current adaptation to disease and treatment status and family's adaptation to disease and treatment status. Reports to be coping with moderate difficulty. Evaluated cognitive response, paying particular attention to negative intrusive thoughts of a persistent and detrimental nature. Thoughts of this type are in evidence with moderate distress. Provided cognitive behavioral therapy to address negative cognitions. Identified and evaluated psychosocial and environmental stressors secondary to diagnosis and treatment.  Examined proactive behaviors that may be implemented to minimize or ameliorate psychosocial stressors secondary to diagnosis and treatment.    The [employer] [place] [employer] Mental Status Examination  (SLUMS), a cognitive screener, was administered to assess the   Patient's current mental status and cognitive capacity. Patient obtained a score of   23/30. This score does not fall within normal limits as   compared to those within similar age and level of education, and   suggests mild impairments in neurocognitive functioning.     Risk parameters:   Patient reports no suicidal ideation  Patient reports no homicidal ideation  Patient reports no self-injurious behavior  Patient reports no violent behavior   Safety needs:  None at this time      Verbal deficits: None     Patient's response to intervention:The patient's response to intervention is accepting.     Progress toward goals and other mental status changes:  The patient's progress toward goals is fair .      Progress to date:Progress - Ongoing, but Slow      Goals from last visit: Attempted, partially met     Patient reported outcomes:    Distress Thermometer:   Distress Score    Distress Score: 7        Practical Problems Physical Problems                                                   Family Problems                                         Emotional Problems                                                         Spiritual/Religions Concerns     Spritual / Hoahaoism Concerns: No         Other Problems            PHQ-8 Questions  Little interest or pleasure in doing things: (P) Several days  Feeling down, depressed, or hopeless: (P) More than half the days  Trouble falling or staying asleep, or sleeping too much: (P) More than half the days  Feeling tired or having little energy: (P) More than half the days  Poor appetite or overeating: (P) Several days  Feeling bad about yourself - or that you are a failure or have let yourself or your family down: (P) More than half the days  Trouble concentrating on things, such as reading the newspaper or watching television: (P) Not at all  Moving or speaking so slowly that other people could have noticed. Or the  opposite - being so fidgety or restless that you have been moving around a lot more than usual: (P) Not at all    MINH-7 Score: (P) 13  Interpretation: (P) Moderate Anxiety      The [employer] [place] [employer] Mental Status Examination (UMS), a cognitive screener, was administered to assess the   Patient's current mental status and cognitive capacity. Patient obtained a score of   23/30. This score does not fall within normal limits as   compared to those within similar age and level of education, and   suggests moderate impairments in neurocognitive functioning.   Client Strengths: verbal, intelligent, successful, good social support, good insight, commitment to wellness, strong [place], strong cultural traditions     Diagnosis:     ICD-10-CM ICD-9-CM   1. Generalized anxiety disorder  F41.1 300.02   2. Psychological factors affecting medical condition  F54 316   3. Malignant carcinoid tumor of unknown primary site  C7A.00 209.20   4. 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  C7B.02 209.72       Treatment Plan:individual psychotherapy and medication management by physician  · Target symptoms: anxiety   · Why chosen therapy is appropriate versus another modality: relevant to diagnosis, patient responds to this modality, evidence based practice  · Outcome monitoring methods: self-report, observation, checklist/rating scale  · Therapeutic intervention type: behavior modifying psychotherapy  · Prognosis: Fair      Behavioral goals:    Exercise:   Stress management: Neuropsych referral   Social engagement:   Nutrition:   Smoking Cessation:   Therapy:  Increase daily self-care and attention to health management  Pleasant events scheduling and increased social interaction  Monitor stressors in writing and bring to next visit    Return to clinic: as scheduled      Length of Service (minutes direct face-to-face contact): 60    Carmella Gurrola, PhD  Clinical Psychologist  [place] License #[id number]  [place] License #[id number]

## 2022-06-24 NOTE — TELEPHONE ENCOUNTER
----- Message from Rain Bloom MD sent at 6/24/2022 12:05 PM CDT -----  Regarding: FW: Question  Alee- pt needing help with NETs follow up  Thanks    ----- Message -----  From: Carmella Gurrola, PhD  Sent: 6/24/2022  11:42 AM CDT  To: Rain Bloom MD  Subject: Question                                         Hey there. This is a NET patient. She reported to me that Dr. Riddle and the NET team left [facility] and went to - she is concerned for her next appointment and injections and where to follow up.

## 2022-06-25 LAB
ANNOTATION COMMENT IMP: NORMAL
IF BLOCK AB SER QL: NORMAL

## 2022-06-27 LAB — GASTRIN SERPL-MCNC: 184 PG/ML

## 2022-06-28 ENCOUNTER — TELEPHONE (OUTPATIENT)
Dept: HEMATOLOGY/ONCOLOGY | Facility: CLINIC | Age: 67
End: 2022-06-28
Payer: COMMERCIAL

## 2022-06-28 LAB — SEROTONIN: 65 NG/ML

## 2022-06-28 NOTE — TELEPHONE ENCOUNTER
Spoke to patient about her concerns with transition of care now that Dr. Riddle is not practicing at [facility]. We briefly discussed her options going forward. Patient was in the middle of her workday, asked if she can call back during her break. Provided my direct number and encouraged her to call anytime.

## 2022-06-28 NOTE — TELEPHONE ENCOUNTER
Patient called back during her break to discuss options for care management going forward with Dr. Riddle's departure. We discussed all options, including our medical oncology team for NET, Dr. Riddle's new practice at , and our surgical oncology team at [facility]. Patient took down the information related to Dr. Woodruff, Dr. Delcid, Dr. Bloom, and Dr. Henriquez. She states she would like to do some research and call me back regarding her decision. She has my direct number for any additional questions or when she's made her decision

## 2022-07-06 ENCOUNTER — TELEPHONE (OUTPATIENT)
Dept: HEMATOLOGY/ONCOLOGY | Facility: CLINIC | Age: 67
End: 2022-07-06
Payer: COMMERCIAL

## 2022-07-06 NOTE — TELEPHONE ENCOUNTER
Called patient to follow up regarding continuation of care at [facility]. Patient and I discussed her options again. She does want to continue at [facility] but hasn't decided which provider yet. Told her we can facilitate her appt with any of our providers, but encouraged her to get on the books soon so we don't delay her care. She has my direct number and will call me when she's ready to schedule

## 2022-07-07 LAB — PANCREASTATIN SERPL-MCNC: 129 PG/ML (ref 10–135)

## 2022-07-08 LAB — 5OH-INDOLEACETATE SERPL-MCNC: 9 NG/ML

## 2022-07-12 ENCOUNTER — TELEPHONE (OUTPATIENT)
Dept: HEMATOLOGY/ONCOLOGY | Facility: CLINIC | Age: 67
End: 2022-07-12
Payer: COMMERCIAL

## 2022-07-12 RX ORDER — LANREOTIDE ACETATE 120 MG/.5ML
120 INJECTION SUBCUTANEOUS
Status: CANCELLED | OUTPATIENT
Start: 2022-07-12

## 2022-07-12 NOTE — TELEPHONE ENCOUNTER
Called patient to review the plan of care. Will work on getting injection appt on 7/21 for her in [place], and then set her up to see Dr. Woodruff with next injection. Per patient request moved Ranjan appt to 8/18 since she is off that day. Patient verbalized understanding of all information

## 2022-07-12 NOTE — TELEPHONE ENCOUNTER
----- Message from Rosa Isela Dubois RN sent at 7/12/2022  1:16 PM CDT -----  Regarding: RE: Venkatesh patient to establish care  Hey, I scheduled the patient for Aug 22 at 10 am. I can send a message to Alma about injection as well.   ----- Message -----  From: Alee Gimenez RN  Sent: 7/12/2022  12:48 PM CDT  To: Eliot Woodruff MD, Rosa Isela Dubois RN  Subject: RE: Venkatesh patient to establish care          Sure, if you sign the order I think Alma Elzbieta can help me schedule it, but Rosa Isela I'll defer to your expertise! She would like the 21st in the morning if possible. I'll call her and review the injection appt and schedule her f/u with you and next injection 4 weeks later    ----- Message -----  From: Eliot Woodruff MD  Sent: 7/12/2022  12:33 PM CDT  To: Alee Gimenez RN, Rosa Isela Dubois RN  Subject: RE: Venkatesh patient to establish care          Yeah, we can continue lanreotide, and I'll see her in August. Can you set that up? I've added Rosa Isela to the message if she can help.    Eliot Mast  ----- Message -----  From: Alee Gimenez RN  Sent: 7/12/2022  12:01 PM CDT  To: Eliot Woodruff MD  Subject: Venkatesh patient to establish care              Hi Dr. Woodruff,     This is a previous Venkatesh patient who will be due for lanreotide on the 21st. Obviously you're pretty booked up, would you be comfortable ordering it for her this time and seeing her with the next one in August? Any labs or anything you'd like done before she has the injection?    If you'd like to see her first she's absolutely fine with it, but I didn't promise that given your volume right now. Thanks in advance!

## 2022-07-18 ENCOUNTER — TELEPHONE (OUTPATIENT)
Dept: HEMATOLOGY/ONCOLOGY | Facility: CLINIC | Age: 67
End: 2022-07-18
Payer: COMMERCIAL

## 2022-07-19 ENCOUNTER — PATIENT MESSAGE (OUTPATIENT)
Dept: RESEARCH | Facility: CLINIC | Age: 67
End: 2022-07-19
Payer: COMMERCIAL

## 2022-07-21 ENCOUNTER — INFUSION (OUTPATIENT)
Dept: INFUSION THERAPY | Facility: HOSPITAL | Age: 67
End: 2022-07-21
Attending: SURGERY
Payer: COMMERCIAL

## 2022-07-21 VITALS — WEIGHT: 143.31 LBS | HEIGHT: 64 IN | BODY MASS INDEX: 24.46 KG/M2

## 2022-07-21 DIAGNOSIS — C7A.00 MALIGNANT CARCINOID TUMOR OF UNKNOWN PRIMARY SITE: Primary | ICD-10-CM

## 2022-07-21 DIAGNOSIS — C7B.02 METASTATIC MALIGNANT CARCINOID TUMOR TO LIVER: ICD-10-CM

## 2022-07-21 PROCEDURE — 96372 THER/PROPH/DIAG INJ SC/IM: CPT

## 2022-07-21 PROCEDURE — 63600175 PHARM REV CODE 636 W HCPCS: Mod: JG | Performed by: INTERNAL MEDICINE

## 2022-07-21 RX ORDER — LANREOTIDE ACETATE 120 MG/.5ML
120 INJECTION SUBCUTANEOUS
Status: DISCONTINUED | OUTPATIENT
Start: 2022-07-21 | End: 2022-07-21 | Stop reason: HOSPADM

## 2022-07-21 RX ORDER — LANREOTIDE ACETATE 120 MG/.5ML
120 INJECTION SUBCUTANEOUS
Status: CANCELLED | OUTPATIENT
Start: 2022-07-21

## 2022-07-21 RX ADMIN — LANREOTIDE ACETATE 120 MG: 120 INJECTION SUBCUTANEOUS at 10:07

## 2022-07-29 ENCOUNTER — OFFICE VISIT (OUTPATIENT)
Dept: PSYCHIATRY | Facility: CLINIC | Age: 67
End: 2022-07-29
Payer: COMMERCIAL

## 2022-07-29 ENCOUNTER — PATIENT MESSAGE (OUTPATIENT)
Dept: PSYCHIATRY | Facility: CLINIC | Age: 67
End: 2022-07-29
Payer: COMMERCIAL

## 2022-07-29 DIAGNOSIS — C7A.00 MALIGNANT CARCINOID TUMOR OF UNKNOWN PRIMARY SITE: ICD-10-CM

## 2022-07-29 DIAGNOSIS — F41.1 GENERALIZED ANXIETY DISORDER: Primary | ICD-10-CM

## 2022-07-29 DIAGNOSIS — F54 PSYCHOLOGICAL FACTORS AFFECTING MEDICAL CONDITION: ICD-10-CM

## 2022-07-29 PROCEDURE — 90837 PSYTX W PT 60 MINUTES: CPT | Mod: S$GLB,,, | Performed by: PSYCHOLOGIST

## 2022-07-29 PROCEDURE — 90837 PR PSYCHOTHERAPY W/PATIENT, 60 MIN: ICD-10-PCS | Mod: S$GLB,,, | Performed by: PSYCHOLOGIST

## 2022-07-29 PROCEDURE — 99999 PR PBB SHADOW E&M-EST. PATIENT-LVL II: CPT | Mod: PBBFAC,,, | Performed by: PSYCHOLOGIST

## 2022-07-29 PROCEDURE — 1159F MED LIST DOCD IN RCRD: CPT | Mod: CPTII,S$GLB,, | Performed by: PSYCHOLOGIST

## 2022-07-29 PROCEDURE — 1159F PR MEDICATION LIST DOCUMENTED IN MEDICAL RECORD: ICD-10-PCS | Mod: CPTII,S$GLB,, | Performed by: PSYCHOLOGIST

## 2022-07-29 PROCEDURE — 99999 PR PBB SHADOW E&M-EST. PATIENT-LVL II: ICD-10-PCS | Mod: PBBFAC,,, | Performed by: PSYCHOLOGIST

## 2022-07-29 NOTE — PROGRESS NOTES
INFORMED CONSENT: Lena Brantley   is known to this provider and identity was confirmed via NAME and .  The patient has been informed of the risks and benefits associated with engaging in psychotherapy, the handling of protected health information, the rights of privacy and the limits of confidentiality. The patient has also been informed of the importance of reporting any suicidal or homicidal ideation to this or any provider to ensure safety of all parties, and the Lena Brantley expressed understanding. The patient was agreeable to these terms and freely participates in individual psychotherapy.    PSYCHO-ONCOLOGY NOTE/ Individual Psychotherapy     Date: 2022   Site:  [facility]        Therapeutic Intervention: Met with patient.  Outpatient - Behavior modifying psychotherapy 60 min - CPT code 06137      Patient was last seen by me on 2022    Problem list  Patient Active Problem List   Diagnosis    Malignant carcinoid tumor of unknown primary site    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    Hypergastrinemia    Carcinoid (except of appendix)    Gastrinoma, malignant    Generalized anxiety disorder    Current moderate episode of major depressive disorder without prior episode    Secondary neuroendocrine tumor of liver    History of iron deficiency anemia    Elevated bilirubin    Vitamin D deficiency    Abnormal glucose    Psychological factors affecting medical condition       Chief complaint/reason for encounter: anxiety   Met with patient to evaluate psychosocial adaptation to diagnosis/treatment/survivorship of Carcinoid    Current Medications  Current Outpatient Medications   Medication    busPIRone (BUSPAR) 7.5 MG tablet    lanreotide (SOMATULINE DEPOT) 120 mg/0.5 mL Syrg    pantoprazole (PROTONIX) 40 MG tablet     No current facility-administered medications for this visit.       Objective:  Lena Brantley arrived promptly for the session.   Ms.  Early was independently ambulatory at the time of session. The patient was fully cooperative throughout the session.  Appearance: age appropriate, appropriately  dressed, adequately  groomed  Behavior/Cooperation: friendly and cooperative  Speech: normal in rate, volume, and tone and appropriate quality, quantity and organization of sentences  Mood: euthymic  Affect: mood congruent  Thought Process: goal-directed, logical  Thought Content: normal,  No delusions or paranoia; did not appear to be responding to internal stimuli during the session  Orientation: grossly intact  Memory: Grossly intact  Attention Span/Concentration: Attends to session without distraction; reports no difficulty  Fund of Knowledge: average  Estimate of Intelligence: average from verbal skills and history  Cognition: grossly intact  Insight: fair  Judgment: fair    Interval history and content of current session: Patient engaging in Yoga. Discussed diagnosis, treatment, prognosis, current adaptation to disease and treatment status and family's adaptation to disease and treatment status. Reports to be coping with great difficulty. Evaluated cognitive response, paying particular attention to negative intrusive thoughts of a persistent and detrimental nature. Thoughts of this type are in evidence with moderate distress. Provided cognitive behavioral therapy to address negative cognitions. Identified and evaluated psychosocial and environmental stressors secondary to diagnosis and treatment.  Examined proactive behaviors that may be implemented to minimize or ameliorate psychosocial stressors secondary to diagnosis and treatment.     Risk parameters:   Patient reports no suicidal ideation  Patient reports no homicidal ideation  Patient reports no self-injurious behavior  Patient reports no violent behavior   Safety needs:  None at this time      Verbal deficits: None     Patient's response to intervention:The patient's response to intervention is  reluctant.     Progress toward goals and other mental status changes:  The patient's progress toward goals is fair .      Progress to date:Progress - Ongoing, but Slow      Goals from last visit: Attempted, partially met     Patient reported outcomes:    Distress Thermometer:   Distress Score             Practical Problems Physical Problems                                                   Family Problems                                         Emotional Problems                                                         Spiritual/Religions Concerns               Other Problems                PHQ-9= 5   MINH-7=7        Client Strengths: verbal, intelligent, successful, good social support, good insight, commitment to wellness, strong [place], strong cultural traditions     Diagnosis:     ICD-10-CM ICD-9-CM   1. Generalized anxiety disorder  F41.1 300.02   2. Psychological factors affecting medical condition  F54 316   3. Malignant carcinoid tumor of unknown primary site  C7A.00 209.20       Treatment Plan:individual psychotherapy and medication management by physician  · Target symptoms: anxiety , avoidance  · Why chosen therapy is appropriate versus another modality: relevant to diagnosis, patient responds to this modality, evidence based practice  · Outcome monitoring methods: self-report, observation, checklist/rating scale  · Therapeutic intervention type: behavior modifying psychotherapy  · Prognosis: Good      Behavioral goals:    Exercise:   Stress management:   Social engagement:   Nutrition:   Smoking Cessation:   Therapy:  Identify and reduce avoidance behaviors contributing negatively to mood  Identify and reduce avoidance behaviors contributing to and maintaining anxiety    Return to clinic: 1 month    Next Session:      Length of Service (minutes direct face-to-face contact): 60    Carmella Gurrola, PhD  Clinical Psychologist  [place] License #[id number]  [place] License #[id number]

## 2022-08-02 ENCOUNTER — PATIENT MESSAGE (OUTPATIENT)
Dept: HEMATOLOGY/ONCOLOGY | Facility: CLINIC | Age: 67
End: 2022-08-02
Payer: COMMERCIAL

## 2022-08-17 NOTE — PROGRESS NOTES
"PATIENT: Lena Brantley  MRN: [id number]  DATE: 8/18/2022    Diagnosis:   1. 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 Secondary malignant neoplasm of liver    3. Other decreased white blood cell (WBC) count    4. GERD without esophagitis    5. Advance care planning      Chief Complaint: neuroendocrine tumor    Oncologic History:       Oncologic History Neuroendocrine tumor unknown primary diagnosed 09/2016  Metastatic disease to liver at presentation    Oncologic Treatment Lanreotide 10/2016    Pathology Well-differentiated neuroendocrine tumor, Ki-67 1%       Subjective:    History of Present Illness: Ms. Brantley is a 67 y.o. female who presents for evaluation and management of metastatic neuroendocrine tumor.    Information from chart review:  "Her history dates to 9/2016 when she was undergoing workup for episodic nausea, vomiting and diarrhea.  A CT scan was performed showing a solitary and had seeing liver mass.  A biopsy was performed showing a well-differentiated neuroendocrine tumor, Ki-67 of 1%.  Conventional imaging an octreotide scan did not reveal a primary site disease.  Gene expression profiling was performed indicating a possible primary site pancreas.  She was started on Lanreotide in 2016.  She was offered surgical resection and also liver directed therapy and declined."    - she was last seen by Dr. Riddle on 4/22/22.  - case was discussed at neuroendocrine conference on 4/25/22. Recommendation was to proceed with chemoembolization of 3.7 x 2.4cm mass in segment 2/4.    Interval history:  - she presents for a follow-up appointment for her neuroendocrine tumor.  - today, she endorses fatigue, dyspnea upon exertion, leg tingling.  - She denies chest pain, nausea, vomiting, diarrhea, constipation.      Past medical, surgical, family, and social histories have been reviewed and updated below.    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    Family History:   Family History   Problem Relation Age of Onset    Cancer Maternal Grandmother     Cancer Other     Depression Mother     Anxiety disorder Mother        Social History:  reports that she has never smoked. She has never used smokeless tobacco. She reports that she does not drink alcohol and does not use drugs.    Allergies:  Review of patient's allergies indicates:   Allergen Reactions    Epinephrine Anaphylaxis     Can Cause Carcinoid Crisis       Medications:  Current Outpatient Medications   Medication Sig Dispense Refill    busPIRone (BUSPAR) 7.5 MG tablet Take 1 tablet (7.5 mg total) by mouth every evening. 90 tablet 3    lanreotide (SOMATULINE DEPOT) 120 mg/0.5 mL Syrg Inject 120 mg into the skin every 28 days.      pantoprazole (PROTONIX) 40 MG tablet TAKE 1 TABLET BY MOUTH EVERY DAY 90 tablet 0     No current facility-administered medications for this visit.       Review of Systems   Constitutional: Positive for fatigue.   HENT: Negative for sore throat.    Eyes: Negative for visual disturbance.   Respiratory: Positive for shortness of breath. Negative for cough.    Cardiovascular: Negative for chest pain.   Gastrointestinal: Negative for abdominal pain, constipation, diarrhea, nausea and vomiting.   Genitourinary: Negative for dysuria.   Musculoskeletal: Negative for back pain.   Skin: Negative for rash.   Neurological: Negative for headaches.   Hematological: Negative for adenopathy.   Psychiatric/Behavioral: The patient  is not nervous/anxious.        ECOG Performance Status:   ECOG SCORE 1          Objective:      Vitals:   Vitals:    08/18/22 1019   BP: 135/75   BP Location: Left arm   Patient Position: Sitting   BP Method: Medium (Automatic)   Pulse: 66   Resp: 18   SpO2: 95%   Weight: 64.8 kg (142 lb 13.7 oz)     BMI: Body mass index is 24.52 kg/m².    Physical Exam  Vitals and nursing note reviewed.   Constitutional:       Appearance: She is well-developed.   HENT:      Head: Normocephalic and atraumatic.   Eyes:      Pupils: Pupils are equal, round, and reactive to light.   Cardiovascular:      Rate and Rhythm: Normal rate and regular rhythm.   Pulmonary:      Effort: Pulmonary effort is normal.      Breath sounds: Normal breath sounds.   Abdominal:      General: Bowel sounds are normal.      Palpations: Abdomen is soft.   Musculoskeletal:         General: Normal range of motion.      Cervical back: Normal range of motion and neck supple.   Skin:     General: Skin is warm and dry.   Neurological:      Mental Status: She is alert and oriented to person, place, and time.   Psychiatric:         Behavior: Behavior normal.         Thought Content: Thought content normal.         Judgment: Judgment normal.         Laboratory Data:  Labs have been reviewed.    Lab Results   Component Value Date    WBC 3.20 (L) 06/23/2022    HGB 14.0 06/23/2022    HCT 42.6 06/23/2022    MCV 88 06/23/2022     06/23/2022           Imaging:     Copper pet scan (3/31/22): I have personally reviewed the images    Quality of the study: Adequate.     SUV measurements may not be directly comparable with those made on Ga-68 DOTATATE PET/CT.     In the head and neck, there is physiologic distribution in the pituitary, salivary, and thyroid glands, and there are no tracer avid lesions suspicious for malignancy.     In the chest, there are no tracer avid lesions suspicious for malignancy.     In the abdomen and pelvis, there is physiologic uptake in the  "pancreatic uncinate process and body, spleen, adrenal glands and  tract.     Stable appearing radiotracer avid lesion within the liver measuring 3.0 x 2.5 cm (previously 3.0 x 2.5 cm) with SUV max of 33 (previously 27).  No new radiotracer avid lesion identified within the abdomen or pelvis.     Small umbilical hernia containing partial loop of bowel without evidence of obstruction.     In the bones, there are no tracer avid lesions suspicious for malignancy.     Impression:     Stable somatostatin receptor avid left hepatic lobe lesion.  No new radiotracer avid lesion.      Assessment:       1. 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 Secondary malignant neoplasm of liver    3. Other decreased white blood cell (WBC) count    4. GERD without esophagitis    5. Advance care planning           Plan:     1. Metastatic neuroendocrine tumor  - I have reviewed her chart  "Her history dates to 9/2016 when she was undergoing workup for episodic nausea, vomiting and diarrhea.  A CT scan was performed showing a solitary and had seeing liver mass.  A biopsy was performed showing a well-differentiated neuroendocrine tumor, Ki-67 of 1%.  Conventional imaging an octreotide scan did not reveal a primary site disease.  Gene expression profiling was performed indicating a possible primary site pancreas.  She was started on Lanreotide in 2016.  She was offered surgical resection and also liver directed therapy and declined."  - she was last seen by Dr. Riddle on 4/22/22.  - case was discussed at neuroendocrine conference on 4/25/22. Recommendation was to proceed with chemoembolization of 3.7 x 2.4 cm mass in segment 2/4.  - she did not proceed with chemoembolization.  - I discussed proceeding with chemoembolization. She would like to get imaging in a few months and decide at that time.  - proceed with lanreotide  - return to clinic in 3 months with repeat imaging.    2. Leukopenia  - intermittent leukopenia since " 2017  - likely benign neutropenia  - check RBC antigens at next visit  - continue to monitor    3. Advance Care Planning     Date: 08/18/2022    Power of   I initiated the process of advance care planning today and explained the importance of this process to the patient.  I introduced the concept of advance directives to the patient, as well. Then the patient received detailed information about the importance of designating a Health Care Power of  (HCPOA). She was also instructed to communicate with this person about their wishes for future healthcare, should she become sick and lose decision-making capacity. The patient has not previously appointed a HCPOA. After our discussion, the patient has decided to complete a HCPOA and has appointed her daughter Sujata Brantley ([phone]), and brothers Isael Haq ([phone]) and Andrzej Haq ([phone]). I spent a total time of 16 minutes discussing this issue with the patient.        - return to clinic in 3 months with repeat imaging.    Eliot Woodruff M.D.  Hematology/Oncology  [facility] - [address], [address]  [place] [address]  Phone: [phone]  Fax: [phone]

## 2022-08-18 ENCOUNTER — OFFICE VISIT (OUTPATIENT)
Dept: HEMATOLOGY/ONCOLOGY | Facility: CLINIC | Age: 67
End: 2022-08-18
Payer: COMMERCIAL

## 2022-08-18 ENCOUNTER — INFUSION (OUTPATIENT)
Dept: INFUSION THERAPY | Facility: HOSPITAL | Age: 67
End: 2022-08-18
Attending: INTERNAL MEDICINE
Payer: COMMERCIAL

## 2022-08-18 VITALS
SYSTOLIC BLOOD PRESSURE: 135 MMHG | OXYGEN SATURATION: 95 % | RESPIRATION RATE: 18 BRPM | BODY MASS INDEX: 24.52 KG/M2 | WEIGHT: 142.88 LBS | DIASTOLIC BLOOD PRESSURE: 75 MMHG | HEART RATE: 66 BPM

## 2022-08-18 VITALS — HEIGHT: 64 IN | BODY MASS INDEX: 24.39 KG/M2 | WEIGHT: 142.88 LBS

## 2022-08-18 DIAGNOSIS — Z71.89 ADVANCE CARE PLANNING: ICD-10-CM

## 2022-08-18 DIAGNOSIS — K21.9 GERD WITHOUT ESOPHAGITIS: ICD-10-CM

## 2022-08-18 DIAGNOSIS — C7A.00 MALIGNANT CARCINOID TUMOR OF UNKNOWN PRIMARY SITE: Primary | ICD-10-CM

## 2022-08-18 DIAGNOSIS — D72.818 OTHER DECREASED WHITE BLOOD CELL (WBC) COUNT: ICD-10-CM

## 2022-08-18 DIAGNOSIS — C7B.02 METASTATIC MALIGNANT CARCINOID TUMOR TO LIVER: ICD-10-CM

## 2022-08-18 DIAGNOSIS — C7B.02 METASTATIC MALIGNANT CARCINOID TUMOR TO LIVER: Primary | ICD-10-CM

## 2022-08-18 DIAGNOSIS — C78.7 SECONDARY MALIGNANT NEOPLASM OF LIVER: ICD-10-CM

## 2022-08-18 PROCEDURE — 99214 OFFICE O/P EST MOD 30 MIN: CPT | Mod: S$GLB,,, | Performed by: INTERNAL MEDICINE

## 2022-08-18 PROCEDURE — 3075F PR MOST RECENT SYSTOLIC BLOOD PRESS GE 130-139MM HG: ICD-10-PCS | Mod: CPTII,S$GLB,, | Performed by: INTERNAL MEDICINE

## 2022-08-18 PROCEDURE — 99497 PR ADVNCD CARE PLAN 30 MIN: ICD-10-PCS | Mod: S$GLB,,, | Performed by: INTERNAL MEDICINE

## 2022-08-18 PROCEDURE — 1160F RVW MEDS BY RX/DR IN RCRD: CPT | Mod: CPTII,S$GLB,, | Performed by: INTERNAL MEDICINE

## 2022-08-18 PROCEDURE — 1158F PR ADVANCE CARE PLANNING DISCUSS DOCUMENTED IN MEDICAL RECORD: ICD-10-PCS | Mod: CPTII,S$GLB,, | Performed by: INTERNAL MEDICINE

## 2022-08-18 PROCEDURE — 1101F PR PT FALLS ASSESS DOC 0-1 FALLS W/OUT INJ PAST YR: ICD-10-PCS | Mod: CPTII,S$GLB,, | Performed by: INTERNAL MEDICINE

## 2022-08-18 PROCEDURE — 96372 THER/PROPH/DIAG INJ SC/IM: CPT

## 2022-08-18 PROCEDURE — 63600175 PHARM REV CODE 636 W HCPCS: Mod: JG | Performed by: INTERNAL MEDICINE

## 2022-08-18 PROCEDURE — 3078F PR MOST RECENT DIASTOLIC BLOOD PRESSURE < 80 MM HG: ICD-10-PCS | Mod: CPTII,S$GLB,, | Performed by: INTERNAL MEDICINE

## 2022-08-18 PROCEDURE — 99999 PR PBB SHADOW E&M-EST. PATIENT-LVL III: ICD-10-PCS | Mod: PBBFAC,,, | Performed by: INTERNAL MEDICINE

## 2022-08-18 PROCEDURE — 1160F PR REVIEW ALL MEDS BY PRESCRIBER/CLIN PHARMACIST DOCUMENTED: ICD-10-PCS | Mod: CPTII,S$GLB,, | Performed by: INTERNAL MEDICINE

## 2022-08-18 PROCEDURE — 1101F PT FALLS ASSESS-DOCD LE1/YR: CPT | Mod: CPTII,S$GLB,, | Performed by: INTERNAL MEDICINE

## 2022-08-18 PROCEDURE — 3288F FALL RISK ASSESSMENT DOCD: CPT | Mod: CPTII,S$GLB,, | Performed by: INTERNAL MEDICINE

## 2022-08-18 PROCEDURE — 1158F ADVNC CARE PLAN TLK DOCD: CPT | Mod: CPTII,S$GLB,, | Performed by: INTERNAL MEDICINE

## 2022-08-18 PROCEDURE — 1159F MED LIST DOCD IN RCRD: CPT | Mod: CPTII,S$GLB,, | Performed by: INTERNAL MEDICINE

## 2022-08-18 PROCEDURE — 1159F PR MEDICATION LIST DOCUMENTED IN MEDICAL RECORD: ICD-10-PCS | Mod: CPTII,S$GLB,, | Performed by: INTERNAL MEDICINE

## 2022-08-18 PROCEDURE — 3288F PR FALLS RISK ASSESSMENT DOCUMENTED: ICD-10-PCS | Mod: CPTII,S$GLB,, | Performed by: INTERNAL MEDICINE

## 2022-08-18 PROCEDURE — 3078F DIAST BP <80 MM HG: CPT | Mod: CPTII,S$GLB,, | Performed by: INTERNAL MEDICINE

## 2022-08-18 PROCEDURE — 3008F BODY MASS INDEX DOCD: CPT | Mod: CPTII,S$GLB,, | Performed by: INTERNAL MEDICINE

## 2022-08-18 PROCEDURE — 99999 PR PBB SHADOW E&M-EST. PATIENT-LVL III: CPT | Mod: PBBFAC,,, | Performed by: INTERNAL MEDICINE

## 2022-08-18 PROCEDURE — 3075F SYST BP GE 130 - 139MM HG: CPT | Mod: CPTII,S$GLB,, | Performed by: INTERNAL MEDICINE

## 2022-08-18 PROCEDURE — 3008F PR BODY MASS INDEX (BMI) DOCUMENTED: ICD-10-PCS | Mod: CPTII,S$GLB,, | Performed by: INTERNAL MEDICINE

## 2022-08-18 PROCEDURE — 99497 ADVNCD CARE PLAN 30 MIN: CPT | Mod: S$GLB,,, | Performed by: INTERNAL MEDICINE

## 2022-08-18 PROCEDURE — 99214 PR OFFICE/OUTPT VISIT, EST, LEVL IV, 30-39 MIN: ICD-10-PCS | Mod: S$GLB,,, | Performed by: INTERNAL MEDICINE

## 2022-08-18 RX ORDER — LANREOTIDE ACETATE 120 MG/.5ML
120 INJECTION SUBCUTANEOUS
Status: DISCONTINUED | OUTPATIENT
Start: 2022-08-18 | End: 2022-08-18 | Stop reason: HOSPADM

## 2022-08-18 RX ORDER — PANTOPRAZOLE SODIUM 40 MG/1
40 TABLET, DELAYED RELEASE ORAL DAILY
Qty: 90 TABLET | Refills: 3 | Status: SHIPPED | OUTPATIENT
Start: 2022-08-18 | End: 2023-08-24 | Stop reason: SDUPTHER

## 2022-08-18 RX ORDER — LANREOTIDE ACETATE 120 MG/.5ML
120 INJECTION SUBCUTANEOUS
Status: CANCELLED | OUTPATIENT
Start: 2022-08-18

## 2022-08-18 RX ADMIN — LANREOTIDE ACETATE 120 MG: 120 INJECTION SUBCUTANEOUS at 11:08

## 2022-08-25 ENCOUNTER — OFFICE VISIT (OUTPATIENT)
Dept: PSYCHIATRY | Facility: CLINIC | Age: 67
End: 2022-08-25
Payer: COMMERCIAL

## 2022-08-25 DIAGNOSIS — C7A.00 MALIGNANT CARCINOID TUMOR OF UNKNOWN PRIMARY SITE: ICD-10-CM

## 2022-08-25 DIAGNOSIS — F54 PSYCHOLOGICAL FACTORS AFFECTING MEDICAL CONDITION: ICD-10-CM

## 2022-08-25 DIAGNOSIS — F32.1 CURRENT MODERATE EPISODE OF MAJOR DEPRESSIVE DISORDER WITHOUT PRIOR EPISODE: ICD-10-CM

## 2022-08-25 DIAGNOSIS — F41.1 GENERALIZED ANXIETY DISORDER: Primary | ICD-10-CM

## 2022-08-25 PROCEDURE — 1159F MED LIST DOCD IN RCRD: CPT | Mod: CPTII,S$GLB,, | Performed by: PSYCHOLOGIST

## 2022-08-25 PROCEDURE — 90834 PSYTX W PT 45 MINUTES: CPT | Mod: S$GLB,,, | Performed by: PSYCHOLOGIST

## 2022-08-25 PROCEDURE — 1159F PR MEDICATION LIST DOCUMENTED IN MEDICAL RECORD: ICD-10-PCS | Mod: CPTII,S$GLB,, | Performed by: PSYCHOLOGIST

## 2022-08-25 PROCEDURE — 1157F PR ADVANCE CARE PLAN OR EQUIV PRESENT IN MEDICAL RECORD: ICD-10-PCS | Mod: CPTII,S$GLB,, | Performed by: PSYCHOLOGIST

## 2022-08-25 PROCEDURE — 1157F ADVNC CARE PLAN IN RCRD: CPT | Mod: CPTII,S$GLB,, | Performed by: PSYCHOLOGIST

## 2022-08-25 PROCEDURE — 90834 PR PSYCHOTHERAPY W/PATIENT, 45 MIN: ICD-10-PCS | Mod: S$GLB,,, | Performed by: PSYCHOLOGIST

## 2022-08-25 PROCEDURE — 99999 PR PBB SHADOW E&M-EST. PATIENT-LVL II: ICD-10-PCS | Mod: PBBFAC,,, | Performed by: PSYCHOLOGIST

## 2022-08-25 PROCEDURE — 99999 PR PBB SHADOW E&M-EST. PATIENT-LVL II: CPT | Mod: PBBFAC,,, | Performed by: PSYCHOLOGIST

## 2022-08-25 NOTE — PROGRESS NOTES
INFORMED CONSENT: Lena Brantley   is known to this provider and identity was confirmed via NAME and .  The patient has been informed of the risks and benefits associated with engaging in psychotherapy, the handling of protected health information, the rights of privacy and the limits of confidentiality. The patient has also been informed of the importance of reporting any suicidal or homicidal ideation to this or any provider to ensure safety of all parties, and the Lena Brantley expressed understanding. The patient was agreeable to these terms and freely participates in individual psychotherapy.\    PSYCHO-ONCOLOGY NOTE/ Individual Psychotherapy     Date: 2022   Site:  [facility]        Therapeutic Intervention: Met with patient.  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    Hypergastrinemia    Carcinoid (except of appendix)    Gastrinoma, malignant    Generalized anxiety disorder    Current moderate episode of major depressive disorder without prior episode    Secondary neuroendocrine tumor of liver    History of iron deficiency anemia    Elevated bilirubin    Vitamin D deficiency    Abnormal glucose    Psychological factors affecting medical condition       Chief complaint/reason for encounter: depression, anxiety and interpersonal   Met with patient to evaluate psychosocial adaptation to diagnosis/treatment/survivorship of Carcinoid    Current Medications  Current Outpatient Medications   Medication    busPIRone (BUSPAR) 7.5 MG tablet    lanreotide (SOMATULINE DEPOT) 120 mg/0.5 mL Syrg    pantoprazole (PROTONIX) 40 MG tablet     No current facility-administered medications for this visit.       Objective:  Lena Brantley arrived  promptly for the session.   Ms. Brantley was independently ambulatory at the time of session. The patient was fully cooperative throughout the session.  Appearance: age appropriate, appropriately  dressed, adequately  groomed  Behavior/Cooperation: friendly and cooperative  Speech: normal in rate, volume, and tone and appropriate quality, quantity and organization of sentences  Mood: dysthymic  Affect: mood congruent  Thought Process: goal-directed, logical  Thought Content: normal,  No delusions or paranoia; did not appear to be responding to internal stimuli during the session  Orientation: grossly intact  Memory: Grossly intact  Attention Span/Concentration: Attends to session without distraction; reports no difficulty  Fund of Knowledge: average  Estimate of Intelligence: average from verbal skills and history  Cognition: grossly intact  Insight: patient has awareness of illness; good insight into own behavior and behavior of others  Judgment: the patient's behavior is adequate to circumstances    Interval history and content of current session: Patietn feel interpersonally lonely.  Discussed relationship, needs, and communication. Reports to be coping in a passive manner. Evaluated cognitive response, paying particular attention to negative intrusive thoughts of a persistent and detrimental nature. Thoughts of this type are in evidence with moderate distress. Provided cognitive behavioral therapy to address negative cognitions. Identified and evaluated psychosocial and environmental stressors secondary to diagnosis and treatment.  Examined proactive behaviors that may be implemented to minimize or ameliorate psychosocial stressors secondary to diagnosis and treatment.     Risk parameters:   Patient reports no suicidal ideation  Patient reports no homicidal ideation  Patient reports no self-injurious behavior  Patient reports no violent behavior   Safety needs:  None at this time      Verbal deficits:  None     Patient's response to intervention:The patient's response to intervention is accepting.     Progress toward goals and other mental status changes:  The patient's progress toward goals is not progressing.      Progress to date:No Progress - Continue Objectives and Revise Objectives (Goals)      Goals from last visit: Met     Patient reported outcomes:    Distress Thermometer:   Distress Score    Distress Score: 6        Practical Problems Physical Problems                                                   Family Problems                                         Emotional Problems                                                         Spiritual/Religions Concerns     Spritual / Holiness Concerns: No         Other Problems                PHQ-9= 9   MINH-7=10        Client Strengths: verbal, intelligent, successful, good social support, good insight, commitment to wellness, strong [place], strong cultural traditions     Diagnosis:     ICD-10-CM ICD-9-CM   1. Generalized anxiety disorder  F41.1 300.02   2. Current moderate episode of major depressive disorder without prior episode  F32.1 296.22   3. Psychological factors affecting medical condition  F54 316   4. Malignant carcinoid tumor of unknown primary site  C7A.00 209.20       Treatment Plan:individual psychotherapy and medication management by physician  · Target symptoms: depression, anxiety   · Why chosen therapy is appropriate versus another modality: relevant to diagnosis, patient responds to this modality, evidence based practice  · Outcome monitoring methods: self-report, observation, checklist/rating scale  · Therapeutic intervention type: behavior modifying psychotherapy  · Prognosis: Fair Patient overall doing well buyt has not been progressing related to increasing social interaction. Discussed therapeutic break.       Behavioral goals:     Increase daily self-care and attention to health management  Pleasant events scheduling and increased social  interaction  Monitor stressors in writing and bring to next visit    Return to clinic: 2 months       Length of Service (minutes direct face-to-face contact): 45    Carmella Gurrola, PhD  Clinical Psychologist  [place] License #[id number]  [place] License #[id number]

## 2022-08-31 ENCOUNTER — OFFICE VISIT (OUTPATIENT)
Dept: URGENT CARE | Facility: CLINIC | Age: 67
End: 2022-08-31
Payer: COMMERCIAL

## 2022-08-31 VITALS
HEIGHT: 64 IN | OXYGEN SATURATION: 96 % | SYSTOLIC BLOOD PRESSURE: 128 MMHG | TEMPERATURE: 99 F | RESPIRATION RATE: 18 BRPM | WEIGHT: 142 LBS | DIASTOLIC BLOOD PRESSURE: 79 MMHG | BODY MASS INDEX: 24.24 KG/M2 | HEART RATE: 77 BPM

## 2022-08-31 DIAGNOSIS — J02.9 SORE THROAT: ICD-10-CM

## 2022-08-31 DIAGNOSIS — J06.9 VIRAL URI: Primary | ICD-10-CM

## 2022-08-31 LAB
CTP QC/QA: YES
CTP QC/QA: YES
MOLECULAR STREP A: NEGATIVE
SARS-COV-2 RDRP RESP QL NAA+PROBE: NEGATIVE

## 2022-08-31 PROCEDURE — 1160F PR REVIEW ALL MEDS BY PRESCRIBER/CLIN PHARMACIST DOCUMENTED: ICD-10-PCS | Mod: CPTII,S$GLB,, | Performed by: NURSE PRACTITIONER

## 2022-08-31 PROCEDURE — 3008F BODY MASS INDEX DOCD: CPT | Mod: CPTII,S$GLB,, | Performed by: NURSE PRACTITIONER

## 2022-08-31 PROCEDURE — 3008F PR BODY MASS INDEX (BMI) DOCUMENTED: ICD-10-PCS | Mod: CPTII,S$GLB,, | Performed by: NURSE PRACTITIONER

## 2022-08-31 PROCEDURE — 99213 PR OFFICE/OUTPT VISIT, EST, LEVL III, 20-29 MIN: ICD-10-PCS | Mod: S$GLB,,, | Performed by: NURSE PRACTITIONER

## 2022-08-31 PROCEDURE — 1160F RVW MEDS BY RX/DR IN RCRD: CPT | Mod: CPTII,S$GLB,, | Performed by: NURSE PRACTITIONER

## 2022-08-31 PROCEDURE — 1157F ADVNC CARE PLAN IN RCRD: CPT | Mod: CPTII,S$GLB,, | Performed by: NURSE PRACTITIONER

## 2022-08-31 PROCEDURE — 1159F MED LIST DOCD IN RCRD: CPT | Mod: CPTII,S$GLB,, | Performed by: NURSE PRACTITIONER

## 2022-08-31 PROCEDURE — U0002: ICD-10-PCS | Mod: QW,S$GLB,, | Performed by: NURSE PRACTITIONER

## 2022-08-31 PROCEDURE — 1157F PR ADVANCE CARE PLAN OR EQUIV PRESENT IN MEDICAL RECORD: ICD-10-PCS | Mod: CPTII,S$GLB,, | Performed by: NURSE PRACTITIONER

## 2022-08-31 PROCEDURE — 3074F PR MOST RECENT SYSTOLIC BLOOD PRESSURE < 130 MM HG: ICD-10-PCS | Mod: CPTII,S$GLB,, | Performed by: NURSE PRACTITIONER

## 2022-08-31 PROCEDURE — 1159F PR MEDICATION LIST DOCUMENTED IN MEDICAL RECORD: ICD-10-PCS | Mod: CPTII,S$GLB,, | Performed by: NURSE PRACTITIONER

## 2022-08-31 PROCEDURE — 3074F SYST BP LT 130 MM HG: CPT | Mod: CPTII,S$GLB,, | Performed by: NURSE PRACTITIONER

## 2022-08-31 PROCEDURE — 3078F PR MOST RECENT DIASTOLIC BLOOD PRESSURE < 80 MM HG: ICD-10-PCS | Mod: CPTII,S$GLB,, | Performed by: NURSE PRACTITIONER

## 2022-08-31 PROCEDURE — 87651 STREP A DNA AMP PROBE: CPT | Mod: QW,S$GLB,, | Performed by: NURSE PRACTITIONER

## 2022-08-31 PROCEDURE — 87651 POCT STREP A MOLECULAR: ICD-10-PCS | Mod: QW,S$GLB,, | Performed by: NURSE PRACTITIONER

## 2022-08-31 PROCEDURE — 99213 OFFICE O/P EST LOW 20 MIN: CPT | Mod: S$GLB,,, | Performed by: NURSE PRACTITIONER

## 2022-08-31 PROCEDURE — U0002 COVID-19 LAB TEST NON-CDC: HCPCS | Mod: QW,S$GLB,, | Performed by: NURSE PRACTITIONER

## 2022-08-31 PROCEDURE — 3078F DIAST BP <80 MM HG: CPT | Mod: CPTII,S$GLB,, | Performed by: NURSE PRACTITIONER

## 2022-08-31 NOTE — PATIENT INSTRUCTIONS
See additional patient Instructions provided    - Rest.    - Drink plenty of fluids.  - Viral upper respiratory infections typically run their course in 10-14 days.     - Tylenol or Ibuprofen as directed as needed for fever/pain. Avoid tylenol if you have a history of liver disease. Do not take ibuprofen if you have a history of GI bleeding, kidney disease, or if you take blood thinners.     - You can take over-the-counter claritin, zyrtec, allegra, or xyzal as directed. These are antihistamines that can help with runny nose, nasal congestion, sneezing, and helps to dry up post-nasal drip, which usually causes sore throat and cough.   - If you do NOT have high blood pressure, you may use a decongestant form (D)  of this medication (ie. Claritin- D, zyrtec-D, allegra-D) or if you do not take the D form, you can take sudafed (pseudoephedrine) over the counter, which is a decongestant. Do NOT take two decongestant (D) medications at the same time (such as mucinex-D and claritin-D or plain sudafed and claritin D)    - You can use Flonase (fluticasone) nasal spray as directed for sinus congestion and postnasal drip. This is a steroid nasal spray that works locally over time to decrease the inflammation in your nose/sinuses and help with allergic symptoms. This is not an quick- relief spray like afrin, but it works well if used daily.  Discontinue if you develop nose bleed  - use nasal saline prior to Flonase.  - Use Ocean Spray Nasal Saline 1-3 puffs each nostril every 2-3 hours then blow out onto tissue. This is to irrigate the nasal passage way to clear the sinus openings. Use until sinus problem resolved.    - you can take plain Mucinex (guaifenesin) 1200 mg twice a day to help loosen mucous.     -warm salt water gargles can help with sore throat    - warm tea with honey can help with cough. Honey is a natural cough suppressant.    - Dextromethorphan (DM) is a cough suppressant over the counter (ie. mucinex DM,  robitussin, delsym; dayquil/nyquil has DM as well.)    - Follow up with your PCP or specialty clinic as directed in the next 1-2 weeks if not improved or as needed.  You can call [phone] to schedule an appointment with the appropriate provider.      - Go to the ER if you develop new or worsening symptoms.     - You must understand that you have received an Urgent Care treatment only and that you may be released before all of your medical problems are known or treated.   - You, the patient, will arrange for follow up care as instructed.   - If your condition worsens or fails to improve we recommend that you receive another evaluation at the ER immediately or contact your PCP to discuss your concerns or return here.     Patient Instructions   - You must understand that you have received an Urgent Care treatment only and that you may be released before all of your medical problems are known or treated.   - You, the patient, will arrange for follow up care as instructed.   - If your condition worsens or fails to improve we recommend that you receive another evaluation at the ER immediately or contact your PCP to discuss your concerns or return here.     Advised on return/follow-up precautions. Advised on ER precautions. Answered all patient questions. Patient verbalized understanding and voiced agreement with current treatment plan.

## 2022-08-31 NOTE — LETTER
August 31, 2022    Lena Brantley  [address]  [address]  [facility] [address]             [facility] [place]  [address],  [address]  [facility] [address]  Phone: [phone]  Fax: [phone]   August 31, 2022     Patient: Lena Brantley   YOB: 1955   Date of Visit: 8/31/2022       To Whom it May Concern:    Lena Brantley was seen in my clinic on 8/31/2022. She may return to work on 9/1/22.    Please excuse her from any classes or work missed.    If you have any questions or concerns, please don't hesitate to call.    Sincerely,         Aretha Holley NP

## 2022-08-31 NOTE — NURSING
Tolerated Lanreotide injection well. Discharged home with next appointment scheduled.     Unit RN to OR RN

## 2022-08-31 NOTE — PROGRESS NOTES
"Subjective:       Patient ID: Lena Brantley is a 67 y.o. female.    Vitals:  height is 5' 4" (1.626 m) and weight is 64.4 kg (142 lb). Her oral temperature is 99.4 °F (37.4 °C). Her blood pressure is 128/79 and her pulse is 77. Her respiration is 18 and oxygen saturation is 63% (abnormal).     Chief Complaint: URI    Sore burning throat, dry cough, coughing up mucus, watery left eye since yesterday.   Pt gargled w/ salt water & peroxide water for relief    URI   This is a new problem. The current episode started yesterday. The problem has been gradually worsening. There has been no fever. Associated symptoms include coughing, ear pain, headaches, a plugged ear sensation, rhinorrhea, sinus pain and a sore throat. Pertinent negatives include no abdominal pain, chest pain, congestion, diarrhea, nausea, neck pain, rash, sneezing, vomiting or wheezing. Treatments tried: salt water, peroxide. The treatment provided mild relief.     Constitution: Negative for chills, sweating, fatigue and fever.   HENT:  Positive for ear pain, sinus pain and sore throat. Negative for ear discharge, tinnitus, hearing loss, congestion, sinus pressure, trouble swallowing and voice change.    Neck: Negative for neck pain and painful lymph nodes.   Cardiovascular:  Negative for chest pain, palpitations and sob on exertion.   Eyes:  Positive for eye discharge.   Respiratory:  Positive for cough. Negative for sputum production, shortness of breath and wheezing.    Gastrointestinal:  Negative for abdominal pain, nausea, vomiting and diarrhea.   Musculoskeletal:  Negative for muscle ache.   Skin:  Negative for color change, pale and rash.   Allergic/Immunologic: Negative for sneezing.   Neurological:  Positive for headaches. Negative for numbness and tingling.   Hematologic/Lymphatic: Negative for swollen lymph nodes.     Objective:      Physical Exam   Constitutional: She is oriented to person, place, and time. She appears well-developed.  " Non-toxic appearance. She does not appear ill. No distress.   HENT:   Head: Normocephalic and atraumatic.   Ears:   Right Ear: Hearing and external ear normal.   Left Ear: Hearing and external ear normal.   Nose: Nose normal. No mucosal edema, rhinorrhea, nasal deformity or congestion. No epistaxis. Right sinus exhibits no maxillary sinus tenderness and no frontal sinus tenderness. Left sinus exhibits no maxillary sinus tenderness and no frontal sinus tenderness.   Mouth/Throat: Uvula is midline and mucous membranes are normal. No trismus in the jaw. Normal dentition. No uvula swelling. Posterior oropharyngeal erythema present. No oropharyngeal exudate or posterior oropharyngeal edema.   Eyes: Conjunctivae and lids are normal. No scleral icterus.   Neck: Trachea normal and phonation normal. Neck supple. No edema present. No erythema present. No neck rigidity present.   Cardiovascular: Normal rate.   Pulmonary/Chest: Effort normal. No respiratory distress. She has no decreased breath sounds.   Abdominal: Normal appearance.   Musculoskeletal: Normal range of motion.         General: No deformity. Normal range of motion.      Cervical back: She exhibits no tenderness.   Lymphadenopathy:     She has no cervical adenopathy.   Neurological: She is alert and oriented to person, place, and time. She exhibits normal muscle tone. Coordination normal.   Skin: Skin is warm, dry, intact, not diaphoretic and not pale.   Nursing note and vitals reviewed.      Results for orders placed or performed in visit on 08/31/22   POCT COVID-19 Rapid Screening   Result Value Ref Range    POC Rapid COVID Negative Negative     Acceptable Yes    POCT Strep A, Molecular   Result Value Ref Range    Molecular Strep A, POC Negative Negative     Acceptable Yes        Assessment:       1. Viral URI    2. Sore throat            Plan:         Viral URI    Sore throat  -     POCT COVID-19 Rapid Screening  -     POCT Strep  A, Molecular                 Patient Instructions   See additional patient Instructions provided    - Rest.    - Drink plenty of fluids.  - Viral upper respiratory infections typically run their course in 10-14 days.     - Tylenol or Ibuprofen as directed as needed for fever/pain. Avoid tylenol if you have a history of liver disease. Do not take ibuprofen if you have a history of GI bleeding, kidney disease, or if you take blood thinners.     - You can take over-the-counter claritin, zyrtec, allegra, or xyzal as directed. These are antihistamines that can help with runny nose, nasal congestion, sneezing, and helps to dry up post-nasal drip, which usually causes sore throat and cough.   - If you do NOT have high blood pressure, you may use a decongestant form (D)  of this medication (ie. Claritin- D, zyrtec-D, allegra-D) or if you do not take the D form, you can take sudafed (pseudoephedrine) over the counter, which is a decongestant. Do NOT take two decongestant (D) medications at the same time (such as mucinex-D and claritin-D or plain sudafed and claritin D)    - You can use Flonase (fluticasone) nasal spray as directed for sinus congestion and postnasal drip. This is a steroid nasal spray that works locally over time to decrease the inflammation in your nose/sinuses and help with allergic symptoms. This is not an quick- relief spray like afrin, but it works well if used daily.  Discontinue if you develop nose bleed  - use nasal saline prior to Flonase.  - Use Ocean Spray Nasal Saline 1-3 puffs each nostril every 2-3 hours then blow out onto tissue. This is to irrigate the nasal passage way to clear the sinus openings. Use until sinus problem resolved.    - you can take plain Mucinex (guaifenesin) 1200 mg twice a day to help loosen mucous.     -warm salt water gargles can help with sore throat    - warm tea with honey can help with cough. Honey is a natural cough suppressant.    - Dextromethorphan (DM) is a cough  suppressant over the counter (ie. mucinex DM, robitussin, delsym; dayquil/nyquil has DM as well.)    - Follow up with your PCP or specialty clinic as directed in the next 1-2 weeks if not improved or as needed.  You can call [phone] to schedule an appointment with the appropriate provider.      - Go to the ER if you develop new or worsening symptoms.     - You must understand that you have received an Urgent Care treatment only and that you may be released before all of your medical problems are known or treated.   - You, the patient, will arrange for follow up care as instructed.   - If your condition worsens or fails to improve we recommend that you receive another evaluation at the ER immediately or contact your PCP to discuss your concerns or return here.     Patient Instructions   - You must understand that you have received an Urgent Care treatment only and that you may be released before all of your medical problems are known or treated.   - You, the patient, will arrange for follow up care as instructed.   - If your condition worsens or fails to improve we recommend that you receive another evaluation at the ER immediately or contact your PCP to discuss your concerns or return here.     Advised on return/follow-up precautions. Advised on ER precautions. Answered all patient questions. Patient verbalized understanding and voiced agreement with current treatment plan.

## 2022-09-08 ENCOUNTER — OFFICE VISIT (OUTPATIENT)
Dept: NEUROLOGY | Facility: CLINIC | Age: 67
End: 2022-09-08
Payer: COMMERCIAL

## 2022-09-08 DIAGNOSIS — F54 PSYCHOLOGICAL FACTORS AFFECTING MEDICAL CONDITION: ICD-10-CM

## 2022-09-08 DIAGNOSIS — R41.3 MEMORY DEFICIT: ICD-10-CM

## 2022-09-08 DIAGNOSIS — F32.1 CURRENT MODERATE EPISODE OF MAJOR DEPRESSIVE DISORDER WITHOUT PRIOR EPISODE: ICD-10-CM

## 2022-09-08 DIAGNOSIS — F41.1 GENERALIZED ANXIETY DISORDER: Primary | ICD-10-CM

## 2022-09-08 PROCEDURE — 99499 NO LOS: ICD-10-PCS | Mod: 95,,, | Performed by: CLINICAL NEUROPSYCHOLOGIST

## 2022-09-08 PROCEDURE — 1157F ADVNC CARE PLAN IN RCRD: CPT | Mod: CPTII,95,, | Performed by: CLINICAL NEUROPSYCHOLOGIST

## 2022-09-08 PROCEDURE — 90791 PR PSYCHIATRIC DIAGNOSTIC EVALUATION: ICD-10-PCS | Mod: FQ,95,, | Performed by: CLINICAL NEUROPSYCHOLOGIST

## 2022-09-08 PROCEDURE — 99499 UNLISTED E&M SERVICE: CPT | Mod: 95,,, | Performed by: CLINICAL NEUROPSYCHOLOGIST

## 2022-09-08 PROCEDURE — 90791 PSYCH DIAGNOSTIC EVALUATION: CPT | Mod: FQ,95,, | Performed by: CLINICAL NEUROPSYCHOLOGIST

## 2022-09-08 PROCEDURE — 1157F PR ADVANCE CARE PLAN OR EQUIV PRESENT IN MEDICAL RECORD: ICD-10-PCS | Mod: CPTII,95,, | Performed by: CLINICAL NEUROPSYCHOLOGIST
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
## 2022-09-15 ENCOUNTER — INFUSION (OUTPATIENT)
Dept: INFUSION THERAPY | Facility: HOSPITAL | Age: 67
End: 2022-09-15
Attending: SURGERY
Payer: COMMERCIAL

## 2022-09-15 VITALS — HEIGHT: 64 IN | WEIGHT: 142 LBS | BODY MASS INDEX: 24.24 KG/M2

## 2022-09-15 DIAGNOSIS — C7B.02 METASTATIC MALIGNANT CARCINOID TUMOR TO LIVER: ICD-10-CM

## 2022-09-15 DIAGNOSIS — C7A.00 MALIGNANT CARCINOID TUMOR OF UNKNOWN PRIMARY SITE: Primary | ICD-10-CM

## 2022-09-15 PROCEDURE — 96372 THER/PROPH/DIAG INJ SC/IM: CPT

## 2022-09-15 PROCEDURE — 63600175 PHARM REV CODE 636 W HCPCS: Mod: JG | Performed by: INTERNAL MEDICINE

## 2022-09-15 RX ORDER — LANREOTIDE ACETATE 120 MG/.5ML
120 INJECTION SUBCUTANEOUS
Status: DISCONTINUED | OUTPATIENT
Start: 2022-09-15 | End: 2022-09-15 | Stop reason: HOSPADM

## 2022-09-15 RX ORDER — LANREOTIDE ACETATE 120 MG/.5ML
120 INJECTION SUBCUTANEOUS
Status: CANCELLED | OUTPATIENT
Start: 2022-09-15

## 2022-09-15 RX ADMIN — LANREOTIDE ACETATE 120 MG: 120 INJECTION SUBCUTANEOUS at 11:09

## 2022-09-21 ENCOUNTER — TELEPHONE (OUTPATIENT)
Dept: HEMATOLOGY/ONCOLOGY | Facility: CLINIC | Age: 67
End: 2022-09-21
Payer: COMMERCIAL

## 2022-09-21 NOTE — TELEPHONE ENCOUNTER
"----- Message from Jason Lucero sent at 9/21/2022  9:53 AM CDT -----  Consult/Advisory:          Name Of Caller: Self      Contact Preference?: [phone]       What is the nature of the call?: Returning call about yoga classes          Additional Notes:  "Thank you for all that you do for our patients"       "

## 2022-09-23 ENCOUNTER — OFFICE VISIT (OUTPATIENT)
Dept: NEUROLOGY | Facility: CLINIC | Age: 67
End: 2022-09-23
Payer: COMMERCIAL

## 2022-09-23 DIAGNOSIS — R41.3 MEMORY DEFICIT: Primary | ICD-10-CM

## 2022-09-23 DIAGNOSIS — F41.1 GENERALIZED ANXIETY DISORDER: ICD-10-CM

## 2022-09-23 DIAGNOSIS — F32.1 CURRENT MODERATE EPISODE OF MAJOR DEPRESSIVE DISORDER WITHOUT PRIOR EPISODE: ICD-10-CM

## 2022-09-23 DIAGNOSIS — F54 PSYCHOLOGICAL FACTORS AFFECTING MEDICAL CONDITION: ICD-10-CM

## 2022-09-23 DIAGNOSIS — C7A.00 MALIGNANT CARCINOID TUMOR OF UNKNOWN PRIMARY SITE: ICD-10-CM

## 2022-09-23 PROCEDURE — 99499 UNLISTED E&M SERVICE: CPT | Mod: S$GLB,,, | Performed by: CLINICAL NEUROPSYCHOLOGIST

## 2022-09-23 PROCEDURE — 96133 PR NEUROPSYCHOLOGIC TEST EVAL SVCS, EA ADDTL HR: ICD-10-PCS | Mod: S$GLB,,, | Performed by: CLINICAL NEUROPSYCHOLOGIST

## 2022-09-23 PROCEDURE — 96138 PSYCL/NRPSYC TECH 1ST: CPT | Mod: S$GLB,,, | Performed by: CLINICAL NEUROPSYCHOLOGIST

## 2022-09-23 PROCEDURE — 96139 PR PSYCH/NEUROPSYCH TEST ADMIN/SCORING, BY TECH, 2+ TESTS, EA ADDTL 30 MIN: ICD-10-PCS | Mod: S$GLB,,, | Performed by: CLINICAL NEUROPSYCHOLOGIST

## 2022-09-23 PROCEDURE — 96132 PR NEUROPSYCHOLOGIC TEST EVAL SVCS, 1ST HR: ICD-10-PCS | Mod: S$GLB,,, | Performed by: CLINICAL NEUROPSYCHOLOGIST

## 2022-09-23 PROCEDURE — 96132 NRPSYC TST EVAL PHYS/QHP 1ST: CPT | Mod: S$GLB,,, | Performed by: CLINICAL NEUROPSYCHOLOGIST

## 2022-09-23 PROCEDURE — 96139 PSYCL/NRPSYC TST TECH EA: CPT | Mod: S$GLB,,, | Performed by: CLINICAL NEUROPSYCHOLOGIST

## 2022-09-23 PROCEDURE — 99999 PR PBB SHADOW E&M-EST. PATIENT-LVL II: ICD-10-PCS | Mod: PBBFAC,,, | Performed by: CLINICAL NEUROPSYCHOLOGIST

## 2022-09-23 PROCEDURE — 1157F ADVNC CARE PLAN IN RCRD: CPT | Mod: CPTII,S$GLB,, | Performed by: CLINICAL NEUROPSYCHOLOGIST

## 2022-09-23 PROCEDURE — 96133 NRPSYC TST EVAL PHYS/QHP EA: CPT | Mod: S$GLB,,, | Performed by: CLINICAL NEUROPSYCHOLOGIST

## 2022-09-23 PROCEDURE — 1157F PR ADVANCE CARE PLAN OR EQUIV PRESENT IN MEDICAL RECORD: ICD-10-PCS | Mod: CPTII,S$GLB,, | Performed by: CLINICAL NEUROPSYCHOLOGIST

## 2022-09-23 PROCEDURE — 99999 PR PBB SHADOW E&M-EST. PATIENT-LVL II: CPT | Mod: PBBFAC,,, | Performed by: CLINICAL NEUROPSYCHOLOGIST

## 2022-09-23 PROCEDURE — 99499 NO LOS: ICD-10-PCS | Mod: S$GLB,,, | Performed by: CLINICAL NEUROPSYCHOLOGIST

## 2022-09-23 PROCEDURE — 96138 PR PSYCH/NEUROPSYCH TEST ADMIN/SCORING, BY TECH, 2+ TESTS, 1ST 30 MIN: ICD-10-PCS | Mod: S$GLB,,, | Performed by: CLINICAL NEUROPSYCHOLOGIST

## 2022-09-23 NOTE — LETTER
September 26, 2022        Carmella Gurrola, PhD  [address] [facility] [address]             [facility]  [address]  [facility] [address]  Phone: [phone]  Fax: [phone]   Patient: Lena Brantley   MR Number: [id number]   YOB: 1955   Date of Visit: 9/23/2022       Dear Dr. Gurrola:    Thank you for referring Lena Brantley to me for evaluation. Below are the relevant portions of my assessment and plan of care.            If you have questions, please do not hesitate to call me. I look forward to following Lena along with you.    Sincerely,      Tyesha Perez, PhD           CC    No Recipients
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
## 2022-09-26 ENCOUNTER — PATIENT MESSAGE (OUTPATIENT)
Dept: NEUROLOGY | Facility: CLINIC | Age: 67
End: 2022-09-26
Payer: COMMERCIAL

## 2022-09-26 NOTE — PATIENT INSTRUCTIONS
PRACTICE GOOD COGNITIVE HYGIENE  Engage in regular exercise, which increases alertness and arousal and can improve attention and focus.  Consider lower impact exercises, such as yoga or light walking. Try to exercise for at least 150 minutes per week  Get a good night's sleep, as this can enhance alertness and cognition.  Eat healthy foods and balanced meals. It is notable that research indicates certain nutrients may aid in brain function, such as B vitamins (especially B6, B12, and folic acid), antioxidants (such as vitamins C and E, and beta carotene), and Omega-3 fatty acids. Here are some common tips for diet (Adopted from Jordon et al, [facility], 2018):  Eat primarily plant-based foods, such as fruits and vegetables, whole grains, legumes   (beans) and nuts.  Limit refined carbohydrates (white pasta, bread, rice).  Replace butter with healthy fats such as olive oil.  Use herbs and spices instead of salt to flavor foods.  Limit red meat and processed meats to no more than a few times a month.  Avoid sugary sodas, bakery goods, and sweets.  Eat fish and poultry at least twice a week.  Keep your brain active. Find activities to stay mentally active, such as reading, games (cards, checkers), puzzles (crosswords, Sudoku, jig saw), crafts (models, woodworking), gardening, or participating in activities in the community.  Stay socially engaged. Continue staying active with your family and friends.    RESOURCE  Consider purchasing the book, High-Octane Brain: 5 Science-Based Steps to Sharpen Your Memory and Reduce Your Risk of Alzheimer's by Dr. Tracey Ch.    COGNITIVE TIPS AND STRATEGIES  The following tips and strategies are provided to help assist in daily activities:      Attention: Remember that inattention and lack of focus are major culprits to forgetting information so be sure and practice paying attention for adequate learning of information. If you rely on passive attention to remembering something (e.g.,  yeah, uh-huh approach), you'll find you cannot recall it later. I recommend the following to improve attention, which may aid in later recall:  1. Reduce distractions in the area as much as possible  2. Look at the person as they are speaking to you.   3. Paraphrase as they are speaking  4. Write down important pieces of information   5. Ask them to repeat if you zone out.  6. Have them simplify and reduce information that you need to attend to during conversation.  7. Have visual cues to remind you if you need to do something later.     Processing Speed:  1. Using multiple modalities (e.g., listening, writing notes, asking questions, recording) to learn new information is likely to allow additional time for processing, thus improving memory for the material.   2. Allowing sufficient time to complete tasks will reduce frustration and help to ensure completion.     Executive Functionin. Don't attempt to multi-task.  Separate tasks so that each can be completed one at a time  2. Consider using a calendar/day planner, as that may be effective to help you plan and stay on track.  Color-coding specific tasks by importance may add additional benefit to your planner  3. Break down large projects into smaller tasks and write down the steps to completing the task.  Taking notes while reading can help with recall.     Storing Information: Use the below strategies to help you further enhance how information is stored  1. Rehearse - Immediately after seeing/hearing something, try to recall it.  Wait a few minutes, then check again.  Gradually lengthen the intervals between rehearsals.  2. Repetition of learned material is critical to ensure storage of information to be learned. Self-test at home to ensure learning.  3. Write down important information to improve your attention and focus and to have something to look back on when you need to recall it.  4. Make sure the person doesn't rattle off, but presents in a  clear, logical, and unhurried manner.      Recalling Information:  1. Jog your memory - Lose something?  Think back to when you last had it.  What did you do next?  And after that?  Mentally walk yourself through each activity that followed.  Prodding your memory this way may enable you to recall the location of the missing item.  2. Use a cue - Symbolic reminders (the proverbial string around the finger) are helpful.  So too are memos, timers, calendar notes, etc.--keep them in visible, appropriate place  3. Get organized - Have fixed locations for all important papers, key phone numbers, medications, keys, wallet, glasses, tools, etc.  4. Develop routines - Routines can anchor memories so they do not drift away.       Word Finding:   Not being able to find a word when you need it is a common and very annoying problem. It is not strictly a memory issue, but more a filing and retrieval issue. You know the word or name you want, but it cannot be found in your brain file. It is like having all the files in your file cabinet emptied on the floor. Every piece of information is there but finding it can be a challenge.   One strategy that may help is working with a speech pathologist/therapist to learn techniques to decrease word finding problems. Some of those strategies/techniques include the following:  Use circumlocutions. Describe the object you're trying to name instead of naming it.  Recite you're A, B, Cs. Go through the alphabet to see if a letter triggers finding the word.  Picture it. Try to visualize the spelling or writing of the word.  Relax. The harder you try to force yourself to come up with the word, the more frustrated you get and the worse you function.   Write it down. In situations where using correct words is critical, such as communicating on the radio while flying, write down the key words so that they are in front of you if needed.  Use word association. For words or names you continually  misfile and can't find, try to come up with a word association, something that reminds you of the word or name.  Write a script. Try scripting something important that you want to say. Either write it out or practice it beforehand, so that you get it right.  Play word games. Doing crossword puzzles and playing word finding games may help your brain become more efficient at filing and retrieving words.     BRAIN TRAINING APPS  · BrainHQ (https://www.brainCLINICAHEALTH.Bonfaire/) - BrainOnly Mallorca[employer] has more than two dozen brain-training exercises organized into six categories: Attention, Brain Speed, Memory, People Skills, Intelligence, and Navigation. It allows you to fit brain exercises into your busy life, and access brain training on most internet-connected devices. Plus, each exercise continuously adapts to your unique performance. So you train at the right level for you.     · Mind Games (https://www.mindgames.com/) - Mind PetLove is a great collection of games based in part on principles derived from cognitive tasks to help you practice different mental skills. This includes a handful of free games. Additionally, there are a number of trial games included that can be played 3 times. All games include your score history and a graph of your progress. Using some principles of standardized testing, your scores are also converted to a standard scale so that you can see where you need work and excel. The training center does the work for you by picking the perfect mix of exercises to keep you engaged.     · Elevate (https://Teak.com/) - Elevate was selected by [employer] as Kimberly of the Year! Elevate is a brain training program designed to improve focus, speaking abilities, processing speed, memory, math skills, and more. Each person is provided with a personalized training program that adjusts over time to maximize results. Theoretically, the more you train with Elevate, the more you'll improve critical cognitive skills that are proven to  boost productivity, earning power, and self-confidence. Users who train at least 3 times per week have reported dramatic gains and increased confidence. In-carlos purchases.     · Peak (https://www.[employer].net/) - Reach Peak performance with over 40 unique games, each one developed by neuroscientists and game experts to challenge your cognitive skills and push you further. Use , the  for your brain, to find the right workout for you at the right time. Choose from 's best recommendations to push your skills to the max. Or take contextual workouts like Coffee Break if you're short on time.  will help you track your progress using in-depth insights and keep you going when you need it most. Play for free or upgrade to Pro and get the best brain training experience available. In-carlos purchases.     OTHER SUGGESTIONS  Games and Apps like Sudoku; Crossword Puzzles; Word Find; Memory Games; Logic Games; Solitaire; and Hidden Object Mysteries. Find some you like and play them. It will exercise many of the skills necessary to improve function.

## 2022-10-07 ENCOUNTER — OFFICE VISIT (OUTPATIENT)
Dept: NEUROLOGY | Facility: CLINIC | Age: 67
End: 2022-10-07
Payer: COMMERCIAL

## 2022-10-07 DIAGNOSIS — F54 PSYCHOLOGICAL FACTORS AFFECTING MEDICAL CONDITION: ICD-10-CM

## 2022-10-07 DIAGNOSIS — R41.3 MEMORY DEFICIT: Primary | ICD-10-CM

## 2022-10-07 DIAGNOSIS — F32.1 CURRENT MODERATE EPISODE OF MAJOR DEPRESSIVE DISORDER WITHOUT PRIOR EPISODE: ICD-10-CM

## 2022-10-07 PROCEDURE — 99499 UNLISTED E&M SERVICE: CPT | Mod: 95,,, | Performed by: CLINICAL NEUROPSYCHOLOGIST

## 2022-10-07 PROCEDURE — 1157F PR ADVANCE CARE PLAN OR EQUIV PRESENT IN MEDICAL RECORD: ICD-10-PCS | Mod: CPTII,95,, | Performed by: CLINICAL NEUROPSYCHOLOGIST

## 2022-10-07 PROCEDURE — 1157F ADVNC CARE PLAN IN RCRD: CPT | Mod: CPTII,95,, | Performed by: CLINICAL NEUROPSYCHOLOGIST

## 2022-10-07 PROCEDURE — 99499 NO LOS: ICD-10-PCS | Mod: 95,,, | Performed by: CLINICAL NEUROPSYCHOLOGIST

## 2022-10-07 NOTE — PROGRESS NOTES
NEUROPSYCHOLOGICAL EVALUATION FEEDBACK    TELEMEDICINE DETAILS:   Established Patient - Audio Only Telehealth Visit  The patient location is: home  The chief complaint leading to consultation is: feedback regarding neuropsychological test results  Visit type: Virtual visit with audio only (telephone)  Total time spent with patient: 35 minutes  The reason for the audio only service rather than synchronous audio and video virtual visit was related to technical difficulties or patient preference/necessity.   Each patient to whom I provide medical services by telemedicine is:  (1) informed of the relationship between the physician and patient and the respective role of any other health care provider with respect to management of the patient; and (2) notified that they may decline to receive medical services by telemedicine and may withdraw from such care at any time. Patient verbally consented to receive this service via voice-only telephone call.     Lena Brantley attended a feedback session today.  We discussed the results of the neuropsychological evaluation and I gave time to discuss questions and concerns. For full evaluation details, please see the note from this provider dated 9/23/2022. A copy of the report was provided via Traity but a second copy is being mailed per her request.     Problem List Items Addressed This Visit          Psychiatric    Current moderate episode of major depressive disorder without prior episode    Psychological factors affecting medical condition     Other Visit Diagnoses       Memory deficit    -  Primary              Tyesha Perez, PhD  Licensed Clinical Neuropsychologist  [facility] - Department of Neurology                          This service was not originating from a related E/M service provided within the previous 7 days nor will  to an E/M service or procedure within the next 24 hours or my soonest available appointment.  Prevailing standard of  care was able to be met in this audio-only visit.

## 2022-10-11 ENCOUNTER — PATIENT MESSAGE (OUTPATIENT)
Dept: HEMATOLOGY/ONCOLOGY | Facility: CLINIC | Age: 67
End: 2022-10-11
Payer: COMMERCIAL

## 2022-10-13 ENCOUNTER — INFUSION (OUTPATIENT)
Dept: INFUSION THERAPY | Facility: HOSPITAL | Age: 67
End: 2022-10-13
Attending: INTERNAL MEDICINE
Payer: COMMERCIAL

## 2022-10-13 VITALS — WEIGHT: 142 LBS | BODY MASS INDEX: 24.24 KG/M2 | HEIGHT: 64 IN

## 2022-10-13 DIAGNOSIS — C7B.02 METASTATIC MALIGNANT CARCINOID TUMOR TO LIVER: ICD-10-CM

## 2022-10-13 DIAGNOSIS — Z78.0 ASYMPTOMATIC MENOPAUSAL STATE: ICD-10-CM

## 2022-10-13 DIAGNOSIS — C7A.00 MALIGNANT CARCINOID TUMOR OF UNKNOWN PRIMARY SITE: Primary | ICD-10-CM

## 2022-10-13 PROCEDURE — 63600175 PHARM REV CODE 636 W HCPCS: Mod: JG | Performed by: INTERNAL MEDICINE

## 2022-10-13 PROCEDURE — 96372 THER/PROPH/DIAG INJ SC/IM: CPT

## 2022-10-13 RX ORDER — LANREOTIDE ACETATE 120 MG/.5ML
120 INJECTION SUBCUTANEOUS
Status: CANCELLED | OUTPATIENT
Start: 2022-10-13

## 2022-10-13 RX ORDER — LANREOTIDE ACETATE 120 MG/.5ML
120 INJECTION SUBCUTANEOUS
Status: DISCONTINUED | OUTPATIENT
Start: 2022-10-13 | End: 2022-10-13 | Stop reason: HOSPADM

## 2022-10-13 RX ADMIN — LANREOTIDE ACETATE 120 MG: 120 INJECTION SUBCUTANEOUS at 03:10

## 2022-10-20 ENCOUNTER — OFFICE VISIT (OUTPATIENT)
Dept: PSYCHIATRY | Facility: CLINIC | Age: 67
End: 2022-10-20
Payer: COMMERCIAL

## 2022-10-20 DIAGNOSIS — C7B.8 SECONDARY NEUROENDOCRINE TUMOR OF LIVER: ICD-10-CM

## 2022-10-20 DIAGNOSIS — F41.1 GENERALIZED ANXIETY DISORDER: Primary | ICD-10-CM

## 2022-10-20 DIAGNOSIS — F54 PSYCHOLOGICAL FACTORS AFFECTING MEDICAL CONDITION: ICD-10-CM

## 2022-10-20 DIAGNOSIS — F32.1 CURRENT MODERATE EPISODE OF MAJOR DEPRESSIVE DISORDER WITHOUT PRIOR EPISODE: ICD-10-CM

## 2022-10-20 DIAGNOSIS — C7B.02 METASTATIC MALIGNANT CARCINOID TUMOR TO LIVER: ICD-10-CM

## 2022-10-20 PROCEDURE — 1157F ADVNC CARE PLAN IN RCRD: CPT | Mod: CPTII,S$GLB,, | Performed by: PSYCHOLOGIST

## 2022-10-20 PROCEDURE — 1159F PR MEDICATION LIST DOCUMENTED IN MEDICAL RECORD: ICD-10-PCS | Mod: CPTII,S$GLB,, | Performed by: PSYCHOLOGIST

## 2022-10-20 PROCEDURE — 99999 PR PBB SHADOW E&M-EST. PATIENT-LVL II: ICD-10-PCS | Mod: PBBFAC,,, | Performed by: PSYCHOLOGIST

## 2022-10-20 PROCEDURE — 90837 PR PSYCHOTHERAPY W/PATIENT, 60 MIN: ICD-10-PCS | Mod: S$GLB,,, | Performed by: PSYCHOLOGIST

## 2022-10-20 PROCEDURE — 1157F PR ADVANCE CARE PLAN OR EQUIV PRESENT IN MEDICAL RECORD: ICD-10-PCS | Mod: CPTII,S$GLB,, | Performed by: PSYCHOLOGIST

## 2022-10-20 PROCEDURE — 90837 PSYTX W PT 60 MINUTES: CPT | Mod: S$GLB,,, | Performed by: PSYCHOLOGIST

## 2022-10-20 PROCEDURE — 1159F MED LIST DOCD IN RCRD: CPT | Mod: CPTII,S$GLB,, | Performed by: PSYCHOLOGIST

## 2022-10-20 PROCEDURE — 99999 PR PBB SHADOW E&M-EST. PATIENT-LVL II: CPT | Mod: PBBFAC,,, | Performed by: PSYCHOLOGIST

## 2022-10-20 NOTE — PROGRESS NOTES
INFORMED CONSENT: Lena Brantley   is known to this provider and identity was confirmed via NAME and .  The patient has been informed of the risks and benefits associated with engaging in psychotherapy, the handling of protected health information, the rights of privacy and the limits of confidentiality. The patient has also been informed of the importance of reporting any suicidal or homicidal ideation to this or any provider to ensure safety of all parties, and the Lena Brantley expressed understanding. The patient was agreeable to these terms and freely participates in individual psychotherapy.      PSYCHO-ONCOLOGY NOTE/ Individual Psychotherapy     Date: 10/20/2022   Site:  [facility]        Therapeutic Intervention: Met with patient.  Outpatient - Insight oriented psychotherapy 60 min - CPT code 88576      Patient was last seen by me on 2022    Problem list  Patient Active Problem List   Diagnosis    Malignant carcinoid tumor of unknown primary site    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    Hypergastrinemia    Carcinoid (except of appendix)    Gastrinoma, malignant    Generalized anxiety disorder    Current moderate episode of major depressive disorder without prior episode    Secondary neuroendocrine tumor of liver    History of iron deficiency anemia    Elevated bilirubin    Vitamin D deficiency    Abnormal glucose    Psychological factors affecting medical condition       Chief complaint/reason for encounter: depression and anxiety   Met with patient to evaluate psychosocial adaptation to diagnosis/treatment/survivorship of NET    Current Medications  Current Outpatient Medications   Medication    busPIRone (BUSPAR) 7.5 MG tablet    lanreotide (SOMATULINE DEPOT) 120 mg/0.5 mL Syrg    pantoprazole (PROTONIX) 40 MG tablet     No current facility-administered medications for this visit.       Objective:  Lena Brantley arrived promptly for the session.  Ms. Brantley was  independently ambulatory at the time of session. The patient was fully cooperative throughout the session.  Appearance: age appropriate, appropriately  dressed, adequately  groomed  Behavior/Cooperation: friendly and cooperative  Speech: normal in rate, volume, and tone and appropriate quality, quantity and organization of sentences  Mood: anxious, dysthymic  Affect: mood congruent  Thought Process: goal-directed, logical  Thought Content: normal,  No delusions or paranoia; did not appear to be responding to internal stimuli during the session  Orientation: grossly intact  Memory: Grossly intact  Attention Span/Concentration: Attends to session without distraction; reports no difficulty  Fund of Knowledge: average  Estimate of Intelligence: average from verbal skills and history  Cognition: grossly intact  Insight: patient has awareness of illness; good insight into own behavior and behavior of others  Judgment: the patient's behavior is adequate to circumstances    Interval history and content of current session: Patient completed Neuropsych eval- noted indicate intact memory, reported memory inefficiencies related to dep/anx, working from home.  Discussed diagnosis, treatment, prognosis, current adaptation to disease and treatment status, and family's adaptation to disease and treatment status. Reports to be coping with great difficulty. Dicussed potential referral to [facility] clinic for depression/anxiety or new provider for work stress and non-cancer depression.     Evaluated cognitive response, paying particular attention to negative intrusive thoughts of a persistent and detrimental nature. Thoughts of this type are in evidence with severe distress. Provided cognitive behavioral therapy to address negative cognitions. Identified and evaluated psychosocial and environmental stressors secondary to diagnosis and treatment.  Examined proactive behaviors that may be implemented to minimize or ameliorate psychosocial  stressors secondary to diagnosis and treatment.     Risk parameters:   Patient reports no suicidal ideation  Patient reports no homicidal ideation  Patient reports no self-injurious behavior  Patient reports no violent behavior   Safety needs:  None at this time      Verbal deficits: None     Patient's response to intervention:The patient's response to intervention is accepting, reluctant. Very avoidant     Progress toward goals and other mental status changes:  The patient's progress toward goals is not progressing.      Progress to date:Revise Objectives (Goals) and Revise Interventions      Goals from last visit: Attempted, partially met- Completed Neuropsych eval     Patient reported outcomes:    Distress Thermometer:   Distress Score             Practical Problems Physical Problems                                                   Family Problems                                         Emotional Problems                                                         Spiritual/Religions Concerns               Other Problems                PHQ-9= 6   MINH-7=13      Client Strengths: verbal, intelligent, successful, good social support, good insight, commitment to wellness, strong [place], strong cultural traditions     Diagnosis:     ICD-10-CM ICD-9-CM   1. Generalized anxiety disorder  F41.1 300.02   2. Current moderate episode of major depressive disorder without prior episode  F32.1 296.22   3. Psychological factors affecting medical condition  F54 316   4. Secondary neuroendocrine tumor of liver  C7B.8 209.72     209.20   5. 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  C7B.02 209.72       Treatment Plan:individual psychotherapy and medication management by physician  Target symptoms: depression, anxiety   Why chosen therapy is appropriate versus another modality: relevant to diagnosis, patient responds to this modality, evidence based practice  Outcome monitoring methods: self-report, observation, checklist/rating  scale  Therapeutic intervention type: insight oriented psychotherapy, behavior modifying psychotherapy  Prognosis: Fair. Patient has maximized therapeutic intervention with this provider.       Behavioral goals:    Exercise:   Stress management: Referral completed to gen psych.   Social engagement:   Nutrition:   Smoking Cessation:   Therapy:  Pleasant events scheduling and increased social interaction  Monitor stressors in writing and bring to next visit    Return to clinic: as needed    Next Session:      Length of Service (minutes direct face-to-face contact): 60    Carmella Gurrola, PhD  Clinical Psychologist  [place] License #[id number]  [place] License #[id number]

## 2022-10-27 ENCOUNTER — TELEPHONE (OUTPATIENT)
Dept: PRIMARY CARE CLINIC | Facility: CLINIC | Age: 67
End: 2022-10-27
Payer: COMMERCIAL

## 2022-10-27 ENCOUNTER — PATIENT MESSAGE (OUTPATIENT)
Dept: PRIMARY CARE CLINIC | Facility: CLINIC | Age: 67
End: 2022-10-27
Payer: COMMERCIAL

## 2022-10-27 ENCOUNTER — PATIENT MESSAGE (OUTPATIENT)
Dept: HEMATOLOGY/ONCOLOGY | Facility: CLINIC | Age: 67
End: 2022-10-27
Payer: COMMERCIAL

## 2022-10-27 ENCOUNTER — PATIENT MESSAGE (OUTPATIENT)
Dept: PSYCHIATRY | Facility: CLINIC | Age: 67
End: 2022-10-27
Payer: COMMERCIAL

## 2022-10-27 DIAGNOSIS — Z12.31 SCREENING MAMMOGRAM FOR BREAST CANCER: Primary | ICD-10-CM

## 2022-10-27 NOTE — TELEPHONE ENCOUNTER
This pt is due for an annual in December, but her screening mmg is due now. Can orders be put in for this pt or does she need an appointment first.

## 2022-10-27 NOTE — TELEPHONE ENCOUNTER
----- Message from Irina Sultana sent at 10/27/2022 12:09 PM CDT -----  Contact: [phone]  Pt is calling for a flu shot and she is also calling for her mammo please give return call

## 2022-10-27 NOTE — TELEPHONE ENCOUNTER
Attempted to speak w/ pt concerning her flu shot and mammogram orders. A voicemail was left instructing the pt to return my call.

## 2022-10-28 ENCOUNTER — TELEPHONE (OUTPATIENT)
Dept: HEMATOLOGY/ONCOLOGY | Facility: CLINIC | Age: 67
End: 2022-10-28
Payer: COMMERCIAL

## 2022-10-28 ENCOUNTER — PATIENT MESSAGE (OUTPATIENT)
Dept: PSYCHIATRY | Facility: CLINIC | Age: 67
End: 2022-10-28
Payer: COMMERCIAL

## 2022-10-28 NOTE — TELEPHONE ENCOUNTER
----- Message from Alex Suazo, PhD sent at 10/28/2022  3:53 PM CDT -----  Regarding: FW: Patient referral  Hi Renetta-  Could you please see if this patient is available for an appt with me on Thursday (11/3) at 1:00?  Thanks! (Please put [facility] protocol in the comment line as a reminder.)  ML  ----- Message -----  From: Carmella Gurrola, PhD  Sent: 10/28/2022   3:42 PM CDT  To: Alex Suazo, PhD  Subject: Patient referral                                 Hey there. This is the patient i mentioned to you who has maximized treatment. She has NET. Unfortunately no one can take her on. Will you be able to see her for brief treatment focused on avoidance and anxiety. [facility] is not up and running. Im out of options.

## 2022-10-31 ENCOUNTER — TELEPHONE (OUTPATIENT)
Dept: PRIMARY CARE CLINIC | Facility: CLINIC | Age: 67
End: 2022-10-31
Payer: COMMERCIAL

## 2022-10-31 DIAGNOSIS — F41.1 GENERALIZED ANXIETY DISORDER: Primary | ICD-10-CM

## 2022-10-31 RX ORDER — BUSPIRONE HYDROCHLORIDE 7.5 MG/1
7.5 TABLET ORAL NIGHTLY
Qty: 30 TABLET | Refills: 1 | Status: SHIPPED | OUTPATIENT
Start: 2022-10-31 | End: 2022-12-21

## 2022-10-31 NOTE — TELEPHONE ENCOUNTER
----- Message from Jaden Irene sent at 10/28/2022 11:17 AM CDT -----  Regarding: MEDICATION  Good morning,       Patient stated she would like the medication Buspirone 7.5 mg she stated she's having anxiety she would like it sent to [facility]  . She also would like to know if she can get her flu shot at [facility] or would you rather her get it done in the [facility]. Please advise.

## 2022-11-03 ENCOUNTER — OFFICE VISIT (OUTPATIENT)
Dept: PSYCHIATRY | Facility: CLINIC | Age: 67
End: 2022-11-03
Payer: COMMERCIAL

## 2022-11-03 DIAGNOSIS — F32.1 CURRENT MODERATE EPISODE OF MAJOR DEPRESSIVE DISORDER WITHOUT PRIOR EPISODE: ICD-10-CM

## 2022-11-03 DIAGNOSIS — F41.1 GENERALIZED ANXIETY DISORDER: Primary | ICD-10-CM

## 2022-11-03 PROCEDURE — 90791 PR PSYCHIATRIC DIAGNOSTIC EVALUATION: ICD-10-PCS | Mod: S$GLB,,, | Performed by: PSYCHOLOGIST

## 2022-11-03 PROCEDURE — 99999 PR PBB SHADOW E&M-EST. PATIENT-LVL II: CPT | Mod: PBBFAC,,, | Performed by: PSYCHOLOGIST

## 2022-11-03 PROCEDURE — 1157F PR ADVANCE CARE PLAN OR EQUIV PRESENT IN MEDICAL RECORD: ICD-10-PCS | Mod: CPTII,S$GLB,, | Performed by: PSYCHOLOGIST

## 2022-11-03 PROCEDURE — 99999 PR PBB SHADOW E&M-EST. PATIENT-LVL II: ICD-10-PCS | Mod: PBBFAC,,, | Performed by: PSYCHOLOGIST

## 2022-11-03 PROCEDURE — 90791 PSYCH DIAGNOSTIC EVALUATION: CPT | Mod: S$GLB,,, | Performed by: PSYCHOLOGIST

## 2022-11-03 PROCEDURE — 1159F MED LIST DOCD IN RCRD: CPT | Mod: CPTII,S$GLB,, | Performed by: PSYCHOLOGIST

## 2022-11-03 PROCEDURE — 1157F ADVNC CARE PLAN IN RCRD: CPT | Mod: CPTII,S$GLB,, | Performed by: PSYCHOLOGIST

## 2022-11-03 PROCEDURE — 1159F PR MEDICATION LIST DOCUMENTED IN MEDICAL RECORD: ICD-10-PCS | Mod: CPTII,S$GLB,, | Performed by: PSYCHOLOGIST

## 2022-11-03 PROCEDURE — 1160F RVW MEDS BY RX/DR IN RCRD: CPT | Mod: CPTII,S$GLB,, | Performed by: PSYCHOLOGIST

## 2022-11-03 PROCEDURE — 1160F PR REVIEW ALL MEDS BY PRESCRIBER/CLIN PHARMACIST DOCUMENTED: ICD-10-PCS | Mod: CPTII,S$GLB,, | Performed by: PSYCHOLOGIST

## 2022-11-04 NOTE — PROGRESS NOTES
INFORMED CONSENT/ LIMITS of CONFIDENTIALITY: Prior to beginning the interview, the patient's identification was confirmed via name and date of birth. Lena Brantley  was informed of the possible risks and benefits of psychological interventions (e.g., counseling, psychotherapy, testing) and provided information regarding the handling of protected health records and   the limits of confidentiality, including the importance of reporting any suicidal or homicidal ideation to ensure safety of all parties. This provider explained the purpose of today's appointment and the patient was provided with time to ask questions regarding this information.  Acceptance and understanding of these conditions was expressed, and Lena Brantley freely consented to this evaluation.     PSYCHO-ONCOLOGY INTAKE    Diagnostic Interview - CPT 41076    Date: 11/3/2022  Site: [facility]     Evaluation Length (direct face-to-face time):  1 hour 15 minutes     Referral Source: Carmella Gurrola, PhD   Oncologist: Ranjan   PCP: Swapnil Diallo MD    Clinical status of patient: Outpatient    Lena Brantley, a 67 y.o. female, seen for initial evaluation visit.  Met with patient.    Chief complaint/reason for encounter: adjustment to illness, anxiety and Psychological Evaluation and treatment recommendations    Medical/Surgical History:    Patient Active Problem List   Diagnosis    Malignant carcinoid tumor of unknown primary site    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    Hypergastrinemia    Carcinoid (except of appendix)    Gastrinoma, malignant    Generalized anxiety disorder    Current moderate episode of major depressive disorder without prior episode    Secondary neuroendocrine tumor of liver    History of iron deficiency anemia    Elevated bilirubin    Vitamin D deficiency    Abnormal glucose    Psychological factors affecting medical condition       Health Behaviors:       ETOH  "Use: No (rare)       Tobacco Use: No   Illicit Drug Use:  No     Prescription Misuse:No   Caffeine: minimal   Exercise:The patient engages in little, if any physical activity.   Firearms:  No   Advanced directives:No     Family History:   Psychiatric illness: Yes Sister with unknown psych issues; younger brother lives in group home; Mother had a "nervous breakdown", daughter ADHD     Alcohol/Drug Abuse: No     Suicide: No      Past Psychiatric History:   Inpatient treatment: No     Outpatient treatment: Dr. Gurrola- 2020 to present- referred for BEBP treatment protocol     Prior substance abuse treatment: No     Suicide Attempts: No     Psychotropic Medications:  Current: Has a prescription for Buspar but only took it 1x due to dizzyness/drowsiness        Past: Lexapro  and Zoloft (only took each for a brief period, "made me feel weird")    Current medications as per below, allergies reviewed in chart.    Current Outpatient Medications   Medication    busPIRone (BUSPAR) 7.5 MG tablet    lanreotide (SOMATULINE DEPOT) 120 mg/0.5 mL Syrg    pantoprazole (PROTONIX) 40 MG tablet     No current facility-administered medications for this visit.       CAM Therapies: None       Social situation/Stressors: Lena Brantley lives alone in [facility].  She works at [employer] as a /.  She has been in her job for 6 years.  She has been working from home since the beginning of the COVID-19 pandemic.   Lena Brantley has been  once 6 years ending in divorce (served while evacuated for [place]) and has 1 adult daughter (27, lives in [place], ). Her ex- was an offshore worker. They split up due to his infidelity. SHe has no current romantic partnerships. The patient had 5 siblings in her family of origin, but was not raised with them (placed in foster care as an infant and raised with 1 foster sister). The patient reports limited social support (has a few " close friends, but not local- [place] an [place]). Lena Brantley is an active member of the [employer] [place].  Lena Brantley's hobbies include sewing.    Additional stressors: Sister (Naa)  1 year ago of endometrial cancer, hid the cancer from most of the family (lola patient)     Naa's daughter recently treated for cervical cancer (in [place])     Nephew  of MS a few months ago     Her foster sister is experiencing IPV    Strengths:Housing stability, Setting and pursuing goals, hopes, dreams, aspirations, Resources - social, interpersonal, monetary, Vocational interests, hobbies and/or talents, Interpersonal relationships and supports available - family, relatives, friends and Cultural/spiritual/Adventism and community involvement  Liabilities: No interests, Complicated medical illness and Financial strain    Current Evaluation:     Mental Status Exam: Lena Brantley arrived promptly for the assessment session.  The patient was fully cooperative throughout the interview and was an adequate historian   Appearance: age appropriate, appropriately  dressed, adequately  groomed  Behavior/Cooperation: friendly and cooperative  Speech: normal in rate, volume, and tone and appropriate quality, quantity and organization of sentences  Mood: anxious  Affect: increased in intensity  Thought Process: goal-directed, logical  Thought Content: normal, no suicidally, no homicidality, delusions, or paranoia;did not appear to be responding to internal stimuli during the interview.   Orientation: grossly intact  Memory: Grossly intact  Attention Span/Concentration: Attends to interview without distraction; reports no difficulty  Fund of Knowledge: average  Estimate of Intelligence: average from verbal skills and history  Cognition: grossly intact  Insight: patient has awareness of illness; good insight into own behavior and behavior of others  Judgment: the patient's behavior is adequate to  circumstances    Distress Score    Distress Score: 7        Practical Problems Physical Problems                                                   Family Problems                                         Emotional Problems                                                         Spiritual/Religions Concerns     Spiritual / Yazidi Concerns: No         Other Problems              PHQ ANSWERS    Q1. Little interest or pleasure in doing things: Not at all (11/03/22 1045)  Q2. Feeling down, depressed, or hopeless: Several days (11/03/22 1045)  Q3. Trouble falling or staying asleep, or sleeping too much: Several days (11/03/22 1045)  Q4. Feeling tired or having little energy: Several days (11/03/22 1045)  Q5. Poor appetite or overeating: Not at all (11/03/22 1045)  Q6. Feeling bad about yourself - or that you are a failure or have let yourself or your family down: Several days (11/03/22 1045)  Q7. Trouble concentrating on things, such as reading the newspaper or watching television: Not at all (11/03/22 1045)  Q8. Moving or speaking so slowly that other people could have noticed. Or the opposite - being so fidgety or restless that you have been moving around a lot more than usual: Not at all (11/03/22 1045)  Q9. Thoughts that you would be better off dead, or of hurting yourself in some way: Not at all (11/03/22 1045)No SI    PHQ8 Score : 4 (11/03/22 1045)  PHQ-9 Total Score: 4 (11/03/22 1045)       MINH-7     GAD7 11/3/2022   1. Feeling nervous, anxious, or on edge? 1   2. Not being able to stop or control worrying? 2   3. Worrying too much about different things? 1   4. Trouble relaxing? 1   5. Being so restless that it is hard to sit still? 0   6. Becoming easily annoyed or irritable? 1   7. Feeling afraid as if something awful might happen? 1   MINH-7 Score 7          History of present illness:    Well-differentiated neuroendocrine tumor, Ki-67 of 1%.  Conventional imaging an octreotide scan did not reveal a primary  site disease.  Gene expression profiling was performed indicating a possible primary site pancreas.  She was started on Lanreotide in 2016.  She was offered surgical resection and also liver directed therapy and declined.    She did discuss other roles for treatment so that she could potentially get off lanreotide and I have told her in the absence of definitive therapy I would recommend we continue on with lanreotide.  We had previously discussed her case and thought that she may be a candidate for surgical resection but she was not interested in this and continues to be very anxious about surgery.     (Prior according to Dr. Gurrola):  Patient initially referred by Dr. Sanford for anxiety, history of nonadherence to appointments and follow-ups. Has declined definitive therapy and surgery. Sister with cancer. Patient reported psychosocial stress related to work and fear of the unknown related to her job switching contracts and transitioning her work. Daughter (25 yrs) is a source of stress due to her unmanaged ADHD. Patient is also struggling with debilitation anxiety, and often avoids daily tasks, medical recommendations, and interactions.     Currently:  Lena Calderon Early has adjusted to illness with significant difficulty primarily through avoidance. She has engaged in appropriate information gathering.  The patient has limited family/friend support.  Her support system is coping adequately with the diagnosis/treatment/prognosis. Illness-related psychosocial stressors include absence from work, difficulty meeting family responsibilities and changes in ability to engage in leisure activities.  The patient has a fair partnership with her [facility] [place]. The patient reports the following barriers to cancer care:avoidance.      As per Dr. Woodruff:  - she was last seen by Dr. Riddle on 4/22/22.  - case was discussed at neuroendocrine conference on 4/25/22. Recommendation was to proceed with  chemoembolization of 3.7 x 2.4cm mass in segment 2/4.    She deferred decision-making on chemoembolization until next set of scans (due mid-November) and will see Dr. Woodruff again after the scans.     Areas assessed:   Mood: Depression: depressed mood, hypersomnia, negative view of self, and fatigue;  prior depression:several time periods- lola at breakup of marriage; no SI/HI; PHQ-9=4  Yadi: Denies  Psychosis: Denies   Anxiety: Feeling nervous, anxious, or on edge, Uncontrollable worry (about finances, FCI, no relationship, historical rumination about sister's illness, divorce), Excessive worry (interfering with mood, enjoyment), Difficulty relaxing, Irritability, and Fear of unknown; lifelong anxiety;  MINH-7=7  Panic Disorder: Denies  Social/specific phobia: Denies   OCD: Denies  Trauma: Denies  Sexual Dysfunction:  no interest, not concerned  Substance abuse: denied  Cognitive functioning: denied  Health behaviors: avoidance,   Sleep: Poor quality, trouble falling asleep; Anxious Cognitions; interrupted sleep and non-restorative sleep; Tried melatonin.Time in bed: 9:30-7a;  Time asleep: 1a-7a; no EDS , no reported apneic events, and no naps , (+) sleep hygiene considerations and (+) psychophysiological factors, no current use of OTC/melatonin/hypnotics/benzodiazepines           Assessment - Diagnosis - Goals:       ICD-10-CM ICD-9-CM   1. Current moderate episode of major depressive disorder without prior episode  F32.1 296.22   2. Generalized anxiety disorder  F41.1 300.02     Plan:individual psychotherapy and consult psychiatrist for medication evaluation    Summary and Recommendations  Lena Brantley is a 67 y.o. female referred by Carmella Gurrola, PhD for psychological evaluation and treatment.  Ms. Brantley appears to be coping poorly through avoidance with her diagnosis and proposed treatment course.  Patient was encouraged to speak to her primary care physician or oncologist regarding  psychotropic medication options. She is interested in CBT to address depression/anxiety/insomnia and will follow up with me for that purpose. Mood protective strategies during cancer treatment were discussed.   She was encouraged to continue with pleasant events scheduling and to increase exercise. .    GOALS:   Track sleep  Track daily worries in writing  Talk to PCP about psychotropic meds?  Continue yoga/meditation  Try Buspar at night    Future goals per patient:   Meet people with similar goals   Back to see Dr. Riddle at    Possible volunteer options    Alex Suazo, PhD  Clinical Psychologist  [place] License #[id number]

## 2022-11-10 ENCOUNTER — INFUSION (OUTPATIENT)
Dept: INFUSION THERAPY | Facility: HOSPITAL | Age: 67
End: 2022-11-10
Attending: INTERNAL MEDICINE
Payer: COMMERCIAL

## 2022-11-10 VITALS — WEIGHT: 142 LBS | BODY MASS INDEX: 24.24 KG/M2 | HEIGHT: 64 IN

## 2022-11-10 VITALS — BODY MASS INDEX: 24.24 KG/M2 | HEIGHT: 64 IN | WEIGHT: 142 LBS

## 2022-11-10 DIAGNOSIS — C7A.00 MALIGNANT CARCINOID TUMOR OF UNKNOWN PRIMARY SITE: Primary | ICD-10-CM

## 2022-11-10 DIAGNOSIS — C7B.02 METASTATIC MALIGNANT CARCINOID TUMOR TO LIVER: ICD-10-CM

## 2022-11-10 DIAGNOSIS — C7A.00 MALIGNANT CARCINOID TUMOR OF UNKNOWN PRIMARY SITE: ICD-10-CM

## 2022-11-10 DIAGNOSIS — C7B.8 SECONDARY NEUROENDOCRINE TUMOR OF LIVER: Primary | ICD-10-CM

## 2022-11-10 PROCEDURE — 63600175 PHARM REV CODE 636 W HCPCS: Mod: JG | Performed by: INTERNAL MEDICINE

## 2022-11-10 PROCEDURE — 96372 THER/PROPH/DIAG INJ SC/IM: CPT

## 2022-11-10 RX ORDER — LANREOTIDE ACETATE 120 MG/.5ML
120 INJECTION SUBCUTANEOUS
Status: CANCELLED | OUTPATIENT
Start: 2022-11-10

## 2022-11-10 RX ORDER — LANREOTIDE ACETATE 120 MG/.5ML
120 INJECTION SUBCUTANEOUS
Status: DISCONTINUED | OUTPATIENT
Start: 2022-11-10 | End: 2022-11-10 | Stop reason: HOSPADM

## 2022-11-10 RX ADMIN — LANREOTIDE ACETATE 120 MG: 120 INJECTION SUBCUTANEOUS at 12:11

## 2022-11-10 NOTE — NURSING
Pt arrived for injection, rescheduled for 11/11/22 after davey scan and labs. Pt verbalized understanding

## 2022-11-17 ENCOUNTER — HOSPITAL ENCOUNTER (OUTPATIENT)
Dept: RADIOLOGY | Facility: HOSPITAL | Age: 67
Discharge: HOME | End: 2022-11-17
Attending: FAMILY MEDICINE
Payer: COMMERCIAL

## 2022-11-17 DIAGNOSIS — Z12.31 SCREENING MAMMOGRAM FOR BREAST CANCER: ICD-10-CM

## 2022-11-17 PROCEDURE — 77067 SCR MAMMO BI INCL CAD: CPT | Mod: TC

## 2022-11-17 PROCEDURE — 77067 MAMMO DIGITAL SCREENING BILAT WITH TOMO: ICD-10-PCS | Mod: 26,,, | Performed by: RADIOLOGY

## 2022-11-17 PROCEDURE — 77063 BREAST TOMOSYNTHESIS BI: CPT | Mod: TC

## 2022-11-17 PROCEDURE — 77067 SCR MAMMO BI INCL CAD: CPT | Mod: 26,,, | Performed by: RADIOLOGY

## 2022-11-17 PROCEDURE — 77063 BREAST TOMOSYNTHESIS BI: CPT | Mod: 26,,, | Performed by: RADIOLOGY

## 2022-11-17 PROCEDURE — 77063 MAMMO DIGITAL SCREENING BILAT WITH TOMO: ICD-10-PCS | Mod: 26,,, | Performed by: RADIOLOGY

## 2022-12-01 ENCOUNTER — TELEPHONE (OUTPATIENT)
Dept: HEMATOLOGY/ONCOLOGY | Facility: CLINIC | Age: 67
End: 2022-12-01
Payer: COMMERCIAL

## 2022-12-01 ENCOUNTER — PATIENT MESSAGE (OUTPATIENT)
Dept: PRIMARY CARE CLINIC | Facility: CLINIC | Age: 67
End: 2022-12-01
Payer: COMMERCIAL

## 2022-12-01 ENCOUNTER — PATIENT MESSAGE (OUTPATIENT)
Dept: PSYCHIATRY | Facility: CLINIC | Age: 67
End: 2022-12-01
Payer: COMMERCIAL

## 2022-12-06 ENCOUNTER — NURSE TRIAGE (OUTPATIENT)
Dept: ADMINISTRATIVE | Facility: CLINIC | Age: 67
End: 2022-12-06
Payer: COMMERCIAL

## 2022-12-06 ENCOUNTER — PATIENT MESSAGE (OUTPATIENT)
Dept: HEMATOLOGY/ONCOLOGY | Facility: CLINIC | Age: 67
End: 2022-12-06
Payer: COMMERCIAL

## 2022-12-06 ENCOUNTER — TELEPHONE (OUTPATIENT)
Dept: INFUSION THERAPY | Facility: HOSPITAL | Age: 67
End: 2022-12-06
Payer: COMMERCIAL

## 2022-12-06 NOTE — TELEPHONE ENCOUNTER
Pt states scheduled for Lanreotide injection and asking if ok to take flu shot at the same time or should it be spread out. Per care advice, callback by pcp today.  Reason for Disposition   Caller has NON-URGENT medicine question about med that PCP or specialist prescribed and triager unable to answer question    Protocols used: Medication Question Call-A-[place]

## 2022-12-08 ENCOUNTER — TELEPHONE (OUTPATIENT)
Dept: INFUSION THERAPY | Facility: HOSPITAL | Age: 67
End: 2022-12-08
Payer: COMMERCIAL

## 2022-12-08 ENCOUNTER — HOSPITAL ENCOUNTER (OUTPATIENT)
Dept: RADIOLOGY | Facility: HOSPITAL | Age: 67
Discharge: HOME | End: 2022-12-08
Attending: INTERNAL MEDICINE
Payer: COMMERCIAL

## 2022-12-08 ENCOUNTER — OFFICE VISIT (OUTPATIENT)
Dept: PSYCHIATRY | Facility: CLINIC | Age: 67
End: 2022-12-08
Payer: COMMERCIAL

## 2022-12-08 DIAGNOSIS — C7B.8 SECONDARY NEUROENDOCRINE TUMOR OF LIVER: ICD-10-CM

## 2022-12-08 DIAGNOSIS — F41.1 GENERALIZED ANXIETY DISORDER: Primary | ICD-10-CM

## 2022-12-08 DIAGNOSIS — C7A.00 MALIGNANT CARCINOID TUMOR OF UNKNOWN PRIMARY SITE: ICD-10-CM

## 2022-12-08 LAB
CREAT SERPL-MCNC: 1 MG/DL (ref 0.5–1.4)
SAMPLE: NORMAL

## 2022-12-08 PROCEDURE — 1160F PR REVIEW ALL MEDS BY PRESCRIBER/CLIN PHARMACIST DOCUMENTED: ICD-10-PCS | Mod: CPTII,S$GLB,, | Performed by: PSYCHOLOGIST

## 2022-12-08 PROCEDURE — 1159F MED LIST DOCD IN RCRD: CPT | Mod: CPTII,S$GLB,, | Performed by: PSYCHOLOGIST

## 2022-12-08 PROCEDURE — 74177 CT ABD & PELVIS W/CONTRAST: CPT | Mod: TC

## 2022-12-08 PROCEDURE — 74177 CT ABDOMEN PELVIS WITH CONTRAST: ICD-10-PCS | Mod: 26,,, | Performed by: RADIOLOGY

## 2022-12-08 PROCEDURE — 1157F ADVNC CARE PLAN IN RCRD: CPT | Mod: CPTII,S$GLB,, | Performed by: PSYCHOLOGIST

## 2022-12-08 PROCEDURE — 90837 PSYTX W PT 60 MINUTES: CPT | Mod: S$GLB,,, | Performed by: PSYCHOLOGIST

## 2022-12-08 PROCEDURE — 99999 PR PBB SHADOW E&M-EST. PATIENT-LVL II: ICD-10-PCS | Mod: PBBFAC,,, | Performed by: PSYCHOLOGIST

## 2022-12-08 PROCEDURE — 1157F PR ADVANCE CARE PLAN OR EQUIV PRESENT IN MEDICAL RECORD: ICD-10-PCS | Mod: CPTII,S$GLB,, | Performed by: PSYCHOLOGIST

## 2022-12-08 PROCEDURE — 99999 PR PBB SHADOW E&M-EST. PATIENT-LVL II: CPT | Mod: PBBFAC,,, | Performed by: PSYCHOLOGIST

## 2022-12-08 PROCEDURE — 90837 PR PSYCHOTHERAPY W/PATIENT, 60 MIN: ICD-10-PCS | Mod: S$GLB,,, | Performed by: PSYCHOLOGIST

## 2022-12-08 PROCEDURE — 1160F RVW MEDS BY RX/DR IN RCRD: CPT | Mod: CPTII,S$GLB,, | Performed by: PSYCHOLOGIST

## 2022-12-08 PROCEDURE — 25500020 PHARM REV CODE 255: Performed by: INTERNAL MEDICINE

## 2022-12-08 PROCEDURE — 1159F PR MEDICATION LIST DOCUMENTED IN MEDICAL RECORD: ICD-10-PCS | Mod: CPTII,S$GLB,, | Performed by: PSYCHOLOGIST

## 2022-12-08 PROCEDURE — 74177 CT ABD & PELVIS W/CONTRAST: CPT | Mod: 26,,, | Performed by: RADIOLOGY

## 2022-12-08 RX ADMIN — IOHEXOL 30 ML: 350 INJECTION, SOLUTION INTRAVENOUS at 09:12

## 2022-12-08 RX ADMIN — IOHEXOL 75 ML: 350 INJECTION, SOLUTION INTRAVENOUS at 11:12

## 2022-12-08 NOTE — PROGRESS NOTES
PSYCHO-ONCOLOGY NOTE/ Individual Psychotherapy     Date: 12/8/2022   Site:  [facility]        Therapeutic Intervention: Met with patient.  Outpatient - Behavior modifying psychotherapy 60 min - CPT code 78747    Patient was last seen by me on 11/3/2022    Problem list  Patient Active Problem List   Diagnosis    Malignant carcinoid tumor of unknown primary site    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    Hypergastrinemia    Carcinoid (except of appendix)    Gastrinoma, malignant    Generalized anxiety disorder    Current moderate episode of major depressive disorder without prior episode    Secondary neuroendocrine tumor of liver    History of iron deficiency anemia    Elevated bilirubin    Vitamin D deficiency    Abnormal glucose    Psychological factors affecting medical condition       Chief complaint/reason for encounter: anxiety   Met with patient to evaluate psychosocial adaptation to diagnosis/treatment/survivorship of NET    Current Medications  Current Outpatient Medications   Medication    busPIRone (BUSPAR) 7.5 MG tablet    lanreotide (SOMATULINE DEPOT) 120 mg/0.5 mL Syrg    pantoprazole (PROTONIX) 40 MG tablet     No current facility-administered medications for this visit.       Objective:  Lena Brantley arrived promptly for the session.  Ms. Brantley was independently ambulatory at the time of session. The patient was fully cooperative throughout the session.  Appearance: age appropriate, casually  dressed, well groomed  Behavior/Cooperation: friendly and cooperative  Speech: normal in rate, volume, and tone and appropriate quality, quantity and organization of sentences  Mood: anxious, depressed  Affect: anxious  Thought Process: goal-directed, logical  Thought Content: normal,  No delusions or paranoia; did not appear to be responding to internal stimuli during the session  Orientation: grossly intact  Memory: Grossly intact  Attention Span/Concentration: Attends to session without  "distraction; reports no difficulty  Fund of Knowledge: average  Estimate of Intelligence: average from verbal skills and history  Cognition: grossly intact  Insight: patient has awareness of illness; good insight into own behavior and behavior of others  Judgment: the patient's behavior is adequate to circumstances    Interval history and content of current session: Patient discussed events and activities since the time of last visit. Discussed current adaptation to disease and treatment status. Reports to be coping with moderate difficulty. Evaluated cognitive response, paying particular attention to negative intrusive thoughts of a persistent and detrimental nature. Thoughts of this type are in evidence with moderate distress. Provided cognitive behavioral therapy to address negative cognitions.    CBT for MINH protocol Session 1. Discussed nature and treatment of anxiety and importance of understanding her anxiety patterns. Patient struggling to differentiate between anxiety and depression.  Avoidance prominent ("stay in bed" "don't make a decision" "don't make a change")     Risk parameters:   Patient reports no suicidal ideation  Patient reports no homicidal ideation  Patient reports no self-injurious behavior  Patient reports no violent behavior   Safety needs:  None at this time      Verbal deficits: None     Patient's response to intervention:The patient's response to intervention is accepting.     Progress toward goals: No Progress - Continue Objectives        Patient reported outcomes:      Distress thermometer:   DISTRESS SCREENING 11/3/2022 10/20/2022 8/25/2022 7/29/2022 6/29/2022 5/20/2022 4/29/2022   Distress Score 7 8 6 5 7 10 6   Practical Problems Insurance/Financial;Work/School;Treatment Decisions Insurance/Financial;Work/School;Treatment Decisions Work/School;Treatment Decisions Insurance/Financial;Treatment Decisions Insurance/Financial;Work/School;Treatment Decisions - -   Family Problems Dealing " with Children - Family Health Issues None of these Dealing with Children - -   Emotional Problems Depression;Fears;Sadness;Worry - None of these Depression;Fears;Nervousness;Sadness;Worry Depression;Fears;Nervousness;Sadness;Worry - -   Spiritual / Anabaptist Concerns No - No - No - No   Physical Problems Eating;Fatigue;Memory/Concentration;Sleep Eating;Fatigue;Memory/Concentration;Sleep Bathing/Dressing;Eating;Fatigue;Memory/Concentration;Sleep Fatigue;Memory/Concentration;Sleep Appearance;Bathing/Dressing;Breathing;Eating;Fatigue;Sleep - -   Other Problems - - - - - - -           PHQ-9=12 Initial visit: 4  PHQ ANSWERS              MINH-7=14  Initial visit:7   GAD7 11/3/2022 6/29/2022 4/29/2022   1. Feeling nervous, anxious, or on edge? 1 2 1   2. Not being able to stop or control worrying? 2 2 1   3. Worrying too much about different things? 1 2 1   4. Trouble relaxing? 1 2 2   5. Being so restless that it is hard to sit still? 0 1 2   6. Becoming easily annoyed or irritable? 1 2 2   7. Feeling afraid as if something awful might happen? 1 2 1   MINH-7 Score 7 13 10          Client Strengths: verbal, intelligent, successful, good social support, good insight, commitment to wellness, strong [place], strong cultural traditions       Treatment Plan:individual psychotherapy and consult psychiatrist for medication evaluation (Leoncoi Saunders NP January)  Target symptoms: depression, anxiety   Why chosen therapy is appropriate versus another modality: relevant to diagnosis, patient responds to this modality, evidence based practice  Outcome monitoring methods: self-report, checklist/rating scale  Therapeutic intervention type: behavior modifying psychotherapy  Prognosis: Good    Goals from last visit: Attempted, partially met   Behavioral goals prior to next visit:    Exercise:   Stress management:   Social engagement:   Nutrition:   Smoking Cessation:   Therapy:  Daily mood log    Worry record (above 5)   Send her links to  NET support group, healing circles   10 visits to be scheduled with Yasmeen    Return to clinic: 2 weeks     Length of Service (minutes direct face-to-face contact): 60    Diagnosis:     ICD-10-CM ICD-9-CM   1. Generalized anxiety disorder  F41.1 300.02       Alex Suazo, PhD  [place] License #[id number]  [place] License #[id number]

## 2022-12-08 NOTE — PROGRESS NOTES
"PATIENT: Lena Brantley  MRN: [id number]  DATE: 12/9/2022    Diagnosis:   1. 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 Secondary neuroendocrine tumor of liver    3. Other decreased white blood cell (WBC) count    4. GERD without esophagitis    5. Tinnitus of right ear      Chief Complaint: neuroendocrine tumor    Oncologic History:       Oncologic History Neuroendocrine tumor unknown primary diagnosed 09/2016  Metastatic disease to liver at presentation    Oncologic Treatment Lanreotide 10/2016    Pathology Well-differentiated neuroendocrine tumor, Ki-67 1%       Subjective:    History of Present Illness: Ms. Brantley is a 67 y.o. female who presented in August 2022 for evaluation and management of metastatic neuroendocrine tumor.    Information from chart review:  "Her history dates to 9/2016 when she was undergoing workup for episodic nausea, vomiting and diarrhea.  A CT scan was performed showing a solitary and had seeing liver mass.  A biopsy was performed showing a well-differentiated neuroendocrine tumor, Ki-67 of 1%.  Conventional imaging an octreotide scan did not reveal a primary site disease.  Gene expression profiling was performed indicating a possible primary site pancreas.  She was started on Lanreotide in 2016.  She was offered surgical resection and also liver directed therapy and declined."    - she was last seen by Dr. Riddle on 4/22/22.  - case was discussed at neuroendocrine conference on 4/25/22. Recommendation was to proceed with chemoembolization of 3.7 x 2.4cm mass in segment 2/4.    Interval history:  - she presents for a follow-up appointment for her neuroendocrine tumor.  - she underwent CT scan on 12/7/22  - today, she endorses fatigue, dyspnea upon exertion, weakness. She endorses tinnitus in her right ear.  - She denies chest pain, nausea, vomiting, diarrhea, constipation.      Past medical, surgical, family, and social histories have been reviewed and updated " below.    Past Medical History:   Past Medical History:   Diagnosis Date    Anxiety     Cancer     Depression     Elevated bilirubin 9/9/2020    Fatigue     GERD (gastroesophageal reflux disease)     History of iron deficiency anemia 9/9/2020    Hx of psychiatric care     Liver mass 06/2016    Mass of colon 06/2016    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     Psychiatric problem     Secondary neuroendocrine tumor of liver 9/9/2020    Sickle cell trait     Sleep difficulties        Past Surgical History: No past surgical history on file.    Family History:   Family History   Problem Relation Age of Onset    Cancer Maternal Grandmother     Cancer Other     Depression Mother     Anxiety disorder Mother        Social History:  reports that she has never smoked. She has never been exposed to tobacco smoke. She has never used smokeless tobacco. She reports that she does not drink alcohol and does not use drugs.    Allergies:  Review of patient's allergies indicates:   Allergen Reactions    Epinephrine Anaphylaxis     Can Cause Carcinoid Crisis       Medications:  Current Outpatient Medications   Medication Sig Dispense Refill    busPIRone (BUSPAR) 7.5 MG tablet Take 1 tablet (7.5 mg total) by mouth every evening. 30 tablet 1    lanreotide (SOMATULINE DEPOT) 120 mg/0.5 mL Syrg Inject 120 mg into the skin every 28 days.      pantoprazole (PROTONIX) 40 MG tablet Take 1 tablet (40 mg total) by mouth once daily. 90 tablet 3     No current facility-administered medications for this visit.       Review of Systems   Constitutional:  Positive for fatigue.   HENT:  Negative for sore throat.    Eyes:  Negative for visual disturbance.   Respiratory:  Positive for shortness of breath. Negative for cough.    Cardiovascular:  Negative for chest pain.   Gastrointestinal:  Negative for abdominal pain, constipation, diarrhea, nausea and vomiting.   Genitourinary:  Negative for dysuria.   Musculoskeletal:  Negative for back pain.  "  Skin:  Negative for rash.   Neurological:  Negative for headaches.   Hematological:  Negative for adenopathy.   Psychiatric/Behavioral:  The patient is not nervous/anxious.      ECOG Performance Status:   ECOG SCORE 1            Objective:      Vitals:   Vitals:    12/09/22 1034   BP: 125/84   BP Location: Left arm   Patient Position: Sitting   BP Method: Medium (Automatic)   Pulse: 88   Resp: 20   Temp: 97.7 °F (36.5 °C)   TempSrc: Oral   SpO2: 100%   Weight: 63.8 kg (140 lb 10.5 oz)   Height: 5' 5" (1.651 m)     BMI: Body mass index is 23.41 kg/m².    Physical Exam  Vitals and nursing note reviewed.   Constitutional:       Appearance: She is well-developed.   HENT:      Head: Normocephalic and atraumatic.   Eyes:      Pupils: Pupils are equal, round, and reactive to light.   Cardiovascular:      Rate and Rhythm: Normal rate and regular rhythm.   Pulmonary:      Effort: Pulmonary effort is normal.      Breath sounds: Normal breath sounds.   Abdominal:      General: Bowel sounds are normal.      Palpations: Abdomen is soft.   Musculoskeletal:         General: Normal range of motion.      Cervical back: Normal range of motion and neck supple.   Skin:     General: Skin is warm and dry.   Neurological:      Mental Status: She is alert and oriented to person, place, and time.   Psychiatric:         Behavior: Behavior normal.         Thought Content: Thought content normal.         Judgment: Judgment normal.       Laboratory Data:  Labs have been reviewed.    Lab Results   Component Value Date    WBC 3.35 (L) 12/08/2022    HGB 14.2 12/08/2022    HCT 42.9 12/08/2022    MCV 87 12/08/2022     12/08/2022       Imaging:     CT abdomen/pelvis (12/7/22): I have personally reviewed the images  No significant detrimental interval change when compared to prior exam.     Stable left hepatic lobe lesion.  No new focal lesions.     Simple left renal cyst.     Gallbladder sludge.     RECIST SUMMARY:     Date of prior " "examination for comparison: 01/06/2022     Lesion 1: Left hepatic lobe lesion.  3.3 cm.  Series 3 image 27.  Prior measurement 3.0 cm           Copper pet scan (3/31/22): I have personally reviewed the images    Quality of the study: Adequate.     SUV measurements may not be directly comparable with those made on Ga-68 DOTATATE PET/CT.     In the head and neck, there is physiologic distribution in the pituitary, salivary, and thyroid glands, and there are no tracer avid lesions suspicious for malignancy.     In the chest, there are no tracer avid lesions suspicious for malignancy.     In the abdomen and pelvis, there is physiologic uptake in the pancreatic uncinate process and body, spleen, adrenal glands and  tract.     Stable appearing radiotracer avid lesion within the liver measuring 3.0 x 2.5 cm (previously 3.0 x 2.5 cm) with SUV max of 33 (previously 27).  No new radiotracer avid lesion identified within the abdomen or pelvis.     Small umbilical hernia containing partial loop of bowel without evidence of obstruction.     In the bones, there are no tracer avid lesions suspicious for malignancy.     Impression:     Stable somatostatin receptor avid left hepatic lobe lesion.  No new radiotracer avid lesion.      Assessment:       1. 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 Secondary neuroendocrine tumor of liver    3. Other decreased white blood cell (WBC) count    4. GERD without esophagitis    5. Tinnitus of right ear         Plan:     1. Metastatic neuroendocrine tumor  - I have reviewed her chart  "Her history dates to 9/2016 when she was undergoing workup for episodic nausea, vomiting and diarrhea.  A CT scan was performed showing a solitary and had seeing liver mass.  A biopsy was performed showing a well-differentiated neuroendocrine tumor, Ki-67 of 1%.  Conventional imaging an octreotide scan did not reveal a primary site disease.  Gene expression profiling was performed indicating a " "possible primary site pancreas.  She was started on Lanreotide in 2016.  She was offered surgical resection and also liver directed therapy and declined."  - she was last seen by Dr. Riddle on 4/22/22.  - case was discussed at neuroendocrine conference on 4/25/22. Recommendation was to proceed with chemoembolization of 3.7 x 2.4 cm mass in segment 2/4.  - she did not proceed with chemoembolization.  - I discussed proceeding with chemoembolization. She would like to get imaging in a few months and decide at that time.  - CT abdomen/pelvis (12/8/22) revealed mostly stable disease. The liver lesion increased in size from 3 to 3.3 cm.  - I will present her case at neuroendocrine conference on 12/15/22. I will update her with the results of testing.  - proceed with lanreotide  - I will update her with results of conference. Follow-up plan will be made at that time.    2. Leukopenia  - intermittent leukopenia since 2017  - RBC antigens Guy A/B antigens were negative, suggestive of benign neutropenia  - continue to monitor    3. Tinnitus  - refer to ENT    4. Advance Care Planning     Power of   After our discussion (at previous visit), the patient decided to complete a HCPOA and appointed her  daughter Sujata Brantley ([phone]), and brothers Isael Haq ([phone]) and Andrzej Haq ([phone]) .         - I will update her with results of conference. Follow-up plan will be made at that time.    Eliot Woodruff M.D.  Hematology/Oncology  [facility] - [address], [address]  [place] [address]  Phone: [phone]  Fax: [phone]  "

## 2022-12-09 ENCOUNTER — TELEPHONE (OUTPATIENT)
Dept: HEMATOLOGY/ONCOLOGY | Facility: CLINIC | Age: 67
End: 2022-12-09

## 2022-12-09 ENCOUNTER — INFUSION (OUTPATIENT)
Dept: INFUSION THERAPY | Facility: HOSPITAL | Age: 67
End: 2022-12-09
Attending: INTERNAL MEDICINE
Payer: COMMERCIAL

## 2022-12-09 ENCOUNTER — OFFICE VISIT (OUTPATIENT)
Dept: HEMATOLOGY/ONCOLOGY | Facility: CLINIC | Age: 67
End: 2022-12-09
Payer: COMMERCIAL

## 2022-12-09 VITALS
DIASTOLIC BLOOD PRESSURE: 84 MMHG | RESPIRATION RATE: 20 BRPM | HEIGHT: 65 IN | HEART RATE: 88 BPM | TEMPERATURE: 98 F | BODY MASS INDEX: 23.43 KG/M2 | OXYGEN SATURATION: 100 % | WEIGHT: 140.63 LBS | SYSTOLIC BLOOD PRESSURE: 125 MMHG

## 2022-12-09 VITALS — BODY MASS INDEX: 23.43 KG/M2 | WEIGHT: 140.63 LBS | HEIGHT: 65 IN

## 2022-12-09 DIAGNOSIS — D72.818 OTHER DECREASED WHITE BLOOD CELL (WBC) COUNT: ICD-10-CM

## 2022-12-09 DIAGNOSIS — C7B.8 SECONDARY NEUROENDOCRINE TUMOR OF LIVER: ICD-10-CM

## 2022-12-09 DIAGNOSIS — C7B.02 METASTATIC MALIGNANT CARCINOID TUMOR TO LIVER: Primary | ICD-10-CM

## 2022-12-09 DIAGNOSIS — C7A.00 MALIGNANT CARCINOID TUMOR OF UNKNOWN PRIMARY SITE: Primary | ICD-10-CM

## 2022-12-09 DIAGNOSIS — H93.11 TINNITUS OF RIGHT EAR: ICD-10-CM

## 2022-12-09 DIAGNOSIS — C7B.02 METASTATIC MALIGNANT CARCINOID TUMOR TO LIVER: ICD-10-CM

## 2022-12-09 DIAGNOSIS — K21.9 GERD WITHOUT ESOPHAGITIS: ICD-10-CM

## 2022-12-09 PROCEDURE — 3008F BODY MASS INDEX DOCD: CPT | Mod: CPTII,S$GLB,, | Performed by: INTERNAL MEDICINE

## 2022-12-09 PROCEDURE — 1157F ADVNC CARE PLAN IN RCRD: CPT | Mod: CPTII,S$GLB,, | Performed by: INTERNAL MEDICINE

## 2022-12-09 PROCEDURE — 3074F SYST BP LT 130 MM HG: CPT | Mod: CPTII,S$GLB,, | Performed by: INTERNAL MEDICINE

## 2022-12-09 PROCEDURE — 3079F DIAST BP 80-89 MM HG: CPT | Mod: CPTII,S$GLB,, | Performed by: INTERNAL MEDICINE

## 2022-12-09 PROCEDURE — 3079F PR MOST RECENT DIASTOLIC BLOOD PRESSURE 80-89 MM HG: ICD-10-PCS | Mod: CPTII,S$GLB,, | Performed by: INTERNAL MEDICINE

## 2022-12-09 PROCEDURE — 3288F PR FALLS RISK ASSESSMENT DOCUMENTED: ICD-10-PCS | Mod: CPTII,S$GLB,, | Performed by: INTERNAL MEDICINE

## 2022-12-09 PROCEDURE — 99215 OFFICE O/P EST HI 40 MIN: CPT | Mod: S$GLB,,, | Performed by: INTERNAL MEDICINE

## 2022-12-09 PROCEDURE — 1101F PT FALLS ASSESS-DOCD LE1/YR: CPT | Mod: CPTII,S$GLB,, | Performed by: INTERNAL MEDICINE

## 2022-12-09 PROCEDURE — 99215 PR OFFICE/OUTPT VISIT, EST, LEVL V, 40-54 MIN: ICD-10-PCS | Mod: S$GLB,,, | Performed by: INTERNAL MEDICINE

## 2022-12-09 PROCEDURE — 1157F PR ADVANCE CARE PLAN OR EQUIV PRESENT IN MEDICAL RECORD: ICD-10-PCS | Mod: CPTII,S$GLB,, | Performed by: INTERNAL MEDICINE

## 2022-12-09 PROCEDURE — 99999 PR PBB SHADOW E&M-EST. PATIENT-LVL IV: ICD-10-PCS | Mod: PBBFAC,,, | Performed by: INTERNAL MEDICINE

## 2022-12-09 PROCEDURE — 3008F PR BODY MASS INDEX (BMI) DOCUMENTED: ICD-10-PCS | Mod: CPTII,S$GLB,, | Performed by: INTERNAL MEDICINE

## 2022-12-09 PROCEDURE — 3074F PR MOST RECENT SYSTOLIC BLOOD PRESSURE < 130 MM HG: ICD-10-PCS | Mod: CPTII,S$GLB,, | Performed by: INTERNAL MEDICINE

## 2022-12-09 PROCEDURE — 1101F PR PT FALLS ASSESS DOC 0-1 FALLS W/OUT INJ PAST YR: ICD-10-PCS | Mod: CPTII,S$GLB,, | Performed by: INTERNAL MEDICINE

## 2022-12-09 PROCEDURE — 1160F RVW MEDS BY RX/DR IN RCRD: CPT | Mod: CPTII,S$GLB,, | Performed by: INTERNAL MEDICINE

## 2022-12-09 PROCEDURE — 63600175 PHARM REV CODE 636 W HCPCS: Mod: JG | Performed by: INTERNAL MEDICINE

## 2022-12-09 PROCEDURE — 3288F FALL RISK ASSESSMENT DOCD: CPT | Mod: CPTII,S$GLB,, | Performed by: INTERNAL MEDICINE

## 2022-12-09 PROCEDURE — 96372 THER/PROPH/DIAG INJ SC/IM: CPT

## 2022-12-09 PROCEDURE — 1160F PR REVIEW ALL MEDS BY PRESCRIBER/CLIN PHARMACIST DOCUMENTED: ICD-10-PCS | Mod: CPTII,S$GLB,, | Performed by: INTERNAL MEDICINE

## 2022-12-09 PROCEDURE — 99999 PR PBB SHADOW E&M-EST. PATIENT-LVL IV: CPT | Mod: PBBFAC,,, | Performed by: INTERNAL MEDICINE

## 2022-12-09 PROCEDURE — 1159F PR MEDICATION LIST DOCUMENTED IN MEDICAL RECORD: ICD-10-PCS | Mod: CPTII,S$GLB,, | Performed by: INTERNAL MEDICINE

## 2022-12-09 PROCEDURE — 1159F MED LIST DOCD IN RCRD: CPT | Mod: CPTII,S$GLB,, | Performed by: INTERNAL MEDICINE

## 2022-12-09 RX ORDER — LANREOTIDE ACETATE 120 MG/.5ML
120 INJECTION SUBCUTANEOUS
Status: CANCELLED | OUTPATIENT
Start: 2022-12-09

## 2022-12-09 RX ORDER — LANREOTIDE ACETATE 120 MG/.5ML
120 INJECTION SUBCUTANEOUS
Status: DISCONTINUED | OUTPATIENT
Start: 2022-12-09 | End: 2022-12-09 | Stop reason: HOSPADM

## 2022-12-09 RX ADMIN — LANREOTIDE ACETATE 120 MG: 120 INJECTION SUBCUTANEOUS at 11:12

## 2022-12-09 NOTE — Clinical Note
I'd like to add her to conference. 67 F with metastatic neuroendocrine tumor on lanreotide. Review CT scan from 12/8/22.  Thanks!

## 2022-12-09 NOTE — TELEPHONE ENCOUNTER
----- Message from Omar Gee LPN sent at 12/9/2022  2:11 PM CST -----  Regarding: RE: referral  Appointment has been scheduled for 1/13 at 8AM with the audiologist, Elena Fall and 8:40AM with Dr. Fernandez. As of now, this is the first availability in [place]. We have one physician out on medical leave and the other physician is only part time. Erasmo Fu may be able to get her something sooner.     ----- Message -----  From: Ayaka Thrasher MA  Sent: 12/9/2022   1:50 PM CST  To: Eliot Woodruff MD, #  Subject: referral                                         Good Afternoon,      Dr. Woodruff put in a referral for the above pt. Can you let me know when the pt has been scheduled.

## 2022-12-14 NOTE — PROGRESS NOTES
[facility]      NEUROENDOCRINE MULTIDISCIPLINARY TUMOR BOARD  _____________________________________________________________________    PRESENTER:   Eliot Woodruff MD    REASON FOR PRESENTATION:  Scan Review    ATTENDEES:   Surgery:   MD Yogesh Reese MD  Gastroenterology - Not Present  Interventional Radiology - Sameer Bruno MD  Nuclear Medicine - Jordin Guaman MD  Nutrition - Tracie Weil-Cavalier, RDN  Oncology - Eliot Woodruff MD; Sandra Delcid MD  Palliative Care - Not present  Pathology - Gilda Regan MD   Research- Not Present    PATIENT STATUS:  Established Patient    PATIENT SUMMARY:  Past Medical History:   Diagnosis Date    Anxiety     Cancer     Depression     Elevated bilirubin 9/9/2020    Fatigue     GERD (gastroesophageal reflux disease)     History of iron deficiency anemia 9/9/2020    Hx of psychiatric care     Liver mass 06/2016    Mass of colon 06/2016    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     Psychiatric problem     Secondary neuroendocrine tumor of liver 9/9/2020    Sickle cell trait     Sleep difficulties      No past surgical history on file.    TUMOR MARKERS:   Latest Reference Range & Units 03/16/21 13:36 12/06/21 07:27 12/23/21 10:38 03/03/22 09:46 06/23/22 11:03 12/08/22 09:13   5-HIAA, Plasma (Neuroend) Up to 22 ng/mL 7  10 10 9    Lactate, Seb 0.5 - 2.2 mmol/L  1.5       Pancreastatin 10 - 135 pg/mL 84  67 80 129    Serotonin <=230 ng/mL 66  138 101 65 101     ________________________________________________________________    DISCUSSION:  67 y.o. F with metastatic neuroendocrine tumor, unknown primary with mets to the liver on presentation in 9/2016. Path:  a well-differentiated neuroendocrine tumor, Ki-67 of 1%. Gene expression profiling was performed indicating a possible primary site pancreas. Started Lanreotide on 10/2016. Patient declined surgery and LDT per [facility] notes on 4/22/2022. Presented at tumor board on 4/25/2022 ([facility]) for 3.7 x 2.4 cm  mass in segment 2/4. Recommend TACE.- patient declined. Tumor Markers 12/8/2022 - most still pending    AM- Stable receptor positive left hepatic lobe mass.    BOARD RECOMMENDATIONS:   Consider Tace - due to the location of tumor   Follow imaging if declined

## 2022-12-15 ENCOUNTER — TUMOR BOARD CONFERENCE (OUTPATIENT)
Dept: NEUROLOGY | Facility: CLINIC | Age: 67
End: 2022-12-15
Payer: COMMERCIAL

## 2022-12-16 ENCOUNTER — TELEPHONE (OUTPATIENT)
Dept: FAMILY MEDICINE | Facility: CLINIC | Age: 67
End: 2022-12-16
Payer: COMMERCIAL

## 2022-12-16 ENCOUNTER — TELEPHONE (OUTPATIENT)
Dept: HEMATOLOGY/ONCOLOGY | Facility: CLINIC | Age: 67
End: 2022-12-16
Payer: COMMERCIAL

## 2022-12-16 DIAGNOSIS — C7B.8 SECONDARY NEUROENDOCRINE TUMOR OF LIVER: Primary | ICD-10-CM

## 2022-12-16 NOTE — TELEPHONE ENCOUNTER
----- Message from Jericho Santos sent at 12/16/2022 11:08 AM CST -----  Contact: pt  Type: Requesting to speak with nurse        Who Called: PT  Regarding: Would like to reschedule her 12/21 appt   Would the patient rather a call back or a response via [employer]? Call back  Best Call Back Number:   Additional Information:

## 2022-12-16 NOTE — TELEPHONE ENCOUNTER
Called pt about her message. Pt stated she had an appointment for Monday 12/19/2022 but called and changed it to Wednesday 12/21/2022. Pt wanted to get the appointment back for Monday 12/19/2022. I was not able to reschedule it and pt stated to keep the appointment for Wednesday 12/21/2022

## 2022-12-16 NOTE — TELEPHONE ENCOUNTER
I called her and discussed recommendations from neuroendocrine conference. She is still hesitant about chemo-embolization. I will place a referral to Dr. Bruno with interventional radiology, so she can discuss the procedure in depth with him. I will also schedule imaging in 6 months.    Eliot Woodruff M.D.  Hematology/Oncology  [facility] - [address], [address]  [place] [address]  Phone: [phone]  Fax: [phone]

## 2022-12-21 ENCOUNTER — OFFICE VISIT (OUTPATIENT)
Dept: FAMILY MEDICINE | Facility: CLINIC | Age: 67
End: 2022-12-21
Payer: COMMERCIAL

## 2022-12-21 VITALS
HEIGHT: 65 IN | DIASTOLIC BLOOD PRESSURE: 66 MMHG | HEART RATE: 78 BPM | BODY MASS INDEX: 23.43 KG/M2 | SYSTOLIC BLOOD PRESSURE: 114 MMHG | WEIGHT: 140.63 LBS | OXYGEN SATURATION: 98 % | TEMPERATURE: 98 F

## 2022-12-21 DIAGNOSIS — C7B.02 METASTATIC MALIGNANT CARCINOID TUMOR TO LIVER: ICD-10-CM

## 2022-12-21 DIAGNOSIS — K21.9 GERD WITHOUT ESOPHAGITIS: ICD-10-CM

## 2022-12-21 DIAGNOSIS — Z76.89 ENCOUNTER TO ESTABLISH CARE WITH NEW DOCTOR: Primary | ICD-10-CM

## 2022-12-21 DIAGNOSIS — C7B.8 SECONDARY NEUROENDOCRINE TUMOR OF LIVER: ICD-10-CM

## 2022-12-21 DIAGNOSIS — Z12.11 ENCOUNTER FOR SCREENING COLONOSCOPY: ICD-10-CM

## 2022-12-21 DIAGNOSIS — F41.1 GENERALIZED ANXIETY DISORDER: ICD-10-CM

## 2022-12-21 DIAGNOSIS — F32.1 CURRENT MODERATE EPISODE OF MAJOR DEPRESSIVE DISORDER WITHOUT PRIOR EPISODE: ICD-10-CM

## 2022-12-21 DIAGNOSIS — H93.11 TINNITUS OF RIGHT EAR: ICD-10-CM

## 2022-12-21 PROCEDURE — 3288F PR FALLS RISK ASSESSMENT DOCUMENTED: ICD-10-PCS | Mod: CPTII,S$GLB,, | Performed by: FAMILY MEDICINE

## 2022-12-21 PROCEDURE — 99214 PR OFFICE/OUTPT VISIT, EST, LEVL IV, 30-39 MIN: ICD-10-PCS | Mod: S$GLB,,, | Performed by: FAMILY MEDICINE

## 2022-12-21 PROCEDURE — 3074F SYST BP LT 130 MM HG: CPT | Mod: CPTII,S$GLB,, | Performed by: FAMILY MEDICINE

## 2022-12-21 PROCEDURE — 1159F PR MEDICATION LIST DOCUMENTED IN MEDICAL RECORD: ICD-10-PCS | Mod: CPTII,S$GLB,, | Performed by: FAMILY MEDICINE

## 2022-12-21 PROCEDURE — 99999 PR PBB SHADOW E&M-EST. PATIENT-LVL IV: ICD-10-PCS | Mod: PBBFAC,,, | Performed by: FAMILY MEDICINE

## 2022-12-21 PROCEDURE — 1159F MED LIST DOCD IN RCRD: CPT | Mod: CPTII,S$GLB,, | Performed by: FAMILY MEDICINE

## 2022-12-21 PROCEDURE — 99999 PR PBB SHADOW E&M-EST. PATIENT-LVL IV: CPT | Mod: PBBFAC,,, | Performed by: FAMILY MEDICINE

## 2022-12-21 PROCEDURE — 3074F PR MOST RECENT SYSTOLIC BLOOD PRESSURE < 130 MM HG: ICD-10-PCS | Mod: CPTII,S$GLB,, | Performed by: FAMILY MEDICINE

## 2022-12-21 PROCEDURE — 1101F PT FALLS ASSESS-DOCD LE1/YR: CPT | Mod: CPTII,S$GLB,, | Performed by: FAMILY MEDICINE

## 2022-12-21 PROCEDURE — 3078F DIAST BP <80 MM HG: CPT | Mod: CPTII,S$GLB,, | Performed by: FAMILY MEDICINE

## 2022-12-21 PROCEDURE — 3008F BODY MASS INDEX DOCD: CPT | Mod: CPTII,S$GLB,, | Performed by: FAMILY MEDICINE

## 2022-12-21 PROCEDURE — 1157F PR ADVANCE CARE PLAN OR EQUIV PRESENT IN MEDICAL RECORD: ICD-10-PCS | Mod: CPTII,S$GLB,, | Performed by: FAMILY MEDICINE

## 2022-12-21 PROCEDURE — 3008F PR BODY MASS INDEX (BMI) DOCUMENTED: ICD-10-PCS | Mod: CPTII,S$GLB,, | Performed by: FAMILY MEDICINE

## 2022-12-21 PROCEDURE — 99214 OFFICE O/P EST MOD 30 MIN: CPT | Mod: S$GLB,,, | Performed by: FAMILY MEDICINE

## 2022-12-21 PROCEDURE — 3078F PR MOST RECENT DIASTOLIC BLOOD PRESSURE < 80 MM HG: ICD-10-PCS | Mod: CPTII,S$GLB,, | Performed by: FAMILY MEDICINE

## 2022-12-21 PROCEDURE — 1101F PR PT FALLS ASSESS DOC 0-1 FALLS W/OUT INJ PAST YR: ICD-10-PCS | Mod: CPTII,S$GLB,, | Performed by: FAMILY MEDICINE

## 2022-12-21 PROCEDURE — 3288F FALL RISK ASSESSMENT DOCD: CPT | Mod: CPTII,S$GLB,, | Performed by: FAMILY MEDICINE

## 2022-12-21 PROCEDURE — 1157F ADVNC CARE PLAN IN RCRD: CPT | Mod: CPTII,S$GLB,, | Performed by: FAMILY MEDICINE

## 2022-12-21 RX ORDER — BUSPIRONE HYDROCHLORIDE 5 MG/1
5 TABLET ORAL NIGHTLY
Qty: 90 TABLET | Refills: 1 | Status: SHIPPED | OUTPATIENT
Start: 2022-12-21 | End: 2023-05-16

## 2022-12-22 NOTE — PROGRESS NOTES
(Portions of this note were dictated using voice recognition software and may contain dictation related errors in spelling/grammar/syntax not found on text review)    CC:   Chief Complaint   Patient presents with    [facility]    Tinnitus     Right ear       HPI: 67 y.o. female presented to [facility] with new doctor. She has medical history significant for anxiety, depression, GERD, metastatic malignant carcinoid tumour to liver. She follows up with neuroendocrinology and currently on lanreotide every 28 days. She has concerns about anxiety and depression, she is dealing with health issues and also worried about demises in the family recently, she follows up with psychologist for therapy, was prescribes Buspar 7.5 mg nightly which she stopped taking due to excessive day time drowsiness. She wants to re start the medication. She reports having right ear tinnitus with buzzing sound which some times bothers her more, denies any hearing loss. She is due for colonoscopy. She needs to schedule dexa scan. She denies having any other symptoms or concerns .     Past Medical History:   Diagnosis Date    Anxiety     Cancer     Depression     Elevated bilirubin 9/9/2020    Fatigue     GERD (gastroesophageal reflux disease)     History of iron deficiency anemia 9/9/2020    Hx of psychiatric care     Liver mass 06/2016    Mass of colon 06/2016    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     Psychiatric problem     Secondary neuroendocrine tumor of liver 9/9/2020    Sickle cell trait     Sleep difficulties        History reviewed. No pertinent surgical history.    Family History   Problem Relation Age of Onset    Cancer Maternal Grandmother     Cancer Other     Depression Mother     Anxiety disorder Mother        Social History     Tobacco Use    Smoking status: Never     Passive exposure: Never    Smokeless tobacco: Never   Substance Use Topics    Alcohol use: No    Drug use: No       Lab Results   Component Value  Date    WBC 3.35 (L) 12/08/2022    HGB 14.2 12/08/2022    HCT 42.9 12/08/2022    MCV 87 12/08/2022     12/08/2022    CHOL 209 (H) 11/04/2021    TRIG 70 11/04/2021    HDL 47 11/04/2021    ALT 16 12/08/2022    AST 18 12/08/2022    BILITOT 1.3 (H) 12/08/2022    ALKPHOS 98 12/08/2022     12/08/2022    K 3.9 12/08/2022     12/08/2022    CREATININE 0.9 12/08/2022    ESTGFRAFRICA >60 06/23/2022    EGFRNONAA 60 06/23/2022    CALCIUM 9.7 12/08/2022    ALBUMIN 3.9 12/08/2022    BUN 10 12/08/2022    CO2 29 12/08/2022    TSH 1.278 02/29/2020    INR 1.1 09/08/2016    HGBA1C 5.8 (H) 11/04/2021    LDLCALC 148.0 11/04/2021     (H) 12/08/2022    CIBJOEFT15SC 52 11/04/2021             Vital signs reviewed  PE:   APPEARANCE: In no acute distress.    HEAD: Normocephalic, atraumatic.  EYES: EOMI.  Conjunctivae noninjected.  EAR: B/L ear normal exam  NECK: Supple with no cervical lymphadenopathy.    CHEST: Lungs clear to auscultation with no wheezes or crackles.  CARDIOVASCULAR: Normal S1, S2. No rubs, murmurs, or gallops.  ABDOMEN: Bowel sounds normal. Not distended. Soft. No tenderness or masses. No organomegaly.  EXTREMITIES: No edema, cyanosis, or clubbing.    Review of Systems   Constitutional:  Negative for chills, fatigue and fever.   HENT: Negative.     Respiratory:  Negative for cough, shortness of breath and wheezing.    Cardiovascular:  Negative for chest pain, palpitations and leg swelling.   Gastrointestinal: Negative.    Genitourinary: Negative.    Psychiatric/Behavioral:  Negative for agitation, behavioral problems, confusion, self-injury, sleep disturbance and suicidal ideas. The patient is nervous/anxious.    All other systems reviewed and are negative.    IMPRESSION  1. Encounter to establish care with new doctor    2. Encounter for screening colonoscopy    3. Generalized anxiety disorder    4. Current moderate episode of major depressive disorder without prior episode    5. Secondary  neuroendocrine tumor of liver    6. Tinnitus of right ear            PLAN      1. Encounter for screening colonoscopy  - Case Request Endoscopy: COLONOSCOPY      2. Generalized anxiety disorder    Discussed different options including SSRI, she is agreeable to re start Buspar as she was doing well on it in the past, prescribed 5 mg to take nightly to minimize side effects    - busPIRone (BUSPAR) 5 MG Tab; Take 1 tablet (5 mg total) by mouth every evening.  Dispense: 90 tablet; Refill: 1      3. Current moderate episode of major depressive disorder without prior episode  - busPIRone (BUSPAR) 5 MG Tab; Take 1 tablet (5 mg total) by mouth every evening.  Dispense: 90 tablet; Refill: 1    Advised to continue psychotherapy      4. Secondary neuroendocrine tumor of liver    Followed by neuroendocrinology      5. Tinnitus of right ear  - Ambulatory referral/consult to ENT; Future      6. Encounter to establish care with new doctor         SCREENINGS      Immunizations:   UTD with Covid and flu vaccine        Age/demographic appropriate health maintenance:    Colonoscopy ordered  Advised to schedule dexa scan    Health Maintenance Due   Topic Date Due    Pneumococcal Vaccines (Age 65+) (1 - PCV) Never done    Shingles Vaccine (1 of 2) Never done    DEXA Scan  Never done    Colorectal Cancer Screening  Never done    COVID-19 Vaccine (4 - Booster for Pfizer series) 03/18/2022    TETANUS VACCINE  11/06/2022             Anat Dumontir   12/21/2022

## 2022-12-28 ENCOUNTER — PATIENT MESSAGE (OUTPATIENT)
Dept: PSYCHIATRY | Facility: CLINIC | Age: 67
End: 2022-12-28

## 2022-12-31 ENCOUNTER — NURSE TRIAGE (OUTPATIENT)
Dept: ADMINISTRATIVE | Facility: CLINIC | Age: 67
End: 2022-12-31
Payer: COMMERCIAL

## 2022-12-31 ENCOUNTER — TELEPHONE (OUTPATIENT)
Dept: INTERNAL MEDICINE | Facility: CLINIC | Age: 67
End: 2022-12-31
Payer: COMMERCIAL

## 2022-12-31 DIAGNOSIS — U07.1 COVID-19: Primary | ICD-10-CM

## 2022-12-31 NOTE — TELEPHONE ENCOUNTER
Called by nurse on call. 67 year old female with metastatic carcinoid tumor tested positive for covid, on day 5 of symptoms and requesting medication.   I see no drug-drug interactions when checking her med list. Her renal function is normal. Paxlovid sent to pharmacy.

## 2022-12-31 NOTE — TELEPHONE ENCOUNTER
Patient states she has been sick since Tuesday. Symptoms include sore throat, cough, nasal congestion, and loss of smell/ taste. Tested positive for COVID today. Contacted on call provider, Dr. Isaac, who prescribed Paxlovid. Provided home care advice to patient and discussed instructions for taking Paxlovid. Advised to speak with pharmacists when picking up medication for further questions. Advised to  today and take first dose tonight. Pt can call back for worsening symptoms or further questions.     Reason for Disposition   [1] HIGH RISK for severe COVID complications (e.g., weak immune system, age > 64 years, obesity with BMI 30 or higher, pregnant, chronic lung disease or other chronic medical condition) AND [2] COVID symptoms (e.g., cough, fever)  (Exceptions: Already seen by PCP and no new or worsening symptoms.)    Additional Information   Negative: SEVERE difficulty breathing (e.g., struggling for each breath, speaks in single words)   Negative: Difficult to awaken or acting confused (e.g., disoriented, slurred speech)   Negative: Bluish (or gray) lips or face now   Negative: Shock suspected (e.g., cold/pale/clammy skin, too weak to stand, low BP, rapid pulse)   Negative: Sounds like a life-threatening emergency to the triager   Negative: SEVERE or constant chest pain or pressure  (Exception: Mild central chest pain, present only when coughing.)   Negative: MODERATE difficulty breathing (e.g., speaks in phrases, SOB even at rest, pulse 100-120)   Negative: [1] Headache AND [2] stiff neck (can't touch chin to chest)   Negative: Oxygen level (e.g., pulse oximetry) 90 percent or lower   Negative: Chest pain or pressure  (Exception: MILD central chest pain, present only when coughing)   Negative: Patient sounds very sick or weak to the triager   Negative: MILD difficulty breathing (e.g., minimal/no SOB at rest, SOB with walking, pulse <100)   Negative: Fever > 103 F (39.4 C)   Negative: [1] Fever >  101 F (38.3 C) AND [2] age > 60 years   Negative: [1] Fever > 100.0 F (37.8 C) AND [2] bedridden (e.g., CVA, chronic illness, recovering from surgery)   Negative: Oxygen level (e.g., pulse oximetry) 91 to 94 percent    Protocols used: Coronavirus (COVID-19) Diagnosed or [id number]

## 2023-01-05 ENCOUNTER — TELEPHONE (OUTPATIENT)
Dept: INFUSION THERAPY | Facility: HOSPITAL | Age: 68
End: 2023-01-05
Payer: COMMERCIAL

## 2023-01-05 NOTE — TELEPHONE ENCOUNTER
Pt called stating she tested positive for covid 12/31/22. Informed pt of [facility] policy for pt to return to infusion center must be 11 days from positive test result and symptom free due to high risk area with immunocompromised patients. Pt verbalized understanding. Informed pt Lanreotide authorization is currently not approved, pre-service is working with insurance to get approved. Pt rescheduled to 1/17/23 at 11:30. Pt verbalized understanding.

## 2023-01-06 ENCOUNTER — OFFICE VISIT (OUTPATIENT)
Dept: PSYCHIATRY | Facility: CLINIC | Age: 68
End: 2023-01-06
Payer: COMMERCIAL

## 2023-01-06 DIAGNOSIS — F41.1 GENERALIZED ANXIETY DISORDER: Primary | ICD-10-CM

## 2023-01-06 PROCEDURE — 1159F MED LIST DOCD IN RCRD: CPT | Mod: CPTII,95,, | Performed by: PSYCHOLOGIST

## 2023-01-06 PROCEDURE — 90834 PSYTX W PT 45 MINUTES: CPT | Mod: 95,,, | Performed by: PSYCHOLOGIST

## 2023-01-06 PROCEDURE — 1157F ADVNC CARE PLAN IN RCRD: CPT | Mod: CPTII,95,, | Performed by: PSYCHOLOGIST

## 2023-01-06 PROCEDURE — 1157F PR ADVANCE CARE PLAN OR EQUIV PRESENT IN MEDICAL RECORD: ICD-10-PCS | Mod: CPTII,95,, | Performed by: PSYCHOLOGIST

## 2023-01-06 PROCEDURE — 1160F PR REVIEW ALL MEDS BY PRESCRIBER/CLIN PHARMACIST DOCUMENTED: ICD-10-PCS | Mod: CPTII,95,, | Performed by: PSYCHOLOGIST

## 2023-01-06 PROCEDURE — 1159F PR MEDICATION LIST DOCUMENTED IN MEDICAL RECORD: ICD-10-PCS | Mod: CPTII,95,, | Performed by: PSYCHOLOGIST

## 2023-01-06 PROCEDURE — 1160F RVW MEDS BY RX/DR IN RCRD: CPT | Mod: CPTII,95,, | Performed by: PSYCHOLOGIST

## 2023-01-06 PROCEDURE — 90834 PR PSYCHOTHERAPY W/PATIENT, 45 MIN: ICD-10-PCS | Mod: 95,,, | Performed by: PSYCHOLOGIST

## 2023-01-06 NOTE — PROGRESS NOTES
Telemedicine PSYCHO-ONCOLOGY NOTE/ Individual Psychotherapy     Consultation started: 1/6/2023 at 2:04 PM   The chief complaint leading to consultation is: BEBP for MINH  The patient location is:   Patient home in [place], [place]  Virtual visit with synchronous audio and video   Patient alone at the time of consultation    Crisis Disclaimer: Patient was informed that due to the virtual nature of the visit, that if a crisis develops, protocols will be implemented to ensure patient safety, including but not limited to: 1) Initiating a welfare check with local Law Enforcement, 2) Calling 1/National Crisis Hotline, and/or 3) Initiating PEC/CEC procedures.     Each patient provided medical services by telemedicine is:  (1) informed of the relationship between the physician and patient and the respective role of any other health care provider with respect to management of the patient; and (2) notified that he or she may decline to receive medical services by telemedicine and may withdraw from such care at any time.    Date: 1/6/2023   Site of therapist:  [facility]          Therapeutic Intervention: Met with patient.    Outpatient - Behavior modifying psychotherapy 45 min - CPT code 94064  Brief emotionall/behavioral assessment measures-CPT 20696  The patient's last visit with me was on 12/8/2022.    Problem list  Patient Active Problem List   Diagnosis    Malignant carcinoid tumor of unknown primary site    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    Hypergastrinemia    Carcinoid (except of appendix)    Gastrinoma, malignant    Generalized anxiety disorder    Current moderate episode of major depressive disorder without prior episode    Secondary neuroendocrine tumor of liver    History of iron deficiency anemia    Elevated bilirubin    Vitamin D deficiency    Abnormal glucose    Psychological factors affecting medical condition       Chief complaint/reason for encounter: anxiety   Met with patient to  evaluate psychosocial adaptation to diagnosis/treatment/survivorship of NET    Current Medications  Current Outpatient Medications   Medication    busPIRone (BUSPAR) 5 MG Tab    flu vac 2022 65up-[id number],PF, (FLUAD QUAD 2022-23,65Y UP,,PF,) 60 mcg (15 mcg x 4)/0.5 mL Syrg    lanreotide (SOMATULINE DEPOT) 120 mg/0.5 mL Syrg    pantoprazole (PROTONIX) 40 MG tablet     No current facility-administered medications for this visit.       Objective:  Lena Brantley arrived promptly for the session.  Ms. Brantley was independently ambulatory at the time of session. The patient was fully cooperative throughout the session.  Appearance: age appropriate, casually  dressed, well groomed  Behavior/Cooperation: friendly and cooperative  Speech: normal in rate, volume, and tone and appropriate quality, quantity and organization of sentences  Mood: anxious, depressed  Affect: anxious  Thought Process: goal-directed, logical  Thought Content: normal,  No delusions or paranoia; did not appear to be responding to internal stimuli during the session  Orientation: grossly intact  Memory: Grossly intact  Attention Span/Concentration: Attends to session without distraction; reports no difficulty  Fund of Knowledge: average  Estimate of Intelligence: average from verbal skills and history  Cognition: grossly intact  Insight: patient has awareness of illness; good insight into own behavior and behavior of others  Judgment: the patient's behavior is adequate to circumstances    Interval history and content of current session: Patient discussed events and activities since the time of last visit. Discussed current adaptation to disease and treatment status. Reports to be coping with moderate difficulty. Evaluated cognitive response, paying particular attention to negative intrusive thoughts of a persistent and detrimental nature. Thoughts of this type are in evidence with moderate distress. Provided cognitive behavioral therapy to address  negative cognitions.    CBT for MINH protocol Session 2,  Discussed nature and treatment of anxiety and importance of understanding her anxiety patterns. Reviewed her homework.  Avoidance prominent      Risk parameters:   Patient reports no suicidal ideation  Patient reports no homicidal ideation  Patient reports no self-injurious behavior  Patient reports no violent behavior   Safety needs:  None at this time      Verbal deficits: None     Patient's response to intervention:The patient's response to intervention is accepting.     Progress toward goals: No Progress - Continue Objectives        Patient reported outcomes:      Distress thermometer:   DISTRESS SCREENING 1/6/2023 1/6/2023 12/9/2022 12/8/2022 11/3/2022 10/20/2022 8/25/2022   Distress Score 8 8 5 9 7 8 6   Practical Problems Treatment Decisions Treatment Decisions Treatment Decisions - Insurance/Financial;Work/School;Treatment Decisions Insurance/Financial;Work/School;Treatment Decisions Work/School;Treatment Decisions   Family Problems None of these None of these Family Health Issues - Dealing with Children - Family Health Issues   Emotional Problems Fears;Worry Fears;Worry None of these - Depression;Fears;Sadness;Worry - None of these   Spiritual / Mormonism Concerns No No - - No - No   Physical Problems Bathing/Dressing;Breathing;Eating;Fatigue Bathing/Dressing;Breathing;Eating;Fatigue Breathing;Fatigue - Eating;Fatigue;Memory/Concentration;Sleep Eating;Fatigue;Memory/Concentration;Sleep Bathing/Dressing;Eating;Fatigue;Memory/Concentration;Sleep   Other Problems Tested Covid + Jan 31, 2023 but am now negative. Tested Covid + Jan 31, 2023 but am now negative. SOB and Weakness - - - -           PHQ-9= Initial visit: 4  PHQ ANSWERS    PHQ8 Score : 8 (01/06/23 1357)  PHQ-9 Total Score: 8 (01/06/23 1357) -mild     MINH-7=  Initial visit:7   GAD7 1/6/2023 11/3/2022 6/29/2022   1. Feeling nervous, anxious, or on edge? 1 1 2   2. Not being able to stop or  control worrying? 1 2 2   3. Worrying too much about different things? 1 1 2   4. Trouble relaxing? 1 1 2   5. Being so restless that it is hard to sit still? 0 0 1   6. Becoming easily annoyed or irritable? 1 1 2   7. Feeling afraid as if something awful might happen? 1 1 2   MINH-7 Score 6 7 13    mild      Client Strengths: verbal, intelligent, successful, good social support, good insight, commitment to wellness, strong [place], strong cultural traditions       Treatment Plan:individual psychotherapy and consult psychiatrist for medication evaluation (Leoncio Saunders NP January)  Target symptoms: depression, anxiety   Why chosen therapy is appropriate versus another modality: relevant to diagnosis, patient responds to this modality, evidence based practice  Outcome monitoring methods: self-report, checklist/rating scale  Therapeutic intervention type: behavior modifying psychotherapy  Prognosis: Good    Goals from last visit: Attempted, partially met   Behavioral goals prior to next visit:    Exercise:   Stress management:   Social engagement:   Nutrition:   Smoking Cessation:   Therapy:  Daily mood log    Worry record (above 5)   Send her links to NET support group, healing circles   Visits to be changed to MOndays or Tuesdays    Return to clinic: 2 weeks     Length of Service (minutes direct face-to-face contact): 45    Diagnosis:     ICD-10-CM ICD-9-CM   1. Generalized anxiety disorder  F41.1 300.02         Alex Suazo, PhD  [place] License #[id number]  [place] License #[id number]

## 2023-01-07 ENCOUNTER — PATIENT MESSAGE (OUTPATIENT)
Dept: FAMILY MEDICINE | Facility: CLINIC | Age: 68
End: 2023-01-07
Payer: COMMERCIAL

## 2023-01-07 ENCOUNTER — PATIENT MESSAGE (OUTPATIENT)
Dept: PSYCHIATRY | Facility: CLINIC | Age: 68
End: 2023-01-07
Payer: COMMERCIAL

## 2023-01-09 ENCOUNTER — TELEPHONE (OUTPATIENT)
Dept: INFUSION THERAPY | Facility: HOSPITAL | Age: 68
End: 2023-01-09
Payer: COMMERCIAL

## 2023-01-10 ENCOUNTER — OFFICE VISIT (OUTPATIENT)
Dept: INTERVENTIONAL RADIOLOGY/VASCULAR | Facility: CLINIC | Age: 68
End: 2023-01-10
Payer: COMMERCIAL

## 2023-01-10 VITALS
HEIGHT: 65 IN | OXYGEN SATURATION: 99 % | SYSTOLIC BLOOD PRESSURE: 149 MMHG | TEMPERATURE: 98 F | HEART RATE: 74 BPM | RESPIRATION RATE: 20 BRPM | DIASTOLIC BLOOD PRESSURE: 70 MMHG | BODY MASS INDEX: 23.2 KG/M2 | WEIGHT: 139.25 LBS

## 2023-01-10 DIAGNOSIS — C7B.8 SECONDARY NEUROENDOCRINE TUMOR OF LIVER: ICD-10-CM

## 2023-01-10 PROCEDURE — 3078F DIAST BP <80 MM HG: CPT | Mod: CPTII,S$GLB,, | Performed by: RADIOLOGY

## 2023-01-10 PROCEDURE — 1126F PR PAIN SEVERITY QUANTIFIED, NO PAIN PRESENT: ICD-10-PCS | Mod: CPTII,S$GLB,, | Performed by: RADIOLOGY

## 2023-01-10 PROCEDURE — 1157F PR ADVANCE CARE PLAN OR EQUIV PRESENT IN MEDICAL RECORD: ICD-10-PCS | Mod: CPTII,S$GLB,, | Performed by: RADIOLOGY

## 2023-01-10 PROCEDURE — 1159F MED LIST DOCD IN RCRD: CPT | Mod: CPTII,S$GLB,, | Performed by: RADIOLOGY

## 2023-01-10 PROCEDURE — 3008F BODY MASS INDEX DOCD: CPT | Mod: CPTII,S$GLB,, | Performed by: RADIOLOGY

## 2023-01-10 PROCEDURE — 3078F PR MOST RECENT DIASTOLIC BLOOD PRESSURE < 80 MM HG: ICD-10-PCS | Mod: CPTII,S$GLB,, | Performed by: RADIOLOGY

## 2023-01-10 PROCEDURE — 3008F PR BODY MASS INDEX (BMI) DOCUMENTED: ICD-10-PCS | Mod: CPTII,S$GLB,, | Performed by: RADIOLOGY

## 2023-01-10 PROCEDURE — 1159F PR MEDICATION LIST DOCUMENTED IN MEDICAL RECORD: ICD-10-PCS | Mod: CPTII,S$GLB,, | Performed by: RADIOLOGY

## 2023-01-10 PROCEDURE — 3077F PR MOST RECENT SYSTOLIC BLOOD PRESSURE >= 140 MM HG: ICD-10-PCS | Mod: CPTII,S$GLB,, | Performed by: RADIOLOGY

## 2023-01-10 PROCEDURE — 3288F FALL RISK ASSESSMENT DOCD: CPT | Mod: CPTII,S$GLB,, | Performed by: RADIOLOGY

## 2023-01-10 PROCEDURE — 99215 PR OFFICE/OUTPT VISIT, EST, LEVL V, 40-54 MIN: ICD-10-PCS | Mod: S$GLB,,, | Performed by: RADIOLOGY

## 2023-01-10 PROCEDURE — 3288F PR FALLS RISK ASSESSMENT DOCUMENTED: ICD-10-PCS | Mod: CPTII,S$GLB,, | Performed by: RADIOLOGY

## 2023-01-10 PROCEDURE — 99215 OFFICE O/P EST HI 40 MIN: CPT | Mod: S$GLB,,, | Performed by: RADIOLOGY

## 2023-01-10 PROCEDURE — 1157F ADVNC CARE PLAN IN RCRD: CPT | Mod: CPTII,S$GLB,, | Performed by: RADIOLOGY

## 2023-01-10 PROCEDURE — 3077F SYST BP >= 140 MM HG: CPT | Mod: CPTII,S$GLB,, | Performed by: RADIOLOGY

## 2023-01-10 PROCEDURE — 1101F PT FALLS ASSESS-DOCD LE1/YR: CPT | Mod: CPTII,S$GLB,, | Performed by: RADIOLOGY

## 2023-01-10 PROCEDURE — 99999 PR PBB SHADOW E&M-EST. PATIENT-LVL III: ICD-10-PCS | Mod: PBBFAC,,, | Performed by: RADIOLOGY

## 2023-01-10 PROCEDURE — 1126F AMNT PAIN NOTED NONE PRSNT: CPT | Mod: CPTII,S$GLB,, | Performed by: RADIOLOGY

## 2023-01-10 PROCEDURE — 1101F PR PT FALLS ASSESS DOC 0-1 FALLS W/OUT INJ PAST YR: ICD-10-PCS | Mod: CPTII,S$GLB,, | Performed by: RADIOLOGY

## 2023-01-10 PROCEDURE — 99999 PR PBB SHADOW E&M-EST. PATIENT-LVL III: CPT | Mod: PBBFAC,,, | Performed by: RADIOLOGY

## 2023-01-10 NOTE — TELEPHONE ENCOUNTER
Pt stopped into Hospital administration at [facility].   Needed a return to work slip.   Phone note from 12/31/22 - pt had taken a home Covid test and was positive. Dr. Isaac prescribed paxlovid.   Pt is now symptom free and would like to return to work in person tomorrow.   I hand wrote a return to work slip for pt to RTW tomorrow, January 11, 2023.

## 2023-01-10 NOTE — PROGRESS NOTES
OUTPATIENT INTERVENTIONAL RADIOLOGY CONSULTATION     PATIENT: Lena Brantley  MRN: [id number]  : 1955  DATE OF SERVICE: 1/10/2023    REFERRING PROVIDER:   Md Darius Wood [address]  [address]  [place] [id number]     CHIEF COMPLAINT: Liver metastasis     HISTORY OF PRESENT ILLNESS: Lnea Brantley is a 67 y.o. female with NET of unknown primary who presents with a single liver metastasis measuring 3.8 x 2.5 cm in segment 4.      Her history dates to 2016 when she was undergoing workup for episodic nausea, vomiting and diarrhea. A CT scan was performed which shown a solitary liver mass. Biopsy revealed a well-differentiated neuroendocrine tumor, Ki-67 of 1%. Somatostatin receptor imaging has shown no other site of disease. Gene expression profiling indicated a possible pancreatic primary. EUS in  was normal. She was started on Lanreotide. She has been discussed at NET board on several occasions. She was offered surgical resection and liver directed therapy in the past but has so far declined.    She endorses fatigue, dyspnea upon exertion, and weakness, but is still currently working from home.    ECOG Score: 1    Past Medical History:   Diagnosis Date    Anxiety     Cancer     Depression     Elevated bilirubin 2020    Fatigue     GERD (gastroesophageal reflux disease)     History of iron deficiency anemia 2020    Hx of psychiatric care     Liver mass 2016    Mass of colon 2016    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     Psychiatric problem     Secondary neuroendocrine tumor of liver 2020    Sickle cell trait     Sleep difficulties       No past surgical history on file.   Family History   Problem Relation Age of Onset    Cancer Maternal Grandmother     Cancer Other     Depression Mother     Anxiety disorder Mother       Social History     Socioeconomic History    Marital status: Single    Number of children: 1   Tobacco Use    Smoking status: Never     Passive  "exposure: Never    Smokeless tobacco: Never   Substance and Sexual Activity    Alcohol use: No    Drug use: No    Sexual activity: Yes     Partners: Male      Review of patient's allergies indicates:   Allergen Reactions    Epinephrine Anaphylaxis     Can Cause Carcinoid Crisis     Current Outpatient Medications   Medication Sig    busPIRone (BUSPAR) 5 MG Tab Take 1 tablet (5 mg total) by mouth every evening.    flu vac 2022 65up-[id number],PF, (FLUAD QUAD 2022-23,65Y UP,,PF,) 60 mcg (15 mcg x 4)/0.5 mL Syrg Inject into the muscle.    lanreotide (SOMATULINE DEPOT) 120 mg/0.5 mL Syrg Inject 120 mg into the skin every 28 days.    pantoprazole (PROTONIX) 40 MG tablet Take 1 tablet (40 mg total) by mouth once daily.     No current facility-administered medications for this visit.        REVIEW OF SYSTEMS:  Constitutional:  Positive for fatigue.   HENT:  Negative for sore throat.    Eyes:  Negative for visual disturbance.   Respiratory:  Positive for shortness of breath. Negative for cough.    Cardiovascular:  Negative for chest pain.   Gastrointestinal:  Negative for abdominal pain, constipation, diarrhea, nausea and vomiting.   Genitourinary:  Negative for dysuria.   Musculoskeletal:  Negative for back pain.   Skin:  Negative for rash.   Neurological:  Negative for headaches.   Hematological:  Negative for adenopathy.   Psychiatric/Behavioral:  The patient is not nervous/anxious.      PHYSICAL EXAM:    Vitals:    01/10/23 1053   BP: (!) 149/70   BP Location: Left arm   Patient Position: Sitting   BP Method: Medium (Automatic)   Pulse: 74   Resp: 20   Temp: 97.8 °F (36.6 °C)   TempSrc: Oral   SpO2: 99%   Weight: 63.2 kg (139 lb 3.5 oz)   Height: 5' 5" (1.651 m)       Physical Exam  Vitals and nursing note reviewed.   Constitutional:       Appearance: She is well-developed.   HENT:      Head: Normocephalic and atraumatic.   Eyes:      Pupils: Pupils are equal, round, and reactive to light.   Cardiovascular:      Rate and " Rhythm: Normal rate and regular rhythm.   Pulmonary:      Effort: Pulmonary effort is normal.      Breath sounds: Normal breath sounds.   Abdominal:      General: Bowel sounds are normal.      Palpations: Abdomen is soft.   Musculoskeletal:         General: Normal range of motion.      Cervical back: Normal range of motion and neck supple.   Skin:     General: Skin is warm and dry.   Neurological:      Mental Status: She is alert and oriented to person, place, and time.   Psychiatric:         Behavior: Behavior normal.         Thought Content: Thought content normal.         Judgment: Judgment normal.     LABS:  Lab Results   Component Value Date     12/08/2022    K 3.9 12/08/2022     12/08/2022    CO2 29 12/08/2022    BUN 10 12/08/2022      Lab Results   Component Value Date    CALCIUM 9.7 12/08/2022    PHOS 2.8 02/12/2021      Lab Results   Component Value Date    ALKPHOS 98 12/08/2022    AST 18 12/08/2022    ALT 16 12/08/2022    BILITOT 1.3 (H) 12/08/2022    ALBUMIN 3.9 12/08/2022      Lab Results   Component Value Date    WBC 3.35 (L) 12/08/2022    HGB 14.2 12/08/2022    HCT 42.9 12/08/2022    MCV 87 12/08/2022     12/08/2022        Lab Results   Component Value Date    INR 1.1 09/08/2016     No results found for: AFP  No results found for this or any previous visit (from the past 24 hour(s)).  No results found for this or any previous visit (from the past 168 hour(s)).    IMAGING:   CT AP 12/8/22, 1/6/22, 3/9/21 and 9/2/20, PET/CT 3/31/22 and 4/12/19, and MRI abd 8/10/17 reviewed by me.     ASSESSMENT/PLAN:   Lena Brantley is a 67 y.o. female with a well-differentiated NET, unknown primary, with a single liver met. Although her disease is stable on Lanreotide, there is concern that it may one day grow. Due to its central location, if the tumor grows it may result in biliary obstruction. She declines surgery. I believe she is a candidate for liver-directed therapy. Chemoembolization would be my  LDT of choice, though Y-90 segmentectomy is not unreasonable, depending on vascular supply to the tumor.     Chemoembolization and radioembolization were discussed at length, including technical details, rationale for one over the other, rationale to treat or not treat, risks (including, but not limited to, pain, bleeding, infection, damage to nearby structures, treatment failure/recurrence, the need for additional procedures), potential benefits, and alternatives were discussed with the patient. All questions were answered to the best of my abilities.     The patient verbalized understanding and wishes to consider these options. Pt will contact me if she decides to proceed. Literature on the procedure, my contact info and our 's contact info was given. RTC PRN.     Thank you for the opportunity to participate in the care of this patient.     I, Sameer Bruno MD, was personally present for this clinic visit and examined the patient. I agree with the findings, assessment and plan of care as documented in this note. I spent a total of 45 minutes reviewing records and imaging, counseling, discussing the examination, therapy, and treatment plan of the patient's condition.        Sameer Bruno MD  [facility]  Pager [phone]

## 2023-01-13 ENCOUNTER — CLINICAL SUPPORT (OUTPATIENT)
Dept: OTOLARYNGOLOGY | Facility: CLINIC | Age: 68
End: 2023-01-13
Payer: COMMERCIAL

## 2023-01-13 ENCOUNTER — OFFICE VISIT (OUTPATIENT)
Dept: OTOLARYNGOLOGY | Facility: CLINIC | Age: 68
End: 2023-01-13
Payer: COMMERCIAL

## 2023-01-13 VITALS
SYSTOLIC BLOOD PRESSURE: 122 MMHG | DIASTOLIC BLOOD PRESSURE: 83 MMHG | HEART RATE: 60 BPM | HEIGHT: 65 IN | WEIGHT: 141.19 LBS | BODY MASS INDEX: 23.52 KG/M2

## 2023-01-13 DIAGNOSIS — J30.2 SEASONAL ALLERGIC RHINITIS, UNSPECIFIED TRIGGER: Chronic | ICD-10-CM

## 2023-01-13 DIAGNOSIS — H93.11 TINNITUS OF RIGHT EAR: Chronic | ICD-10-CM

## 2023-01-13 DIAGNOSIS — H93.293 ABNORMAL AUDITORY PERCEPTION, BILATERAL: Primary | ICD-10-CM

## 2023-01-13 DIAGNOSIS — H93.11 TINNITUS OF RIGHT EAR: ICD-10-CM

## 2023-01-13 PROCEDURE — 99204 PR OFFICE/OUTPT VISIT, NEW, LEVL IV, 45-59 MIN: ICD-10-PCS | Mod: S$GLB,,, | Performed by: OTOLARYNGOLOGY

## 2023-01-13 PROCEDURE — 1126F PR PAIN SEVERITY QUANTIFIED, NO PAIN PRESENT: ICD-10-PCS | Mod: CPTII,S$GLB,, | Performed by: OTOLARYNGOLOGY

## 2023-01-13 PROCEDURE — 1157F ADVNC CARE PLAN IN RCRD: CPT | Mod: CPTII,S$GLB,, | Performed by: OTOLARYNGOLOGY

## 2023-01-13 PROCEDURE — 92556 SPEECH AUDIOMETRY COMPLETE: CPT | Mod: S$GLB,,, | Performed by: AUDIOLOGIST

## 2023-01-13 PROCEDURE — 3074F PR MOST RECENT SYSTOLIC BLOOD PRESSURE < 130 MM HG: ICD-10-PCS | Mod: CPTII,S$GLB,, | Performed by: OTOLARYNGOLOGY

## 2023-01-13 PROCEDURE — 1157F PR ADVANCE CARE PLAN OR EQUIV PRESENT IN MEDICAL RECORD: ICD-10-PCS | Mod: CPTII,S$GLB,, | Performed by: OTOLARYNGOLOGY

## 2023-01-13 PROCEDURE — 99999 PR PBB SHADOW E&M-EST. PATIENT-LVL I: ICD-10-PCS | Mod: PBBFAC,,, | Performed by: AUDIOLOGIST

## 2023-01-13 PROCEDURE — 92552 PR PURE TONE AUDIOMETRY, AIR: ICD-10-PCS | Mod: S$GLB,,, | Performed by: AUDIOLOGIST

## 2023-01-13 PROCEDURE — 3079F DIAST BP 80-89 MM HG: CPT | Mod: CPTII,S$GLB,, | Performed by: OTOLARYNGOLOGY

## 2023-01-13 PROCEDURE — 3074F SYST BP LT 130 MM HG: CPT | Mod: CPTII,S$GLB,, | Performed by: OTOLARYNGOLOGY

## 2023-01-13 PROCEDURE — 1126F AMNT PAIN NOTED NONE PRSNT: CPT | Mod: CPTII,S$GLB,, | Performed by: OTOLARYNGOLOGY

## 2023-01-13 PROCEDURE — 92552 PURE TONE AUDIOMETRY AIR: CPT | Mod: S$GLB,,, | Performed by: AUDIOLOGIST

## 2023-01-13 PROCEDURE — 1159F MED LIST DOCD IN RCRD: CPT | Mod: CPTII,S$GLB,, | Performed by: OTOLARYNGOLOGY

## 2023-01-13 PROCEDURE — 92567 PR TYMPA2METRY: ICD-10-PCS | Mod: S$GLB,,, | Performed by: AUDIOLOGIST

## 2023-01-13 PROCEDURE — 3288F FALL RISK ASSESSMENT DOCD: CPT | Mod: CPTII,S$GLB,, | Performed by: OTOLARYNGOLOGY

## 2023-01-13 PROCEDURE — 1101F PT FALLS ASSESS-DOCD LE1/YR: CPT | Mod: CPTII,S$GLB,, | Performed by: OTOLARYNGOLOGY

## 2023-01-13 PROCEDURE — 99999 PR PBB SHADOW E&M-EST. PATIENT-LVL I: CPT | Mod: PBBFAC,,, | Performed by: AUDIOLOGIST

## 2023-01-13 PROCEDURE — 92556 PR SPEECH AUDIOMETRY, COMPLETE: ICD-10-PCS | Mod: S$GLB,,, | Performed by: AUDIOLOGIST

## 2023-01-13 PROCEDURE — 1159F PR MEDICATION LIST DOCUMENTED IN MEDICAL RECORD: ICD-10-PCS | Mod: CPTII,S$GLB,, | Performed by: OTOLARYNGOLOGY

## 2023-01-13 PROCEDURE — 3008F PR BODY MASS INDEX (BMI) DOCUMENTED: ICD-10-PCS | Mod: CPTII,S$GLB,, | Performed by: OTOLARYNGOLOGY

## 2023-01-13 PROCEDURE — 3008F BODY MASS INDEX DOCD: CPT | Mod: CPTII,S$GLB,, | Performed by: OTOLARYNGOLOGY

## 2023-01-13 PROCEDURE — 99999 PR PBB SHADOW E&M-EST. PATIENT-LVL V: ICD-10-PCS | Mod: PBBFAC,,, | Performed by: OTOLARYNGOLOGY

## 2023-01-13 PROCEDURE — 92567 TYMPANOMETRY: CPT | Mod: S$GLB,,, | Performed by: AUDIOLOGIST

## 2023-01-13 PROCEDURE — 1160F PR REVIEW ALL MEDS BY PRESCRIBER/CLIN PHARMACIST DOCUMENTED: ICD-10-PCS | Mod: CPTII,S$GLB,, | Performed by: OTOLARYNGOLOGY

## 2023-01-13 PROCEDURE — 99204 OFFICE O/P NEW MOD 45 MIN: CPT | Mod: S$GLB,,, | Performed by: OTOLARYNGOLOGY

## 2023-01-13 PROCEDURE — 99999 PR PBB SHADOW E&M-EST. PATIENT-LVL V: CPT | Mod: PBBFAC,,, | Performed by: OTOLARYNGOLOGY

## 2023-01-13 PROCEDURE — 1160F RVW MEDS BY RX/DR IN RCRD: CPT | Mod: CPTII,S$GLB,, | Performed by: OTOLARYNGOLOGY

## 2023-01-13 PROCEDURE — 1101F PR PT FALLS ASSESS DOC 0-1 FALLS W/OUT INJ PAST YR: ICD-10-PCS | Mod: CPTII,S$GLB,, | Performed by: OTOLARYNGOLOGY

## 2023-01-13 PROCEDURE — 3079F PR MOST RECENT DIASTOLIC BLOOD PRESSURE 80-89 MM HG: ICD-10-PCS | Mod: CPTII,S$GLB,, | Performed by: OTOLARYNGOLOGY

## 2023-01-13 PROCEDURE — 3288F PR FALLS RISK ASSESSMENT DOCUMENTED: ICD-10-PCS | Mod: CPTII,S$GLB,, | Performed by: OTOLARYNGOLOGY

## 2023-01-13 RX ORDER — FLUTICASONE PROPIONATE 50 MCG
2 SPRAY, SUSPENSION (ML) NASAL DAILY
Qty: 9.9 ML | Refills: 11 | Status: SHIPPED | OUTPATIENT
Start: 2023-01-13 | End: 2023-05-16

## 2023-01-13 RX ORDER — LEVOCETIRIZINE DIHYDROCHLORIDE 5 MG/1
5 TABLET, FILM COATED ORAL NIGHTLY
Qty: 30 TABLET | Refills: 11 | Status: SHIPPED | OUTPATIENT
Start: 2023-01-13 | End: 2023-05-16

## 2023-01-13 NOTE — PROGRESS NOTES
Chief Complaint   Patient presents with    Tinnitus     Bilateral, started in December       HPI:   Lena Brantley is a very pleasant 67 y.o. female here to see me today for the first time for evaluation of tinnitus. Reports tinnitus that has been gradually progressing over the last 1month(s).  She notes that it is primarily right. She states that the tinnitus does not interfere with communication, concentration, sleep, and enjoyment of life. She  also denies hearing loss. She has not noted any difference in hearing between the ears, with either ear being the better hearing ear. She has not had any recent issues with ear pain or ear drainage. She denies a family history of hearing loss, and has not had any previous otologic surgery. She has any history of significant loud noise exposure.She denies issues with dizziness.  She was restarted on Buspar on 12/21/22 but the tinnitus has started before that.     She has been experiencing some nasal congestion and runny nose within the same time as the tinnitus. She is currently not on Flonase or antihistamines. She uses saline nasal spray prn.       Past Medical History:   Diagnosis Date    Anxiety     Cancer     Depression     Elevated bilirubin 9/9/2020    Fatigue     GERD (gastroesophageal reflux disease)     History of iron deficiency anemia 9/9/2020    Hx of psychiatric care     Liver mass 06/2016    Mass of colon 06/2016    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     Psychiatric problem     Secondary neuroendocrine tumor of liver 9/9/2020    Sickle cell trait     Sleep difficulties      Social History     Socioeconomic History    Marital status: Single    Number of children: 1   Tobacco Use    Smoking status: Never     Passive exposure: Never    Smokeless tobacco: Never   Substance and Sexual Activity    Alcohol use: No    Drug use: No    Sexual activity: Yes     Partners: Male     No past surgical history on file.  Family History   Problem Relation Age of  Onset    Cancer Maternal Grandmother     Cancer Other     Depression Mother     Anxiety disorder Mother            Review of Systems  General: negative for chills, fever or weight loss  Psychological: negative for mood changes or depression  Ophthalmic: negative for blurry vision, photophobia or eye pain  ENT: see HPI  Respiratory: no cough, shortness of breath, or wheezing  Cardiovascular: no chest pain or dyspnea on exertion  Gastrointestinal: no abdominal pain, change in bowel habits, or black/ bloody stools  Musculoskeletal: negative for gait disturbance or muscular weakness  Neurological: no syncope or seizures; no ataxia  Dermatological: negative for pruritis,  rash and jaundice  Hematologic/lymphatic: no easy bruising, no new adenopathy      Physical Exam:    Vitals:    01/13/23 0842   BP: 122/83   Pulse: 60       Constitutional: Well appearing / communicating without difficutly.  NAD.  Eyes: EOM I Bilaterally  Head/Face: Normocephalic.  Negative paranasal sinus pressure/tenderness.  Salivary glands WNL.  House Brackmann I Bilaterally.    Right Ear: Auricle normal appearance. External Auditory Canal within normal limits no lesions or masses,TM w/o masses/lesions/perforations. TM mobility noted.   Left Ear: Auricle normal appearance. External Auditory Canal within normal limits no lesions or masses,TM w/o masses/lesions/perforations. TM mobility noted.  Nose: No gross nasal septal deviation. Inferior Turbinates 3+ bilaterally. No septal perforation. No masses/lesions. External nasal skin appears normal without masses/lesions.  Oral Cavity: Gingiva/lips within normal limits.  Dentition/gingiva healthy appearing. Mucus membranes moist. Floor of mouth soft, no masses palpated. Oral Tongue mobile. Hard Palate appears normal.    Oropharynx: Base of tongue appears normal. No masses/lesions noted. Tonsillar fossa/pharyngeal wall without lesions. Posterior oropharynx WNL.  Soft palate without masses. Midline uvula.    Neck/Lymphatic: No LAD I-VI bilaterally.  No thyromegaly.  No masses noted on exam.    Mirror laryngoscopy/nasopharyngoscopy: Active gag reflex.  Unable to perform.      Diagnostic Studies:  Audiogram reviewed personally by myself and in detail with the patient today. Tympanometry revealed a Type A tympanogram for both ears.  Audiogram results revealed hearing within normal limits bilaterally.  Speech reception thresholds were obtained at 15dB for both ears and speech discrimination scores were 96% for the right ear and 100% for the left ear.          Assessment:    ICD-10-CM ICD-9-CM    1. Abnormal auditory perception, bilateral  H93.293 388.40       2. Tinnitus of right ear  H93.11 388.30 Ambulatory referral/consult to ENT      Ambulatory referral/consult to ENT      CANCELED: CTA Head and Neck (xpd)      3. Seasonal allergic rhinitis, unspecified trigger  J30.2 477.9         The primary encounter diagnosis was Abnormal auditory perception, bilateral. Diagnoses of Tinnitus of right ear and Seasonal allergic rhinitis, unspecified trigger were also pertinent to this visit.      Plan:  Start nasal saline rinses BID  Start Flonase 2 sprays per nostril daily  Start xyzal 5mg PO daily    We reviewed the audiogram together in detail.  We also discussed that tinnitus is most often caused by a hearing loss, and that as the hair cells are damaged, either genetic or as a result of loud noise exposure, they then cause tinnitus.  Tinnitus tends to be louder in times of stress and fatigue, and may decrease with time.  Hearing aids are helpful if patient is a good hearing aid candidate. Sound machines may also be an effective masking technique if needed at night. I will give the patient a comprehensive guide on managing tinnitus today with reference materials to further learn about tinnitus and coping mechanisms. We discussed that due to unilateral nature of the tinnitus, imaging such as CTA could be performed to further  evaluate. She felt that the symptoms are improving and would like to defer imaging. Will plan on recheck in 3 months.     Elma Cortez MD

## 2023-01-13 NOTE — PROGRESS NOTES
Lean Early was seen today in the clinic for an audiologic evaluation.  Her main complaint was tinnitus in the right ear.    Tympanometry revealed a Type A tympanogram for both ears.  Audiogram results revealed hearing within normal limits bilaterally.  Speech reception thresholds were obtained at 15dB for both ears and speech discrimination scores were 96% for the right ear and 100% for the left ear.    Recommendations:  Otologic evaluation  Follow up hearing test as needed  Hearing protection in noise

## 2023-01-13 NOTE — PATIENT INSTRUCTIONS
WHAT IS TINNITUS?  The perception of sound heard in one or both sides of the head. Some refer to it as a ringing, noise, buzzing, roaring, clicking, wooshing, crickets, heartbeat, music, or other noises. There are varying levels of severity. The tinnitus may be constant or periodic. Common complaints include difficulty sleeping, struggling to understand other's speech, depression, and problems focusing.  Tinnitus is a symptom, not a disease. It affects 10-15% of adults in the [place].      HOW IS TINNITUS DIAGNOSED?  A doctor can diagnose tinnitus after a physical examination and interview. The examination may rule out conditions causing the tinnitus such as wax impaction or fluid behind the eardrum. Tinnitus may also be related to hearing loss, especially hearing loss from frequent noise exposure. A hearing test may be performed if you are experiencing tinnitus.    TREATMENT FOR TINNITUS?  Treatment may improve on its own but if it doesn't there are several ways to manage your symptoms although there is no cure. Possible treatment options include amplification (hearing aids), sound therapy (maskers,white noise,etc.), TMJ treatment, cognitive behavioral therapy, relaxation exercises, and managing your medications.  There is not enough evidence to suggest that over the counter products help patients with tinnitus. Acupuncture can neither be recommended or discouraged due to lack of evidence as well.    Source: American Academy of Otolaryngology-Head And Neck Surgery    ORGANIZATIONS AND LINKS FOR TINNITUS AND HEARING LOSS    American Academy of Audiology      www.audiology.org  American Tinnitus Associate        www.carolee.org  American Academy of Otolaryngology, Head & Neck Surgery www.entnet.org  American Auditory Society     www.amauditorysoc.org  Better Hearing [place]      www.betterhearing.org  EarHelp        www.earhelp.co.uk/  Hearing Education and Awareness for [place] (HEAR)  www.hearnet.com  Hearing  Loss Association of [place]    www.hearingloss.com   Hear-It        www.hear-it.org  Healthy Hearing       www.[email or web address].[facility]   Sight and Hearing Association     www.sightandhearing.org

## 2023-01-17 ENCOUNTER — INFUSION (OUTPATIENT)
Dept: INFUSION THERAPY | Facility: HOSPITAL | Age: 68
End: 2023-01-17
Attending: INTERNAL MEDICINE
Payer: COMMERCIAL

## 2023-01-17 VITALS — HEIGHT: 65 IN | WEIGHT: 141.19 LBS | BODY MASS INDEX: 23.52 KG/M2

## 2023-01-17 DIAGNOSIS — C7B.02 METASTATIC MALIGNANT CARCINOID TUMOR TO LIVER: ICD-10-CM

## 2023-01-17 DIAGNOSIS — C7A.00 MALIGNANT CARCINOID TUMOR OF UNKNOWN PRIMARY SITE: Primary | ICD-10-CM

## 2023-01-17 PROCEDURE — 96372 THER/PROPH/DIAG INJ SC/IM: CPT

## 2023-01-17 PROCEDURE — 63600175 PHARM REV CODE 636 W HCPCS: Mod: JG | Performed by: INTERNAL MEDICINE

## 2023-01-17 RX ORDER — LANREOTIDE ACETATE 120 MG/.5ML
120 INJECTION SUBCUTANEOUS
Status: DISCONTINUED | OUTPATIENT
Start: 2023-01-17 | End: 2023-01-17 | Stop reason: HOSPADM

## 2023-01-17 RX ORDER — LANREOTIDE ACETATE 120 MG/.5ML
120 INJECTION SUBCUTANEOUS
Status: CANCELLED | OUTPATIENT
Start: 2023-01-17

## 2023-01-17 RX ADMIN — LANREOTIDE ACETATE 120 MG: 120 INJECTION SUBCUTANEOUS at 12:01

## 2023-01-20 ENCOUNTER — OFFICE VISIT (OUTPATIENT)
Dept: PSYCHIATRY | Facility: CLINIC | Age: 68
End: 2023-01-20
Payer: COMMERCIAL

## 2023-01-20 DIAGNOSIS — F41.1 GENERALIZED ANXIETY DISORDER: Primary | ICD-10-CM

## 2023-01-20 PROCEDURE — 1159F MED LIST DOCD IN RCRD: CPT | Mod: CPTII,95,, | Performed by: PSYCHOLOGIST

## 2023-01-20 PROCEDURE — 90834 PSYTX W PT 45 MINUTES: CPT | Mod: 95,,, | Performed by: PSYCHOLOGIST

## 2023-01-20 PROCEDURE — 1157F ADVNC CARE PLAN IN RCRD: CPT | Mod: CPTII,95,, | Performed by: PSYCHOLOGIST

## 2023-01-20 PROCEDURE — 1157F PR ADVANCE CARE PLAN OR EQUIV PRESENT IN MEDICAL RECORD: ICD-10-PCS | Mod: CPTII,95,, | Performed by: PSYCHOLOGIST

## 2023-01-20 PROCEDURE — 90834 PR PSYCHOTHERAPY W/PATIENT, 45 MIN: ICD-10-PCS | Mod: 95,,, | Performed by: PSYCHOLOGIST

## 2023-01-20 PROCEDURE — 1159F PR MEDICATION LIST DOCUMENTED IN MEDICAL RECORD: ICD-10-PCS | Mod: CPTII,95,, | Performed by: PSYCHOLOGIST

## 2023-01-20 PROCEDURE — 1160F PR REVIEW ALL MEDS BY PRESCRIBER/CLIN PHARMACIST DOCUMENTED: ICD-10-PCS | Mod: CPTII,95,, | Performed by: PSYCHOLOGIST

## 2023-01-20 PROCEDURE — 1160F RVW MEDS BY RX/DR IN RCRD: CPT | Mod: CPTII,95,, | Performed by: PSYCHOLOGIST

## 2023-01-20 NOTE — PROGRESS NOTES
Telemedicine PSYCHO-ONCOLOGY NOTE/ Individual Psychotherapy     Consultation started: 1/20/2023 at 10:04 AM  The chief complaint leading to consultation is: BEBP for MINH  The patient location is:   Patient home in [place], [place]  Virtual visit with synchronous audio and video   Patient alone at the time of consultation    Crisis Disclaimer: Patient was informed that due to the virtual nature of the visit, that if a crisis develops, protocols will be implemented to ensure patient safety, including but not limited to: 1) Initiating a welfare check with local Law Enforcement, 2) Calling 1/National Crisis Hotline, and/or 3) Initiating PEC/CEC procedures.     Each patient provided medical services by telemedicine is:  (1) informed of the relationship between the physician and patient and the respective role of any other health care provider with respect to management of the patient; and (2) notified that he or she may decline to receive medical services by telemedicine and may withdraw from such care at any time.    Date: 1/20/2023   Site of therapist:  [facility]          Therapeutic Intervention: Met with patient.    Outpatient - Behavior modifying psychotherapy 45 min - CPT code 68446  Brief emotional/behavioral assessment measures-CPT 57536   The patient's last visit with me was on 1/6/2023.    Problem list  Patient Active Problem List   Diagnosis    Malignant carcinoid tumor of unknown primary site    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    Hypergastrinemia    Carcinoid (except of appendix)    Gastrinoma, malignant    Generalized anxiety disorder    Current moderate episode of major depressive disorder without prior episode    Secondary neuroendocrine tumor of liver    History of iron deficiency anemia    Elevated bilirubin    Vitamin D deficiency    Abnormal glucose    Psychological factors affecting medical condition       Chief complaint/reason for encounter: anxiety   Met with patient to  evaluate psychosocial adaptation to diagnosis/treatment/survivorship of NET    Current Medications  Current Outpatient Medications   Medication    busPIRone (BUSPAR) 5 MG Tab    flu vac 2022 65up-[id number],PF, (FLUAD QUAD 2022-23,65Y UP,,PF,) 60 mcg (15 mcg x 4)/0.5 mL Syrg    fluticasone propionate (FLONASE) 50 mcg/actuation nasal spray    lanreotide (SOMATULINE DEPOT) 120 mg/0.5 mL Syrg    levocetirizine (XYZAL) 5 MG tablet    pantoprazole (PROTONIX) 40 MG tablet     No current facility-administered medications for this visit.       Objective:  Lena Brantley arrived promptly for the session.  Ms. Brantley was independently ambulatory at the time of session. The patient was fully cooperative throughout the session.  Appearance: age appropriate, casually  dressed, well groomed  Behavior/Cooperation: friendly and cooperative  Speech: normal in rate, volume, and tone and appropriate quality, quantity and organization of sentences  Mood: anxious, depressed  Affect: anxious  Thought Process: goal-directed, logical  Thought Content: normal,  No delusions or paranoia; did not appear to be responding to internal stimuli during the session  Orientation: grossly intact  Memory: Grossly intact  Attention Span/Concentration: Attends to session without distraction; reports no difficulty  Fund of Knowledge: average  Estimate of Intelligence: average from verbal skills and history  Cognition: grossly intact  Insight: patient has awareness of illness; good insight into own behavior and behavior of others  Judgment: the patient's behavior is adequate to circumstances    Interval history and content of current session: Patient discussed events and activities since the time of last visit. Discussed current adaptation to disease and treatment status. Reports to be coping with moderate difficulty. SHe describes having consulted with IR about embolization. Patient continues to be ambivalent about treatment. Evaluated cognitive  "response, paying particular attention to negative intrusive thoughts of a persistent and detrimental nature. Thoughts of this type are in evidence with moderate distress. Provided cognitive behavioral therapy to address negative cognitions.  Discussed her search for additional "information" to allow her to not feel anxious in order to decide on the procedure. Utility of "acting despite anxiety" discussed.    Patient also describes emotional reaction to inefficiencies at medical appointments. Helped patient to understand that her anxiety is about future "being taken advantage of" rather than current events.    CBT for MINH protocol Session 3,  Discussed historical and biological antecedents to anxiety and ways anxiety is often maintained. Reviewed her homework.       Risk parameters:   Patient reports no suicidal ideation  Patient reports no homicidal ideation  Patient reports no self-injurious behavior  Patient reports no violent behavior   Safety needs:  None at this time      Verbal deficits: None     Patient's response to intervention:The patient's response to intervention is accepting.     Progress toward goals: No Progress - Continue Objectives        Patient reported outcomes:      Distress thermometer:   DISTRESS SCREENING 1/20/2023 1/20/2023 1/10/2023 1/6/2023 1/6/2023 12/9/2022 12/8/2022   Distress Score 7 7 0 - No Distress 8 8 5 9   Practical Problems Insurance/Financial;Work/School;Treatment Decisions Insurance/Financial;Work/School;Treatment Decisions None of these Treatment Decisions Treatment Decisions Treatment Decisions -   Family Problems Dealing with Children Dealing with Children None of these None of these None of these Family Health Issues -   Emotional Problems Fears;Worry Fears;Worry None of these Fears;Worry Fears;Worry None of these -   Spiritual / Taoist Concerns No No No No No - -   Physical Problems Sleep Sleep None of these Bathing/Dressing;Breathing;Eating;Fatigue " Bathing/Dressing;Breathing;Eating;Fatigue Breathing;Fatigue -   Other Problems - - - Tested Covid + Jan 31, 2023 but am now negative. Tested Covid + Jan 31, 2023 but am now negative. SOB and Weakness -           PHQ-9=Initial visit: 4  PHQ ANSWERS    PHQ8 Score : 4 (01/20/23 0959)  PHQ-9 Total Score: 4 (01/20/23 0959) -minimal     MINH-7= Initial visit:7   GAD7 1/20/2023 1/6/2023 11/3/2022   1. Feeling nervous, anxious, or on edge? 1 1 1   2. Not being able to stop or control worrying? 1 1 2   3. Worrying too much about different things? 1 1 1   4. Trouble relaxing? 1 1 1   5. Being so restless that it is hard to sit still? 1 0 0   6. Becoming easily annoyed or irritable? 1 1 1   7. Feeling afraid as if something awful might happen? 1 1 1   MINH-7 Score 7 6 7    mild      Client Strengths: verbal, intelligent, successful, good social support, good insight, commitment to wellness, strong [place], strong cultural traditions       Treatment Plan:individual psychotherapy and consult psychiatrist for medication evaluation (Leoncio Saunders NP January)  Target symptoms: depression, anxiety   Why chosen therapy is appropriate versus another modality: relevant to diagnosis, patient responds to this modality, evidence based practice  Outcome monitoring methods: self-report, checklist/rating scale  Therapeutic intervention type: behavior modifying psychotherapy  Prognosis: Good    Goals from last visit: Attempted, partially met   Behavioral goals prior to next visit:    Exercise:   Stress management:   Social engagement:   Nutrition:   Smoking Cessation:   Therapy:  Daily mood log    Worry record (above 5)    Think about learning history and maintaining factors    Email NETS support group    Return to clinic: 2 weeks     Length of Service (minutes direct face-to-face contact): 45    Diagnosis:     ICD-10-CM ICD-9-CM   1. Generalized anxiety disorder  F41.1 300.02           Alex Suazo, PhD  [place] License #[id number]  [place] License #[id number]

## 2023-01-27 ENCOUNTER — OFFICE VISIT (OUTPATIENT)
Dept: PSYCHIATRY | Facility: CLINIC | Age: 68
End: 2023-01-27
Payer: COMMERCIAL

## 2023-01-27 DIAGNOSIS — F41.1 GENERALIZED ANXIETY DISORDER: Primary | ICD-10-CM

## 2023-01-27 PROCEDURE — 1157F PR ADVANCE CARE PLAN OR EQUIV PRESENT IN MEDICAL RECORD: ICD-10-PCS | Mod: CPTII,95,, | Performed by: PSYCHOLOGIST

## 2023-01-27 PROCEDURE — 90837 PSYTX W PT 60 MINUTES: CPT | Mod: 95,,, | Performed by: PSYCHOLOGIST

## 2023-01-27 PROCEDURE — 1160F RVW MEDS BY RX/DR IN RCRD: CPT | Mod: CPTII,95,, | Performed by: PSYCHOLOGIST

## 2023-01-27 PROCEDURE — 1160F PR REVIEW ALL MEDS BY PRESCRIBER/CLIN PHARMACIST DOCUMENTED: ICD-10-PCS | Mod: CPTII,95,, | Performed by: PSYCHOLOGIST

## 2023-01-27 PROCEDURE — 1159F PR MEDICATION LIST DOCUMENTED IN MEDICAL RECORD: ICD-10-PCS | Mod: CPTII,95,, | Performed by: PSYCHOLOGIST

## 2023-01-27 PROCEDURE — 1157F ADVNC CARE PLAN IN RCRD: CPT | Mod: CPTII,95,, | Performed by: PSYCHOLOGIST

## 2023-01-27 PROCEDURE — 1159F MED LIST DOCD IN RCRD: CPT | Mod: CPTII,95,, | Performed by: PSYCHOLOGIST

## 2023-01-27 PROCEDURE — 90837 PR PSYCHOTHERAPY W/PATIENT, 60 MIN: ICD-10-PCS | Mod: 95,,, | Performed by: PSYCHOLOGIST

## 2023-01-27 NOTE — PROGRESS NOTES
Telemedicine PSYCHO-ONCOLOGY NOTE/ Individual Psychotherapy     Consultation started: 1/27/2023 at 10:01 AM  The chief complaint leading to consultation is: BEBP for MINH  The patient location is:   Patient home in [place], [place]  Virtual visit with synchronous audio and video   Patient alone at the time of consultation    Crisis Disclaimer: Patient was informed that due to the virtual nature of the visit, that if a crisis develops, protocols will be implemented to ensure patient safety, including but not limited to: 1) Initiating a welfare check with local Law Enforcement, 2) Calling 1/National Crisis Hotline, and/or 3) Initiating PEC/CEC procedures.     Each patient provided medical services by telemedicine is:  (1) informed of the relationship between the physician and patient and the respective role of any other health care provider with respect to management of the patient; and (2) notified that he or she may decline to receive medical services by telemedicine and may withdraw from such care at any time.    Date: 1/27/2023   Site of therapist:  [facility]          Therapeutic Intervention: Met with patient.  Outpatient - Behavior modifying psychotherapy 60 min - CPT code 63626  The patient's last visit with me was on 1/20/2023.      Problem list  Patient Active Problem List   Diagnosis    Malignant carcinoid tumor of unknown primary site    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    Hypergastrinemia    Carcinoid (except of appendix)    Gastrinoma, malignant    Generalized anxiety disorder    Current moderate episode of major depressive disorder without prior episode    Secondary neuroendocrine tumor of liver    History of iron deficiency anemia    Elevated bilirubin    Vitamin D deficiency    Abnormal glucose    Psychological factors affecting medical condition       Chief complaint/reason for encounter: anxiety   Met with patient to evaluate psychosocial adaptation to  diagnosis/treatment/survivorship of NET    Current Medications  Current Outpatient Medications   Medication    busPIRone (BUSPAR) 5 MG Tab    flu vac 2022 65up-mhaVU32O,PF, (FLUAD QUAD 2022-23,65Y UP,,PF,) 60 mcg (15 mcg x 4)/0.5 mL Syrg    fluticasone propionate (FLONASE) 50 mcg/actuation nasal spray    lanreotide (SOMATULINE DEPOT) 120 mg/0.5 mL Syrg    levocetirizine (XYZAL) 5 MG tablet    pantoprazole (PROTONIX) 40 MG tablet     No current facility-administered medications for this visit.       Objective:  Lena Brantley arrived promptly for the session.  Ms. Brantley was independently ambulatory at the time of session. The patient was fully cooperative throughout the session.  Appearance: age appropriate, casually  dressed, well groomed  Behavior/Cooperation: friendly and cooperative  Speech: normal in rate, volume, and tone and appropriate quality, quantity and organization of sentences  Mood: anxious, depressed  Affect: anxious  Thought Process: goal-directed, logical  Thought Content: normal,  No delusions or paranoia; did not appear to be responding to internal stimuli during the session  Orientation: grossly intact  Memory: Grossly intact  Attention Span/Concentration: Attends to session without distraction; reports no difficulty  Fund of Knowledge: average  Estimate of Intelligence: average from verbal skills and history  Cognition: grossly intact  Insight: patient has awareness of illness; good insight into own behavior and behavior of others  Judgment: the patient's behavior is adequate to circumstances    Interval history and content of current session: Patient discussed events and activities since the time of last visit. Discussed current adaptation to disease and treatment status. Reports to be coping with moderate difficulty. SHe describes having consulted with IR about embolization. Patient continues to be ambivalent about treatment. Evaluated cognitive response, paying particular attention to  "negative intrusive thoughts of a persistent and detrimental nature. Thoughts of this type are in evidence with moderate distress. Provided cognitive behavioral therapy to address negative cognitions.  Discussed her search for additional "information" to allow her to not feel anxious in order to decide on the procedure. Utility of "acting despite anxiety" discussed.    Patient also describes emotional reaction to inefficiencies at medical appointments. Helped patient to understand that her anxiety is about future "being taken advantage of" rather than current events.    CBT for MINH protocol Session 4,  Discussed and practiced PMR. Reviewed her homework.       Risk parameters:   Patient reports no suicidal ideation  Patient reports no homicidal ideation  Patient reports no self-injurious behavior  Patient reports no violent behavior   Safety needs:  None at this time      Verbal deficits: None     Patient's response to intervention:The patient's response to intervention is accepting.     Progress toward goals: No Progress - Continue Objectives        Patient reported outcomes:      Distress thermometer:   DISTRESS SCREENING 1/20/2023 1/20/2023 1/10/2023 1/6/2023 1/6/2023 12/9/2022 12/8/2022   Distress Score 7 7 0 - No Distress 8 8 5 9   Practical Problems Insurance/Financial;Work/School;Treatment Decisions Insurance/Financial;Work/School;Treatment Decisions None of these Treatment Decisions Treatment Decisions Treatment Decisions -   Family Problems Dealing with Children Dealing with Children None of these None of these None of these Family Health Issues -   Emotional Problems Fears;Worry Fears;Worry None of these Fears;Worry Fears;Worry None of these -   Spiritual / Baptism Concerns No No No No No - -   Physical Problems Sleep Sleep None of these Bathing/Dressing;Breathing;Eating;Fatigue Bathing/Dressing;Breathing;Eating;Fatigue Breathing;Fatigue -   Other Problems - - - Tested Covid + Jan 31, 2023 but am now " negative. Tested Covid + Jan 31, 2023 but am now negative. SOB and Weakness -           PHQ-9=12 Initial visit: 4  PHQ ANSWERS    PHQ8 Score : 5 (01/27/23 0941)  PHQ-9 Total Score: 5 (01/27/23 0941)      MINH-7=14  Initial visit:7   GAD7 1/27/2023 1/20/2023 1/6/2023   1. Feeling nervous, anxious, or on edge? 1 1 1   2. Not being able to stop or control worrying? 1 1 1   3. Worrying too much about different things? 1 1 1   4. Trouble relaxing? 1 1 1   5. Being so restless that it is hard to sit still? 0 1 0   6. Becoming easily annoyed or irritable? 1 1 1   7. Feeling afraid as if something awful might happen? 1 1 1   MINH-7 Score 6 7 6          Client Strengths: verbal, intelligent, successful, good social support, good insight, commitment to wellness, strong [place], strong cultural traditions       Treatment Plan:individual psychotherapy and consult psychiatrist for medication evaluation (Leoncio Saunders NP January)  Target symptoms: depression, anxiety   Why chosen therapy is appropriate versus another modality: relevant to diagnosis, patient responds to this modality, evidence based practice  Outcome monitoring methods: self-report, checklist/rating scale  Therapeutic intervention type: behavior modifying psychotherapy  Prognosis: Good    Goals from last visit: Attempted, partially met   Behavioral goals prior to next visit:    Exercise:   Stress management:  set up specific place for work in her home   Social engagement:   Nutrition:   Smoking Cessation:   Therapy:  Daily mood log    Worry record (above 5)    Practice PMR daily    Email NETS support group    Return to clinic: 2 weeks     Length of Service (minutes direct face-to-face contact): 60    Diagnosis:     ICD-10-CM ICD-9-CM   1. Generalized anxiety disorder  F41.1 300.02           Alex Suazo, PhD  [place] License #[id number]  [place] License #[id number]

## 2023-01-30 ENCOUNTER — PATIENT MESSAGE (OUTPATIENT)
Dept: HEMATOLOGY/ONCOLOGY | Facility: CLINIC | Age: 68
End: 2023-01-30
Payer: COMMERCIAL

## 2023-01-31 ENCOUNTER — IMMUNIZATION (OUTPATIENT)
Dept: PRIMARY CARE CLINIC | Facility: CLINIC | Age: 68
End: 2023-01-31
Payer: COMMERCIAL

## 2023-01-31 DIAGNOSIS — Z23 NEED FOR VACCINATION: Primary | ICD-10-CM

## 2023-01-31 PROCEDURE — 0124A COVID-19, MRNA, LNP-S, BIVALENT BOOSTER, PF, 30 MCG/0.3 ML DOSE: CPT | Mod: CV19,PBBFAC | Performed by: INTERNAL MEDICINE

## 2023-01-31 PROCEDURE — 91312 COVID-19, MRNA, LNP-S, BIVALENT BOOSTER, PF, 30 MCG/0.3 ML DOSE: CPT | Mod: PBBFAC | Performed by: INTERNAL MEDICINE

## 2023-02-01 ENCOUNTER — PATIENT OUTREACH (OUTPATIENT)
Dept: ADMINISTRATIVE | Facility: HOSPITAL | Age: 68
End: 2023-02-01
Payer: COMMERCIAL

## 2023-02-14 ENCOUNTER — OFFICE VISIT (OUTPATIENT)
Dept: PSYCHIATRY | Facility: CLINIC | Age: 68
End: 2023-02-14
Payer: COMMERCIAL

## 2023-02-14 ENCOUNTER — INFUSION (OUTPATIENT)
Dept: INFUSION THERAPY | Facility: HOSPITAL | Age: 68
End: 2023-02-14
Attending: INTERNAL MEDICINE
Payer: COMMERCIAL

## 2023-02-14 VITALS — HEIGHT: 65 IN | BODY MASS INDEX: 23.52 KG/M2 | WEIGHT: 141.19 LBS

## 2023-02-14 DIAGNOSIS — F41.1 GENERALIZED ANXIETY DISORDER: Primary | ICD-10-CM

## 2023-02-14 DIAGNOSIS — C7B.02 METASTATIC MALIGNANT CARCINOID TUMOR TO LIVER: ICD-10-CM

## 2023-02-14 DIAGNOSIS — C7A.00 MALIGNANT CARCINOID TUMOR OF UNKNOWN PRIMARY SITE: Primary | ICD-10-CM

## 2023-02-14 PROCEDURE — 1160F RVW MEDS BY RX/DR IN RCRD: CPT | Mod: CPTII,S$GLB,, | Performed by: PSYCHOLOGIST

## 2023-02-14 PROCEDURE — 1159F MED LIST DOCD IN RCRD: CPT | Mod: CPTII,S$GLB,, | Performed by: PSYCHOLOGIST

## 2023-02-14 PROCEDURE — 63600175 PHARM REV CODE 636 W HCPCS: Mod: JG | Performed by: INTERNAL MEDICINE

## 2023-02-14 PROCEDURE — 1157F PR ADVANCE CARE PLAN OR EQUIV PRESENT IN MEDICAL RECORD: ICD-10-PCS | Mod: CPTII,S$GLB,, | Performed by: PSYCHOLOGIST

## 2023-02-14 PROCEDURE — 90837 PSYTX W PT 60 MINUTES: CPT | Mod: S$GLB,,, | Performed by: PSYCHOLOGIST

## 2023-02-14 PROCEDURE — 96372 THER/PROPH/DIAG INJ SC/IM: CPT

## 2023-02-14 PROCEDURE — 1160F PR REVIEW ALL MEDS BY PRESCRIBER/CLIN PHARMACIST DOCUMENTED: ICD-10-PCS | Mod: CPTII,S$GLB,, | Performed by: PSYCHOLOGIST

## 2023-02-14 PROCEDURE — 99999 PR PBB SHADOW E&M-EST. PATIENT-LVL II: ICD-10-PCS | Mod: PBBFAC,,, | Performed by: PSYCHOLOGIST

## 2023-02-14 PROCEDURE — 90837 PR PSYCHOTHERAPY W/PATIENT, 60 MIN: ICD-10-PCS | Mod: S$GLB,,, | Performed by: PSYCHOLOGIST

## 2023-02-14 PROCEDURE — 99999 PR PBB SHADOW E&M-EST. PATIENT-LVL II: CPT | Mod: PBBFAC,,, | Performed by: PSYCHOLOGIST

## 2023-02-14 PROCEDURE — 1157F ADVNC CARE PLAN IN RCRD: CPT | Mod: CPTII,S$GLB,, | Performed by: PSYCHOLOGIST

## 2023-02-14 PROCEDURE — 1159F PR MEDICATION LIST DOCUMENTED IN MEDICAL RECORD: ICD-10-PCS | Mod: CPTII,S$GLB,, | Performed by: PSYCHOLOGIST

## 2023-02-14 RX ORDER — LANREOTIDE ACETATE 120 MG/.5ML
120 INJECTION SUBCUTANEOUS
Status: DISCONTINUED | OUTPATIENT
Start: 2023-02-14 | End: 2023-02-14 | Stop reason: HOSPADM

## 2023-02-14 RX ORDER — LANREOTIDE ACETATE 120 MG/.5ML
120 INJECTION SUBCUTANEOUS
Status: CANCELLED | OUTPATIENT
Start: 2023-02-14

## 2023-02-14 RX ADMIN — LANREOTIDE ACETATE 120 MG: 120 INJECTION SUBCUTANEOUS at 10:02

## 2023-02-14 NOTE — NURSING
Patient tolerated Lanreotide injection, next appointment scheduled and pt d/c in no acute distress.

## 2023-02-14 NOTE — PROGRESS NOTES
PSYCHO-ONCOLOGY NOTE/ Individual Psychotherapy     Date: 2/14/2023   [facility] of therapist:  [facility]          Therapeutic Intervention: Met with patient.  Outpatient - Behavior modifying psychotherapy 60 min - CPT code 44256  The patient's last visit with me was on 1/27/2023.    Problem list  Patient Active Problem List   Diagnosis    Malignant carcinoid tumor of unknown primary site    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    Hypergastrinemia    Carcinoid (except of appendix)    Gastrinoma, malignant    Generalized anxiety disorder    Current moderate episode of major depressive disorder without prior episode    Secondary neuroendocrine tumor of liver    History of iron deficiency anemia    Elevated bilirubin    Vitamin D deficiency    Abnormal glucose    Psychological factors affecting medical condition       Chief complaint/reason for encounter: anxiety   Met with patient to evaluate psychosocial adaptation to diagnosis/treatment/survivorship of NET    Current Medications  Current Outpatient Medications   Medication    busPIRone (BUSPAR) 5 MG Tab    flu vac 2022 65up-unrEW99H,PF, (FLUAD QUAD 2022-23,65Y UP,,PF,) 60 mcg (15 mcg x 4)/0.5 mL Syrg    fluticasone propionate (FLONASE) 50 mcg/actuation nasal spray    lanreotide (SOMATULINE DEPOT) 120 mg/0.5 mL Syrg    levocetirizine (XYZAL) 5 MG tablet    pantoprazole (PROTONIX) 40 MG tablet     No current facility-administered medications for this visit.       Objective:  Lena Brantley arrived promptly for the session.  Ms. Brantley was independently ambulatory at the time of session. The patient was fully cooperative throughout the session.  Appearance: age appropriate, casually  dressed, well groomed  Behavior/Cooperation: friendly and cooperative  Speech: normal in rate, volume, and tone and appropriate quality, quantity and organization of sentences  Mood: anxious, depressed  Affect: anxious  Thought Process: goal-directed, logical  Thought  Content: normal,  No delusions or paranoia; did not appear to be responding to internal stimuli during the session  Orientation: grossly intact  Memory: Grossly intact  Attention Span/Concentration: Attends to session without distraction; reports no difficulty  Fund of Knowledge: average  Estimate of Intelligence: average from verbal skills and history  Cognition: grossly intact  Insight: patient has awareness of illness; good insight into own behavior and behavior of others  Judgment: the patient's behavior is adequate to circumstances    Interval history and content of current session: Patient discussed events and activities since the time of last visit. Discussed current adaptation to disease and treatment status. Reports to be coping with moderate difficulty. SHe describes several recent days with increased anxiety, but struggles to identify cause. Role of self-talk explored. Evaluated cognitive response, paying particular attention to negative intrusive thoughts of a persistent and detrimental nature. Thoughts of this type are in evidence with moderate distress. Provided cognitive behavioral therapy to address negative cognitions.  CBT for MINH protocol Session 5,  Discussed distortion of probability estimates. Reviewed her homework.       Risk parameters:   Patient reports no suicidal ideation  Patient reports no homicidal ideation  Patient reports no self-injurious behavior  Patient reports no violent behavior   Safety needs:  None at this time      Verbal deficits: None     Patient's response to intervention:The patient's response to intervention is accepting.     Progress toward goals: No Progress - Continue Objectives        Patient reported outcomes:      Distress thermometer:   DISTRESS SCREENING 2/14/2023 1/20/2023 1/20/2023 1/10/2023 1/6/2023 1/6/2023 12/9/2022   Distress Score 6 7 7 0 - No Distress 8 8 5   Practical Problems - Insurance/Financial;Work/School;Treatment Decisions  Insurance/Financial;Work/School;Treatment Decisions None of these Treatment Decisions Treatment Decisions Treatment Decisions   Family Problems - Dealing with Children Dealing with Children None of these None of these None of these Family Health Issues   Emotional Problems - Fears;Worry Fears;Worry None of these Fears;Worry Fears;Worry None of these   Spiritual / Scientologist Concerns - No No No No No -   Physical Problems - Sleep Sleep None of these Bathing/Dressing;Breathing;Eating;Fatigue Bathing/Dressing;Breathing;Eating;Fatigue Breathing;Fatigue   Other Problems - - - - Tested Covid + Jan 31, 2023 but am now negative. Tested Covid + Jan 31, 2023 but am now negative. SOB and Weakness           PHQ-9=8 Initial visit: 4  PHQ ANSWERS              MINH-7=14  Initial visit:7   GAD7 1/27/2023 1/20/2023 1/6/2023   1. Feeling nervous, anxious, or on edge? 1 1 1   2. Not being able to stop or control worrying? 1 1 1   3. Worrying too much about different things? 1 1 1   4. Trouble relaxing? 1 1 1   5. Being so restless that it is hard to sit still? 0 1 0   6. Becoming easily annoyed or irritable? 1 1 1   7. Feeling afraid as if something awful might happen? 1 1 1   MINH-7 Score 6 7 6          Client Strengths: verbal, intelligent, successful, good social support, good insight, commitment to wellness, strong [place], strong cultural traditions       Treatment Plan:individual psychotherapy and consult psychiatrist for medication evaluation (Leoncio Saunders NP January)  Target symptoms: depression, anxiety   Why chosen therapy is appropriate versus another modality: relevant to diagnosis, patient responds to this modality, evidence based practice  Outcome monitoring methods: self-report, checklist/rating scale  Therapeutic intervention type: behavior modifying psychotherapy  Prognosis: Good    Goals from last visit: Attempted, partially met   Behavioral goals prior to next visit:    Exercise:   Stress management:  set up specific  place for work in her home   Social engagement:   Nutrition:   Smoking Cessation:   Therapy:  Daily mood log    Worry record (above 5)    Practice PMR daily    Do Session 5 homework- probability estimates    Email NETS support group    Return to clinic: 2 weeks     Length of Service (minutes direct face-to-face contact): 60    Diagnosis:     ICD-10-CM ICD-9-CM   1. Generalized anxiety disorder  F41.1 300.02           Alex Suazo, PhD  [place] License #[id number]  [place] License #[id number]

## 2023-02-20 ENCOUNTER — OFFICE VISIT (OUTPATIENT)
Dept: PSYCHIATRY | Facility: CLINIC | Age: 68
End: 2023-02-20
Payer: COMMERCIAL

## 2023-02-20 DIAGNOSIS — F41.1 GENERALIZED ANXIETY DISORDER: Primary | ICD-10-CM

## 2023-02-20 PROCEDURE — 1157F PR ADVANCE CARE PLAN OR EQUIV PRESENT IN MEDICAL RECORD: ICD-10-PCS | Mod: CPTII,S$GLB,, | Performed by: PSYCHOLOGIST

## 2023-02-20 PROCEDURE — 1157F ADVNC CARE PLAN IN RCRD: CPT | Mod: CPTII,S$GLB,, | Performed by: PSYCHOLOGIST

## 2023-02-20 PROCEDURE — 99999 PR PBB SHADOW E&M-EST. PATIENT-LVL II: CPT | Mod: PBBFAC,,, | Performed by: PSYCHOLOGIST

## 2023-02-20 PROCEDURE — 99999 PR PBB SHADOW E&M-EST. PATIENT-LVL II: ICD-10-PCS | Mod: PBBFAC,,, | Performed by: PSYCHOLOGIST

## 2023-02-20 PROCEDURE — 90834 PSYTX W PT 45 MINUTES: CPT | Mod: S$GLB,,, | Performed by: PSYCHOLOGIST

## 2023-02-20 PROCEDURE — 90834 PR PSYCHOTHERAPY W/PATIENT, 45 MIN: ICD-10-PCS | Mod: S$GLB,,, | Performed by: PSYCHOLOGIST

## 2023-02-20 NOTE — PROGRESS NOTES
PSYCHO-ONCOLOGY NOTE/ Individual Psychotherapy     Date: 2/20/2023   [facility]          Therapeutic Intervention: Met with patient.  Outpatient - Behavior modifying psychotherapy 45min - CPT code 01282   Brief emotional/behavioral assessment measures-CPT 60799  The patient's last visit with me was on 2/14/2023.    Problem list  Patient Active Problem List   Diagnosis    Malignant carcinoid tumor of unknown primary site    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    Hypergastrinemia    Carcinoid (except of appendix)    Gastrinoma, malignant    Generalized anxiety disorder    Current moderate episode of major depressive disorder without prior episode    Secondary neuroendocrine tumor of liver    History of iron deficiency anemia    Elevated bilirubin    Vitamin D deficiency    Abnormal glucose    Psychological factors affecting medical condition       Chief complaint/reason for encounter: anxiety   Met with patient to evaluate psychosocial adaptation to diagnosis/treatment/survivorship of NET    Current Medications  Current Outpatient Medications   Medication    busPIRone (BUSPAR) 5 MG Tab    flu vac 2022 [id number],PF, (FLUAD QUAD 2022-23,65Y UP,,PF,) 60 mcg (15 mcg x 4)/0.5 mL Syrg    fluticasone propionate (FLONASE) 50 mcg/actuation nasal spray    lanreotide (SOMATULINE DEPOT) 120 mg/0.5 mL Syrg    levocetirizine (XYZAL) 5 MG tablet    pantoprazole (PROTONIX) 40 MG tablet     No current facility-administered medications for this visit.       Objective:  Lena Brantley arrived promptly for the session.  Ms. Brantley was independently ambulatory at the time of session. The patient was fully cooperative throughout the session.  Appearance: age appropriate, casually  dressed, well groomed  Behavior/Cooperation: friendly and cooperative  Speech: normal in rate, volume, and tone and appropriate quality, quantity and organization of sentences  Mood: anxious, depressed  Affect:  anxious  Thought Process: goal-directed, logical  Thought Content: normal,  No delusions or paranoia; did not appear to be responding to internal stimuli during the session  Orientation: grossly intact  Memory: Grossly intact  Attention Span/Concentration: Attends to session without distraction; reports no difficulty  Fund of Knowledge: average  Estimate of Intelligence: average from verbal skills and history  Cognition: grossly intact  Insight: patient has awareness of illness; good insight into own behavior and behavior of others  Judgment: the patient's behavior is adequate to circumstances    Interval history and content of current session: Patient discussed events and activities since the time of last visit. Discussed current adaptation to disease and treatment status. Reports to be coping with moderate difficulty. She describes several recent days with increased anxiety.  Discussed role of setting circumstances (e.g., hearing of someone else's illness/death/hospice). Did not do homework.  Evaluated cognitive response, paying particular attention to negative intrusive thoughts of a persistent and detrimental nature. Thoughts of this type are in evidence with moderate distress. Provided cognitive behavioral therapy to address negative cognitions.  CBT for [facility] protocol Session 5 (repeated due to no homework),  Discussed distortion of probability estimates. Reviewed her homework.       Risk parameters:   Patient reports no suicidal ideation  Patient reports no homicidal ideation  Patient reports no self-injurious behavior  Patient reports no violent behavior   Safety needs:  None at this time      Verbal deficits: None     Patient's response to intervention:The patient's response to intervention is accepting.     Progress toward goals: No Progress - Continue Objectives        Patient reported outcomes:      Distress thermometer:   DISTRESS SCREENING 2/20/2023 2/20/2023 2/14/2023 1/20/2023 1/20/2023 1/10/2023  1/6/2023   Distress Score 4 4 6 7 7 0 - No Distress 8   Practical Problems Insurance/Financial;Work/School;Treatment Decisions Insurance/Financial;Work/School;Treatment Decisions - Insurance/Financial;Work/School;Treatment Decisions Insurance/Financial;Work/School;Treatment Decisions None of these Treatment Decisions   Family Problems Dealing with Children Dealing with Children - Dealing with Children Dealing with Children None of these None of these   Emotional Problems Fears;Worry Fears;Worry - Fears;Worry Fears;Worry None of these Fears;Worry   Spiritual / Zoroastrianism Concerns No No - No No No No   Physical Problems Eating;Fatigue;Memory/Concentration;Sleep Eating;Fatigue;Memory/Concentration;Sleep - Sleep Sleep None of these Bathing/Dressing;Breathing;Eating;Fatigue   Other Problems - - - - - - Tested Covid + Jan 31, 2023 but am now negative.           PHQ-9=Initial visit: 4  PHQ ANSWERS    PHQ8 Score : 5 (02/20/23 1236)  PHQ-9 Total Score: 5 (02/20/23 1236)      MINH-7= Initial visit:7   GAD7 2/20/2023 1/27/2023 1/20/2023   1. Feeling nervous, anxious, or on edge? 1 1 1   2. Not being able to stop or control worrying? 1 1 1   3. Worrying too much about different things? 1 1 1   4. Trouble relaxing? 1 1 1   5. Being so restless that it is hard to sit still? 0 0 1   6. Becoming easily annoyed or irritable? 1 1 1   7. Feeling afraid as if something awful might happen? 1 1 1   MINH-7 Score 6 6 7          Client Strengths: verbal, intelligent, successful, good social support, good insight, commitment to wellness, strong [place], strong cultural traditions       Treatment Plan:individual psychotherapy and consult psychiatrist for medication evaluation (Leoncio Saunders NP January)  Target symptoms: depression, anxiety   Why chosen therapy is appropriate versus another modality: relevant to diagnosis, patient responds to this modality, evidence based practice  Outcome monitoring methods: self-report, checklist/rating  scale  Therapeutic intervention type: behavior modifying psychotherapy  Prognosis: Good    Goals from last visit: Attempted, partially met   Behavioral goals prior to next visit:    Exercise:   Stress management:  set up specific place for work in her home   Social engagement:   Nutrition:   Smoking Cessation:   Therapy:  Worry record (above 5) with probability estimates    Practice PMR daily    Email NETS support group    Look up number of people harmed at [facility] to improve probability estimates    Return to clinic: 1 week     Length of Service (minutes direct face-to-face contact): 45    Diagnosis:     ICD-10-CM ICD-9-CM   1. Generalized anxiety disorder  F41.1 300.02           Alex Suazo, PhD  [place] License #[id number]  [place] License #[id number]

## 2023-02-23 ENCOUNTER — TELEPHONE (OUTPATIENT)
Dept: PSYCHIATRY | Facility: CLINIC | Age: 68
End: 2023-02-23
Payer: COMMERCIAL

## 2023-02-23 NOTE — TELEPHONE ENCOUNTER
Checked in with patient due to recent fall in waiting room. States she was dehydrated. Is feeling much better. No additional concerns.  JO Suazo, PhD

## 2023-02-27 ENCOUNTER — PATIENT MESSAGE (OUTPATIENT)
Dept: HEMATOLOGY/ONCOLOGY | Facility: CLINIC | Age: 68
End: 2023-02-27
Payer: COMMERCIAL

## 2023-02-27 ENCOUNTER — OFFICE VISIT (OUTPATIENT)
Dept: PSYCHIATRY | Facility: CLINIC | Age: 68
End: 2023-02-27
Payer: COMMERCIAL

## 2023-02-27 DIAGNOSIS — F32.1 CURRENT MODERATE EPISODE OF MAJOR DEPRESSIVE DISORDER WITHOUT PRIOR EPISODE: ICD-10-CM

## 2023-02-27 DIAGNOSIS — F41.1 GENERALIZED ANXIETY DISORDER: Primary | ICD-10-CM

## 2023-02-27 PROCEDURE — 1157F PR ADVANCE CARE PLAN OR EQUIV PRESENT IN MEDICAL RECORD: ICD-10-PCS | Mod: CPTII,S$GLB,, | Performed by: PSYCHOLOGIST

## 2023-02-27 PROCEDURE — 1160F PR REVIEW ALL MEDS BY PRESCRIBER/CLIN PHARMACIST DOCUMENTED: ICD-10-PCS | Mod: CPTII,S$GLB,, | Performed by: PSYCHOLOGIST

## 2023-02-27 PROCEDURE — 1159F MED LIST DOCD IN RCRD: CPT | Mod: CPTII,S$GLB,, | Performed by: PSYCHOLOGIST

## 2023-02-27 PROCEDURE — 1160F RVW MEDS BY RX/DR IN RCRD: CPT | Mod: CPTII,S$GLB,, | Performed by: PSYCHOLOGIST

## 2023-02-27 PROCEDURE — 99999 PR PBB SHADOW E&M-EST. PATIENT-LVL II: CPT | Mod: PBBFAC,,, | Performed by: PSYCHOLOGIST

## 2023-02-27 PROCEDURE — 90837 PR PSYCHOTHERAPY W/PATIENT, 60 MIN: ICD-10-PCS | Mod: S$GLB,,, | Performed by: PSYCHOLOGIST

## 2023-02-27 PROCEDURE — 1157F ADVNC CARE PLAN IN RCRD: CPT | Mod: CPTII,S$GLB,, | Performed by: PSYCHOLOGIST

## 2023-02-27 PROCEDURE — 99999 PR PBB SHADOW E&M-EST. PATIENT-LVL II: ICD-10-PCS | Mod: PBBFAC,,, | Performed by: PSYCHOLOGIST

## 2023-02-27 PROCEDURE — 90837 PSYTX W PT 60 MINUTES: CPT | Mod: S$GLB,,, | Performed by: PSYCHOLOGIST

## 2023-02-27 PROCEDURE — 1159F PR MEDICATION LIST DOCUMENTED IN MEDICAL RECORD: ICD-10-PCS | Mod: CPTII,S$GLB,, | Performed by: PSYCHOLOGIST

## 2023-02-28 ENCOUNTER — PATIENT MESSAGE (OUTPATIENT)
Dept: HEMATOLOGY/ONCOLOGY | Facility: CLINIC | Age: 68
End: 2023-02-28
Payer: COMMERCIAL

## 2023-02-28 ENCOUNTER — PATIENT MESSAGE (OUTPATIENT)
Dept: FAMILY MEDICINE | Facility: CLINIC | Age: 68
End: 2023-02-28
Payer: COMMERCIAL

## 2023-02-28 ENCOUNTER — PATIENT MESSAGE (OUTPATIENT)
Dept: PSYCHIATRY | Facility: CLINIC | Age: 68
End: 2023-02-28
Payer: COMMERCIAL

## 2023-02-28 ENCOUNTER — HOSPITAL ENCOUNTER (OUTPATIENT)
Dept: RADIOLOGY | Facility: HOSPITAL | Age: 68
Discharge: HOME | End: 2023-02-28
Attending: FAMILY MEDICINE
Payer: COMMERCIAL

## 2023-02-28 DIAGNOSIS — Z78.0 ASYMPTOMATIC MENOPAUSAL STATE: ICD-10-CM

## 2023-02-28 PROCEDURE — 77080 DEXA BONE DENSITY SPINE HIP: ICD-10-PCS | Mod: 26,,, | Performed by: RADIOLOGY

## 2023-02-28 PROCEDURE — 77080 DXA BONE DENSITY AXIAL: CPT | Mod: TC

## 2023-02-28 PROCEDURE — 77080 DXA BONE DENSITY AXIAL: CPT | Mod: 26,,, | Performed by: RADIOLOGY

## 2023-02-28 NOTE — PROGRESS NOTES
PSYCHO-ONCOLOGY NOTE/ Individual Psychotherapy     Date: 2/27/2023   [facility]          Therapeutic Intervention: Met with patient.  Outpatient - Behavior modifying psychotherapy 60 min - CPT code 30678  The patient's last visit with me was on 2/20/2023.    Problem list  Patient Active Problem List   Diagnosis    Malignant carcinoid tumor of unknown primary site    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    Hypergastrinemia    Carcinoid (except of appendix)    Gastrinoma, malignant    Generalized anxiety disorder    Current moderate episode of major depressive disorder without prior episode    Secondary neuroendocrine tumor of liver    History of iron deficiency anemia    Elevated bilirubin    Vitamin D deficiency    Abnormal glucose    Psychological factors affecting medical condition       Chief complaint/reason for encounter: anxiety   Met with patient to evaluate psychosocial adaptation to diagnosis/treatment/survivorship of NET    Current Medications  Current Outpatient Medications   Medication    busPIRone (BUSPAR) 5 MG Tab    flu vac 2022 [id number],PF, (FLUAD QUAD 2022-23,65Y UP,,PF,) 60 mcg (15 mcg x 4)/0.5 mL Syrg    fluticasone propionate (FLONASE) 50 mcg/actuation nasal spray    lanreotide (SOMATULINE DEPOT) 120 mg/0.5 mL Syrg    levocetirizine (XYZAL) 5 MG tablet    pantoprazole (PROTONIX) 40 MG tablet     No current facility-administered medications for this visit.       Objective:  Lena Brantley arrived promptly for the session.  Ms. Brantley was independently ambulatory at the time of session. The patient was fully cooperative throughout the session.  Appearance: age appropriate, casually  dressed, well groomed  Behavior/Cooperation: friendly and cooperative  Speech: normal in rate, volume, and tone and appropriate quality, quantity and organization of sentences  Mood: anxious, depressed  Affect: anxious  Thought Process: goal-directed, logical  Thought  Content: normal,  No delusions or paranoia; did not appear to be responding to internal stimuli during the session  Orientation: grossly intact  Memory: Grossly intact  Attention Span/Concentration: Attends to session without distraction; reports no difficulty  Fund of Knowledge: average  Estimate of Intelligence: average from verbal skills and history  Cognition: grossly intact  Insight: patient has awareness of illness; good insight into own behavior and behavior of others  Judgment: the patient's behavior is adequate to circumstances    Interval history and content of current session: Patient discussed events and activities since the time of last visit. Discussed current adaptation to disease and treatment status. Reports to be coping with moderate difficulty. She describes no significant anxiety since last visit. Discussed improved anxiety when actually coping (e.g,. after passing out/ED visit) v anticipated coping. Discussed need for pleasant events scheduling/meaning in her life. Considering volunteer work. Did not do homework.  Evaluated cognitive response, paying particular attention to negative intrusive thoughts of a persistent and detrimental nature. Thoughts of this type are in evidence with mild distress. Provided cognitive behavioral therapy to address negative cognitions.  CBT for [facility] protocol Session 6:  Discussed catastrophising and de-catastrophising. Encouraged homework.       Risk parameters:   Patient reports no suicidal ideation  Patient reports no homicidal ideation  Patient reports no self-injurious behavior  Patient reports no violent behavior   Safety needs:  None at this time      Verbal deficits: None     Patient's response to intervention:The patient's response to intervention is accepting.     Progress toward goals: No Progress - Continue Objectives        Patient reported outcomes:      Distress thermometer:   DISTRESS SCREENING 2/20/2023 2/20/2023 2/14/2023 1/20/2023 1/20/2023  1/10/2023 1/6/2023   Distress Score 4 4 6 7 7 0 - No Distress 8   Practical Problems Insurance/Financial;Work/School;Treatment Decisions Insurance/Financial;Work/School;Treatment Decisions - Insurance/Financial;Work/School;Treatment Decisions Insurance/Financial;Work/School;Treatment Decisions None of these Treatment Decisions   Family Problems Dealing with Children Dealing with Children - Dealing with Children Dealing with Children None of these None of these   Emotional Problems Fears;Worry Fears;Worry - Fears;Worry Fears;Worry None of these Fears;Worry   Spiritual / Scientology Concerns No No - No No No No   Physical Problems Eating;Fatigue;Memory/Concentration;Sleep Eating;Fatigue;Memory/Concentration;Sleep - Sleep Sleep None of these Bathing/Dressing;Breathing;Eating;Fatigue   Other Problems - - - - - - Tested Covid + Jan 31, 2023 but am now negative.           PHQ-9=Initial visit: 4  PHQ ANSWERS    PHQ8 Score : 8 (02/27/23 1138)  PHQ-9 Total Score: 8 (02/27/23 1138)      MINH-7= Initial visit:7   GAD7 2/27/2023 2/20/2023 1/27/2023   1. Feeling nervous, anxious, or on edge? 1 1 1   2. Not being able to stop or control worrying? 1 1 1   3. Worrying too much about different things? 1 1 1   4. Trouble relaxing? 1 1 1   5. Being so restless that it is hard to sit still? 0 0 0   6. Becoming easily annoyed or irritable? 1 1 1   7. Feeling afraid as if something awful might happen? 1 1 1   MINH-7 Score 6 6 6          Client Strengths: verbal, intelligent, successful, good social support, good insight, commitment to wellness, strong [place], strong cultural traditions       Treatment Plan:individual psychotherapy and consult psychiatrist for medication evaluation (Leoncio Saunders NP January)  Target symptoms: depression, anxiety   Why chosen therapy is appropriate versus another modality: relevant to diagnosis, patient responds to this modality, evidence based practice  Outcome monitoring methods: self-report,  checklist/rating scale  Therapeutic intervention type: behavior modifying psychotherapy  Prognosis: Good    Goals from last visit: Attempted, partially met   Behavioral goals prior to next visit:    Exercise:   Stress management:  shred while watching TV    Investigate Medicare options   Social engagement: investigate volunteer options    Nutrition:   Smoking Cessation:   Therapy:  Worry record (above 5) with probability estimates    Practice PMR daily- re-sent audio exercises    Look up number of people harmed at [facility] to improve probability estimates    Return to clinic: 1 week     Length of Service (minutes direct face-to-face contact): 60    Diagnosis:     ICD-10-CM ICD-9-CM   1. Current moderate episode of major depressive disorder without prior episode  F32.1 296.22   2. Generalized anxiety disorder  F41.1 300.02           Alex Suazo, PhD  [place] License #[id number]  [place] License #[id number]

## 2023-03-06 ENCOUNTER — OFFICE VISIT (OUTPATIENT)
Dept: PSYCHIATRY | Facility: CLINIC | Age: 68
End: 2023-03-06
Payer: COMMERCIAL

## 2023-03-06 DIAGNOSIS — F41.1 GENERALIZED ANXIETY DISORDER: Primary | ICD-10-CM

## 2023-03-06 PROCEDURE — 1159F PR MEDICATION LIST DOCUMENTED IN MEDICAL RECORD: ICD-10-PCS | Mod: CPTII,S$GLB,, | Performed by: PSYCHOLOGIST

## 2023-03-06 PROCEDURE — 99999 PR PBB SHADOW E&M-EST. PATIENT-LVL II: CPT | Mod: PBBFAC,,, | Performed by: PSYCHOLOGIST

## 2023-03-06 PROCEDURE — 1160F RVW MEDS BY RX/DR IN RCRD: CPT | Mod: CPTII,S$GLB,, | Performed by: PSYCHOLOGIST

## 2023-03-06 PROCEDURE — 1160F PR REVIEW ALL MEDS BY PRESCRIBER/CLIN PHARMACIST DOCUMENTED: ICD-10-PCS | Mod: CPTII,S$GLB,, | Performed by: PSYCHOLOGIST

## 2023-03-06 PROCEDURE — 1157F ADVNC CARE PLAN IN RCRD: CPT | Mod: CPTII,S$GLB,, | Performed by: PSYCHOLOGIST

## 2023-03-06 PROCEDURE — 1159F MED LIST DOCD IN RCRD: CPT | Mod: CPTII,S$GLB,, | Performed by: PSYCHOLOGIST

## 2023-03-06 PROCEDURE — 90834 PR PSYCHOTHERAPY W/PATIENT, 45 MIN: ICD-10-PCS | Mod: S$GLB,,, | Performed by: PSYCHOLOGIST

## 2023-03-06 PROCEDURE — 99999 PR PBB SHADOW E&M-EST. PATIENT-LVL II: ICD-10-PCS | Mod: PBBFAC,,, | Performed by: PSYCHOLOGIST

## 2023-03-06 PROCEDURE — 1157F PR ADVANCE CARE PLAN OR EQUIV PRESENT IN MEDICAL RECORD: ICD-10-PCS | Mod: CPTII,S$GLB,, | Performed by: PSYCHOLOGIST

## 2023-03-06 PROCEDURE — 90834 PSYTX W PT 45 MINUTES: CPT | Mod: S$GLB,,, | Performed by: PSYCHOLOGIST

## 2023-03-06 NOTE — PROGRESS NOTES
PSYCHO-ONCOLOGY NOTE/ Individual Psychotherapy     Date: 3/6/2023   [facility]          Therapeutic Intervention: Met with patient.  Outpatient - Behavior modifying psychotherapy 45 min - CPT code 26465  The patient's last visit with me was on 2/27/2023.    Problem list  Patient Active Problem List   Diagnosis    Malignant carcinoid tumor of unknown primary site    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    Hypergastrinemia    Carcinoid (except of appendix)    Gastrinoma, malignant    Generalized anxiety disorder    Current moderate episode of major depressive disorder without prior episode    Secondary neuroendocrine tumor of liver    History of iron deficiency anemia    Elevated bilirubin    Vitamin D deficiency    Abnormal glucose    Psychological factors affecting medical condition       Chief complaint/reason for encounter: anxiety   Met with patient to evaluate psychosocial adaptation to diagnosis/treatment/survivorship of NET    Current Medications  Current Outpatient Medications   Medication    busPIRone (BUSPAR) 5 MG Tab    flu vac 2022 [id number],PF, (FLUAD QUAD 2022-23,65Y UP,,PF,) 60 mcg (15 mcg x 4)/0.5 mL Syrg    fluticasone propionate (FLONASE) 50 mcg/actuation nasal spray    lanreotide (SOMATULINE DEPOT) 120 mg/0.5 mL Syrg    levocetirizine (XYZAL) 5 MG tablet    pantoprazole (PROTONIX) 40 MG tablet     No current facility-administered medications for this visit.       Objective:  Lena Brantley arrived promptly for the session.  Ms. Brantley was independently ambulatory at the time of session. The patient was fully cooperative throughout the session.  Appearance: age appropriate, casually  dressed, well groomed  Behavior/Cooperation: friendly and cooperative  Speech: normal in rate, volume, and tone and appropriate quality, quantity and organization of sentences  Mood: anxious, depressed  Affect: anxious  Thought Process: goal-directed, logical  Thought  Content: normal,  No delusions or paranoia; did not appear to be responding to internal stimuli during the session  Orientation: grossly intact  Memory: Grossly intact  Attention Span/Concentration: Attends to session without distraction; reports no difficulty  Fund of Knowledge: average  Estimate of Intelligence: average from verbal skills and history  Cognition: grossly intact  Insight: patient has awareness of illness; good insight into own behavior and behavior of others  Judgment: the patient's behavior is adequate to circumstances    Interval history and content of current session: Patient discussed events and activities since the time of last visit. Discussed current adaptation to disease and treatment status. Reports to be coping with moderate difficulty. She describes anxiety related to not hearing from her daughter over the weekend. Patient did not use CBT skills in response. Did engage in reassurance-seeking. Did not do complete homework assignment. Barriers to homework explored. Evaluated cognitive response, paying particular attention to negative intrusive thoughts of a persistent and detrimental nature. Thoughts of this type are in evidence with moderate distress. Provided cognitive behavioral therapy to address negative cognitions.  CBT for MIN[facility] protocol Session 6 repeated using weekend events as example:  Discussed catastrophising and de-catastrophising. Encouraged homework and reinforced importance of practicing skills.     Risk parameters:   Patient reports no suicidal ideation  Patient reports no homicidal ideation  Patient reports no self-injurious behavior  Patient reports no violent behavior   Safety needs:  None at this time      Verbal deficits: None     Patient's response to intervention:The patient's response to intervention is accepting.     Progress toward goals: No Progress - Continue Objectives        Patient reported outcomes:      Distress thermometer:   DISTRESS SCREENING 2/20/2023  2/20/2023 2/14/2023 1/20/2023 1/20/2023 1/10/2023 1/6/2023   Distress Score 4 4 6 7 7 0 - No Distress 8   Practical Problems Insurance/Financial;Work/School;Treatment Decisions Insurance/Financial;Work/School;Treatment Decisions - Insurance/Financial;Work/School;Treatment Decisions Insurance/Financial;Work/School;Treatment Decisions None of these Treatment Decisions   Family Problems Dealing with Children Dealing with Children - Dealing with Children Dealing with Children None of these None of these   Emotional Problems Fears;Worry Fears;Worry - Fears;Worry Fears;Worry None of these Fears;Worry   Spiritual / Jain Concerns No No - No No No No   Physical Problems Eating;Fatigue;Memory/Concentration;Sleep Eating;Fatigue;Memory/Concentration;Sleep - Sleep Sleep None of these Bathing/Dressing;Breathing;Eating;Fatigue   Other Problems - - - - - - Tested Covid + Jan 31, 2023 but am now negative.           PHQ-9=9 Initial visit: 4                MINH-7=16 Initial visit:7   GAD7 2/27/2023 2/20/2023 1/27/2023   1. Feeling nervous, anxious, or on edge? 1 1 1   2. Not being able to stop or control worrying? 1 1 1   3. Worrying too much about different things? 1 1 1   4. Trouble relaxing? 1 1 1   5. Being so restless that it is hard to sit still? 0 0 0   6. Becoming easily annoyed or irritable? 1 1 1   7. Feeling afraid as if something awful might happen? 1 1 1   MINH-7 Score 6 6 6          Client Strengths: verbal, intelligent, successful, good social support, good insight, commitment to wellness, strong [place], strong cultural traditions       Treatment Plan:individual psychotherapy and consult psychiatrist for medication evaluation   Target symptoms: depression, anxiety   Why chosen therapy is appropriate versus another modality: relevant to diagnosis, patient responds to this modality, evidence based practice  Outcome monitoring methods: self-report, checklist/rating scale  Therapeutic intervention type: behavior modifying  psychotherapy  Prognosis: Good    Goals from last visit: Attempted, partially met   Behavioral goals prior to next visit:    Exercise:   Stress management:  shred while watching TV    Investigate Medicare options   Social engagement: investigate volunteer options   Nutrition:   Smoking Cessation:   Therapy:  Worry record (above 5) with probability estimates and de-catastrophising    Practice PMR daily    Look up number of people harmed at [facility] to improve probability estimates  Reschedule Leoncio Saunders NP -cancelled in January  Return to clinic: 1 week     Length of Service (minutes direct face-to-face contact): 45    Diagnosis:     ICD-10-CM ICD-9-CM   1. Generalized anxiety disorder  F41.1 300.02           Alex Suazo, PhD  [place] License #[id number]  [place] License #[id number]

## 2023-03-07 ENCOUNTER — TELEPHONE (OUTPATIENT)
Dept: INFUSION THERAPY | Facility: HOSPITAL | Age: 68
End: 2023-03-07
Payer: COMMERCIAL

## 2023-03-08 ENCOUNTER — PATIENT MESSAGE (OUTPATIENT)
Dept: HEMATOLOGY/ONCOLOGY | Facility: CLINIC | Age: 68
End: 2023-03-08
Payer: COMMERCIAL

## 2023-03-08 ENCOUNTER — PATIENT MESSAGE (OUTPATIENT)
Dept: FAMILY MEDICINE | Facility: CLINIC | Age: 68
End: 2023-03-08
Payer: COMMERCIAL

## 2023-03-13 ENCOUNTER — OFFICE VISIT (OUTPATIENT)
Dept: PSYCHIATRY | Facility: CLINIC | Age: 68
End: 2023-03-13
Payer: COMMERCIAL

## 2023-03-13 DIAGNOSIS — F41.1 GENERALIZED ANXIETY DISORDER: Primary | ICD-10-CM

## 2023-03-13 PROCEDURE — 90837 PR PSYCHOTHERAPY W/PATIENT, 60 MIN: ICD-10-PCS | Mod: S$GLB,,, | Performed by: PSYCHOLOGIST

## 2023-03-13 PROCEDURE — 1157F PR ADVANCE CARE PLAN OR EQUIV PRESENT IN MEDICAL RECORD: ICD-10-PCS | Mod: CPTII,S$GLB,, | Performed by: PSYCHOLOGIST

## 2023-03-13 PROCEDURE — 1157F ADVNC CARE PLAN IN RCRD: CPT | Mod: CPTII,S$GLB,, | Performed by: PSYCHOLOGIST

## 2023-03-13 PROCEDURE — 1159F PR MEDICATION LIST DOCUMENTED IN MEDICAL RECORD: ICD-10-PCS | Mod: CPTII,S$GLB,, | Performed by: PSYCHOLOGIST

## 2023-03-13 PROCEDURE — 90837 PSYTX W PT 60 MINUTES: CPT | Mod: S$GLB,,, | Performed by: PSYCHOLOGIST

## 2023-03-13 PROCEDURE — 1160F RVW MEDS BY RX/DR IN RCRD: CPT | Mod: CPTII,S$GLB,, | Performed by: PSYCHOLOGIST

## 2023-03-13 PROCEDURE — 99999 PR PBB SHADOW E&M-EST. PATIENT-LVL II: CPT | Mod: PBBFAC,,, | Performed by: PSYCHOLOGIST

## 2023-03-13 PROCEDURE — 1160F PR REVIEW ALL MEDS BY PRESCRIBER/CLIN PHARMACIST DOCUMENTED: ICD-10-PCS | Mod: CPTII,S$GLB,, | Performed by: PSYCHOLOGIST

## 2023-03-13 PROCEDURE — 99999 PR PBB SHADOW E&M-EST. PATIENT-LVL II: ICD-10-PCS | Mod: PBBFAC,,, | Performed by: PSYCHOLOGIST

## 2023-03-13 PROCEDURE — 1159F MED LIST DOCD IN RCRD: CPT | Mod: CPTII,S$GLB,, | Performed by: PSYCHOLOGIST

## 2023-03-13 NOTE — PROGRESS NOTES
PSYCHO-ONCOLOGY NOTE/ Individual Psychotherapy     Date: 3/13/2023   [facility]          Therapeutic Intervention: Met with patient.  Outpatient - Behavior modifying psychotherapy 60 min - CPT code 55834  The patient's last visit with me was on 3/6/2023.    Problem list  Patient Active Problem List   Diagnosis    Malignant carcinoid tumor of unknown primary site    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    Hypergastrinemia    Carcinoid (except of appendix)    Gastrinoma, malignant    Generalized anxiety disorder    Current moderate episode of major depressive disorder without prior episode    Secondary neuroendocrine tumor of liver    History of iron deficiency anemia    Elevated bilirubin    Vitamin D deficiency    Abnormal glucose    Psychological factors affecting medical condition       Chief complaint/reason for encounter: anxiety   Met with patient to evaluate psychosocial adaptation to diagnosis/treatment/survivorship of NET    Current Medications  Current Outpatient Medications   Medication    busPIRone (BUSPAR) 5 MG Tab    flu vac 2022 [id number], (FLUAD QUAD 2022-23,65Y UP,,PF,) 60 mcg (15 mcg x 4)/0.5 mL Syrg    fluticasone propionate (FLONASE) 50 mcg/actuation nasal spray    lanreotide (SOMATULINE DEPOT) 120 mg/0.5 mL Syrg    levocetirizine (XYZAL) 5 MG tablet    pantoprazole (PROTONIX) 40 MG tablet     No current facility-administered medications for this visit.       Objective:  Lena Brantley arrived promptly for the session.  Ms. Brantley was independently ambulatory at the time of session. The patient was fully cooperative throughout the session.  Appearance: age appropriate, casually  dressed, well groomed  Behavior/Cooperation: friendly and cooperative  Speech: normal in rate, volume, and tone and appropriate quality, quantity and organization of sentences  Mood: anxious, depressed  Affect: anxious  Thought Process: goal-directed, logical  Thought  Content: normal,  No delusions or paranoia; did not appear to be responding to internal stimuli during the session  Orientation: grossly intact  Memory: Grossly intact  Attention Span/Concentration: Attends to session without distraction; reports no difficulty  Fund of Knowledge: average  Estimate of Intelligence: average from verbal skills and history  Cognition: grossly intact  Insight: patient has awareness of illness; good insight into own behavior and behavior of others  Judgment: the patient's behavior is adequate to circumstances    Interval history and content of current session: Patient discussed events and activities since the time of last visit. Discussed current adaptation to disease and treatment status. Reports to be coping with moderate difficulty. Patient states she did complete homework assignment, but forgot to bring it in. Barriers to homework explored. Evaluated cognitive response, paying particular attention to negative intrusive thoughts of a persistent and detrimental nature. Thoughts of this type are in evidence with moderate distress. Provided cognitive behavioral therapy to address negative cognitions-catastrophising and de-catastrophising reviewed.  CBT for [facility] protocol Session 7 discussed-  Encouraged homework (1st half of module) and reinforced importance of practicing skills.     Risk parameters:   Patient reports no suicidal ideation  Patient reports no homicidal ideation  Patient reports no self-injurious behavior  Patient reports no violent behavior   Safety needs:  None at this time      Verbal deficits: None     Patient's response to intervention:The patient's response to intervention is accepting.     Progress toward goals: No Progress - Continue Objectives        Patient reported outcomes:      Distress thermometer:   DISTRESS SCREENING 3/13/2023 3/13/2023 2/20/2023 2/20/2023 2/14/2023 1/20/2023 1/20/2023   Distress Score 4 4 4 4 6 7 7   Practical Problems  Insurance/Financial;Work/School;Treatment Decisions Insurance/Financial;Work/School;Treatment Decisions Insurance/Financial;Work/School;Treatment Decisions Insurance/Financial;Work/School;Treatment Decisions - Insurance/Financial;Work/School;Treatment Decisions Insurance/Financial;Work/School;Treatment Decisions   Family Problems Dealing with Children Dealing with Children Dealing with Children Dealing with Children - Dealing with Children Dealing with Children   Emotional Problems Depression;Fears;Nervousness;Sadness;Worry Depression;Fears;Nervousness;Sadness;Worry Fears;Worry Fears;Worry - Fears;Worry Fears;Worry   Spiritual / Jainism Concerns No No No No - No No   Physical Problems Eating;Fatigue;Sleep Eating;Fatigue;Sleep Eating;Fatigue;Memory/Concentration;Sleep Eating;Fatigue;Memory/Concentration;Sleep - Sleep Sleep   Other Problems Joints started popping in Feb.2023 after being dehydrated. I contacted my primary care DrBrianne Joints started popping in Feb.2023 after being dehydrated. I contacted my primary care DrBrianne - - - - -           PHQ-9=Initial visit: 4      PHQ8 Score : 10 (03/13/23 1216)  PHQ-9 Total Score: 10 (03/13/23 1216)      MINH-7=Initial visit:7   GAD7 3/13/2023 2/27/2023 2/20/2023   1. Feeling nervous, anxious, or on edge? 2 1 1   2. Not being able to stop or control worrying? 2 1 1   3. Worrying too much about different things? 2 1 1   4. Trouble relaxing? 2 1 1   5. Being so restless that it is hard to sit still? 0 0 0   6. Becoming easily annoyed or irritable? 2 1 1   7. Feeling afraid as if something awful might happen? 2 1 1   MINH-7 Score 12 6 6          Client Strengths: verbal, intelligent, successful, good social support, good insight, commitment to wellness, strong [place], strong cultural traditions       Treatment Plan:individual psychotherapy and consult psychiatrist for medication evaluation   Target symptoms: depression, anxiety   Why chosen therapy is appropriate versus another  modality: relevant to diagnosis, patient responds to this modality, evidence based practice  Outcome monitoring methods: self-report, checklist/rating scale  Therapeutic intervention type: behavior modifying psychotherapy  Prognosis: Good    Goals from last visit: Attempted, partially met   Behavioral goals prior to next visit:    Exercise: yoga, walking   Stress management:    Social engagement:   Nutrition:   Smoking Cessation:   Therapy:  Write out 3 anxiety producing scenarios    Practice imagery daily  Reschedule Leoncio Saunders NP -cancelled in January  Return to clinic: 1 week     Length of Service (minutes direct face-to-face contact): 60    Diagnosis:     ICD-10-CM ICD-9-CM   1. Generalized anxiety disorder  F41.1 300.02           Alex Suazo, PhD  [place] License #[id number]  [place] License #[id number]

## 2023-03-14 ENCOUNTER — INFUSION (OUTPATIENT)
Dept: INFUSION THERAPY | Facility: HOSPITAL | Age: 68
End: 2023-03-14
Attending: INTERNAL MEDICINE
Payer: COMMERCIAL

## 2023-03-14 VITALS — WEIGHT: 140 LBS | BODY MASS INDEX: 23.32 KG/M2 | HEIGHT: 65 IN

## 2023-03-14 DIAGNOSIS — C7B.02 METASTATIC MALIGNANT CARCINOID TUMOR TO LIVER: ICD-10-CM

## 2023-03-14 DIAGNOSIS — C7A.00 MALIGNANT CARCINOID TUMOR OF UNKNOWN PRIMARY SITE: Primary | ICD-10-CM

## 2023-03-14 PROCEDURE — 96372 THER/PROPH/DIAG INJ SC/IM: CPT

## 2023-03-14 PROCEDURE — 63600175 PHARM REV CODE 636 W HCPCS: Mod: JZ,JG | Performed by: INTERNAL MEDICINE

## 2023-03-14 RX ORDER — LANREOTIDE ACETATE 120 MG/.5ML
120 INJECTION SUBCUTANEOUS
Status: CANCELLED | OUTPATIENT
Start: 2023-03-14

## 2023-03-14 RX ORDER — LANREOTIDE ACETATE 120 MG/.5ML
120 INJECTION SUBCUTANEOUS
Status: DISCONTINUED | OUTPATIENT
Start: 2023-03-14 | End: 2023-03-14 | Stop reason: HOSPADM

## 2023-03-14 RX ADMIN — LANREOTIDE ACETATE 120 MG: 120 INJECTION SUBCUTANEOUS at 10:03

## 2023-04-11 ENCOUNTER — PATIENT MESSAGE (OUTPATIENT)
Dept: ADMINISTRATIVE | Facility: HOSPITAL | Age: 68
End: 2023-04-11
Payer: COMMERCIAL

## 2023-04-11 ENCOUNTER — INFUSION (OUTPATIENT)
Dept: INFUSION THERAPY | Facility: HOSPITAL | Age: 68
End: 2023-04-11
Attending: INTERNAL MEDICINE
Payer: COMMERCIAL

## 2023-04-11 VITALS — BODY MASS INDEX: 23.32 KG/M2 | WEIGHT: 140 LBS | HEIGHT: 65 IN

## 2023-04-11 DIAGNOSIS — C7A.00 MALIGNANT CARCINOID TUMOR OF UNKNOWN PRIMARY SITE: Primary | ICD-10-CM

## 2023-04-11 DIAGNOSIS — C7B.02 METASTATIC MALIGNANT CARCINOID TUMOR TO LIVER: ICD-10-CM

## 2023-04-11 PROCEDURE — 96372 THER/PROPH/DIAG INJ SC/IM: CPT

## 2023-04-11 PROCEDURE — 63600175 PHARM REV CODE 636 W HCPCS: Mod: JZ,JG | Performed by: INTERNAL MEDICINE

## 2023-04-11 RX ORDER — LANREOTIDE ACETATE 120 MG/.5ML
120 INJECTION SUBCUTANEOUS
Status: CANCELLED | OUTPATIENT
Start: 2023-04-11

## 2023-04-11 RX ORDER — LANREOTIDE ACETATE 120 MG/.5ML
120 INJECTION SUBCUTANEOUS
Status: DISCONTINUED | OUTPATIENT
Start: 2023-04-11 | End: 2023-04-11 | Stop reason: HOSPADM

## 2023-04-11 RX ADMIN — LANREOTIDE ACETATE 120 MG: 120 INJECTION SUBCUTANEOUS at 10:04

## 2023-04-12 ENCOUNTER — PATIENT MESSAGE (OUTPATIENT)
Dept: HEMATOLOGY/ONCOLOGY | Facility: CLINIC | Age: 68
End: 2023-04-12
Payer: COMMERCIAL

## 2023-04-17 ENCOUNTER — OFFICE VISIT (OUTPATIENT)
Dept: PSYCHIATRY | Facility: CLINIC | Age: 68
End: 2023-04-17
Payer: COMMERCIAL

## 2023-04-17 ENCOUNTER — TELEPHONE (OUTPATIENT)
Dept: INFUSION THERAPY | Facility: HOSPITAL | Age: 68
End: 2023-04-17
Payer: COMMERCIAL

## 2023-04-17 DIAGNOSIS — F54 PSYCHOLOGICAL FACTORS AFFECTING MEDICAL CONDITION: ICD-10-CM

## 2023-04-17 DIAGNOSIS — F41.1 GENERALIZED ANXIETY DISORDER: Primary | ICD-10-CM

## 2023-04-17 PROCEDURE — 99999 PR PBB SHADOW E&M-EST. PATIENT-LVL II: CPT | Mod: PBBFAC,,, | Performed by: PSYCHOLOGIST

## 2023-04-17 PROCEDURE — 90834 PR PSYCHOTHERAPY W/PATIENT, 45 MIN: ICD-10-PCS | Mod: S$GLB,,, | Performed by: PSYCHOLOGIST

## 2023-04-17 PROCEDURE — 1157F PR ADVANCE CARE PLAN OR EQUIV PRESENT IN MEDICAL RECORD: ICD-10-PCS | Mod: CPTII,S$GLB,, | Performed by: PSYCHOLOGIST

## 2023-04-17 PROCEDURE — 99999 PR PBB SHADOW E&M-EST. PATIENT-LVL II: ICD-10-PCS | Mod: PBBFAC,,, | Performed by: PSYCHOLOGIST

## 2023-04-17 PROCEDURE — 1159F MED LIST DOCD IN RCRD: CPT | Mod: CPTII,S$GLB,, | Performed by: PSYCHOLOGIST

## 2023-04-17 PROCEDURE — 90834 PSYTX W PT 45 MINUTES: CPT | Mod: S$GLB,,, | Performed by: PSYCHOLOGIST

## 2023-04-17 PROCEDURE — 1159F PR MEDICATION LIST DOCUMENTED IN MEDICAL RECORD: ICD-10-PCS | Mod: CPTII,S$GLB,, | Performed by: PSYCHOLOGIST

## 2023-04-17 PROCEDURE — 1160F PR REVIEW ALL MEDS BY PRESCRIBER/CLIN PHARMACIST DOCUMENTED: ICD-10-PCS | Mod: CPTII,S$GLB,, | Performed by: PSYCHOLOGIST

## 2023-04-17 PROCEDURE — 1160F RVW MEDS BY RX/DR IN RCRD: CPT | Mod: CPTII,S$GLB,, | Performed by: PSYCHOLOGIST

## 2023-04-17 PROCEDURE — 1157F ADVNC CARE PLAN IN RCRD: CPT | Mod: CPTII,S$GLB,, | Performed by: PSYCHOLOGIST

## 2023-04-20 ENCOUNTER — TELEPHONE (OUTPATIENT)
Dept: INFUSION THERAPY | Facility: HOSPITAL | Age: 68
End: 2023-04-20
Payer: COMMERCIAL

## 2023-04-20 NOTE — PROGRESS NOTES
PSYCHO-ONCOLOGY NOTE/ Individual Psychotherapy     Date: 4/17/2023   [facility]          Therapeutic Intervention: Met with patient.  Outpatient - Behavior modifying psychotherapy 45 min - CPT code 84367  The patient's last visit with me was on 3/13/2023.      Problem list  Patient Active Problem List   Diagnosis    Malignant carcinoid tumor of unknown primary site    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    Hypergastrinemia    Carcinoid (except of appendix)    Gastrinoma, malignant    Generalized anxiety disorder    Current moderate episode of major depressive disorder without prior episode    Secondary neuroendocrine tumor of liver    History of iron deficiency anemia    Elevated bilirubin    Vitamin D deficiency    Abnormal glucose    Psychological factors affecting medical condition       Chief complaint/reason for encounter: anxiety   Met with patient to evaluate psychosocial adaptation to diagnosis/treatment/survivorship of NET    Current Medications  Current Outpatient Medications   Medication    busPIRone (BUSPAR) 5 MG Tab    flu vac 2022 [id number], (FLUAD QUAD 2022-23,65Y UP,,PF,) 60 mcg (15 mcg x 4)/0.5 mL Syrg    fluticasone propionate (FLONASE) 50 mcg/actuation nasal spray    lanreotide (SOMATULINE DEPOT) 120 mg/0.5 mL Syrg    levocetirizine (XYZAL) 5 MG tablet    pantoprazole (PROTONIX) 40 MG tablet     No current facility-administered medications for this visit.       Objective:  Lena Brantley arrived promptly for the session.  Ms. Brantley was independently ambulatory at the time of session. The patient was fully cooperative throughout the session.  Appearance: age appropriate, casually  dressed, well groomed  Behavior/Cooperation: friendly and cooperative  Speech: normal in rate, volume, and tone and appropriate quality, quantity and organization of sentences  Mood: anxious, depressed  Affect: anxious  Thought Process: goal-directed, logical  Thought  "Content: normal,  No delusions or paranoia; did not appear to be responding to internal stimuli during the session  Orientation: grossly intact  Memory: Grossly intact  Attention Span/Concentration: Attends to session without distraction; reports no difficulty  Fund of Knowledge: average  Estimate of Intelligence: average from verbal skills and history  Cognition: grossly intact  Insight: patient has awareness of illness; good insight into own behavior and behavior of others  Judgment: the patient's behavior is adequate to circumstances    Interval history and content of current session: Patient discussed events and activities since the time of last visit. Discussed current adaptation to disease and treatment status. Reports to be coping with moderate difficulty. Patient states she did not complete homework assignments.  Discussed her avoidance of doing the assignment. Evaluated cognitive response, paying particular attention to negative intrusive thoughts of a persistent and detrimental nature. Thoughts of this type are in evidence with moderate distress.  Particular difficulty with emotion/thought/reality fusion dicussed ("felt bad" and it was due to unknown national events).  Provided cognitive behavioral therapy to address distorted cognitions.  CBT for [facility] protocol Session 7 discussed-  Encouraged homework (2nd half of module) and reinforced importance of practicing skills.    3 fears- aging alone, financial strife in future, something happening to daughter     Risk parameters:   Patient reports no suicidal ideation  Patient reports no homicidal ideation  Patient reports no self-injurious behavior  Patient reports no violent behavior   Safety needs:  None at this time      Verbal deficits: None     Patient's response to intervention:The patient's response to intervention is accepting.     Progress toward goals: No Progress - Continue Objectives        Patient reported outcomes:      Distress thermometer: "   DISTRESS SCREENING 4/17/2023 3/13/2023 3/13/2023 2/20/2023 2/20/2023 2/14/2023 1/20/2023   Distress Score 5 4 4 4 4 6 7   Practical Problems Insurance/Financial;Treatment Decisions Insurance/Financial;Work/School;Treatment Decisions Insurance/Financial;Work/School;Treatment Decisions Insurance/Financial;Work/School;Treatment Decisions Insurance/Financial;Work/School;Treatment Decisions - Insurance/Financial;Work/School;Treatment Decisions   Family Problems Dealing with Children Dealing with Children Dealing with Children Dealing with Children Dealing with Children - Dealing with Children   Emotional Problems Depression;Fears;Worry Depression;Fears;Nervousness;Sadness;Worry Depression;Fears;Nervousness;Sadness;Worry Fears;Worry Fears;Worry - Fears;Worry   Spiritual / Mandaen Concerns No No No No No - No   Physical Problems Eating;Fatigue Eating;Fatigue;Sleep Eating;Fatigue;Sleep Eating;Fatigue;Memory/Concentration;Sleep Eating;Fatigue;Memory/Concentration;Sleep - Sleep   Other Problems - Joints started popping in Feb.2023 after being dehydrated. I contacted my primary care DrBrianne Joints started popping in Feb.2023 after being dehydrated. I contacted my primary care Dr. - - - -           PHQ-9=Initial visit: 4      PHQ8 Score : 6 (04/17/23 1340)  PHQ-9 Total Score: 6 (04/17/23 1340)      MINH-7=Initial visit:7   GAD7 4/17/2023 3/13/2023 2/27/2023   1. Feeling nervous, anxious, or on edge? 1 2 1   2. Not being able to stop or control worrying? 0 2 1   3. Worrying too much about different things? 1 2 1   4. Trouble relaxing? 0 2 1   5. Being so restless that it is hard to sit still? 0 0 0   6. Becoming easily annoyed or irritable? 1 2 1   7. Feeling afraid as if something awful might happen? 1 2 1   MINH-7 Score 4 12 6          Client Strengths: verbal, intelligent, successful, good social support, good insight, commitment to wellness, strong [place], strong cultural traditions       Treatment Plan:individual psychotherapy  and consult psychiatrist for medication evaluation   Target symptoms: depression, anxiety   Why chosen therapy is appropriate versus another modality: relevant to diagnosis, patient responds to this modality, evidence based practice  Outcome monitoring methods: self-report, checklist/rating scale  Therapeutic intervention type: behavior modifying psychotherapy  Prognosis: Good    Goals from last visit: Attempted, partially met   Behavioral goals prior to next visit:    Exercise: yoga, walking   Stress management:    Social engagement: explore option of 1x/week in office   Nutrition:   Smoking Cessation:   Therapy:  Write out 3 anxiety producing scenarios practice daughter scenario- when anxiety goes down think about next steps    Practice imagery daily  Reschedule Leoncio Saunders NP -cancelled in January  Return to clinic: 1 week     Length of Service (minutes direct face-to-face contact): 45    Diagnosis:     ICD-10-CM ICD-9-CM   1. Generalized anxiety disorder  F41.1 300.02   2. Psychological factors affecting medical condition  F54 316           Alex Suazo, PhD  [place] License #[id number]  [place] License #[id number]

## 2023-05-08 ENCOUNTER — OFFICE VISIT (OUTPATIENT)
Dept: PSYCHIATRY | Facility: CLINIC | Age: 68
End: 2023-05-08
Payer: COMMERCIAL

## 2023-05-08 DIAGNOSIS — F41.1 GENERALIZED ANXIETY DISORDER: Primary | ICD-10-CM

## 2023-05-08 DIAGNOSIS — F54 PSYCHOLOGICAL FACTORS AFFECTING MEDICAL CONDITION: ICD-10-CM

## 2023-05-08 PROCEDURE — 1160F RVW MEDS BY RX/DR IN RCRD: CPT | Mod: CPTII,S$GLB,, | Performed by: PSYCHOLOGIST

## 2023-05-08 PROCEDURE — 1159F PR MEDICATION LIST DOCUMENTED IN MEDICAL RECORD: ICD-10-PCS | Mod: CPTII,S$GLB,, | Performed by: PSYCHOLOGIST

## 2023-05-08 PROCEDURE — 99999 PR PBB SHADOW E&M-EST. PATIENT-LVL II: ICD-10-PCS | Mod: PBBFAC,,, | Performed by: PSYCHOLOGIST

## 2023-05-08 PROCEDURE — 1157F PR ADVANCE CARE PLAN OR EQUIV PRESENT IN MEDICAL RECORD: ICD-10-PCS | Mod: CPTII,S$GLB,, | Performed by: PSYCHOLOGIST

## 2023-05-08 PROCEDURE — 90837 PSYTX W PT 60 MINUTES: CPT | Mod: S$GLB,,, | Performed by: PSYCHOLOGIST

## 2023-05-08 PROCEDURE — 1157F ADVNC CARE PLAN IN RCRD: CPT | Mod: CPTII,S$GLB,, | Performed by: PSYCHOLOGIST

## 2023-05-08 PROCEDURE — 90837 PR PSYCHOTHERAPY W/PATIENT, 60 MIN: ICD-10-PCS | Mod: S$GLB,,, | Performed by: PSYCHOLOGIST

## 2023-05-08 PROCEDURE — 1159F MED LIST DOCD IN RCRD: CPT | Mod: CPTII,S$GLB,, | Performed by: PSYCHOLOGIST

## 2023-05-08 PROCEDURE — 99999 PR PBB SHADOW E&M-EST. PATIENT-LVL II: CPT | Mod: PBBFAC,,, | Performed by: PSYCHOLOGIST

## 2023-05-08 PROCEDURE — 1160F PR REVIEW ALL MEDS BY PRESCRIBER/CLIN PHARMACIST DOCUMENTED: ICD-10-PCS | Mod: CPTII,S$GLB,, | Performed by: PSYCHOLOGIST

## 2023-05-09 ENCOUNTER — PATIENT OUTREACH (OUTPATIENT)
Dept: ADMINISTRATIVE | Facility: HOSPITAL | Age: 68
End: 2023-05-09
Payer: COMMERCIAL

## 2023-05-09 ENCOUNTER — INFUSION (OUTPATIENT)
Dept: INFUSION THERAPY | Facility: HOSPITAL | Age: 68
End: 2023-05-09
Attending: INTERNAL MEDICINE
Payer: COMMERCIAL

## 2023-05-09 ENCOUNTER — PATIENT MESSAGE (OUTPATIENT)
Dept: ADMINISTRATIVE | Facility: HOSPITAL | Age: 68
End: 2023-05-09
Payer: COMMERCIAL

## 2023-05-09 VITALS — WEIGHT: 140 LBS | BODY MASS INDEX: 23.32 KG/M2 | HEIGHT: 65 IN

## 2023-05-09 DIAGNOSIS — C7A.00 MALIGNANT CARCINOID TUMOR OF UNKNOWN PRIMARY SITE: Primary | ICD-10-CM

## 2023-05-09 DIAGNOSIS — C7B.02 METASTATIC MALIGNANT CARCINOID TUMOR TO LIVER: ICD-10-CM

## 2023-05-09 PROCEDURE — 96372 THER/PROPH/DIAG INJ SC/IM: CPT

## 2023-05-09 PROCEDURE — 63600175 PHARM REV CODE 636 W HCPCS: Mod: JZ,JG | Performed by: INTERNAL MEDICINE

## 2023-05-09 RX ORDER — LANREOTIDE ACETATE 120 MG/.5ML
120 INJECTION SUBCUTANEOUS
Status: CANCELLED | OUTPATIENT
Start: 2023-05-09

## 2023-05-09 RX ORDER — LANREOTIDE ACETATE 120 MG/.5ML
120 INJECTION SUBCUTANEOUS
Status: DISCONTINUED | OUTPATIENT
Start: 2023-05-09 | End: 2023-05-09 | Stop reason: HOSPADM

## 2023-05-09 RX ADMIN — LANREOTIDE ACETATE 120 MG: 120 INJECTION SUBCUTANEOUS at 10:05

## 2023-05-09 NOTE — PROGRESS NOTES
Non-compliant [facility] report chart review - Chart review completed for the following HM test if overdue  (Mammogram, Colonoscopy, Cervical Cancer Screening,  Diabetic lab testing, and/or Dilated EYE EXAM)     Care Everywhere and Media reports - updates requested and reviewed.        Health Maintenance Due   Topic Date Due    Pneumococcal Vaccines (Age 65+) (1 - PCV) Never done    Shingles Vaccine (1 of 2) Never done    Colorectal Cancer Screening  Never done    TETANUS VACCINE  11/06/2022         Suturegard Retention Suture: 2-0 Nylon

## 2023-05-09 NOTE — NURSING
Pt tolerated Lanreotide injection well, see flowsheet for assessment details. No adverse reactions noted. Next appointment scheduled and pt d/c in no acute distress.

## 2023-05-10 NOTE — PROGRESS NOTES
PSYCHO-ONCOLOGY NOTE/ Individual Psychotherapy     Date: 5/8/2023   [facility]          Therapeutic Intervention: Met with patient.  Outpatient - Behavior modifying psychotherapy 60 min - CPT code 94338  The patient's last visit with me was on 4/17/2023.    Problem list  Patient Active Problem List   Diagnosis    Malignant carcinoid tumor of unknown primary site    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    Hypergastrinemia    Carcinoid (except of appendix)    Gastrinoma, malignant    Generalized anxiety disorder    Current moderate episode of major depressive disorder without prior episode    Secondary neuroendocrine tumor of liver    History of iron deficiency anemia    Elevated bilirubin    Vitamin D deficiency    Abnormal glucose    Psychological factors affecting medical condition       Chief complaint/reason for encounter: anxiety   Met with patient to evaluate psychosocial adaptation to diagnosis/treatment/survivorship of NET    Current Medications  Current Outpatient Medications   Medication    busPIRone (BUSPAR) 5 MG Tab    flu vac 2022 [id number], (FLUAD QUAD 2022-23,65Y UP,,PF,) 60 mcg (15 mcg x 4)/0.5 mL Syrg    fluticasone propionate (FLONASE) 50 mcg/actuation nasal spray    lanreotide (SOMATULINE DEPOT) 120 mg/0.5 mL Syrg    levocetirizine (XYZAL) 5 MG tablet    pantoprazole (PROTONIX) 40 MG tablet     No current facility-administered medications for this visit.       Objective:  Lena Brantley arrived promptly for the session.  Ms. Brantley was independently ambulatory at the time of session. The patient was fully cooperative throughout the session.  Appearance: age appropriate, casually  dressed, well groomed  Behavior/Cooperation: friendly and cooperative  Speech: normal in rate, volume, and tone and appropriate quality, quantity and organization of sentences  Mood: anxious, depressed  Affect: anxious  Thought Process: goal-directed, logical  Thought  "Content: normal,  No delusions or paranoia; did not appear to be responding to internal stimuli during the session  Orientation: grossly intact  Memory: Grossly intact  Attention Span/Concentration: Attends to session without distraction; reports no difficulty  Fund of Knowledge: average  Estimate of Intelligence: average from verbal skills and history  Cognition: grossly intact  Insight: patient has awareness of illness; good insight into own behavior and behavior of others  Judgment: the patient's behavior is adequate to circumstances    Interval history and content of current session: Patient discussed events and activities since the time of last visit. Discussed current adaptation to disease and treatment status. Reports to be coping with moderate difficulty. Patient states she did not complete homework assignments.  Discussed her avoidance of doing the assignment.  Patient continues to exhibit thought/action fusion and is unwilling to risk anxiety by exposure to feared images.  Changes to treatment plan discussed.    Patient discussed procrastination on cleaning. Breaking up overwhelming tasks discussed.    All day exposure to anxiety provoking media (TV news breaks) discussed and ways to restructure environment toward planned news consumption encouraged.    Patient did not reschedule Leoncio Saunders NP -cancelled in January ("decided against medication"- even though her current medications are not effective, as per patient).     Risk parameters:   Patient reports no suicidal ideation  Patient reports no homicidal ideation  Patient reports no self-injurious behavior  Patient reports no violent behavior   Safety needs:  None at this time      Verbal deficits: None     Patient's response to intervention:The patient's response to intervention is accepting.     Progress toward goals: No Progress - Continue Objectives        Patient reported outcomes:      Distress thermometer:   DISTRESS SCREENING 5/10/2023 " 4/17/2023 3/13/2023 3/13/2023 2/20/2023 2/20/2023 2/14/2023   Distress Score 4 5 4 4 4 4 6   Practical Problems - Insurance/Financial;Treatment Decisions Insurance/Financial;Work/School;Treatment Decisions Insurance/Financial;Work/School;Treatment Decisions Insurance/Financial;Work/School;Treatment Decisions Insurance/Financial;Work/School;Treatment Decisions -   Family Problems - Dealing with Children Dealing with Children Dealing with Children Dealing with Children Dealing with Children -   Emotional Problems - Depression;Fears;Worry Depression;Fears;Nervousness;Sadness;Worry Depression;Fears;Nervousness;Sadness;Worry Fears;Worry Fears;Worry -   Spiritual / Anabaptism Concerns - No No No No No -   Physical Problems - Eating;Fatigue Eating;Fatigue;Sleep Eating;Fatigue;Sleep Eating;Fatigue;Memory/Concentration;Sleep Eating;Fatigue;Memory/Concentration;Sleep -   Other Problems - - Joints started popping in Feb.2023 after being dehydrated. I contacted my primary care DrBrianne Joints started popping in Feb.2023 after being dehydrated. I contacted my primary care DrBrianne - - -           PHQ-9=7; Initial visit: 4                MINH-7=7; Initial visit:7   GAD7 4/17/2023 3/13/2023 2/27/2023   1. Feeling nervous, anxious, or on edge? 1 2 1   2. Not being able to stop or control worrying? 0 2 1   3. Worrying too much about different things? 1 2 1   4. Trouble relaxing? 0 2 1   5. Being so restless that it is hard to sit still? 0 0 0   6. Becoming easily annoyed or irritable? 1 2 1   7. Feeling afraid as if something awful might happen? 1 2 1   MINH-7 Score 4 12 6          Client Strengths: verbal, intelligent, successful, good social support, good insight, commitment to wellness, strong [place], strong cultural traditions       Treatment Plan:individual psychotherapy and consult psychiatrist for medication evaluation   Target symptoms: depression, anxiety   Why chosen therapy is appropriate versus another modality: relevant to diagnosis,  patient responds to this modality, evidence based practice  Outcome monitoring methods: self-report, checklist/rating scale  Therapeutic intervention type: behavior modifying psychotherapy  Prognosis: Good    Goals from last visit: Attempted, partially met   Behavioral goals prior to next visit:    Exercise: yoga, walking   Stress management: change news consumption    No phone in bedroom   Social engagement:    Nutrition:   Smoking Cessation:   Therapy:  1 thing out of storage container    Therapy referral through insurer- next visit last visit    Return to clinic: 1 week     Length of Service (minutes direct face-to-face contact): 60    Diagnosis:     ICD-10-CM ICD-9-CM   1. Generalized anxiety disorder  F41.1 300.02   2. Psychological factors affecting medical condition  F54 316           Alex Suazo, PhD  [place] License #[id number]  [place] License #[id number]

## 2023-05-16 ENCOUNTER — OFFICE VISIT (OUTPATIENT)
Dept: URGENT CARE | Facility: CLINIC | Age: 68
End: 2023-05-16
Payer: COMMERCIAL

## 2023-05-16 VITALS
BODY MASS INDEX: 23.32 KG/M2 | HEART RATE: 78 BPM | TEMPERATURE: 97 F | OXYGEN SATURATION: 98 % | HEIGHT: 65 IN | DIASTOLIC BLOOD PRESSURE: 78 MMHG | WEIGHT: 140 LBS | RESPIRATION RATE: 18 BRPM | SYSTOLIC BLOOD PRESSURE: 114 MMHG

## 2023-05-16 DIAGNOSIS — D37.9 GASTRINOMA: ICD-10-CM

## 2023-05-16 DIAGNOSIS — C78.7 METASTATIC CANCER TO LIVER: ICD-10-CM

## 2023-05-16 DIAGNOSIS — R10.9 ABDOMINAL DISCOMFORT: Primary | ICD-10-CM

## 2023-05-16 DIAGNOSIS — K21.9 GASTROESOPHAGEAL REFLUX DISEASE, UNSPECIFIED WHETHER ESOPHAGITIS PRESENT: ICD-10-CM

## 2023-05-16 DIAGNOSIS — K59.00 CONSTIPATION, UNSPECIFIED CONSTIPATION TYPE: ICD-10-CM

## 2023-05-16 PROCEDURE — 99214 OFFICE O/P EST MOD 30 MIN: CPT | Mod: S$GLB,,, | Performed by: NURSE PRACTITIONER

## 2023-05-16 PROCEDURE — 99214 PR OFFICE/OUTPT VISIT, EST, LEVL IV, 30-39 MIN: ICD-10-PCS | Mod: S$GLB,,, | Performed by: NURSE PRACTITIONER

## 2023-05-16 RX ORDER — ASCORBIC ACID 500 MG
500 TABLET ORAL DAILY
COMMUNITY

## 2023-05-16 RX ORDER — DICYCLOMINE HYDROCHLORIDE 20 MG/1
20 TABLET ORAL EVERY 8 HOURS PRN
Qty: 30 TABLET | Refills: 0 | Status: SHIPPED | OUTPATIENT
Start: 2023-05-16 | End: 2023-05-23

## 2023-05-16 RX ORDER — LANOLIN ALCOHOL/MO/W.PET/CERES
100 CREAM (GRAM) TOPICAL DAILY
COMMUNITY

## 2023-05-16 RX ORDER — CALCIUM CARBONATE 500(1250)
1 TABLET ORAL DAILY
COMMUNITY

## 2023-05-16 RX ORDER — CHOLECALCIFEROL (VITAMIN D3) 25 MCG
1000 TABLET ORAL DAILY
COMMUNITY

## 2023-05-16 NOTE — PATIENT INSTRUCTIONS
Over the counter glycerine suppositories (1 per day as needed)   Over the counter maalox (follow instructions on bottle)   [place] diet: avoid greasy, dairy, and spicy foods  Increase fiber in diet to help treat constipation   Stay well hydrated     Make appt asap to follow up with primary care; as well as your hem-onc doctor; as well as GI, in which a referral was placed today.

## 2023-05-16 NOTE — PROGRESS NOTES
"Subjective:      Patient ID: Lena Brantley is a 67 y.o. female.    Vitals:  height is 5' 5" (1.651 m) and weight is 63.5 kg (140 lb). Her oral temperature is 97 °F (36.1 °C). Her blood pressure is 114/78 and her pulse is 78. Her respiration is 18 and oxygen saturation is 98%.     Chief Complaint: Gastroesophageal Reflux    This is a 67 y.o. 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 She notes she is hungry but it is difficult to eat with her stomach symptoms. She has taken pepto bismol and pantoprazole today for symptoms. She has an upcoming appt with her doctor for an MRI of the chest and abdomen. Denies fever. Denies dysuria. Denies back pain and chest pain.       Gastroesophageal Reflux  She complains of abdominal pain, belching and heartburn. She reports no chest pain, no choking, no coughing, no hoarse voice, no nausea or no sore throat. Associated symptoms include fatigue.     Constitution: Positive for appetite change and fatigue. Negative for fever.   HENT:  Negative for sore throat.    Cardiovascular:  Negative for chest pain.   Respiratory:  Negative for cough.    Gastrointestinal:  Positive for abdominal pain, abdominal bloating, constipation and heartburn. Negative for nausea, vomiting and diarrhea.   Genitourinary:  Negative for dysuria.    Objective:     Physical Exam   Constitutional: She is oriented to person, place, and time.  Non-toxic appearance. She does not appear ill. No distress.   HENT:   Head: Normocephalic.   Nose: Nose normal.   Mouth/Throat: Mucous membranes are moist.   Eyes: Right eye exhibits no discharge. Left eye exhibits no " discharge.   Neck: Neck supple. No neck rigidity present.   Cardiovascular: Normal rate and regular rhythm.   Pulmonary/Chest: Effort normal and breath sounds normal.   Abdominal: Normal appearance. She exhibits no distension. Soft. Bowel sounds are increased. flat abdomen There is no abdominal tenderness. There is no rebound and no guarding.   Musculoskeletal: Normal range of motion.         General: Normal range of motion.   Neurological: She is alert and oriented to person, place, and time.   Skin: Skin is warm, dry and not diaphoretic.   Psychiatric: Her behavior is normal. Mood normal.   Nursing note and vitals reviewed.    Assessment:     1. Abdominal discomfort    2. Constipation, unspecified constipation type    3. Gastroesophageal reflux disease, unspecified whether esophagitis present    4. Gastrinoma    5. Metastatic cancer to liver        Plan:       Abdominal discomfort  -     Ambulatory referral/consult to Gastroenterology  -     dicyclomine (BENTYL) 20 mg tablet; Take 1 tablet (20 mg total) by mouth every 8 (eight) hours as needed (abdominal discomfort).  Dispense: 30 tablet; Refill: 0    Constipation, unspecified constipation type  -     Ambulatory referral/consult to Gastroenterology    Gastroesophageal reflux disease, unspecified whether esophagitis present  -     Ambulatory referral/consult to Gastroenterology    Gastrinoma  -     Ambulatory referral/consult to Gastroenterology    Metastatic cancer to liver  -     Ambulatory referral/consult to Gastroenterology      Patient Instructions   Over the counter glycerine suppositories (1 per day as needed)   Over the counter maalox (follow instructions on bottle)   [place] diet: avoid greasy, dairy, and spicy foods  Increase fiber in diet to help treat constipation   Stay well hydrated     Make appt asap to follow up with primary care; as well as your hem-onc doctor; as well as GI, in which a referral was placed today.

## 2023-05-17 ENCOUNTER — PATIENT MESSAGE (OUTPATIENT)
Dept: HEMATOLOGY/ONCOLOGY | Facility: CLINIC | Age: 68
End: 2023-05-17
Payer: COMMERCIAL

## 2023-05-17 DIAGNOSIS — R10.9 ABDOMINAL DISCOMFORT: ICD-10-CM

## 2023-05-17 DIAGNOSIS — C7B.02 METASTATIC MALIGNANT CARCINOID TUMOR TO LIVER: Primary | ICD-10-CM

## 2023-05-18 ENCOUNTER — HOSPITAL ENCOUNTER (EMERGENCY)
Facility: HOSPITAL | Age: 68
Discharge: HOME | End: 2023-05-18
Attending: EMERGENCY MEDICINE
Payer: COMMERCIAL

## 2023-05-18 VITALS
RESPIRATION RATE: 18 BRPM | TEMPERATURE: 99 F | OXYGEN SATURATION: 98 % | HEIGHT: 65 IN | BODY MASS INDEX: 21.45 KG/M2 | SYSTOLIC BLOOD PRESSURE: 129 MMHG | DIASTOLIC BLOOD PRESSURE: 80 MMHG | WEIGHT: 128.75 LBS | HEART RATE: 69 BPM

## 2023-05-18 DIAGNOSIS — K59.00 CONSTIPATION, UNSPECIFIED CONSTIPATION TYPE: Primary | ICD-10-CM

## 2023-05-18 LAB
ALBUMIN SERPL BCP-MCNC: 4 G/DL (ref 3.5–5.2)
ALP SERPL-CCNC: 102 U/L (ref 55–135)
ALT SERPL W/O P-5'-P-CCNC: 15 U/L (ref 10–44)
ANION GAP SERPL CALC-SCNC: 9 MMOL/L (ref 8–16)
AST SERPL-CCNC: 24 U/L (ref 10–40)
BASOPHILS # BLD AUTO: 0.04 K/UL (ref 0–0.2)
BASOPHILS NFR BLD: 0.8 % (ref 0–1.9)
BILIRUB SERPL-MCNC: 1.6 MG/DL (ref 0.1–1)
BILIRUB UR QL STRIP: NEGATIVE
BUN SERPL-MCNC: 10 MG/DL (ref 8–23)
CALCIUM SERPL-MCNC: 10 MG/DL (ref 8.7–10.5)
CHLORIDE SERPL-SCNC: 102 MMOL/L (ref 95–110)
CLARITY UR: CLEAR
CO2 SERPL-SCNC: 29 MMOL/L (ref 23–29)
COLOR UR: YELLOW
CREAT SERPL-MCNC: 1 MG/DL (ref 0.5–1.4)
DIFFERENTIAL METHOD: NORMAL
EOSINOPHIL # BLD AUTO: 0.1 K/UL (ref 0–0.5)
EOSINOPHIL NFR BLD: 1 % (ref 0–8)
ERYTHROCYTE [DISTWIDTH] IN BLOOD BY AUTOMATED COUNT: 13.2 % (ref 11.5–14.5)
EST. GFR  (NO RACE VARIABLE): >60 ML/MIN/1.73 M^2
GLUCOSE SERPL-MCNC: 97 MG/DL (ref 70–110)
GLUCOSE UR QL STRIP: NEGATIVE
HCT VFR BLD AUTO: 41.7 % (ref 37–48.5)
HGB BLD-MCNC: 14.1 G/DL (ref 12–16)
HGB UR QL STRIP: NEGATIVE
IMM GRANULOCYTES # BLD AUTO: 0.01 K/UL (ref 0–0.04)
IMM GRANULOCYTES NFR BLD AUTO: 0.2 % (ref 0–0.5)
KETONES UR QL STRIP: NEGATIVE
LEUKOCYTE ESTERASE UR QL STRIP: NEGATIVE
LIPASE SERPL-CCNC: 14 U/L (ref 4–60)
LYMPHOCYTES # BLD AUTO: 1.2 K/UL (ref 1–4.8)
LYMPHOCYTES NFR BLD: 24.9 % (ref 18–48)
MCH RBC QN AUTO: 28.8 PG (ref 27–31)
MCHC RBC AUTO-ENTMCNC: 33.8 G/DL (ref 32–36)
MCV RBC AUTO: 85 FL (ref 82–98)
MONOCYTES # BLD AUTO: 0.5 K/UL (ref 0.3–1)
MONOCYTES NFR BLD: 10.4 % (ref 4–15)
NEUTROPHILS # BLD AUTO: 3 K/UL (ref 1.8–7.7)
NEUTROPHILS NFR BLD: 62.7 % (ref 38–73)
NITRITE UR QL STRIP: NEGATIVE
NRBC BLD-RTO: 0 /100 WBC
PH UR STRIP: 7 [PH] (ref 5–8)
PLATELET # BLD AUTO: 222 K/UL (ref 150–450)
PMV BLD AUTO: 11.3 FL (ref 9.2–12.9)
POTASSIUM SERPL-SCNC: 4 MMOL/L (ref 3.5–5.1)
PROT SERPL-MCNC: 7.6 G/DL (ref 6–8.4)
PROT UR QL STRIP: ABNORMAL
RBC # BLD AUTO: 4.89 M/UL (ref 4–5.4)
SODIUM SERPL-SCNC: 140 MMOL/L (ref 136–145)
SP GR UR STRIP: >1.03 (ref 1–1.03)
URN SPEC COLLECT METH UR: ABNORMAL
UROBILINOGEN UR STRIP-ACNC: NEGATIVE EU/DL
WBC # BLD AUTO: 4.81 K/UL (ref 3.9–12.7)

## 2023-05-18 PROCEDURE — 25500020 PHARM REV CODE 255: Performed by: EMERGENCY MEDICINE

## 2023-05-18 PROCEDURE — 85025 COMPLETE CBC W/AUTO DIFF WBC: CPT | Performed by: EMERGENCY MEDICINE

## 2023-05-18 PROCEDURE — 96360 HYDRATION IV INFUSION INIT: CPT | Mod: 59

## 2023-05-18 PROCEDURE — 25000003 PHARM REV CODE 250: Performed by: EMERGENCY MEDICINE

## 2023-05-18 PROCEDURE — 83690 ASSAY OF LIPASE: CPT | Performed by: EMERGENCY MEDICINE

## 2023-05-18 PROCEDURE — 99285 EMERGENCY DEPT VISIT HI MDM: CPT | Mod: 25

## 2023-05-18 PROCEDURE — 81003 URINALYSIS AUTO W/O SCOPE: CPT | Performed by: EMERGENCY MEDICINE

## 2023-05-18 PROCEDURE — 80053 COMPREHEN METABOLIC PANEL: CPT | Performed by: EMERGENCY MEDICINE

## 2023-05-18 RX ORDER — SUCRALFATE 1 G/10ML
1 SUSPENSION ORAL 4 TIMES DAILY
Qty: 414 ML | Refills: 0 | Status: SHIPPED | OUTPATIENT
Start: 2023-05-18

## 2023-05-18 RX ADMIN — SODIUM CHLORIDE 1000 ML: 0.9 INJECTION, SOLUTION INTRAVENOUS at 08:05

## 2023-05-18 RX ADMIN — IOHEXOL 75 ML: 350 INJECTION, SOLUTION INTRAVENOUS at 07:05

## 2023-05-18 NOTE — ED PROVIDER NOTES
Emergency Department Encounter  Provider Note  Encounter Date: 5/18/2023    Patient Name: Lena Brantley  MRN: [id number]    History of Present Illness   HPI  History of Present Illness:    Chief Complaint:   Chief Complaint   Patient presents with    Abdominal Pain     Pt is complaining of abd pain and constipation. Pt states that she feels fine in the evening but in the morning does not feel good.        67-year-old female presenting with itching, fatigue, stomach gurgling, constipation.  She has a decreased appetite and unable to eat as much as she used to.  Pantoprazole helps a little bit, also takes Pepto-Bismol.  Has follow-up with GI in less than a week.  Denies any fever, urinary symptoms, chest pain, shortness of breath.    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  Social History     Tobacco Use    Smoking status: Never     Passive exposure: Never    Smokeless tobacco: Never   Substance Use Topics    Alcohol use: No    Drug use: No     Family History   Problem Relation Age of Onset    Cancer Maternal Grandmother     Cancer Other     Depression Mother     Anxiety disorder Mother        Allergies reviewed:  Review of patient's allergies indicates:   Allergen Reactions    Epinephrine Anaphylaxis     Can Cause Carcinoid Crisis       Medications reviewed:  Medication List with Changes/Refills   New Medications    SUCRALFATE (CARAFATE) 100 MG/ML SUSPENSION    Take 10 mLs (1 g total) by mouth 4 (four)  times daily.   Current Medications    ASCORBIC ACID, VITAMIN C, (VITAMIN C) 500 MG TABLET    Take 500 mg by mouth once daily.    CALCIUM CARBONATE (OS-TJ) 500 MG CALCIUM (1,250 MG) TABLET    Take 1 tablet by mouth once daily.    CYANOCOBALAMIN (VITAMIN B-12) 1000 MCG TABLET    Take 100 mcg by mouth once daily.    DICYCLOMINE (BENTYL) 20 MG TABLET    Take 1 tablet (20 mg total) by mouth every 8 (eight) hours as needed (abdominal discomfort).    LANREOTIDE (SOMATULINE DEPOT) 120 MG/0.5 ML SYRG    Inject 120 mg into the skin every 28 days.    MULTIVITAMIN CAPSULE    Take 1 capsule by mouth once daily.    PANTOPRAZOLE (PROTONIX) 40 MG TABLET    Take 1 tablet (40 mg total) by mouth once daily.    VITAMIN D (VITAMIN D3) 1000 UNITS TAB    Take 1,000 Units by mouth once daily.       ROS  Review of Systems:    Constitutional:  Negative for fever.   HENT:  Negative for sore throat.    Eyes:  Negative for redness.   Respiratory:  Negative for shortness of breath.    Cardiovascular:  Negative for chest pain.   Gastrointestinal:  Negative for nausea.   Genitourinary:  Negative for dysuria.   Musculoskeletal:  Negative for back pain.   Skin:  Negative for rash.   Neurological:  Negative for weakness.   Hematological:  Does not bruise/bleed easily.   Psychiatric/Behavioral:  The patient is not nervous/anxious.      Physical Exam   Physical Exam    Initial Vitals [05/18/23 1640]   BP Pulse Resp Temp SpO2   127/83 92 18 98.5 °F (36.9 °C) 98 %      MAP       --           Triage vital signs reviewed.    Constitutional: Well-nourished, well-developed. Not in acute distress.  HENT: Normocephalic, atraumatic. Moist mucous membranes.  Eyes: No conjunctival injection.  Resp: Normal respiratory effort, breathing unlabored.  Cardio: Regular rate and rhythm.  GI: Abdomen non-distended.   MSK: Extremities without any deformities noted. No lower extremity edema.  Skin: Warm and dry. No rashes or lesions noted.  Neuro: Awake and alert. Moves  all extremities.    ED Course   Procedures    Medical Decision Making    History Acquisition     The history is provided by the patient.     Review of prior external/non ED notes:  Urgent care note from 05/16, Heme-Onc progress notes    Differential Diagnoses   Based on available information and initial assessment, the working Differential Diagnosis includes, but is not limited to:  AAA, aortic dissection, mesenteric ischemia, perforated viscous, MI/ACS, SBO/volvulus, incarcerated/strangulated hernia, intussusception, ileus, appendicitis, cholecystitis, cholangitis, diverticulitis, esophagitis, hepatitis, nephrolithiasis, pancreatitis, gastroenteritis, colitis, IBD/IBS, biliary colic, GERD, PUD, constipation, UTI/pyelonephritis,  disorder.  .    EKG   EKG ordered and independently reviewed by me:       Labs   Lab tests ordered and independently reviewed by me:    Labs Reviewed   COMPREHENSIVE METABOLIC PANEL - Abnormal; Notable for the following components:       Result Value    Total Bilirubin 1.6 (*)     All other components within normal limits   URINALYSIS, REFLEX TO URINE CULTURE - Abnormal; Notable for the following components:    Specific Gravity, UA >1.030 (*)     Protein, UA Trace (*)     All other components within normal limits    Narrative:     Specimen Source->Urine   CBC W/ AUTO DIFFERENTIAL   LIPASE         Imaging   Imaging ordered and independently reviewed by me:   Imaging Results              CT Abdomen Pelvis With Contrast (Final result)  Result time 05/18/23 19:49:29      Final result by Jason Sánchez DO (05/18/23 19:49:29)                   Impression:      1. Urinary bladder wall thickening, correlate with urinalysis to exclude cystitis.  2. Fibroid uterus.  3. Endometrial fluid distension correlate with clinical symptoms and consider further evaluation with tissue sampling.  4. Cholelithiasis without evidence of acute cholecystitis.  5. Unchanged hypodense lesion in the left hepatic  lobe.      Electronically signed by: Jason Sánchez  Date:    05/18/2023  Time:    19:49               Narrative:    EXAMINATION:  CT ABDOMEN PELVIS WITH CONTRAST    CLINICAL HISTORY:  Abdominal pain, acute, nonlocalized;    TECHNIQUE:  Axial CT images with sagittal and coronal reformats were obtained of the abdomen and pelvis from the hemidiaphragms through the symphysis pubis after the administration of 75mL Omnipaque 350.    COMPARISON:  CT abdomen and pelvis from 12/08/2022.    FINDINGS:  Lung Bases: Clear.    Heart: Heart size is normal.  No pericardial effusion.    Liver: Again seen is a 3.1 cm peripherally enhancing hypodense lesion in the left hepatic lobe (series 2, image 23), stable in size from prior.  There are no new hepatic lesions.  The portal vasculature is patent.    Biliary tract: No intrahepatic or extrahepatic biliary ductal dilatation.    Gallbladder: Layering sludge and gallstones.  No pericholecystic fluid or gallbladder wall thickening.    Pancreas: Normal. No pancreatic ductal dilatation.    Spleen: Normal size without focal lesion.    Adrenals: Unremarkable.    Kidneys and urinary collecting systems: There is a simple cyst in the left kidney superior pole.  The kidneys are otherwise unremarkable.  No hydronephrosis or urolithiasis.    Lymph nodes: None enlarged.    Stomach and bowel: The stomach is normal.  Loops of small and large bowel are normal in caliber without evidence for inflammation or obstruction.  The appendix is normal.    Peritoneum and mesentery: No ascites or free intraperitoneal air.  No abdominal fluid collection.    Vasculature: No aneurysm or significant atherosclerosis.    Urinary bladder: There is bladder wall thickening which may be due to contracted status.    Reproductive organs: The uterus demonstrates numerous calcified granulomas.  There is endometrial fluid distension.    Body wall: No abnormality.    Musculoskeletal: No aggressive osseous lesion.                                            Additional Consideration   Lena Brantley  presents to the emergency Department today with abdominal pain, belching, constipation.  Unremarkable abdominal exam.  Given her history, will obtain CT abdomen pelvis, labs.  Patient has a follow-up appointment next week, if no acute emergency, will discharge have her follow-up.    Additional testing considered but not indicated during the course of this workup: further imaging and labwork, not indicated  Co-morbidities/chronic illness/exacerbation of chronic illness considered which impacted clinical decision making: gastrinoma, carcinoid tumor  Procedures done in the ED or plan for the OR: No  Social determinants of care considered during development of treatment plan include: none  Discussion of management or test interpretation with external provider: No  DNR discussion: No    The patient's list of active medications and allergies as documented per RN staff has been reviewed.  Medications given in the ED and/or prescribed:   Medications   iohexoL (OMNIPAQUE 350) injection 75 mL (75 mLs Intravenous Given 5/18/23 1906)   sodium chloride 0.9% bolus 1,000 mL 1,000 mL (1,000 mLs Intravenous New Bag 5/18/23 2030)             ED Course as of 05/18/23 2206   Thu May 18, 2023   1842 Comp. Metabolic Panel(!) [CS]   1842 CBC W/ AUTO DIFFERENTIAL [CS]   1842 Lipase [CS]   2004 CT Abdomen Pelvis With Contrast  No acute findings; no clinical evidence cholecystitis, no suprapubic/any abdominal pain. Awaiting UA. Anticipate dc [CS]   2120 Pt informed of results. Will send carafate to pharmacy. Encouraged her to give urine sample [CS]   2206 Urinalysis, Reflex to Urine Culture Urine, Clean Catch(!)  wnl [CS]      ED Course User Index  [CS] Nava Santana MD       Explanation of disposition: Discharge    Clinical Impression:     1. Constipation, unspecified constipation type         All results from the workup were reviewed with the patient/family in  detail. I discussed with the patient and/or the family/caregiver that today's evaluation in the ED does not suggest any emergent or life-threatening medical conditions that would require hospitalization or immediate intervention beyond what was provided in the ED. I believe the patient is safe for discharge.  However, a reassuring evaluation in the ED does not preclude the presence or development of a serious or life-threatening condition. As such, strict return precautions were discussed with the patient expressing understanding of instructions, and all questions answered. The patient/family communicates understanding of all this information and all remaining questions and concerns were addressed at this time.    The patient/family has been provided with verbal and printed direction regarding our final diagnosis(es) as well as instructions regarding use of OTC and/or Rx medications intended to manage the patient's aforementioned conditions including:  ED Prescriptions       Medication Sig Dispense Start Date End Date Auth. Provider    sucralfate (CARAFATE) 100 mg/mL suspension Take 10 mLs (1 g total) by mouth 4 (four) times daily. 414 mL 5/18/2023 -- Nava Santana MD          The patient's condition does not warrant review of the  and prescription of controlled substances.      ED Disposition Condition    Discharge Stable               Nava Santana MD  05/18/23 7928

## 2023-05-19 ENCOUNTER — NURSE TRIAGE (OUTPATIENT)
Dept: ADMINISTRATIVE | Facility: CLINIC | Age: 68
End: 2023-05-19
Payer: COMMERCIAL

## 2023-05-19 NOTE — TELEPHONE ENCOUNTER
Pt scheduled for GI apt on 5/23, calling for clarification on providers listed with speciality. All questions answered, no further assistance needed.     Reason for Disposition   General information question, no triage required and triager able to answer question    Protocols used: Information Only Call - No Triage-A-

## 2023-05-19 NOTE — DISCHARGE INSTRUCTIONS
Your workup today was reassuring. You have gallstones without signs of infection. This is not causing issues of constipation today. Medicine has been sent to your pharmacy to help with acid. Please see your doctor next week as scheduled.

## 2023-05-23 ENCOUNTER — OFFICE VISIT (OUTPATIENT)
Dept: GASTROENTEROLOGY | Facility: CLINIC | Age: 68
End: 2023-05-23
Payer: COMMERCIAL

## 2023-05-23 VITALS — BODY MASS INDEX: 22.85 KG/M2 | WEIGHT: 137.13 LBS | HEIGHT: 65 IN

## 2023-05-23 DIAGNOSIS — K21.9 GASTROESOPHAGEAL REFLUX DISEASE, UNSPECIFIED WHETHER ESOPHAGITIS PRESENT: ICD-10-CM

## 2023-05-23 DIAGNOSIS — R10.9 ABDOMINAL DISCOMFORT: ICD-10-CM

## 2023-05-23 DIAGNOSIS — C7B.02 METASTATIC MALIGNANT CARCINOID TUMOR TO LIVER: ICD-10-CM

## 2023-05-23 DIAGNOSIS — Z86.010 HISTORY OF COLON POLYPS: Primary | ICD-10-CM

## 2023-05-23 PROCEDURE — 3288F FALL RISK ASSESSMENT DOCD: CPT | Mod: CPTII,S$GLB,,

## 2023-05-23 PROCEDURE — 99214 OFFICE O/P EST MOD 30 MIN: CPT | Mod: S$GLB,,,

## 2023-05-23 PROCEDURE — 1157F ADVNC CARE PLAN IN RCRD: CPT | Mod: CPTII,S$GLB,,

## 2023-05-23 PROCEDURE — 1126F AMNT PAIN NOTED NONE PRSNT: CPT | Mod: CPTII,S$GLB,,

## 2023-05-23 PROCEDURE — 3008F PR BODY MASS INDEX (BMI) DOCUMENTED: ICD-10-PCS | Mod: CPTII,S$GLB,,

## 2023-05-23 PROCEDURE — 1101F PT FALLS ASSESS-DOCD LE1/YR: CPT | Mod: CPTII,S$GLB,,

## 2023-05-23 PROCEDURE — 1157F PR ADVANCE CARE PLAN OR EQUIV PRESENT IN MEDICAL RECORD: ICD-10-PCS | Mod: CPTII,S$GLB,,

## 2023-05-23 PROCEDURE — 1101F PR PT FALLS ASSESS DOC 0-1 FALLS W/OUT INJ PAST YR: ICD-10-PCS | Mod: CPTII,S$GLB,,

## 2023-05-23 PROCEDURE — 99999 PR PBB SHADOW E&M-EST. PATIENT-LVL IV: ICD-10-PCS | Mod: PBBFAC,,,

## 2023-05-23 PROCEDURE — 3288F PR FALLS RISK ASSESSMENT DOCUMENTED: ICD-10-PCS | Mod: CPTII,S$GLB,,

## 2023-05-23 PROCEDURE — 99999 PR PBB SHADOW E&M-EST. PATIENT-LVL IV: CPT | Mod: PBBFAC,,,

## 2023-05-23 PROCEDURE — 1126F PR PAIN SEVERITY QUANTIFIED, NO PAIN PRESENT: ICD-10-PCS | Mod: CPTII,S$GLB,,

## 2023-05-23 PROCEDURE — 1159F PR MEDICATION LIST DOCUMENTED IN MEDICAL RECORD: ICD-10-PCS | Mod: CPTII,S$GLB,,

## 2023-05-23 PROCEDURE — 1159F MED LIST DOCD IN RCRD: CPT | Mod: CPTII,S$GLB,,

## 2023-05-23 PROCEDURE — 99214 PR OFFICE/OUTPT VISIT, EST, LEVL IV, 30-39 MIN: ICD-10-PCS | Mod: S$GLB,,,

## 2023-05-23 PROCEDURE — 3008F BODY MASS INDEX DOCD: CPT | Mod: CPTII,S$GLB,,

## 2023-05-23 RX ORDER — SODIUM, POTASSIUM,MAG SULFATES 17.5-3.13G
1 SOLUTION, RECONSTITUTED, ORAL ORAL DAILY
Qty: 1 KIT | Refills: 0 | Status: ON HOLD | OUTPATIENT
Start: 2023-05-23 | End: 2023-07-17 | Stop reason: CLARIF

## 2023-05-23 NOTE — PROGRESS NOTES
GASTROENTEROLOGY CLINIC NOTE    Reason for visit: The primary encounter diagnosis was History of colon polyps. Diagnoses of 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, Abdominal discomfort, and Gastroesophageal reflux disease, unspecified whether esophagitis present were also pertinent to this visit.  Referring provider/PCP: Anat Blackwell MD    HPI:  Lena Brantley is a 67 y.o. female here today for constipation, began a few weeks ago. Hx of neuroendocrine tumor, unknown primary site, with metastasis to liver. She reports previously having daily BM's without issue and randomly started having small amount of BM's, ball-like in consistency, and straining. She has associated bloating, but no abd pain. She denies any blood in stool. She reports also increase in belching and feeling like food is sitting in her stomach. She takes protonix daily which controls acid reflux symptoms, she denies nausea/vomiting. She was seen in urgent care and ER- CT obtained, d/c on bentyl and carafate. She reports bentyl did not help, no longer taking. She is feeling much better today, she has started taking prune juice daily which has allowed for easier passage of BM's with less straining. She also reports improvement with carafate as well. Last colonoscopy was greater than 10 years ago, she reports having polyps removed. EUS in 2016- multiple gastric polyps, biopsied.     Prior Endoscopy:  EUS: 2016 with Dr. James:   - Normal esophagus.              - Multiple gastric polyps. Biopsied. Tattooed. Gastric pH 4.0              - Normal examined duodenum.              - There was no sign of significant pathology in the entire pancreas.              - There was no sign of significant pathology in the ampulla.     FINAL PATHOLOGIC DIAGNOSIS STOMACH POLYP (BIOPSY): Fundic gland polyp, no dysplasia No Helicobacter organisms    Colon: greater than 10 years ago, polyps removed    (Portions of this note were dictated using voice  "recognition software and may contain dictation related errors in spelling/grammar/syntax not found on text review)    Review of Systems   Constitutional:  Negative for weight loss.   Gastrointestinal:  Positive for constipation. Negative for abdominal pain, blood in stool, nausea and vomiting.     Past Medical History: has a past medical history of Anxiety, Cancer, Depression, Elevated bilirubin, Fatigue, GERD (gastroesophageal reflux disease), History of iron deficiency anemia, psychiatric care, Liver mass, Mass of colon, 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, Psychiatric problem, Secondary neuroendocrine tumor of liver, Sickle cell trait, and Sleep difficulties.    Past Surgical History: has no past surgical history on file.    Home medications:   Current Outpatient Medications on File Prior to Visit   Medication Sig Dispense Refill    ascorbic acid, vitamin C, (VITAMIN C) 500 MG tablet Take 500 mg by mouth once daily.      calcium carbonate (OS-TJ) 500 mg calcium (1,250 mg) tablet Take 1 tablet by mouth once daily.      cyanocobalamin (VITAMIN B-12) 1000 MCG tablet Take 100 mcg by mouth once daily.      lanreotide (SOMATULINE DEPOT) 120 mg/0.5 mL Syrg Inject 120 mg into the skin every 28 days.      multivitamin capsule Take 1 capsule by mouth once daily.      pantoprazole (PROTONIX) 40 MG tablet Take 1 tablet (40 mg total) by mouth once daily. 90 tablet 3    sucralfate (CARAFATE) 100 mg/mL suspension Take 10 mLs (1 g total) by mouth 4 (four) times daily. 414 mL 0    vitamin D (VITAMIN D3) 1000 units Tab Take 1,000 Units by mouth once daily.      [DISCONTINUED] dicyclomine (BENTYL) 20 mg tablet Take 1 tablet (20 mg total) by mouth every 8 (eight) hours as needed (abdominal discomfort). (Patient not taking: Reported on 5/23/2023) 30 tablet 0     No current facility-administered medications on file prior to visit.       Vital signs:  Ht 5' 5" (1.651 m)   Wt 62.2 kg (137 lb 2 oz)   BMI 22.82 kg/m² "     Physical Exam  Constitutional:       Appearance: Normal appearance. She is normal weight.   Abdominal:      General: Abdomen is flat. There is no distension.      Palpations: Abdomen is soft.      Tenderness: There is no abdominal tenderness.   Neurological:      Mental Status: She is alert.     Results for orders placed or performed during the hospital encounter of 05/18/23 (from the past 2160 hour(s))   CT Abdomen Pelvis With Contrast    Narrative    EXAMINATION:  CT ABDOMEN PELVIS WITH CONTRAST    CLINICAL HISTORY:  Abdominal pain, acute, nonlocalized;    TECHNIQUE:  Axial CT images with sagittal and coronal reformats were obtained of the abdomen and pelvis from the hemidiaphragms through the symphysis pubis after the administration of 75mL Omnipaque 350.    COMPARISON:  CT abdomen and pelvis from 12/08/2022.    FINDINGS:  Lung Bases: Clear.    Heart: Heart size is normal.  No pericardial effusion.    Liver: Again seen is a 3.1 cm peripherally enhancing hypodense lesion in the left hepatic lobe (series 2, image 23), stable in size from prior.  There are no new hepatic lesions.  The portal vasculature is patent.    Biliary tract: No intrahepatic or extrahepatic biliary ductal dilatation.    Gallbladder: Layering sludge and gallstones.  No pericholecystic fluid or gallbladder wall thickening.    Pancreas: Normal. No pancreatic ductal dilatation.    Spleen: Normal size without focal lesion.    Adrenals: Unremarkable.    Kidneys and urinary collecting systems: There is a simple cyst in the left kidney superior pole.  The kidneys are otherwise unremarkable.  No hydronephrosis or urolithiasis.    Lymph nodes: None enlarged.    Stomach and bowel: The stomach is normal.  Loops of small and large bowel are normal in caliber without evidence for inflammation or obstruction.  The appendix is normal.    Peritoneum and mesentery: No ascites or free intraperitoneal air.  No abdominal fluid collection.    Vasculature: No  aneurysm or significant atherosclerosis.    Urinary bladder: There is bladder wall thickening which may be due to contracted status.    Reproductive organs: The uterus demonstrates numerous calcified granulomas.  There is endometrial fluid distension.    Body wall: No abnormality.    Musculoskeletal: No aggressive osseous lesion.      Impression    1. Urinary bladder wall thickening, correlate with urinalysis to exclude cystitis.  2. Fibroid uterus.  3. Endometrial fluid distension correlate with clinical symptoms and consider further evaluation with tissue sampling.  4. Cholelithiasis without evidence of acute cholecystitis.  5. Unchanged hypodense lesion in the left hepatic lobe.      Electronically signed by: Jason Sánchez  Date:    05/18/2023  Time:    19:49       I have reviewed associated labs, imaging and notes.     Assessment:  1. History of colon polyps    2. 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    3. Abdominal discomfort    4. Gastroesophageal reflux disease, unspecified whether esophagitis present    Neuroendocrine tumor, unknown primary, with metastasis to liver, following with hemeonc   Recent onset constipation   Now having better BM's without straining, drinking prune juice   No N/V, no abd pain, no blood in stool  Last colonoscopy more than 10 years ago, polyps removed  Associated abd bloating/fullness, belching   Protonix daily, recent prescribed carafate which has improved symptoms    Plan:  Orders Placed This Encounter    sodium,potassium,mag sulfates (SUPREP BOWEL PREP KIT) 17.5-3.13-1.6 gram SolR    Case Request Endoscopy: COLONOSCOPY, EGD (ESOPHAGOGASTRODUODENOSCOPY)     Schedule EGD/colonoscopy for further evaluation   Suprep  Continue protonix as previously prescribed  Continue carafate    Consider adding miralax daily or every other day    Karen Schaefer NP  [facility]

## 2023-05-23 NOTE — PATIENT INSTRUCTIONS
SUPREP Instructions    [facility] [address]  Clinic Office [phone]  Endoscopy Lab [phone]    You are scheduled for a Colonoscopy with Dr. Avendaño  on 7/17/23 at [facility] in [place].    Check in at the Hospital -1st floor, Information desk.   Call [phone] to reschedule.    An adult friend/family member must come with you to drive you home.  You cannot drive, take a taxi, Uber/Lyft or bus to leave the Endoscopy Center alone.  If you do not have someone to drive you home, your test will be cancelled.     Please follow the directions of your doctor if you take any pills that thin your blood. If you take these meds: Aggrenox, Brilinta, Effient, Eliquis, Lovenox, Plavix, Pletal, Pradaxa, Ticilid, Xarelto or Coumadin, let the doctor's office know.    DON'T: On the morning of the test do not take insulin or pills for diabetes.     DO: On the morning of the test, do take any pills for blood pressure, heart, anti-rejection and or seizures with a small sip of water. Bring any inhalers with you.    To have a good prep, you must follow these instructions - please do not use the directions from the pharmacy.    The doctor will send a prescription for the SUPREP.      The Day Before the test:    You can only drink CLEAR LIQUIDS the whole day before your test.  You can't eat any food for the whole day.    You CAN have:  Water, Coffee or decaf coffee (no milk or cream)  Tea  Soft drinks - regular and sugar free  Jello (green or yellow)  Apple Juice, white grape juice, white cranberry juice  Gatorade, Power Aid, Crystal Light, Brett Aid  Lemonade and Limeade  Bouillon, clear soup  Snowball, popsicles  YOU CAN'T DRINK ANYTHING RED, PURPLE ORANGE OR BLUE   YOU CAN'T DRINK ALCOHOL  ONLY DRINK WHAT IS ON THE LIST      At 5 pm the night before your test:    Pour the 1st bottle of SUPREP into the cup provided in the box. Add water to the line on the cup and mix well.  Drink the whole  cup and then drink 2 more full cups of water over 1 hour.  This can be easier to drink if it is cold. You can mix it 20 minutes ahead of time and place in the refrigerator before you drink it.  You must drink it within 30-45 minutes of mixing it.  Do NOT pour the drink over ice.  You can drink it with a straw.    The Day of the test - We will call you 2 days before your test to tell you what time to get there.    5 hours before you come to the hospital (this may be in the middle of the night)  Pour the 2nd bottle of SUPREP into the cup provided in the box. Add water to the line on the cup and mix well.  Drink the whole cup and then drink 2 more full cups of water over 1 hour.  It might be easier to drink if it is cold. You can mix it 20 minutes ahead of time and place in the refrigerator before you drink it.  You must drink it within 30-45 minutes of mixing it.  Do NOT pour the drink over ice.  You can drink it with a straw.    YOU CAN'T EAT OR DRINK ANYTHING ELSE ONCE YOU FINISH THE PREP    Leave all valuables and jewelry at home. You will be at the hospital for 2-4 hours.    Call the Endoscopy department at [phone] with any questions about your procedure.

## 2023-05-29 ENCOUNTER — OFFICE VISIT (OUTPATIENT)
Dept: PSYCHIATRY | Facility: CLINIC | Age: 68
End: 2023-05-29
Payer: COMMERCIAL

## 2023-05-29 ENCOUNTER — PATIENT MESSAGE (OUTPATIENT)
Dept: PSYCHIATRY | Facility: CLINIC | Age: 68
End: 2023-05-29
Payer: COMMERCIAL

## 2023-05-29 DIAGNOSIS — F54 PSYCHOLOGICAL FACTORS AFFECTING MEDICAL CONDITION: ICD-10-CM

## 2023-05-29 DIAGNOSIS — F41.1 GENERALIZED ANXIETY DISORDER: Primary | ICD-10-CM

## 2023-05-29 PROCEDURE — 90834 PR PSYCHOTHERAPY W/PATIENT, 45 MIN: ICD-10-PCS | Mod: S$GLB,,, | Performed by: PSYCHOLOGIST

## 2023-05-29 PROCEDURE — 1160F RVW MEDS BY RX/DR IN RCRD: CPT | Mod: CPTII,S$GLB,, | Performed by: PSYCHOLOGIST

## 2023-05-29 PROCEDURE — 1157F PR ADVANCE CARE PLAN OR EQUIV PRESENT IN MEDICAL RECORD: ICD-10-PCS | Mod: CPTII,S$GLB,, | Performed by: PSYCHOLOGIST

## 2023-05-29 PROCEDURE — 99999 PR PBB SHADOW E&M-EST. PATIENT-LVL II: CPT | Mod: PBBFAC,,, | Performed by: PSYCHOLOGIST

## 2023-05-29 PROCEDURE — 1157F ADVNC CARE PLAN IN RCRD: CPT | Mod: CPTII,S$GLB,, | Performed by: PSYCHOLOGIST

## 2023-05-29 PROCEDURE — 1159F PR MEDICATION LIST DOCUMENTED IN MEDICAL RECORD: ICD-10-PCS | Mod: CPTII,S$GLB,, | Performed by: PSYCHOLOGIST

## 2023-05-29 PROCEDURE — 1160F PR REVIEW ALL MEDS BY PRESCRIBER/CLIN PHARMACIST DOCUMENTED: ICD-10-PCS | Mod: CPTII,S$GLB,, | Performed by: PSYCHOLOGIST

## 2023-05-29 PROCEDURE — 1159F MED LIST DOCD IN RCRD: CPT | Mod: CPTII,S$GLB,, | Performed by: PSYCHOLOGIST

## 2023-05-29 PROCEDURE — 90834 PSYTX W PT 45 MINUTES: CPT | Mod: S$GLB,,, | Performed by: PSYCHOLOGIST

## 2023-05-29 PROCEDURE — 99999 PR PBB SHADOW E&M-EST. PATIENT-LVL II: ICD-10-PCS | Mod: PBBFAC,,, | Performed by: PSYCHOLOGIST

## 2023-05-29 NOTE — PROGRESS NOTES
PSYCHO-ONCOLOGY NOTE/ Individual Psychotherapy     Date: 5/29/2023   Site of therapist:  [facility]          Therapeutic Intervention: Met with patient.  Outpatient - Behavior modifying psychotherapy 45 min - CPT code 40699  The patient's last visit with me was on 5/8/2023.    Problem list  Patient Active Problem List   Diagnosis    Malignant carcinoid tumor of unknown primary site    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    Hypergastrinemia    Carcinoid (except of appendix)    Gastrinoma, malignant    Generalized anxiety disorder    Current moderate episode of major depressive disorder without prior episode    Secondary neuroendocrine tumor of liver    History of iron deficiency anemia    Elevated bilirubin    Vitamin D deficiency    Abnormal glucose    Psychological factors affecting medical condition       Chief complaint/reason for encounter: anxiety   Met with patient to evaluate psychosocial adaptation to diagnosis/treatment/survivorship of NET    Current Medications  Current Outpatient Medications   Medication    ascorbic acid, vitamin C, (VITAMIN C) 500 MG tablet    calcium carbonate (OS-TJ) 500 mg calcium (1,250 mg) tablet    cyanocobalamin (VITAMIN B-12) 1000 MCG tablet    lanreotide (SOMATULINE DEPOT) 120 mg/0.5 mL Syrg    multivitamin capsule    pantoprazole (PROTONIX) 40 MG tablet    sodium,potassium,mag sulfates (SUPREP BOWEL PREP KIT) 17.5-3.13-1.6 gram SolR    sucralfate (CARAFATE) 100 mg/mL suspension    vitamin D (VITAMIN D3) 1000 units Tab     No current facility-administered medications for this visit.       Objective:  Lena Brantley arrived promptly for the session.  Ms. Brantley was independently ambulatory at the time of session. The patient was fully cooperative throughout the session.  Appearance: age appropriate, casually  dressed, well groomed  Behavior/Cooperation: friendly and cooperative  Speech: normal in rate, volume, and tone and appropriate quality, quantity and  organization of sentences  Mood: anxious  Affect:euthymic  Thought Process: goal-directed, logical  Thought Content: normal,  No delusions or paranoia; did not appear to be responding to internal stimuli during the session  Orientation: grossly intact  Memory: Grossly intact  Attention Span/Concentration: Attends to session without distraction; reports no difficulty  Fund of Knowledge: average  Estimate of Intelligence: average from verbal skills and history  Cognition: grossly intact  Insight: patient has awareness of illness; good insight into own behavior and behavior of others  Judgment: the patient's behavior is adequate to circumstances    Interval history and content of current session: Patient discussed events and activities since the time of last visit. Discussed current adaptation to disease and treatment status. Reports to be coping  with mild difficulty . Patient states she did not complete homework assignments.  Discussed changes to treatment plan.  She did not bring list of therapists from her insurance plan. Potential list provided to her, but she was encouraged to look for coverage of services.    Patient has scheduled her EGD/colonoscopy due to recent GI distress. Very anxious about colonoscopy prep. Discussed utility of acceptance and guided imagery.    Patient has been given information about Medicare advisors, Social Security assistance, [employer] support group, etc. Avoidant of all of these resources due to fear of negative information.       Risk parameters:   Patient reports no suicidal ideation  Patient reports no homicidal ideation  Patient reports no self-injurious behavior  Patient reports no violent behavior   Safety needs:  None at this time      Verbal deficits: None     Patient's response to intervention:The patient's response to intervention is accepting.     Progress toward goals: No Progress - Continue Objectives        Patient reported outcomes:      Distress thermometer:   DISTRESS  SCREENING 5/29/2023 5/10/2023 4/17/2023 3/13/2023 3/13/2023 2/20/2023 2/20/2023   Distress Score 4 4 5 4 4 4 4   Practical Problems Insurance/Financial;Treatment Decisions - Insurance/Financial;Treatment Decisions Insurance/Financial;Work/School;Treatment Decisions Insurance/Financial;Work/School;Treatment Decisions Insurance/Financial;Work/School;Treatment Decisions Insurance/Financial;Work/School;Treatment Decisions   Family Problems Dealing with Children - Dealing with Children Dealing with Children Dealing with Children Dealing with Children Dealing with Children   Emotional Problems Fears;Sadness;Worry - Depression;Fears;Worry Depression;Fears;Nervousness;Sadness;Worry Depression;Fears;Nervousness;Sadness;Worry Fears;Worry Fears;Worry   Spiritual / Yazidism Concerns No - No No No No No   Physical Problems Constipation;Sleep - Eating;Fatigue Eating;Fatigue;Sleep Eating;Fatigue;Sleep Eating;Fatigue;Memory/Concentration;Sleep Eating;Fatigue;Memory/Concentration;Sleep   Other Problems - - - Joints started popping in Feb.2023 after being dehydrated. I contacted my primary care DrBrianne Joints started popping in Feb.2023 after being dehydrated. I contacted my primary care Dr. - -           PHQ-9=7; Initial visit: 4      PHQ8 Score : 4 (05/29/23 0636)  PHQ-9 Total Score: 4 (05/29/23 0636)      MINH-7=7; Initial visit:7   GAD7 5/29/2023 4/17/2023 3/13/2023   1. Feeling nervous, anxious, or on edge? 1 1 2   2. Not being able to stop or control worrying? 1 0 2   3. Worrying too much about different things? 1 1 2   4. Trouble relaxing? 1 0 2   5. Being so restless that it is hard to sit still? 0 0 0   6. Becoming easily annoyed or irritable? 1 1 2   7. Feeling afraid as if something awful might happen? 1 1 2   MINH-7 Score 6 4 12          Client Strengths: verbal, intelligent, successful, good social support, good insight, commitment to wellness, strong [place], strong cultural traditions       Treatment Plan:individual  psychotherapy and consult psychiatrist for medication evaluation   Target symptoms: depression, anxiety   Why chosen therapy is appropriate versus another modality: relevant to diagnosis, patient responds to this modality, evidence based practice  Outcome monitoring methods: self-report, checklist/rating scale  Therapeutic intervention type: behavior modifying psychotherapy  Prognosis: Good    Goals from last visit: Attempted, partially met    Therapy referral through insurer- next visit last visit    Return to clinic:  referral to alternative sources     Length of Service (minutes direct face-to-face contact): 45    Diagnosis:     ICD-10-CM ICD-9-CM   1. Generalized anxiety disorder  F41.1 300.02   2. Psychological factors affecting medical condition  F54 316             Alex Suazo, PhD  [place] License #[id number]  [place] License #[id number]

## 2023-05-31 ENCOUNTER — PATIENT MESSAGE (OUTPATIENT)
Dept: FAMILY MEDICINE | Facility: CLINIC | Age: 68
End: 2023-05-31

## 2023-05-31 ENCOUNTER — OFFICE VISIT (OUTPATIENT)
Dept: FAMILY MEDICINE | Facility: CLINIC | Age: 68
End: 2023-05-31
Payer: COMMERCIAL

## 2023-05-31 VITALS
HEIGHT: 65 IN | BODY MASS INDEX: 22.7 KG/M2 | OXYGEN SATURATION: 97 % | DIASTOLIC BLOOD PRESSURE: 66 MMHG | WEIGHT: 136.25 LBS | HEART RATE: 91 BPM | SYSTOLIC BLOOD PRESSURE: 100 MMHG

## 2023-05-31 DIAGNOSIS — Z09 FOLLOW-UP EXAM: Primary | ICD-10-CM

## 2023-05-31 DIAGNOSIS — K21.9 GASTROESOPHAGEAL REFLUX DISEASE, UNSPECIFIED WHETHER ESOPHAGITIS PRESENT: ICD-10-CM

## 2023-05-31 DIAGNOSIS — Z01.419 ENCOUNTER FOR ANNUAL ROUTINE GYNECOLOGICAL EXAMINATION: ICD-10-CM

## 2023-05-31 DIAGNOSIS — C7B.02 METASTATIC MALIGNANT CARCINOID TUMOR TO LIVER: ICD-10-CM

## 2023-05-31 DIAGNOSIS — D25.9 UTERINE LEIOMYOMA, UNSPECIFIED LOCATION: ICD-10-CM

## 2023-05-31 PROCEDURE — 3074F PR MOST RECENT SYSTOLIC BLOOD PRESSURE < 130 MM HG: ICD-10-PCS | Mod: CPTII,S$GLB,, | Performed by: FAMILY MEDICINE

## 2023-05-31 PROCEDURE — 1126F AMNT PAIN NOTED NONE PRSNT: CPT | Mod: CPTII,S$GLB,, | Performed by: FAMILY MEDICINE

## 2023-05-31 PROCEDURE — 3078F PR MOST RECENT DIASTOLIC BLOOD PRESSURE < 80 MM HG: ICD-10-PCS | Mod: CPTII,S$GLB,, | Performed by: FAMILY MEDICINE

## 2023-05-31 PROCEDURE — 1157F ADVNC CARE PLAN IN RCRD: CPT | Mod: CPTII,S$GLB,, | Performed by: FAMILY MEDICINE

## 2023-05-31 PROCEDURE — 3288F FALL RISK ASSESSMENT DOCD: CPT | Mod: CPTII,S$GLB,, | Performed by: FAMILY MEDICINE

## 2023-05-31 PROCEDURE — 3078F DIAST BP <80 MM HG: CPT | Mod: CPTII,S$GLB,, | Performed by: FAMILY MEDICINE

## 2023-05-31 PROCEDURE — 1157F PR ADVANCE CARE PLAN OR EQUIV PRESENT IN MEDICAL RECORD: ICD-10-PCS | Mod: CPTII,S$GLB,, | Performed by: FAMILY MEDICINE

## 2023-05-31 PROCEDURE — 99214 PR OFFICE/OUTPT VISIT, EST, LEVL IV, 30-39 MIN: ICD-10-PCS | Mod: S$GLB,,, | Performed by: FAMILY MEDICINE

## 2023-05-31 PROCEDURE — 99999 PR PBB SHADOW E&M-EST. PATIENT-LVL IV: ICD-10-PCS | Mod: PBBFAC,,, | Performed by: FAMILY MEDICINE

## 2023-05-31 PROCEDURE — 1159F PR MEDICATION LIST DOCUMENTED IN MEDICAL RECORD: ICD-10-PCS | Mod: CPTII,S$GLB,, | Performed by: FAMILY MEDICINE

## 2023-05-31 PROCEDURE — 3288F PR FALLS RISK ASSESSMENT DOCUMENTED: ICD-10-PCS | Mod: CPTII,S$GLB,, | Performed by: FAMILY MEDICINE

## 2023-05-31 PROCEDURE — 3008F BODY MASS INDEX DOCD: CPT | Mod: CPTII,S$GLB,, | Performed by: FAMILY MEDICINE

## 2023-05-31 PROCEDURE — 1101F PR PT FALLS ASSESS DOC 0-1 FALLS W/OUT INJ PAST YR: ICD-10-PCS | Mod: CPTII,S$GLB,, | Performed by: FAMILY MEDICINE

## 2023-05-31 PROCEDURE — 1126F PR PAIN SEVERITY QUANTIFIED, NO PAIN PRESENT: ICD-10-PCS | Mod: CPTII,S$GLB,, | Performed by: FAMILY MEDICINE

## 2023-05-31 PROCEDURE — 3074F SYST BP LT 130 MM HG: CPT | Mod: CPTII,S$GLB,, | Performed by: FAMILY MEDICINE

## 2023-05-31 PROCEDURE — 99214 OFFICE O/P EST MOD 30 MIN: CPT | Mod: S$GLB,,, | Performed by: FAMILY MEDICINE

## 2023-05-31 PROCEDURE — 1159F MED LIST DOCD IN RCRD: CPT | Mod: CPTII,S$GLB,, | Performed by: FAMILY MEDICINE

## 2023-05-31 PROCEDURE — 1101F PT FALLS ASSESS-DOCD LE1/YR: CPT | Mod: CPTII,S$GLB,, | Performed by: FAMILY MEDICINE

## 2023-05-31 PROCEDURE — 99999 PR PBB SHADOW E&M-EST. PATIENT-LVL IV: CPT | Mod: PBBFAC,,, | Performed by: FAMILY MEDICINE

## 2023-05-31 PROCEDURE — 3008F PR BODY MASS INDEX (BMI) DOCUMENTED: ICD-10-PCS | Mod: CPTII,S$GLB,, | Performed by: FAMILY MEDICINE

## 2023-05-31 NOTE — PROGRESS NOTES
(Portions of this note were dictated using voice recognition software and may contain dictation related errors in spelling/grammar/syntax not found on text review)    CC:   Chief Complaint   Patient presents with    Follow-up     Hospital, do to stomach pain        HPI: 67 y.o. female presented for ED follow-up visit. She has medical history significant for anxiety, depression, GERD, metastatic malignant carcinoid tumour to liver. She follows up with neuroendocrinology and currently on lanreotide every 28 days. She was seen recently in the ER with abdominal pain and constipation, has no appetite, had workup done including blood workup and CT abdomen.  She followed up with GI on 05/23 and EGD and colonoscopy is scheduled for July. She was found to have uterine fibroid and cholelithiasis on CT, pt wants to follow up with gynae, has no gynecologist and needs referral. She reports constipation has been resolved and she is feeling better. She denies having any other symptoms or concerns.     Past Medical History:   Diagnosis Date    Anxiety     Cancer     Depression     Elevated bilirubin 9/9/2020    Fatigue     GERD (gastroesophageal reflux disease)     History of iron deficiency anemia 9/9/2020    Hx of psychiatric care     Liver mass 06/2016    Mass of colon 06/2016    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     Psychiatric problem     Secondary neuroendocrine tumor of liver 9/9/2020    Sickle cell trait     Sleep difficulties        No past surgical history on file.    Family History   Problem Relation Age of Onset    Cancer Maternal Grandmother     Cancer Other     Depression Mother     Anxiety disorder Mother        Social History     Tobacco Use    Smoking status: Never     Passive exposure: Never    Smokeless tobacco: Never   Substance Use Topics    Alcohol use: No    Drug use: No       Lab Results   Component Value Date    WBC 4.81 05/18/2023    HGB 14.1 05/18/2023    HCT 41.7 05/18/2023    MCV 85  05/18/2023     05/18/2023    CHOL 209 (H) 11/04/2021    TRIG 70 11/04/2021    HDL 47 11/04/2021    ALT 15 05/18/2023    AST 24 05/18/2023    BILITOT 1.6 (H) 05/18/2023    ALKPHOS 102 05/18/2023     05/18/2023    K 4.0 05/18/2023     05/18/2023    CREATININE 1.0 05/18/2023    ESTGFRAFRICA >60 06/23/2022    EGFRNONAA 60 06/23/2022    CALCIUM 10.0 05/18/2023    ALBUMIN 4.0 05/18/2023    BUN 10 05/18/2023    CO2 29 05/18/2023    TSH 1.278 02/29/2020    INR 1.1 09/08/2016    HGBA1C 5.8 (H) 11/04/2021    LDLCALC 148.0 11/04/2021    GLU 97 05/18/2023    [id number] 52 11/04/2021             Vital signs reviewed  PE:   APPEARANCE: Well nourished, well developed, in no acute distress.    HEAD: Normocephalic, atraumatic.  EYES: EOMI.  Conjunctivae noninjected.  NECK: Supple with no cervical lymphadenopathy.    CHEST: Good inspiratory effort. Lungs clear to auscultation with no wheezes or crackles.  CARDIOVASCULAR: Normal S1, S2. No rubs, murmurs, or gallops.  ABDOMEN: Bowel sounds normal. Not distended. Soft. No tenderness or masses. No organomegaly.  EXTREMITIES: No edema, cyanosis, or clubbing.    Review of Systems   Constitutional:  Negative for chills, fatigue and fever.   HENT: Negative.     Respiratory:  Negative for cough, shortness of breath and wheezing.    Cardiovascular:  Negative for chest pain, palpitations and leg swelling.   Gastrointestinal:  Positive for constipation and reflux.   Genitourinary: Negative.    Musculoskeletal: Negative.    Neurological: Negative.    Psychiatric/Behavioral: Negative.     All other systems reviewed and are negative.    IMPRESSION  1. Follow-up exam    2. Encounter for annual routine gynecological examination    3. Uterine leiomyoma, unspecified location    4. 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    5. Gastroesophageal reflux disease, unspecified whether esophagitis present            PLAN      1. Encounter for annual routine gynecological  examination    - Ambulatory referral/consult to Gynecology; Future      2. Uterine leiomyoma, unspecified location    - Ambulatory referral/consult to Gynecology; Future        3. 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    Followed by hematology/oncology      4. Gastroesophageal reflux disease, unspecified whether esophagitis present    Continue Protonix  Followed by GI    EGD and colosncopy in July      5. Follow-up exam         SCREENINGS           Age/demographic appropriate health maintenance:    Health Maintenance Due   Topic Date Due    Colorectal Cancer Screening  Never done    COVID-19 Vaccine (5 - Pfizer series) 05/31/2023           Anat Blackwell   5/31/2023

## 2023-06-05 ENCOUNTER — PATIENT MESSAGE (OUTPATIENT)
Dept: FAMILY MEDICINE | Facility: CLINIC | Age: 68
End: 2023-06-05
Payer: COMMERCIAL

## 2023-06-06 ENCOUNTER — INFUSION (OUTPATIENT)
Dept: INFUSION THERAPY | Facility: HOSPITAL | Age: 68
End: 2023-06-06
Attending: INTERNAL MEDICINE
Payer: COMMERCIAL

## 2023-06-06 VITALS — BODY MASS INDEX: 22.7 KG/M2 | WEIGHT: 136.25 LBS | HEIGHT: 65 IN

## 2023-06-06 DIAGNOSIS — C7A.00 MALIGNANT CARCINOID TUMOR OF UNKNOWN PRIMARY SITE: Primary | ICD-10-CM

## 2023-06-06 DIAGNOSIS — C7B.02 METASTATIC MALIGNANT CARCINOID TUMOR TO LIVER: ICD-10-CM

## 2023-06-06 PROCEDURE — 63600175 PHARM REV CODE 636 W HCPCS: Mod: JZ,JG | Performed by: INTERNAL MEDICINE

## 2023-06-06 PROCEDURE — 96372 THER/PROPH/DIAG INJ SC/IM: CPT

## 2023-06-06 RX ORDER — LANREOTIDE ACETATE 120 MG/.5ML
120 INJECTION SUBCUTANEOUS
Status: DISCONTINUED | OUTPATIENT
Start: 2023-06-06 | End: 2023-06-06 | Stop reason: HOSPADM

## 2023-06-06 RX ORDER — LANREOTIDE ACETATE 120 MG/.5ML
120 INJECTION SUBCUTANEOUS
Status: CANCELLED | OUTPATIENT
Start: 2023-06-06

## 2023-06-06 RX ADMIN — LANREOTIDE ACETATE 120 MG: 120 INJECTION SUBCUTANEOUS at 10:06

## 2023-06-12 ENCOUNTER — OFFICE VISIT (OUTPATIENT)
Dept: OBSTETRICS AND GYNECOLOGY | Facility: CLINIC | Age: 68
End: 2023-06-12
Payer: COMMERCIAL

## 2023-06-12 VITALS
WEIGHT: 135.38 LBS | DIASTOLIC BLOOD PRESSURE: 75 MMHG | SYSTOLIC BLOOD PRESSURE: 114 MMHG | BODY MASS INDEX: 22.56 KG/M2 | HEIGHT: 65 IN

## 2023-06-12 DIAGNOSIS — D25.9 UTERINE LEIOMYOMA, UNSPECIFIED LOCATION: ICD-10-CM

## 2023-06-12 DIAGNOSIS — Z01.419 ENCOUNTER FOR ANNUAL ROUTINE GYNECOLOGICAL EXAMINATION: ICD-10-CM

## 2023-06-12 PROCEDURE — 3078F DIAST BP <80 MM HG: CPT | Mod: CPTII,S$GLB,, | Performed by: STUDENT IN AN ORGANIZED HEALTH CARE EDUCATION/TRAINING PROGRAM

## 2023-06-12 PROCEDURE — 1157F PR ADVANCE CARE PLAN OR EQUIV PRESENT IN MEDICAL RECORD: ICD-10-PCS | Mod: CPTII,S$GLB,, | Performed by: STUDENT IN AN ORGANIZED HEALTH CARE EDUCATION/TRAINING PROGRAM

## 2023-06-12 PROCEDURE — 99397 PER PM REEVAL EST PAT 65+ YR: CPT | Mod: S$GLB,,, | Performed by: STUDENT IN AN ORGANIZED HEALTH CARE EDUCATION/TRAINING PROGRAM

## 2023-06-12 PROCEDURE — 3008F BODY MASS INDEX DOCD: CPT | Mod: CPTII,S$GLB,, | Performed by: STUDENT IN AN ORGANIZED HEALTH CARE EDUCATION/TRAINING PROGRAM

## 2023-06-12 PROCEDURE — 99397 PR PREVENTIVE VISIT,EST,65 & OVER: ICD-10-PCS | Mod: S$GLB,,, | Performed by: STUDENT IN AN ORGANIZED HEALTH CARE EDUCATION/TRAINING PROGRAM

## 2023-06-12 PROCEDURE — 1126F AMNT PAIN NOTED NONE PRSNT: CPT | Mod: CPTII,S$GLB,, | Performed by: STUDENT IN AN ORGANIZED HEALTH CARE EDUCATION/TRAINING PROGRAM

## 2023-06-12 PROCEDURE — 99999 PR PBB SHADOW E&M-EST. PATIENT-LVL III: ICD-10-PCS | Mod: PBBFAC,,, | Performed by: STUDENT IN AN ORGANIZED HEALTH CARE EDUCATION/TRAINING PROGRAM

## 2023-06-12 PROCEDURE — 88175 CYTOPATH C/V AUTO FLUID REDO: CPT | Performed by: STUDENT IN AN ORGANIZED HEALTH CARE EDUCATION/TRAINING PROGRAM

## 2023-06-12 PROCEDURE — 3078F PR MOST RECENT DIASTOLIC BLOOD PRESSURE < 80 MM HG: ICD-10-PCS | Mod: CPTII,S$GLB,, | Performed by: STUDENT IN AN ORGANIZED HEALTH CARE EDUCATION/TRAINING PROGRAM

## 2023-06-12 PROCEDURE — 1159F PR MEDICATION LIST DOCUMENTED IN MEDICAL RECORD: ICD-10-PCS | Mod: CPTII,S$GLB,, | Performed by: STUDENT IN AN ORGANIZED HEALTH CARE EDUCATION/TRAINING PROGRAM

## 2023-06-12 PROCEDURE — 3008F PR BODY MASS INDEX (BMI) DOCUMENTED: ICD-10-PCS | Mod: CPTII,S$GLB,, | Performed by: STUDENT IN AN ORGANIZED HEALTH CARE EDUCATION/TRAINING PROGRAM

## 2023-06-12 PROCEDURE — 3074F PR MOST RECENT SYSTOLIC BLOOD PRESSURE < 130 MM HG: ICD-10-PCS | Mod: CPTII,S$GLB,, | Performed by: STUDENT IN AN ORGANIZED HEALTH CARE EDUCATION/TRAINING PROGRAM

## 2023-06-12 PROCEDURE — 1157F ADVNC CARE PLAN IN RCRD: CPT | Mod: CPTII,S$GLB,, | Performed by: STUDENT IN AN ORGANIZED HEALTH CARE EDUCATION/TRAINING PROGRAM

## 2023-06-12 PROCEDURE — 3074F SYST BP LT 130 MM HG: CPT | Mod: CPTII,S$GLB,, | Performed by: STUDENT IN AN ORGANIZED HEALTH CARE EDUCATION/TRAINING PROGRAM

## 2023-06-12 PROCEDURE — 87624 HPV HI-RISK TYP POOLED RSLT: CPT | Performed by: STUDENT IN AN ORGANIZED HEALTH CARE EDUCATION/TRAINING PROGRAM

## 2023-06-12 PROCEDURE — 1126F PR PAIN SEVERITY QUANTIFIED, NO PAIN PRESENT: ICD-10-PCS | Mod: CPTII,S$GLB,, | Performed by: STUDENT IN AN ORGANIZED HEALTH CARE EDUCATION/TRAINING PROGRAM

## 2023-06-12 PROCEDURE — 1159F MED LIST DOCD IN RCRD: CPT | Mod: CPTII,S$GLB,, | Performed by: STUDENT IN AN ORGANIZED HEALTH CARE EDUCATION/TRAINING PROGRAM

## 2023-06-12 PROCEDURE — 99999 PR PBB SHADOW E&M-EST. PATIENT-LVL III: CPT | Mod: PBBFAC,,, | Performed by: STUDENT IN AN ORGANIZED HEALTH CARE EDUCATION/TRAINING PROGRAM

## 2023-06-12 NOTE — PROGRESS NOTES
CC: Well woman exam    HPI:  Lena Brantley is a 67 y.o. female  presents for a well woman exam.  She has no issues, problems, or complaints. Patient reports she went to ED due to abdominal pain (in May) and CT reported Reproductive organs: The uterus demonstrates numerous calcified granulomas.  There is endometrial fluid distension. Patient has had no bleeding or other associated symptoms. Reports her sister passed from endometrial cancer, concerned about possibility of cancer in the future.       Patient history:   Past Medical History:   Diagnosis Date    Anxiety     Cancer     Depression     Elevated bilirubin 2020    Fatigue     GERD (gastroesophageal reflux disease)     History of iron deficiency anemia 2020    Hx of psychiatric care     Liver mass 2016    Mass of colon 2016    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     Psychiatric problem     Secondary neuroendocrine tumor of liver 2020    Sickle cell trait     Sleep difficulties      History reviewed. No pertinent surgical history.  OB History    Para Term  AB Living   1 1 1         SAB IAB Ectopic Multiple Live Births                  # Outcome Date GA Lbr Kenji/2nd Weight Sex Delivery Anes PTL Lv   1 Term                GYN  Menopausal: Yes  History of abnormal paps: DENIES  Abnormal or postmenopausal bleeding: DENIES  History of abnormal mammograms:DENIES   Family history of breast or ovarian cancer: DENIES  Any breast masses, pain, skin changes, or nipple discharge: DENIES  Possible recent STD exposure: denies  Contraception: N/A    Pap: 2020, NILM/HPV(-)  Mammogram: BIRADS1, T-C=8.5% 2022      Family History   Problem Relation Age of Onset    Cancer Maternal Grandmother     Cancer Other     Depression Mother     Anxiety disorder Mother      Social History     Tobacco Use    Smoking status: Never     Passive exposure: Never    Smokeless tobacco: Never   Substance Use Topics    Alcohol use: No     "Drug use: No     Allergies: Epinephrine    Current Outpatient Medications:     ascorbic acid, vitamin C, (VITAMIN C) 500 MG tablet, Take 500 mg by mouth once daily., Disp: , Rfl:     calcium carbonate (OS-TJ) 500 mg calcium (1,250 mg) tablet, Take 1 tablet by mouth once daily., Disp: , Rfl:     cyanocobalamin (VITAMIN B-12) 1000 MCG tablet, Take 100 mcg by mouth once daily., Disp: , Rfl:     lanreotide (SOMATULINE DEPOT) 120 mg/0.5 mL Syrg, Inject 120 mg into the skin every 28 days., Disp: , Rfl:     pantoprazole (PROTONIX) 40 MG tablet, Take 1 tablet (40 mg total) by mouth once daily., Disp: 90 tablet, Rfl: 3    sucralfate (CARAFATE) 100 mg/mL suspension, Take 10 mLs (1 g total) by mouth 4 (four) times daily., Disp: 414 mL, Rfl: 0    vitamin D (VITAMIN D3) 1000 units Tab, Take 1,000 Units by mouth once daily., Disp: , Rfl:     multivitamin capsule, Take 1 capsule by mouth once daily., Disp: , Rfl:     sodium,potassium,mag sulfates (SUPREP BOWEL PREP KIT) 17.5-3.13-1.6 gram SolR, Take 177 mLs by mouth once daily. (Patient not taking: Reported on 5/31/2023), Disp: 1 kit, Rfl: 0       ROS:  GENERAL: Denies weight gain or weight loss. Feeling well overall.   SKIN: Denies rash or lesions.   HEAD: Denies head injury or headache.   NODES: Denies enlarged lymph nodes.   CHEST: Denies chest pain or shortness of breath.   CARDIOVASCULAR: Denies palpitations or left sided chest pain.   ABDOMEN: No abdominal pain, constipation, diarrhea, nausea, vomiting or rectal bleeding.   URINARY: No frequency, dysuria, hematuria, or burning on urination.  REPRODUCTIVE: See HPI.   BREASTS: The patient performs breast self-examination and denies pain, lumps, or nipple discharge.   HEMATOLOGIC: No easy bruisability or excessive bleeding.  MUSCULOSKELETAL: Denies joint pain or swelling.   NEUROLOGIC: Denies syncope or weakness.   PSYCHIATRIC: Denies depression, anxiety or mood swings.    Objective:   /75   Ht 5' 5" (1.651 m)   Wt " 61.4 kg (135 lb 5.8 oz)   BMI 22.53 kg/m²       Physical Exam:  APPEARANCE: Well nourished, well developed, in no acute distress.  AFFECT: WNL, alert and oriented x 3  SKIN: No acne or hirsutism  NECK: Neck symmetric without masses or thyromegaly  NODES: No inguinal, cervical, axillary, or femoral lymph node enlargement  CHEST: Good respiratory effect  ABDOMEN: Soft.  No tenderness or masses.  No hepatosplenomegaly.  No hernias.  BREASTS: Symmetrical, no skin changes or visible lesions.  No palpable masses, nipple discharge bilaterally.  PELVIC: Normal external genitalia without lesions.  Normal hair distribution.  Adequate perineal body, normal urethral meatus.  Vagina atrophic without lesions or discharge.  Cervix pink, without lesions, discharge or tenderness.  No significant cystocele or rectocele.  Bimanual exam shows uterus to be normal size, regular, mobile and nontender.  Adnexa without masses or tenderness.  EXTREMITIES: No edema.    ASSESSMENT AND PLAN  1. Encounter for annual routine gynecological examination  Ambulatory referral/consult to Gynecology    Liquid-Based Pap Smear, Screening    HPV High Risk Genotypes, PCR      2. Uterine leiomyoma, unspecified location  Ambulatory referral/consult to Gynecology    US Pelvis Comp with Transvag NON-OB (xpd          Annual exam  Breast and pelvic exam: wnl  Patient counseled on ASCCP guidelines for cervical cytology screening  Cervical screening: done today   Patient counseled on current recommendations for breast cancer screening  Mammogram screening: due Nov 2023  STD testing: not requested today   Osteoporosis screening: done Feb 2023, osteopenia   Tobacco cessation counseling n/a    She was counseled to follow up with her PCP for other routine health maintenance      Follow up in about 1 year (around 6/12/2024).      Elida Schilling MD  OBGYN [facility]

## 2023-06-20 ENCOUNTER — HOSPITAL ENCOUNTER (OUTPATIENT)
Dept: RADIOLOGY | Facility: HOSPITAL | Age: 68
Discharge: HOME | End: 2023-06-20
Attending: INTERNAL MEDICINE
Payer: COMMERCIAL

## 2023-06-20 DIAGNOSIS — C7B.8 SECONDARY NEUROENDOCRINE TUMOR OF LIVER: ICD-10-CM

## 2023-06-20 PROCEDURE — 74183 MRI ABD W/O CNTR FLWD CNTR: CPT | Mod: TC

## 2023-06-20 PROCEDURE — A9585 GADOBUTROL INJECTION: HCPCS | Performed by: INTERNAL MEDICINE

## 2023-06-20 PROCEDURE — 25500020 PHARM REV CODE 255: Performed by: INTERNAL MEDICINE

## 2023-06-20 PROCEDURE — 74183 MRI ABDOMEN W WO CONTRAST: ICD-10-PCS | Mod: 26,,, | Performed by: RADIOLOGY

## 2023-06-20 PROCEDURE — 74183 MRI ABD W/O CNTR FLWD CNTR: CPT | Mod: 26,,, | Performed by: RADIOLOGY

## 2023-06-20 RX ORDER — GADOBUTROL 604.72 MG/ML
10 INJECTION INTRAVENOUS
Status: COMPLETED | OUTPATIENT
Start: 2023-06-20 | End: 2023-06-20

## 2023-06-20 RX ADMIN — GADOBUTROL 10 ML: 604.72 INJECTION INTRAVENOUS at 10:06

## 2023-06-23 NOTE — PROGRESS NOTES
"PATIENT: Lena Brantley  MRN: [id number]  DATE: 6/26/2023    Diagnosis:   1. 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 Secondary neuroendocrine tumor of liver    3. Other decreased white blood cell (WBC) count    4. GERD without esophagitis    5. Other constipation      Chief Complaint: neuroendocrine tumor    Oncologic History:       Oncologic History Neuroendocrine tumor unknown primary diagnosed 09/2016  Metastatic disease to liver at presentation    Oncologic Treatment Lanreotide 10/2016    Pathology Well-differentiated neuroendocrine tumor, Ki-67 1%       Subjective:    History of Present Illness: Ms. Brantley is a 67 y.o. female who presented in August 2022 for evaluation and management of metastatic neuroendocrine tumor.    Information from chart review:  "Her history dates to 9/2016 when she was undergoing workup for episodic nausea, vomiting and diarrhea.  A CT scan was performed showing a solitary and had seeing liver mass.  A biopsy was performed showing a well-differentiated neuroendocrine tumor, Ki-67 of 1%.  Conventional imaging an octreotide scan did not reveal a primary site disease.  Gene expression profiling was performed indicating a possible primary site pancreas.  She was started on Lanreotide in 2016.  She was offered surgical resection and also liver directed therapy and declined."    - she was last seen by Dr. iRddle on 4/22/22.  - case was discussed at neuroendocrine conference on 4/25/22. Recommendation was to proceed with chemoembolization of 3.7 x 2.4cm mass in segment 2/4.    - she underwent CT scan on 12/7/22    Interval history:  - she presents for a follow-up appointment for her neuroendocrine tumor.  - she met with interventional radiology on 1/10/23 regarding liver-directed therapy. She decided not to proceed with it.  - she presented to the emergency room in May 2023 for nausea/abdominal pain/constipation. She underwent imaging then.  - she underwent MRI abdomen on " 6/20/23.  - today, she endorses fatigue, dyspnea upon exertion, constipation, mood changes, nausea.       Past medical, surgical, family, and social histories have been reviewed and updated below.    Past Medical History:   Past Medical History:   Diagnosis Date    Anxiety     Cancer     Depression     Elevated bilirubin 9/9/2020    Fatigue     GERD (gastroesophageal reflux disease)     History of iron deficiency anemia 9/9/2020    Hx of psychiatric care     Liver mass 06/2016    Mass of colon 06/2016    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     Psychiatric problem     Secondary neuroendocrine tumor of liver 9/9/2020    Sickle cell trait     Sleep difficulties        Past Surgical History: No past surgical history on file.    Family History:   Family History   Problem Relation Age of Onset    Cancer Maternal Grandmother     Cancer Other     Depression Mother     Anxiety disorder Mother        Social History:  reports that she has never smoked. She has never been exposed to tobacco smoke. She has never used smokeless tobacco. She reports that she does not drink alcohol and does not use drugs.    Allergies:  Review of patient's allergies indicates:   Allergen Reactions    Epinephrine Anaphylaxis     Can Cause Carcinoid Crisis       Medications:  Current Outpatient Medications   Medication Sig Dispense Refill    ascorbic acid, vitamin C, (VITAMIN C) 500 MG tablet Take 500 mg by mouth once daily.      calcium carbonate (OS-TJ) 500 mg calcium (1,250 mg) tablet Take 1 tablet by mouth once daily.      cyanocobalamin (VITAMIN B-12) 1000 MCG tablet Take 100 mcg by mouth once daily.      lanreotide (SOMATULINE DEPOT) 120 mg/0.5 mL Syrg Inject 120 mg into the skin every 28 days.      multivitamin capsule Take 1 capsule by mouth once daily.      pantoprazole (PROTONIX) 40 MG tablet Take 1 tablet (40 mg total) by mouth once daily. 90 tablet 3    sodium,potassium,mag sulfates (SUPREP BOWEL PREP KIT) 17.5-3.13-1.6 gram  SolR Take 177 mLs by mouth once daily. (Patient not taking: Reported on 5/31/2023) 1 kit 0    sucralfate (CARAFATE) 100 mg/mL suspension Take 10 mLs (1 g total) by mouth 4 (four) times daily. 414 mL 0    vitamin D (VITAMIN D3) 1000 units Tab Take 1,000 Units by mouth once daily.       No current facility-administered medications for this visit.       Review of Systems   Constitutional:  Positive for fatigue.   HENT:  Negative for sore throat.    Eyes:  Negative for visual disturbance.   Respiratory:  Positive for shortness of breath. Negative for cough.    Cardiovascular:  Negative for chest pain.   Gastrointestinal:  Positive for constipation and nausea. Negative for abdominal pain, diarrhea and vomiting.   Genitourinary:  Negative for dysuria.   Musculoskeletal:  Negative for back pain.   Skin:  Negative for rash.   Neurological:  Negative for headaches.   Hematological:  Negative for adenopathy.   Psychiatric/Behavioral:  The patient is nervous/anxious.      ECOG Performance Status:   ECOG SCORE 1            Objective:      Vitals:   Vitals:    06/26/23 1101   BP: 126/75   BP Location: Right arm   Patient Position: Sitting   BP Method: Medium (Automatic)   Pulse: 102   Resp: 18   SpO2: 98%   Weight: 61.4 kg (135 lb 5.8 oz)       BMI: Body mass index is 22.53 kg/m².    Physical Exam  Vitals and nursing note reviewed.   Constitutional:       Appearance: She is well-developed.   HENT:      Head: Normocephalic and atraumatic.   Eyes:      Pupils: Pupils are equal, round, and reactive to light.   Cardiovascular:      Rate and Rhythm: Normal rate and regular rhythm.   Pulmonary:      Effort: Pulmonary effort is normal.      Breath sounds: Normal breath sounds.   Abdominal:      General: Bowel sounds are normal.      Palpations: Abdomen is soft.   Musculoskeletal:         General: Normal range of motion.      Cervical back: Normal range of motion and neck supple.   Skin:     General: Skin is warm and dry.    Neurological:      Mental Status: She is alert and oriented to person, place, and time.   Psychiatric:         Behavior: Behavior normal.         Thought Content: Thought content normal.         Judgment: Judgment normal.       Laboratory Data:  Labs have been reviewed.    Lab Results   Component Value Date    WBC 3.82 (L) 06/20/2023    HGB 14.2 06/20/2023    HCT 42.9 06/20/2023    MCV 88 06/20/2023     06/20/2023       Imaging:     MRI abdomen (6/20/23): I have personally reviewed the images  Comparison is a CT dated 05/18/2023.     Patient was administered 10 cc of Gadavist.     Liver lesion measures 3.3 cm, previously 3.1.  Spleen, stomach, gallbladder, biliary tree, pancreas, kidneys, and adrenal glands show nothing unusual.  There is a left renal cyst.  Vessels are unremarkable.  No para-aortic or retroperitoneal adenopathy is identified.     Impression:     Stable liver lesion as above.  There has been no detrimental change.  Gallium 68 Dotatate scan could be helpful.      CT abdomen/pelvis (12/7/22): I have personally reviewed the images  No significant detrimental interval change when compared to prior exam.     Stable left hepatic lobe lesion.  No new focal lesions.     Simple left renal cyst.     Gallbladder sludge.     RECIST SUMMARY:     Date of prior examination for comparison: 01/06/2022     Lesion 1: Left hepatic lobe lesion.  3.3 cm.  Series 3 image 27.  Prior measurement 3.0 cm           Copper pet scan (3/31/22): I have personally reviewed the images    Quality of the study: Adequate.     SUV measurements may not be directly comparable with those made on Ga-68 DOTATATE PET/CT.     In the head and neck, there is physiologic distribution in the pituitary, salivary, and thyroid glands, and there are no tracer avid lesions suspicious for malignancy.     In the chest, there are no tracer avid lesions suspicious for malignancy.     In the abdomen and pelvis, there is physiologic uptake in the  "pancreatic uncinate process and body, spleen, adrenal glands and  tract.     Stable appearing radiotracer avid lesion within the liver measuring 3.0 x 2.5 cm (previously 3.0 x 2.5 cm) with SUV max of 33 (previously 27).  No new radiotracer avid lesion identified within the abdomen or pelvis.     Small umbilical hernia containing partial loop of bowel without evidence of obstruction.     In the bones, there are no tracer avid lesions suspicious for malignancy.     Impression:     Stable somatostatin receptor avid left hepatic lobe lesion.  No new radiotracer avid lesion.      Assessment:       1. 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 Secondary neuroendocrine tumor of liver    3. Other decreased white blood cell (WBC) count    4. GERD without esophagitis    5. Other constipation         Plan:     1. Metastatic neuroendocrine tumor  - I have reviewed her chart  "Her history dates to 9/2016 when she was undergoing workup for episodic nausea, vomiting and diarrhea.  A CT scan was performed showing a solitary and had seeing liver mass.  A biopsy was performed showing a well-differentiated neuroendocrine tumor, Ki-67 of 1%.  Conventional imaging an octreotide scan did not reveal a primary site disease.  Gene expression profiling was performed indicating a possible primary site pancreas.  She was started on Lanreotide in 2016.  She was offered surgical resection and also liver directed therapy and declined."  - she was last seen by Dr. Riddle on 4/22/22.  - case was discussed at neuroendocrine conference on 4/25/22. Recommendation was to proceed with chemoembolization of 3.7 x 2.4 cm mass in segment 2/4.  - she did not proceed with chemoembolization.  - I discussed proceeding with chemoembolization. She would like to get imaging in a few months and decide at that time.  - CT abdomen/pelvis (12/8/22) revealed mostly stable disease. The liver lesion increased in size from 3 to 3.3 cm.  - I presented her " case at neuroendocrine conference on 12/15/22. Recommendation was consideration for liver directed therapy.  - she met with interventional radiology on 1/10/23 regarding liver-directed therapy. She decided not to proceed with it.  - MRI abdomen (6/20/23) revealed stable disease.  - proceed with lanreotide  - return to clinic in 6 months with repeat labs/scan.    2. Leukopenia  - intermittent leukopenia since 2017  - RBC antigens Guy A/B antigens were negative, suggestive of benign neutropenia  - continue to monitor    3. Constipation / GERD  - she takes pantoprazole  - refer to gastroenterology per her request  - I sent lactulose prescription in.    4. Advance Care Planning     Power of   After our discussion (at previous visit), the patient decided to complete a HCPOA and appointed her  daughter Sujata Brantley ([phone]), and brothers Isael Haq ([phone]) and Andrzej Haq ([phone]) .         - return to clinic in 6 months with repeat labs/scan.    Eliot Woodruff M.D.  Hematology/Oncology  [facility] - [address], [address]  [place] [address]  Phone: [phone]  Fax: [phone]

## 2023-06-26 ENCOUNTER — OFFICE VISIT (OUTPATIENT)
Dept: HEMATOLOGY/ONCOLOGY | Facility: CLINIC | Age: 68
End: 2023-06-26
Payer: COMMERCIAL

## 2023-06-26 ENCOUNTER — TELEPHONE (OUTPATIENT)
Dept: ADMINISTRATIVE | Facility: OTHER | Age: 68
End: 2023-06-26
Payer: COMMERCIAL

## 2023-06-26 VITALS
RESPIRATION RATE: 18 BRPM | DIASTOLIC BLOOD PRESSURE: 75 MMHG | SYSTOLIC BLOOD PRESSURE: 126 MMHG | WEIGHT: 135.38 LBS | BODY MASS INDEX: 22.53 KG/M2 | HEART RATE: 102 BPM | OXYGEN SATURATION: 98 %

## 2023-06-26 DIAGNOSIS — K59.09 OTHER CONSTIPATION: ICD-10-CM

## 2023-06-26 DIAGNOSIS — K21.9 GERD WITHOUT ESOPHAGITIS: ICD-10-CM

## 2023-06-26 DIAGNOSIS — C7B.02 METASTATIC MALIGNANT CARCINOID TUMOR TO LIVER: Primary | ICD-10-CM

## 2023-06-26 DIAGNOSIS — D72.818 OTHER DECREASED WHITE BLOOD CELL (WBC) COUNT: ICD-10-CM

## 2023-06-26 DIAGNOSIS — C7B.8 SECONDARY NEUROENDOCRINE TUMOR OF LIVER: ICD-10-CM

## 2023-06-26 PROCEDURE — 1126F PR PAIN SEVERITY QUANTIFIED, NO PAIN PRESENT: ICD-10-PCS | Mod: CPTII,S$GLB,, | Performed by: INTERNAL MEDICINE

## 2023-06-26 PROCEDURE — 99215 PR OFFICE/OUTPT VISIT, EST, LEVL V, 40-54 MIN: ICD-10-PCS | Mod: S$GLB,,, | Performed by: INTERNAL MEDICINE

## 2023-06-26 PROCEDURE — 3074F SYST BP LT 130 MM HG: CPT | Mod: CPTII,S$GLB,, | Performed by: INTERNAL MEDICINE

## 2023-06-26 PROCEDURE — 1101F PR PT FALLS ASSESS DOC 0-1 FALLS W/OUT INJ PAST YR: ICD-10-PCS | Mod: CPTII,S$GLB,, | Performed by: INTERNAL MEDICINE

## 2023-06-26 PROCEDURE — 3008F BODY MASS INDEX DOCD: CPT | Mod: CPTII,S$GLB,, | Performed by: INTERNAL MEDICINE

## 2023-06-26 PROCEDURE — 3078F DIAST BP <80 MM HG: CPT | Mod: CPTII,S$GLB,, | Performed by: INTERNAL MEDICINE

## 2023-06-26 PROCEDURE — 3078F PR MOST RECENT DIASTOLIC BLOOD PRESSURE < 80 MM HG: ICD-10-PCS | Mod: CPTII,S$GLB,, | Performed by: INTERNAL MEDICINE

## 2023-06-26 PROCEDURE — 1101F PT FALLS ASSESS-DOCD LE1/YR: CPT | Mod: CPTII,S$GLB,, | Performed by: INTERNAL MEDICINE

## 2023-06-26 PROCEDURE — 1126F AMNT PAIN NOTED NONE PRSNT: CPT | Mod: CPTII,S$GLB,, | Performed by: INTERNAL MEDICINE

## 2023-06-26 PROCEDURE — 3074F PR MOST RECENT SYSTOLIC BLOOD PRESSURE < 130 MM HG: ICD-10-PCS | Mod: CPTII,S$GLB,, | Performed by: INTERNAL MEDICINE

## 2023-06-26 PROCEDURE — 1157F PR ADVANCE CARE PLAN OR EQUIV PRESENT IN MEDICAL RECORD: ICD-10-PCS | Mod: CPTII,S$GLB,, | Performed by: INTERNAL MEDICINE

## 2023-06-26 PROCEDURE — 99999 PR PBB SHADOW E&M-EST. PATIENT-LVL IV: ICD-10-PCS | Mod: PBBFAC,,, | Performed by: INTERNAL MEDICINE

## 2023-06-26 PROCEDURE — 3008F PR BODY MASS INDEX (BMI) DOCUMENTED: ICD-10-PCS | Mod: CPTII,S$GLB,, | Performed by: INTERNAL MEDICINE

## 2023-06-26 PROCEDURE — 99999 PR PBB SHADOW E&M-EST. PATIENT-LVL IV: CPT | Mod: PBBFAC,,, | Performed by: INTERNAL MEDICINE

## 2023-06-26 PROCEDURE — 99215 OFFICE O/P EST HI 40 MIN: CPT | Mod: S$GLB,,, | Performed by: INTERNAL MEDICINE

## 2023-06-26 PROCEDURE — 1157F ADVNC CARE PLAN IN RCRD: CPT | Mod: CPTII,S$GLB,, | Performed by: INTERNAL MEDICINE

## 2023-06-26 PROCEDURE — 3288F FALL RISK ASSESSMENT DOCD: CPT | Mod: CPTII,S$GLB,, | Performed by: INTERNAL MEDICINE

## 2023-06-26 PROCEDURE — 3288F PR FALLS RISK ASSESSMENT DOCUMENTED: ICD-10-PCS | Mod: CPTII,S$GLB,, | Performed by: INTERNAL MEDICINE

## 2023-06-26 PROCEDURE — 1159F MED LIST DOCD IN RCRD: CPT | Mod: CPTII,S$GLB,, | Performed by: INTERNAL MEDICINE

## 2023-06-26 PROCEDURE — 1159F PR MEDICATION LIST DOCUMENTED IN MEDICAL RECORD: ICD-10-PCS | Mod: CPTII,S$GLB,, | Performed by: INTERNAL MEDICINE

## 2023-06-26 RX ORDER — LACTULOSE 10 G/15ML
10 SOLUTION ORAL 2 TIMES DAILY PRN
Qty: 300 ML | Refills: 1 | Status: SHIPPED | OUTPATIENT
Start: 2023-06-26

## 2023-07-11 ENCOUNTER — INFUSION (OUTPATIENT)
Dept: INFUSION THERAPY | Facility: HOSPITAL | Age: 68
End: 2023-07-11
Attending: INTERNAL MEDICINE
Payer: COMMERCIAL

## 2023-07-11 VITALS — WEIGHT: 135.38 LBS | HEIGHT: 65 IN | BODY MASS INDEX: 22.56 KG/M2

## 2023-07-11 DIAGNOSIS — C7A.00 MALIGNANT CARCINOID TUMOR OF UNKNOWN PRIMARY SITE: Primary | ICD-10-CM

## 2023-07-11 DIAGNOSIS — C7B.02 METASTATIC MALIGNANT CARCINOID TUMOR TO LIVER: ICD-10-CM

## 2023-07-11 PROCEDURE — 96372 THER/PROPH/DIAG INJ SC/IM: CPT

## 2023-07-11 PROCEDURE — 63600175 PHARM REV CODE 636 W HCPCS: Mod: JZ,JG | Performed by: INTERNAL MEDICINE

## 2023-07-11 RX ORDER — LANREOTIDE ACETATE 120 MG/.5ML
120 INJECTION SUBCUTANEOUS
Status: CANCELLED | OUTPATIENT
Start: 2023-07-11

## 2023-07-11 RX ORDER — LANREOTIDE ACETATE 120 MG/.5ML
120 INJECTION SUBCUTANEOUS
Status: DISCONTINUED | OUTPATIENT
Start: 2023-07-11 | End: 2023-07-11 | Stop reason: HOSPADM

## 2023-07-11 RX ADMIN — LANREOTIDE ACETATE 120 MG: 120 INJECTION SUBCUTANEOUS at 10:07

## 2023-07-12 ENCOUNTER — TELEPHONE (OUTPATIENT)
Dept: ENDOSCOPY | Facility: HOSPITAL | Age: 68
End: 2023-07-12
Payer: COMMERCIAL

## 2023-07-12 NOTE — TELEPHONE ENCOUNTER
Spoke with patient about arrival time @ 0700.   EGD/Colon    Prep instructions reviewed: the day before the procedure, follow a clear liquid diet all day, then start the first 1/2 of prep at 5pm and take 2nd 1/2 of prep @ 0200.  Pt must be completely NPO when prep completed @ 0400.  Suprep instructions sent to portal.            Medications: Do not take Insulin or oral diabetic medications the day of the procedure.  Take as prescribed: heart, seizure and blood pressure medication in the morning with a sip of water (less than an ounce).  Take any breathing medications and bring inhalers to hospital with you Leave all valuables and jewelry at home.     Wear comfortable clothes to procedure to change into hospital gown You cannot drive for 24 hours after your procedure because you will receive sedation for your procedure to make you comfortable.  A ride must be provided at discharge.

## 2023-07-17 ENCOUNTER — HOSPITAL ENCOUNTER (OUTPATIENT)
Facility: HOSPITAL | Age: 68
Discharge: HOME | End: 2023-07-17
Attending: INTERNAL MEDICINE | Admitting: INTERNAL MEDICINE
Payer: COMMERCIAL

## 2023-07-17 ENCOUNTER — ANESTHESIA EVENT (OUTPATIENT)
Dept: ENDOSCOPY | Facility: HOSPITAL | Age: 68
End: 2023-07-17
Payer: COMMERCIAL

## 2023-07-17 ENCOUNTER — ANESTHESIA (OUTPATIENT)
Dept: ENDOSCOPY | Facility: HOSPITAL | Age: 68
End: 2023-07-17
Payer: COMMERCIAL

## 2023-07-17 VITALS
DIASTOLIC BLOOD PRESSURE: 64 MMHG | SYSTOLIC BLOOD PRESSURE: 105 MMHG | RESPIRATION RATE: 20 BRPM | TEMPERATURE: 98 F | HEIGHT: 64 IN | BODY MASS INDEX: 23.22 KG/M2 | OXYGEN SATURATION: 100 % | WEIGHT: 136 LBS | HEART RATE: 74 BPM

## 2023-07-17 DIAGNOSIS — Z12.11 COLON CANCER SCREENING: ICD-10-CM

## 2023-07-17 DIAGNOSIS — C7A.00 MALIGNANT CARCINOID TUMOR OF UNKNOWN PRIMARY SITE: Primary | ICD-10-CM

## 2023-07-17 PROCEDURE — 43239 PR EGD, FLEX, W/BIOPSY, SGL/MULTI: ICD-10-PCS | Mod: 51,,, | Performed by: INTERNAL MEDICINE

## 2023-07-17 PROCEDURE — D9220A PRA ANESTHESIA: ICD-10-PCS | Mod: 33,ANES,, | Performed by: ANESTHESIOLOGY

## 2023-07-17 PROCEDURE — 88305 TISSUE EXAM BY PATHOLOGIST: ICD-10-PCS | Mod: 26,,, | Performed by: STUDENT IN AN ORGANIZED HEALTH CARE EDUCATION/TRAINING PROGRAM

## 2023-07-17 PROCEDURE — 88342 IMHCHEM/IMCYTCHM 1ST ANTB: CPT | Mod: 26,,, | Performed by: STUDENT IN AN ORGANIZED HEALTH CARE EDUCATION/TRAINING PROGRAM

## 2023-07-17 PROCEDURE — 88342 CHG IMMUNOCYTOCHEMISTRY: ICD-10-PCS | Mod: 26,,, | Performed by: STUDENT IN AN ORGANIZED HEALTH CARE EDUCATION/TRAINING PROGRAM

## 2023-07-17 PROCEDURE — 43239 EGD BIOPSY SINGLE/MULTIPLE: CPT | Performed by: INTERNAL MEDICINE

## 2023-07-17 PROCEDURE — 27201089 HC SNARE, DISP (ANY): Performed by: INTERNAL MEDICINE

## 2023-07-17 PROCEDURE — 88313 SPECIAL STAINS GROUP 2: CPT | Performed by: STUDENT IN AN ORGANIZED HEALTH CARE EDUCATION/TRAINING PROGRAM

## 2023-07-17 PROCEDURE — 88313 PR  SPECIAL STAINS,GROUP II: ICD-10-PCS | Mod: 26,,, | Performed by: STUDENT IN AN ORGANIZED HEALTH CARE EDUCATION/TRAINING PROGRAM

## 2023-07-17 PROCEDURE — 88305 TISSUE EXAM BY PATHOLOGIST: CPT | Mod: 59 | Performed by: STUDENT IN AN ORGANIZED HEALTH CARE EDUCATION/TRAINING PROGRAM

## 2023-07-17 PROCEDURE — 45385 COLONOSCOPY W/LESION REMOVAL: CPT | Mod: PT | Performed by: INTERNAL MEDICINE

## 2023-07-17 PROCEDURE — 63600175 PHARM REV CODE 636 W HCPCS: Performed by: NURSE ANESTHETIST, CERTIFIED REGISTERED

## 2023-07-17 PROCEDURE — 45385 COLONOSCOPY W/LESION REMOVAL: CPT | Mod: 33,,, | Performed by: INTERNAL MEDICINE

## 2023-07-17 PROCEDURE — 37000008 HC ANESTHESIA 1ST 15 MINUTES: Performed by: INTERNAL MEDICINE

## 2023-07-17 PROCEDURE — 88342 IMHCHEM/IMCYTCHM 1ST ANTB: CPT | Performed by: STUDENT IN AN ORGANIZED HEALTH CARE EDUCATION/TRAINING PROGRAM

## 2023-07-17 PROCEDURE — 88305 TISSUE EXAM BY PATHOLOGIST: CPT | Mod: 26,,, | Performed by: STUDENT IN AN ORGANIZED HEALTH CARE EDUCATION/TRAINING PROGRAM

## 2023-07-17 PROCEDURE — D9220A PRA ANESTHESIA: ICD-10-PCS | Mod: 33,CRNA,, | Performed by: NURSE ANESTHETIST, CERTIFIED REGISTERED

## 2023-07-17 PROCEDURE — 25000003 PHARM REV CODE 250: Performed by: INTERNAL MEDICINE

## 2023-07-17 PROCEDURE — D9220A PRA ANESTHESIA: Mod: 33,ANES,, | Performed by: ANESTHESIOLOGY

## 2023-07-17 PROCEDURE — 45385 PR COLONOSCOPY,REMV LESN,SNARE: ICD-10-PCS | Mod: 33,,, | Performed by: INTERNAL MEDICINE

## 2023-07-17 PROCEDURE — 37000009 HC ANESTHESIA EA ADD 15 MINS: Performed by: INTERNAL MEDICINE

## 2023-07-17 PROCEDURE — 27201012 HC FORCEPS, HOT/COLD, DISP: Performed by: INTERNAL MEDICINE

## 2023-07-17 PROCEDURE — 25000003 PHARM REV CODE 250: Performed by: NURSE ANESTHETIST, CERTIFIED REGISTERED

## 2023-07-17 PROCEDURE — 88313 SPECIAL STAINS GROUP 2: CPT | Mod: 26,,, | Performed by: STUDENT IN AN ORGANIZED HEALTH CARE EDUCATION/TRAINING PROGRAM

## 2023-07-17 PROCEDURE — 63600175 PHARM REV CODE 636 W HCPCS: Mod: JA | Performed by: INTERNAL MEDICINE

## 2023-07-17 PROCEDURE — D9220A PRA ANESTHESIA: Mod: 33,CRNA,, | Performed by: NURSE ANESTHETIST, CERTIFIED REGISTERED

## 2023-07-17 PROCEDURE — 43239 EGD BIOPSY SINGLE/MULTIPLE: CPT | Mod: 51,,, | Performed by: INTERNAL MEDICINE

## 2023-07-17 RX ORDER — PROPOFOL 10 MG/ML
VIAL (ML) INTRAVENOUS
Status: DISCONTINUED | OUTPATIENT
Start: 2023-07-17 | End: 2023-07-17

## 2023-07-17 RX ORDER — SODIUM CHLORIDE 9 MG/ML
INJECTION, SOLUTION INTRAVENOUS CONTINUOUS PRN
Status: DISCONTINUED | OUTPATIENT
Start: 2023-07-17 | End: 2023-07-17

## 2023-07-17 RX ORDER — ONDANSETRON 2 MG/ML
4 INJECTION INTRAMUSCULAR; INTRAVENOUS ONCE
Status: COMPLETED | OUTPATIENT
Start: 2023-07-17 | End: 2023-07-17

## 2023-07-17 RX ORDER — SODIUM CHLORIDE 9 MG/ML
INJECTION, SOLUTION INTRAVENOUS CONTINUOUS
Status: DISCONTINUED | OUTPATIENT
Start: 2023-07-17 | End: 2023-07-17 | Stop reason: HOSPADM

## 2023-07-17 RX ORDER — SODIUM CHLORIDE 0.9 % (FLUSH) 0.9 %
3 SYRINGE (ML) INJECTION
Status: DISCONTINUED | OUTPATIENT
Start: 2023-07-17 | End: 2023-07-17 | Stop reason: HOSPADM

## 2023-07-17 RX ORDER — PROPOFOL 10 MG/ML
VIAL (ML) INTRAVENOUS CONTINUOUS PRN
Status: DISCONTINUED | OUTPATIENT
Start: 2023-07-17 | End: 2023-07-17

## 2023-07-17 RX ORDER — LIDOCAINE HYDROCHLORIDE 20 MG/ML
INJECTION INTRAVENOUS
Status: DISCONTINUED | OUTPATIENT
Start: 2023-07-17 | End: 2023-07-17

## 2023-07-17 RX ADMIN — ONDANSETRON 4 MG: 2 INJECTION INTRAMUSCULAR; INTRAVENOUS at 08:07

## 2023-07-17 RX ADMIN — PROPOFOL 40 MG: 10 INJECTION, EMULSION INTRAVENOUS at 09:07

## 2023-07-17 RX ADMIN — TOPICAL ANESTHETIC 1 EACH: 200 SPRAY DENTAL; PERIODONTAL at 09:07

## 2023-07-17 RX ADMIN — LIDOCAINE HYDROCHLORIDE 50 MG: 20 INJECTION, SOLUTION INTRAVENOUS at 09:07

## 2023-07-17 RX ADMIN — PROPOFOL 150 MCG/KG/MIN: 10 INJECTION, EMULSION INTRAVENOUS at 09:07

## 2023-07-17 RX ADMIN — OCTREOTIDE ACETATE 500 MCG: 500 INJECTION, SOLUTION INTRAVENOUS; SUBCUTANEOUS at 08:07

## 2023-07-17 RX ADMIN — SODIUM CHLORIDE: 9 INJECTION, SOLUTION INTRAVENOUS at 07:07

## 2023-07-17 RX ADMIN — PROPOFOL 50 MG: 10 INJECTION, EMULSION INTRAVENOUS at 09:07

## 2023-07-17 RX ADMIN — SODIUM CHLORIDE: 9 INJECTION, SOLUTION INTRAVENOUS at 09:07

## 2023-07-17 NOTE — ANESTHESIA PREPROCEDURE EVALUATION
07/17/2023     Lena Brantley is a 67 y.o., female here for Colonoscopy    Hx of carcinoid    Past Medical History:   Diagnosis Date    Anxiety     Cancer     Depression     Elevated bilirubin 9/9/2020    Fatigue     GERD (gastroesophageal reflux disease)     History of iron deficiency anemia 9/9/2020    Hx of psychiatric care     Liver mass 06/2016    Mass of colon 06/2016    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     Psychiatric problem     Secondary neuroendocrine tumor of liver 9/9/2020    Sickle cell trait     Sleep difficulties      No past surgical history on file.        Pre-op Assessment    I have reviewed the Patient Summary Reports.     I have reviewed the Nursing Notes. I have reviewed the NPO Status.      Review of Systems  Anesthesia Hx:   Denies Personal Hx of Anesthesia complications.   Cardiovascular:   Exercise tolerance: good    Hepatic/GI:   GERD Liver Disease,    Endocrine:  Endocrine Normal    Psych:   depression          Physical Exam  General: Well nourished    Airway:  Mallampati: II   Mouth Opening: Normal  Neck ROM: Normal ROM  Pre-Existing Airway: Oral Endotracheal tube    Dental:  Intact        Anesthesia Plan  Type of Anesthesia, risks & benefits discussed:    Anesthesia Type: Gen Natural Airway  Informed Consent: Informed consent signed with the Patient and all parties understand the risks and agree with anesthesia plan.  All questions answered.   ASA Score: 3    Ready For Surgery From Anesthesia Perspective.     .

## 2023-07-17 NOTE — TRANSFER OF CARE
"Anesthesia Transfer of Care Note    Patient: Lena Brantley    Procedure(s) Performed: Procedure(s) (LRB):  COLONOSCOPY (N/A)  EGD (ESOPHAGOGASTRODUODENOSCOPY) (N/A)    Patient location: GI    Anesthesia Type: general    Transport from OR: Transported from OR on room air with adequate spontaneous ventilation    Post pain: adequate analgesia    Post assessment: no apparent anesthetic complications and tolerated procedure well    Post vital signs: stable    Level of consciousness: awake and oriented    Nausea/Vomiting: no nausea/vomiting    Complications: none    Transfer of care protocol was followed      Last vitals:   Visit Vitals  /77 (BP Location: Left arm, Patient Position: Lying)   Pulse 90   Temp 36.5 °C (97.7 °F) (Temporal)   Resp 18   Ht 5' 4" (1.626 m)   Wt 61.7 kg (136 lb)   SpO2 100%   Breastfeeding No   BMI 23.34 kg/m²     "

## 2023-07-17 NOTE — H&P
Short Stay Endoscopy History and Physical    PCP - Anat Blackwell MD    Procedure - EGD/colonoscopy  ASA - III  Mallampati - per anesthesia  History of Anesthesia problems - no  Family history Anesthesia problems - no     HPI:  This is a 67 y.o. female here for evaluation of : Abd pain. Colon polyps    ROS:  Constitutional: No fevers, chills, No weight loss  ENT: No allergies  CV: No chest pain  Pulm: No shortness of breath  GI: see HPI  Derm: No rash    Medical History:  has a past medical history of Anxiety, Cancer, Depression, Elevated bilirubin (9/9/2020), Fatigue, GERD (gastroesophageal reflux disease), History of iron deficiency anemia (9/9/2020), psychiatric care, Liver mass (06/2016), Mass of colon (06/2016), 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, Psychiatric problem, Secondary neuroendocrine tumor of liver (9/9/2020), Sickle cell trait, and Sleep difficulties.    Surgical History:  has no past surgical history on file.    Family History: family history includes Anxiety disorder in her mother; Cancer in her maternal grandmother and another family member; Depression in her mother.. Otherwise no colon cancer, inflammatory bowel disease, or GI malignancies.    Social History:  reports that she has never smoked. She has never been exposed to tobacco smoke. She has never used smokeless tobacco. She reports that she does not drink alcohol and does not use drugs.    Review of patient's allergies indicates:   Allergen Reactions    Epinephrine Anaphylaxis     Can Cause Carcinoid Crisis       Medications:   Medications Prior to Admission   Medication Sig Dispense Refill Last Dose    ascorbic acid, vitamin C, (VITAMIN C) 500 MG tablet Take 500 mg by mouth once daily.   Past Week    calcium carbonate (OS-TJ) 500 mg calcium (1,250 mg) tablet Take 1 tablet by mouth once daily.   Past Week    cyanocobalamin (VITAMIN B-12) 1000 MCG tablet Take 100 mcg by mouth once daily.   Past Week    lanreotide (SOMATULINE DEPOT)  120 mg/0.5 mL Syrg Inject 120 mg into the skin every 28 days.   Past Week    multivitamin capsule Take 1 capsule by mouth once daily.   Past Week    pantoprazole (PROTONIX) 40 MG tablet Take 1 tablet (40 mg total) by mouth once daily. 90 tablet 3 7/16/2023    sucralfate (CARAFATE) 100 mg/mL suspension Take 10 mLs (1 g total) by mouth 4 (four) times daily. 414 mL 0 Past Month    vitamin D (VITAMIN D3) 1000 units Tab Take 1,000 Units by mouth once daily.   Past Week    lactulose (CHRONULAC) 20 gram/30 mL Soln Take 15 mLs (10 g total) by mouth 2 (two) times daily as needed (constipation). 300 mL 1 More than a month         Objective Findings:    Vital Signs: see nursing notes  Physical Exam:  General Appearance: Well appearing in no acute distress  Eyes:    No scleral icterus  ENT: Neck supple  Lungs: CTA anteriorly  Heart:  S1, S2 normal, no murmurs heard  Abdomen: Soft, non tender, non distended with positive bowel sounds. No hepatosplenomegaly, ascites, or mass  Extremities: no edema  Skin: No rash      Labs:  Lab Results   Component Value Date    WBC 3.82 (L) 06/20/2023    HGB 14.2 06/20/2023    HCT 42.9 06/20/2023     06/20/2023    CHOL 209 (H) 11/04/2021    TRIG 70 11/04/2021    HDL 47 11/04/2021    ALT 15 06/20/2023    AST 20 06/20/2023     06/20/2023    K 4.3 06/20/2023     06/20/2023    CREATININE 0.9 06/20/2023    BUN 14 06/20/2023    CO2 27 06/20/2023    TSH 1.278 02/29/2020    INR 1.1 09/08/2016    HGBA1C 5.8 (H) 11/04/2021       I have explained the risks and benefits of endoscopy procedures to the patient including but not limited to bleeding, perforation, infection, and death.    Tyron Avendaño MD

## 2023-07-17 NOTE — PROVATION PATIENT INSTRUCTIONS
Discharge Summary/Instructions after an Endoscopic Procedure  Patient Name: Lena Brantley  Patient MRN: [id number]  Patient YOB: 1955 Monday, July 17, 2023  Tyron Avendaño MD  Dear patient,  As a result of recent federal legislation (The Federal Cures Act), you may   receive lab or pathology results from your procedure in your [facility]   account before your physician is able to contact you. Your physician or   their representative will relay the results to you with their   recommendations at their soonest availability.  Thank you,  Your health is very important to us during the Covid Crisis. Following your   procedure today, you will receive a daily text for 2 weeks asking about   signs or symptoms of Covid 19.  Please respond to this text when you   receive it so we can follow up and keep you as safe as possible.   RESTRICTIONS:  During your procedure today, you received medications for sedation.  These   medications may affect your judgment, balance and coordination.  Therefore,   for 24 hours, you have the following restrictions:   - DO NOT drive a car, operate machinery, make legal/financial decisions,   sign important papers or drink alcohol.    ACTIVITY:  Today: no heavy lifting, straining or running due to procedural   sedation/anesthesia.  The following day: return to full activity including work.  DIET:  Eat and drink normally unless instructed otherwise.     TREATMENT FOR COMMON SIDE EFFECTS:  - Mild abdominal pain, nausea, belching, bloating or excessive gas:  rest,   eat lightly and use a heating pad.  - Sore Throat: treat with throat lozenges and/or gargle with warm salt   water.  - Because air was used during the procedure, expelling large amounts of air   from your rectum or belching is normal.  - If a bowel prep was taken, you may not have a bowel movement for 1-3 days.    This is normal.  SYMPTOMS TO WATCH FOR AND REPORT TO YOUR PHYSICIAN:  1. Abdominal pain or bloating, other  than gas cramps.  2. Chest pain.  3. Back pain.  4. Signs of infection such as: chills or fever occurring within 24 hours   after the procedure.  5. Rectal bleeding, which would show as bright red, maroon, or black stools.   (A tablespoon of blood from the rectum is not serious, especially if   hemorrhoids are present.)  6. Vomiting.  7. Weakness or dizziness.  GO DIRECTLY TO THE NEAREST EMERGENCY ROOM IF YOU HAVE ANY OF THE FOLLOWING:      Difficulty breathing              Chills and/or fever over 101 F   Persistent vomiting and/or vomiting blood   Severe abdominal pain   Severe chest pain   Black, tarry stools   Bleeding- more than one tablespoon   Any other symptom or condition that you feel may need urgent attention  Your doctor recommends these additional instructions:  If any biopsies were taken, your doctors clinic will contact you in 1 to 2   weeks with any results.  - Discharge patient to home.   - Resume previous diet.   - Continue present medications.   - Await pathology results.   - Perform a colonoscopy as previously scheduled.  For questions, problems or results please call your physician - Tyron Avendaño MD.  EMERGENCY PHONE NUMBER: [phone],  LAB RESULTS: [phone]  IF A COMPLICATION OR EMERGENCY SITUATION ARISES AND YOU ARE UNABLE TO REACH   YOUR PHYSICIAN - GO DIRECTLY TO THE EMERGENCY ROOM.  Tyron Avendaño MD  7/17/2023 9:35:51 AM  This report has been verified and signed electronically.  Dear patient,  As a result of recent federal legislation (The Federal Cures Act), you may   receive lab or pathology results from your procedure in your [facility]   account before your physician is able to contact you. Your physician or   their representative will relay the results to you with their   recommendations at their soonest availability.  Thank you,  PROVATION

## 2023-07-17 NOTE — PROVATION PATIENT INSTRUCTIONS
Discharge Summary/Instructions after an Endoscopic Procedure  Patient Name: Lena Brantley  Patient MRN: [id number]  Patient YOB: 1955 Monday, July 17, 2023  Tyron Avendaño MD  Dear patient,  As a result of recent federal legislation (The Federal Cures Act), you may   receive lab or pathology results from your procedure in your [facility]   account before your physician is able to contact you. Your physician or   their representative will relay the results to you with their   recommendations at their soonest availability.  Thank you,  Your health is very important to us during the Covid Crisis. Following your   procedure today, you will receive a daily text for 2 weeks asking about   signs or symptoms of Covid 19.  Please respond to this text when you   receive it so we can follow up and keep you as safe as possible.   RESTRICTIONS:  During your procedure today, you received medications for sedation.  These   medications may affect your judgment, balance and coordination.  Therefore,   for 24 hours, you have the following restrictions:   - DO NOT drive a car, operate machinery, make legal/financial decisions,   sign important papers or drink alcohol.    ACTIVITY:  Today: no heavy lifting, straining or running due to procedural   sedation/anesthesia.  The following day: return to full activity including work.  DIET:  Eat and drink normally unless instructed otherwise.     TREATMENT FOR COMMON SIDE EFFECTS:  - Mild abdominal pain, nausea, belching, bloating or excessive gas:  rest,   eat lightly and use a heating pad.  - Sore Throat: treat with throat lozenges and/or gargle with warm salt   water.  - Because air was used during the procedure, expelling large amounts of air   from your rectum or belching is normal.  - If a bowel prep was taken, you may not have a bowel movement for 1-3 days.    This is normal.  SYMPTOMS TO WATCH FOR AND REPORT TO YOUR PHYSICIAN:  1. Abdominal pain or bloating, other  than gas cramps.  2. Chest pain.  3. Back pain.  4. Signs of infection such as: chills or fever occurring within 24 hours   after the procedure.  5. Rectal bleeding, which would show as bright red, maroon, or black stools.   (A tablespoon of blood from the rectum is not serious, especially if   hemorrhoids are present.)  6. Vomiting.  7. Weakness or dizziness.  GO DIRECTLY TO THE NEAREST EMERGENCY ROOM IF YOU HAVE ANY OF THE FOLLOWING:      Difficulty breathing              Chills and/or fever over 101 F   Persistent vomiting and/or vomiting blood   Severe abdominal pain   Severe chest pain   Black, tarry stools   Bleeding- more than one tablespoon   Any other symptom or condition that you feel may need urgent attention  Your doctor recommends these additional instructions:  If any biopsies were taken, your doctors clinic will contact you in 1 to 2   weeks with any results.  - Discharge patient to home.   - Resume previous diet.   - Continue present medications.   - Await pathology results.   - Repeat colonoscopy in 3 years for surveillance.   - Patient has a contact number available for emergencies.  The signs and   symptoms of potential delayed complications were discussed with the   patient.  Return to normal activities tomorrow.  Written discharge   instructions were provided to the patient.  For questions, problems or results please call your physician - Tyron Avendaño MD.  EMERGENCY PHONE NUMBER: [phone],  LAB RESULTS: [phone]  IF A COMPLICATION OR EMERGENCY SITUATION ARISES AND YOU ARE UNABLE TO REACH   YOUR PHYSICIAN - GO DIRECTLY TO THE EMERGENCY ROOM.  Tyron Avendaño MD  7/17/2023 9:59:06 AM  This report has been verified and signed electronically.  Dear patient,  As a result of recent federal legislation (The Federal Cures Act), you may   receive lab or pathology results from your procedure in your [facility]   account before your physician is able to contact you. Your  physician or   their representative will relay the results to you with their   recommendations at their soonest availability.  Thank you,  PROVATION

## 2023-07-31 LAB
FINAL PATHOLOGIC DIAGNOSIS: NORMAL
GROSS: NORMAL
Lab: NORMAL
MICROSCOPIC EXAM: NORMAL

## 2023-08-04 NOTE — PROGRESS NOTES
Gastric polyps unremarkable. Colon polyps benign. Repeat colonoscopy in 3 years as recommended. F/u as needed with Karen.

## 2023-08-08 ENCOUNTER — INFUSION (OUTPATIENT)
Dept: INFUSION THERAPY | Facility: HOSPITAL | Age: 68
End: 2023-08-08
Attending: INTERNAL MEDICINE
Payer: COMMERCIAL

## 2023-08-08 VITALS — HEIGHT: 64 IN | WEIGHT: 136 LBS | BODY MASS INDEX: 23.22 KG/M2

## 2023-08-08 DIAGNOSIS — C7B.02 METASTATIC MALIGNANT CARCINOID TUMOR TO LIVER: ICD-10-CM

## 2023-08-08 DIAGNOSIS — C7A.00 MALIGNANT CARCINOID TUMOR OF UNKNOWN PRIMARY SITE: Primary | ICD-10-CM

## 2023-08-08 PROCEDURE — 96372 THER/PROPH/DIAG INJ SC/IM: CPT

## 2023-08-08 PROCEDURE — 63600175 PHARM REV CODE 636 W HCPCS: Mod: JZ,JG | Performed by: INTERNAL MEDICINE

## 2023-08-08 RX ORDER — LANREOTIDE ACETATE 120 MG/.5ML
120 INJECTION SUBCUTANEOUS
Status: DISCONTINUED | OUTPATIENT
Start: 2023-08-08 | End: 2023-08-08 | Stop reason: HOSPADM

## 2023-08-08 RX ORDER — LANREOTIDE ACETATE 120 MG/.5ML
120 INJECTION SUBCUTANEOUS
Status: CANCELLED | OUTPATIENT
Start: 2023-08-08

## 2023-08-08 RX ADMIN — LANREOTIDE ACETATE 120 MG: 120 INJECTION SUBCUTANEOUS at 11:08

## 2023-08-24 ENCOUNTER — PATIENT MESSAGE (OUTPATIENT)
Dept: HEMATOLOGY/ONCOLOGY | Facility: CLINIC | Age: 68
End: 2023-08-24
Payer: COMMERCIAL

## 2023-08-24 DIAGNOSIS — K21.9 GERD WITHOUT ESOPHAGITIS: ICD-10-CM

## 2023-08-24 RX ORDER — PANTOPRAZOLE SODIUM 40 MG/1
40 TABLET, DELAYED RELEASE ORAL DAILY
Qty: 90 TABLET | Refills: 3 | Status: SHIPPED | OUTPATIENT
Start: 2023-08-24

## 2023-08-29 ENCOUNTER — TELEPHONE (OUTPATIENT)
Dept: INFUSION THERAPY | Facility: HOSPITAL | Age: 68
End: 2023-08-29
Payer: COMMERCIAL

## 2023-09-05 ENCOUNTER — INFUSION (OUTPATIENT)
Dept: INFUSION THERAPY | Facility: HOSPITAL | Age: 68
End: 2023-09-05
Attending: INTERNAL MEDICINE
Payer: COMMERCIAL

## 2023-09-05 VITALS
HEIGHT: 64 IN | OXYGEN SATURATION: 98 % | SYSTOLIC BLOOD PRESSURE: 148 MMHG | HEART RATE: 69 BPM | BODY MASS INDEX: 23.22 KG/M2 | WEIGHT: 136 LBS | DIASTOLIC BLOOD PRESSURE: 70 MMHG | RESPIRATION RATE: 18 BRPM

## 2023-09-05 DIAGNOSIS — C7B.02 METASTATIC MALIGNANT CARCINOID TUMOR TO LIVER: ICD-10-CM

## 2023-09-05 DIAGNOSIS — C7A.00 MALIGNANT CARCINOID TUMOR OF UNKNOWN PRIMARY SITE: Primary | ICD-10-CM

## 2023-09-05 PROCEDURE — 63600175 PHARM REV CODE 636 W HCPCS: Mod: JZ,JG | Performed by: INTERNAL MEDICINE

## 2023-09-05 PROCEDURE — 96372 THER/PROPH/DIAG INJ SC/IM: CPT

## 2023-09-05 RX ORDER — LANREOTIDE ACETATE 120 MG/.5ML
120 INJECTION SUBCUTANEOUS
Status: DISCONTINUED | OUTPATIENT
Start: 2023-09-05 | End: 2023-09-05 | Stop reason: HOSPADM

## 2023-09-05 RX ORDER — LANREOTIDE ACETATE 120 MG/.5ML
120 INJECTION SUBCUTANEOUS
Status: CANCELLED | OUTPATIENT
Start: 2023-09-05

## 2023-09-05 RX ADMIN — LANREOTIDE ACETATE 120 MG: 120 INJECTION SUBCUTANEOUS at 09:09

## 2023-10-03 ENCOUNTER — INFUSION (OUTPATIENT)
Dept: INFUSION THERAPY | Facility: HOSPITAL | Age: 68
End: 2023-10-03
Attending: INTERNAL MEDICINE
Payer: COMMERCIAL

## 2023-10-03 VITALS
WEIGHT: 136 LBS | SYSTOLIC BLOOD PRESSURE: 117 MMHG | BODY MASS INDEX: 23.22 KG/M2 | DIASTOLIC BLOOD PRESSURE: 76 MMHG | OXYGEN SATURATION: 98 % | HEIGHT: 64 IN | HEART RATE: 64 BPM

## 2023-10-03 DIAGNOSIS — C7B.02 METASTATIC MALIGNANT CARCINOID TUMOR TO LIVER: ICD-10-CM

## 2023-10-03 DIAGNOSIS — C7A.00 MALIGNANT CARCINOID TUMOR OF UNKNOWN PRIMARY SITE: Primary | ICD-10-CM

## 2023-10-03 PROCEDURE — 63600175 PHARM REV CODE 636 W HCPCS: Mod: JZ,JG | Performed by: INTERNAL MEDICINE

## 2023-10-03 PROCEDURE — 96372 THER/PROPH/DIAG INJ SC/IM: CPT

## 2023-10-03 RX ORDER — LANREOTIDE ACETATE 120 MG/.5ML
120 INJECTION SUBCUTANEOUS
Status: DISCONTINUED | OUTPATIENT
Start: 2023-10-03 | End: 2023-10-03 | Stop reason: HOSPADM

## 2023-10-03 RX ORDER — LANREOTIDE ACETATE 120 MG/.5ML
120 INJECTION SUBCUTANEOUS
Status: CANCELLED | OUTPATIENT
Start: 2023-10-03

## 2023-10-03 RX ADMIN — LANREOTIDE ACETATE 120 MG: 120 INJECTION SUBCUTANEOUS at 10:10

## 2023-10-03 NOTE — NURSING
Pt tolerated Lanreotide injection well, no complaints at this time. No adverse reactions noted. Next appointment scheduled and pt d/c in no acute distress.

## 2023-10-31 ENCOUNTER — INFUSION (OUTPATIENT)
Dept: INFUSION THERAPY | Facility: HOSPITAL | Age: 68
End: 2023-10-31
Attending: INTERNAL MEDICINE
Payer: COMMERCIAL

## 2023-10-31 VITALS
HEIGHT: 64 IN | WEIGHT: 136 LBS | HEART RATE: 71 BPM | TEMPERATURE: 98 F | OXYGEN SATURATION: 100 % | RESPIRATION RATE: 20 BRPM | DIASTOLIC BLOOD PRESSURE: 91 MMHG | SYSTOLIC BLOOD PRESSURE: 137 MMHG | BODY MASS INDEX: 23.22 KG/M2

## 2023-10-31 DIAGNOSIS — C7A.00 MALIGNANT CARCINOID TUMOR OF UNKNOWN PRIMARY SITE: Primary | ICD-10-CM

## 2023-10-31 DIAGNOSIS — C7B.02 METASTATIC MALIGNANT CARCINOID TUMOR TO LIVER: ICD-10-CM

## 2023-10-31 PROCEDURE — 63600175 PHARM REV CODE 636 W HCPCS: Mod: JZ,JG | Performed by: INTERNAL MEDICINE

## 2023-10-31 PROCEDURE — 96372 THER/PROPH/DIAG INJ SC/IM: CPT

## 2023-10-31 RX ORDER — LANREOTIDE ACETATE 120 MG/.5ML
120 INJECTION SUBCUTANEOUS
Status: CANCELLED | OUTPATIENT
Start: 2023-10-31

## 2023-10-31 RX ORDER — LANREOTIDE ACETATE 120 MG/.5ML
120 INJECTION SUBCUTANEOUS
Status: DISCONTINUED | OUTPATIENT
Start: 2023-10-31 | End: 2023-10-31 | Stop reason: HOSPADM

## 2023-10-31 RX ADMIN — LANREOTIDE ACETATE 120 MG: 120 INJECTION SUBCUTANEOUS at 10:10

## 2023-11-06 ENCOUNTER — PATIENT MESSAGE (OUTPATIENT)
Dept: PSYCHIATRY | Facility: CLINIC | Age: 68
End: 2023-11-06
Payer: COMMERCIAL

## 2023-11-07 ENCOUNTER — TELEPHONE (OUTPATIENT)
Dept: FAMILY MEDICINE | Facility: CLINIC | Age: 68
End: 2023-11-07
Payer: COMMERCIAL

## 2023-11-07 ENCOUNTER — PATIENT MESSAGE (OUTPATIENT)
Dept: FAMILY MEDICINE | Facility: CLINIC | Age: 68
End: 2023-11-07
Payer: COMMERCIAL

## 2023-11-07 DIAGNOSIS — Z00.00 ANNUAL PHYSICAL EXAM: Primary | ICD-10-CM

## 2023-11-28 ENCOUNTER — HOSPITAL ENCOUNTER (OUTPATIENT)
Dept: RADIOLOGY | Facility: HOSPITAL | Age: 68
Discharge: HOME | End: 2023-11-28
Attending: FAMILY MEDICINE
Payer: COMMERCIAL

## 2023-11-28 ENCOUNTER — INFUSION (OUTPATIENT)
Dept: INFUSION THERAPY | Facility: HOSPITAL | Age: 68
End: 2023-11-28
Attending: INTERNAL MEDICINE
Payer: COMMERCIAL

## 2023-11-28 VITALS
WEIGHT: 136 LBS | OXYGEN SATURATION: 94 % | TEMPERATURE: 98 F | HEART RATE: 94 BPM | SYSTOLIC BLOOD PRESSURE: 137 MMHG | DIASTOLIC BLOOD PRESSURE: 78 MMHG | BODY MASS INDEX: 23.22 KG/M2 | HEIGHT: 64 IN | RESPIRATION RATE: 18 BRPM

## 2023-11-28 DIAGNOSIS — Z00.00 ANNUAL PHYSICAL EXAM: ICD-10-CM

## 2023-11-28 DIAGNOSIS — C7B.02 METASTATIC MALIGNANT CARCINOID TUMOR TO LIVER: ICD-10-CM

## 2023-11-28 DIAGNOSIS — C7A.00 MALIGNANT CARCINOID TUMOR OF UNKNOWN PRIMARY SITE: Primary | ICD-10-CM

## 2023-11-28 PROCEDURE — 96372 THER/PROPH/DIAG INJ SC/IM: CPT

## 2023-11-28 PROCEDURE — 77067 SCR MAMMO BI INCL CAD: CPT | Mod: TC

## 2023-11-28 PROCEDURE — 77063 BREAST TOMOSYNTHESIS BI: CPT | Mod: 26,,, | Performed by: RADIOLOGY

## 2023-11-28 PROCEDURE — 77067 MAMMO DIGITAL SCREENING BILAT WITH TOMO: ICD-10-PCS | Mod: 26,,, | Performed by: RADIOLOGY

## 2023-11-28 PROCEDURE — 77063 MAMMO DIGITAL SCREENING BILAT WITH TOMO: ICD-10-PCS | Mod: 26,,, | Performed by: RADIOLOGY

## 2023-11-28 PROCEDURE — 77067 SCR MAMMO BI INCL CAD: CPT | Mod: 26,,, | Performed by: RADIOLOGY

## 2023-11-28 PROCEDURE — 63600175 PHARM REV CODE 636 W HCPCS: Mod: JZ,JG | Performed by: INTERNAL MEDICINE

## 2023-11-28 RX ORDER — LANREOTIDE ACETATE 120 MG/.5ML
120 INJECTION SUBCUTANEOUS
Status: DISCONTINUED | OUTPATIENT
Start: 2023-11-28 | End: 2023-11-28 | Stop reason: HOSPADM

## 2023-11-28 RX ORDER — LANREOTIDE ACETATE 120 MG/.5ML
120 INJECTION SUBCUTANEOUS
Status: CANCELLED | OUTPATIENT
Start: 2023-11-28

## 2023-11-28 RX ADMIN — LANREOTIDE ACETATE 120 MG: 120 INJECTION SUBCUTANEOUS at 10:11

## 2023-12-14 DIAGNOSIS — C7A.00 MALIGNANT CARCINOID TUMOR OF UNKNOWN PRIMARY SITE: Primary | ICD-10-CM

## 2023-12-14 DIAGNOSIS — C78.7 SECONDARY MALIGNANT NEOPLASM OF LIVER: ICD-10-CM

## 2023-12-26 ENCOUNTER — INFUSION (OUTPATIENT)
Dept: INFUSION THERAPY | Facility: HOSPITAL | Age: 68
End: 2023-12-26
Attending: INTERNAL MEDICINE
Payer: COMMERCIAL

## 2023-12-26 VITALS
SYSTOLIC BLOOD PRESSURE: 136 MMHG | RESPIRATION RATE: 18 BRPM | WEIGHT: 136 LBS | DIASTOLIC BLOOD PRESSURE: 81 MMHG | BODY MASS INDEX: 23.22 KG/M2 | HEART RATE: 70 BPM | HEIGHT: 64 IN | TEMPERATURE: 98 F | OXYGEN SATURATION: 100 %

## 2023-12-26 DIAGNOSIS — C7B.02 METASTATIC MALIGNANT CARCINOID TUMOR TO LIVER: ICD-10-CM

## 2023-12-26 DIAGNOSIS — C7A.00 MALIGNANT CARCINOID TUMOR OF UNKNOWN PRIMARY SITE: Primary | ICD-10-CM

## 2023-12-26 PROCEDURE — 63600175 PHARM REV CODE 636 W HCPCS: Mod: JZ,JG | Performed by: INTERNAL MEDICINE

## 2023-12-26 PROCEDURE — 96372 THER/PROPH/DIAG INJ SC/IM: CPT

## 2023-12-26 RX ORDER — LANREOTIDE ACETATE 120 MG/.5ML
120 INJECTION SUBCUTANEOUS
Status: CANCELLED | OUTPATIENT
Start: 2023-12-26

## 2023-12-26 RX ORDER — LANREOTIDE ACETATE 120 MG/.5ML
120 INJECTION SUBCUTANEOUS
Status: DISCONTINUED | OUTPATIENT
Start: 2023-12-26 | End: 2023-12-26 | Stop reason: HOSPADM

## 2023-12-26 RX ADMIN — LANREOTIDE ACETATE 120 MG: 120 INJECTION SUBCUTANEOUS at 11:12

## 2024-01-08 ENCOUNTER — HOSPITAL ENCOUNTER (OUTPATIENT)
Dept: RADIOLOGY | Facility: HOSPITAL | Age: 69
Discharge: HOME | End: 2024-01-08
Attending: INTERNAL MEDICINE
Payer: COMMERCIAL

## 2024-01-08 DIAGNOSIS — C7A.00 MALIGNANT CARCINOID TUMOR OF UNKNOWN PRIMARY SITE: ICD-10-CM

## 2024-01-08 DIAGNOSIS — C78.7 SECONDARY MALIGNANT NEOPLASM OF LIVER: ICD-10-CM

## 2024-01-08 LAB
CREAT SERPL-MCNC: 1 MG/DL (ref 0.5–1.4)
SAMPLE: NORMAL

## 2024-01-08 PROCEDURE — 74183 MRI ABD W/O CNTR FLWD CNTR: CPT | Mod: 26,,, | Performed by: RADIOLOGY

## 2024-01-08 PROCEDURE — A9585 GADOBUTROL INJECTION: HCPCS | Performed by: INTERNAL MEDICINE

## 2024-01-08 PROCEDURE — 25500020 PHARM REV CODE 255: Performed by: INTERNAL MEDICINE

## 2024-01-08 PROCEDURE — 74183 MRI ABD W/O CNTR FLWD CNTR: CPT | Mod: TC

## 2024-01-08 RX ORDER — GADOBUTROL 604.72 MG/ML
10 INJECTION INTRAVENOUS
Status: COMPLETED | OUTPATIENT
Start: 2024-01-08 | End: 2024-01-08

## 2024-01-08 RX ADMIN — GADOBUTROL 10 ML: 604.72 INJECTION INTRAVENOUS at 12:01

## 2024-01-20 NOTE — PROGRESS NOTES
"PATIENT: Lena Brantley  MRN: [id number]  DATE: 1/23/2024    Diagnosis:   1. 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 Secondary neuroendocrine tumor of liver    3. Other decreased white blood cell (WBC) count    4. GERD without esophagitis    5. Other constipation      Chief Complaint: neuroendocrine tumor    Oncologic History:       Oncologic History Neuroendocrine tumor unknown primary diagnosed 09/2016  Metastatic disease to liver at presentation      Oncologic Treatment Lanreotide 10/2016      Pathology Well-differentiated neuroendocrine tumor, Ki-67 1%         Subjective:    History of Present Illness: Ms. Brantley is a 68 y.o. female who presented in August 2022 for evaluation and management of metastatic neuroendocrine tumor.    Information from chart review:  "Her history dates to 9/2016 when she was undergoing workup for episodic nausea, vomiting and diarrhea.  A CT scan was performed showing a solitary and had seeing liver mass.  A biopsy was performed showing a well-differentiated neuroendocrine tumor, Ki-67 of 1%.  Conventional imaging an octreotide scan did not reveal a primary site disease.  Gene expression profiling was performed indicating a possible primary site pancreas.  She was started on Lanreotide in 2016.  She was offered surgical resection and also liver directed therapy and declined."    - she was last seen by Dr. Riddle on 4/22/22.  - case was discussed at neuroendocrine conference on 4/25/22. Recommendation was to proceed with chemoembolization of 3.7 x 2.4cm mass in segment 2/4.    - she underwent CT scan on 12/7/22    - she met with interventional radiology on 1/10/23 regarding liver-directed therapy. She decided not to proceed with it.  - she presented to the emergency room in May 2023 for nausea/abdominal pain/constipation. She underwent imaging then.  - she underwent MRI abdomen on 6/20/23.      Interval history:  - she presents for a follow-up appointment for her " neuroendocrine tumor.  - she underwent upper GI endoscopy/colonoscopy on 7/17/23  - she underwent MRI abdomen on 1/8/24.  - today, she endorses fatigue, dyspnea upon exertion. She denies chest pain, nausea, vomiting, diarrhea, constipation.      Past medical, surgical, family, and social histories have been reviewed and updated below.    Past Medical History:   Past Medical History:   Diagnosis Date    Anxiety     Cancer     Depression     Elevated bilirubin 9/9/2020    Fatigue     GERD (gastroesophageal reflux disease)     History of iron deficiency anemia 9/9/2020    Hx of psychiatric care     Liver mass 06/2016    Mass of colon 06/2016    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     Psychiatric problem     Secondary neuroendocrine tumor of liver 9/9/2020    Sickle cell trait     Sleep difficulties        Past Surgical History:   Past Surgical History:   Procedure Laterality Date    COLONOSCOPY N/A 7/17/2023    Procedure: COLONOSCOPY;  Surgeon: Tyron Avendaño MD;  Location: [facility];  Service: Endoscopy;  Laterality: N/A;    ESOPHAGOGASTRODUODENOSCOPY N/A 7/17/2023    Procedure: EGD (ESOPHAGOGASTRODUODENOSCOPY);  Surgeon: Tyron Avendaño MD;  Location: [facility];  Service: Endoscopy;  Laterality: N/A;       Family History:   Family History   Problem Relation Age of Onset    Cancer Maternal Grandmother     Cancer Other     Depression Mother     Anxiety disorder Mother        Social History:  reports that she has never smoked. She has never been exposed to tobacco smoke. She has never used smokeless tobacco. She reports that she does not drink alcohol and does not use drugs.    Allergies:  Review of patient's allergies indicates:   Allergen Reactions    Epinephrine Anaphylaxis     Can Cause Carcinoid Crisis       Medications:  Current Outpatient Medications   Medication Sig Dispense Refill    ascorbic acid, vitamin C, (VITAMIN C) 500 MG tablet Take 500 mg by mouth once daily.      calcium  carbonate (OS-TJ) 500 mg calcium (1,250 mg) tablet Take 1 tablet by mouth once daily.      cyanocobalamin (VITAMIN B-12) 1000 MCG tablet Take 100 mcg by mouth once daily.      lactulose (CHRONULAC) 20 gram/30 mL Soln Take 15 mLs (10 g total) by mouth 2 (two) times daily as needed (constipation). 300 mL 1    lanreotide (SOMATULINE DEPOT) 120 mg/0.5 mL Syrg Inject 120 mg into the skin every 28 days.      multivitamin capsule Take 1 capsule by mouth once daily.      pantoprazole (PROTONIX) 40 MG tablet Take 1 tablet (40 mg total) by mouth once daily. 90 tablet 3    sucralfate (CARAFATE) 100 mg/mL suspension Take 10 mLs (1 g total) by mouth 4 (four) times daily. 414 mL 0    vitamin D (VITAMIN D3) 1000 units Tab Take 1,000 Units by mouth once daily.       No current facility-administered medications for this visit.       Review of Systems   Constitutional:  Positive for fatigue.   HENT:  Negative for sore throat.    Eyes:  Negative for visual disturbance.   Respiratory:  Positive for shortness of breath. Negative for cough.    Cardiovascular:  Negative for chest pain.   Gastrointestinal:  Positive for constipation. Negative for abdominal pain, diarrhea, nausea and vomiting.   Genitourinary:  Negative for dysuria.   Musculoskeletal:  Negative for back pain.   Skin:  Negative for rash.   Neurological:  Negative for headaches.   Hematological:  Negative for adenopathy.   Psychiatric/Behavioral:  The patient is not nervous/anxious.        ECOG Performance Status:   ECOG SCORE 1            Objective:      Vitals:   Vitals:    01/23/24 1051   BP: 109/73   BP Location: Right arm   Patient Position: Sitting   BP Method: Medium (Automatic)   Pulse: 96   Resp: 16   SpO2: 100%   Weight: 59.8 kg (131 lb 13.4 oz)       BMI: Body mass index is 22.63 kg/m².    Physical Exam  Vitals and nursing note reviewed.   Constitutional:       Appearance: She is well-developed.   HENT:      Head: Normocephalic and atraumatic.   Eyes:      Pupils:  Pupils are equal, round, and reactive to light.   Cardiovascular:      Rate and Rhythm: Normal rate and regular rhythm.   Pulmonary:      Effort: Pulmonary effort is normal.      Breath sounds: Normal breath sounds.   Abdominal:      General: Bowel sounds are normal.      Palpations: Abdomen is soft.   Musculoskeletal:         General: Normal range of motion.      Cervical back: Normal range of motion and neck supple.   Skin:     General: Skin is warm and dry.   Neurological:      Mental Status: She is alert and oriented to person, place, and time.   Psychiatric:         Behavior: Behavior normal.         Thought Content: Thought content normal.         Judgment: Judgment normal.         Laboratory Data:  Labs have been reviewed.    Lab Results   Component Value Date    WBC 3.75 (L) 01/08/2024    HGB 14.6 01/08/2024    HCT 42.5 01/08/2024    MCV 85 01/08/2024     01/08/2024     Diagnostics:       MRI abdomen (1/8/24): I have personally reviewed the images  Stable 3.3 cm liver lesion as above.  There has been no worrisome change.     Upper GI endoscopy (7/17/23):  - Normal esophagus.                          - Small hiatal hernia.                          - Multiple gastric polyps. Biopsied.                          - Normal examined duodenum.                          - Biopsies were taken with a cold forceps for                          Helicobacter pylori testing.     Colonoscopy (7/17/23):  - Two 6 to 13 mm polyps in the cecum, removed with                          a hot snare. Resected and retrieved.                          - Two 4 to 5 mm polyps in the transverse colon and                          in the ascending colon, removed with a cold snare.                          Resected and retrieved.       MRI abdomen (6/20/23): I have personally reviewed the images  Comparison is a CT dated 05/18/2023.     Patient was administered 10 cc of Gadavist.     Liver lesion measures 3.3 cm, previously 3.1.  Spleen,  stomach, gallbladder, biliary tree, pancreas, kidneys, and adrenal glands show nothing unusual.  There is a left renal cyst.  Vessels are unremarkable.  No para-aortic or retroperitoneal adenopathy is identified.     Impression:     Stable liver lesion as above.  There has been no detrimental change.  Gallium 68 Dotatate scan could be helpful.      CT abdomen/pelvis (12/7/22): I have personally reviewed the images  No significant detrimental interval change when compared to prior exam.     Stable left hepatic lobe lesion.  No new focal lesions.     Simple left renal cyst.     Gallbladder sludge.     RECIST SUMMARY:     Date of prior examination for comparison: 01/06/2022     Lesion 1: Left hepatic lobe lesion.  3.3 cm.  Series 3 image 27.  Prior measurement 3.0 cm           Copper pet scan (3/31/22): I have personally reviewed the images    Quality of the study: Adequate.     SUV measurements may not be directly comparable with those made on Ga-68 DOTATATE PET/CT.     In the head and neck, there is physiologic distribution in the pituitary, salivary, and thyroid glands, and there are no tracer avid lesions suspicious for malignancy.     In the chest, there are no tracer avid lesions suspicious for malignancy.     In the abdomen and pelvis, there is physiologic uptake in the pancreatic uncinate process and body, spleen, adrenal glands and  tract.     Stable appearing radiotracer avid lesion within the liver measuring 3.0 x 2.5 cm (previously 3.0 x 2.5 cm) with SUV max of 33 (previously 27).  No new radiotracer avid lesion identified within the abdomen or pelvis.     Small umbilical hernia containing partial loop of bowel without evidence of obstruction.     In the bones, there are no tracer avid lesions suspicious for malignancy.     Impression:     Stable somatostatin receptor avid left hepatic lobe lesion.  No new radiotracer avid lesion.      Assessment:       1. 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   "  2. Secondary neuroendocrine tumor of liver    3. Other decreased white blood cell (WBC) count    4. GERD without esophagitis    5. Other constipation         Plan:     1. Metastatic neuroendocrine tumor  - I have reviewed her chart  "Her history dates to 9/2016 when she was undergoing workup for episodic nausea, vomiting and diarrhea.  A CT scan was performed showing a solitary and had seeing liver mass.  A biopsy was performed showing a well-differentiated neuroendocrine tumor, Ki-67 of 1%.  Conventional imaging an octreotide scan did not reveal a primary site disease.  Gene expression profiling was performed indicating a possible primary site pancreas.  She was started on Lanreotide in 2016.  She was offered surgical resection and also liver directed therapy and declined."  - she was last seen by Dr. Riddle on 4/22/22.  - case was discussed at neuroendocrine conference on 4/25/22. Recommendation was to proceed with chemoembolization of 3.7 x 2.4 cm mass in segment 2/4.  - she did not proceed with chemoembolization.  - I discussed proceeding with chemoembolization. She would like to get imaging in a few months and decide at that time.  - CT abdomen/pelvis (12/8/22) revealed mostly stable disease. The liver lesion increased in size from 3 to 3.3 cm.  - I presented her case at neuroendocrine conference on 12/15/22. Recommendation was consideration for liver directed therapy.  - she met with interventional radiology on 1/10/23 regarding liver-directed therapy. She decided not to proceed with it.  - MRI abdomen (6/20/23) revealed stable disease.  MRI abdomen (1/8/24):"Stable 3.3 cm liver lesion as above.  There has been no worrisome change."  - continue lanreotide.  - return to clinic in 9 months with repeat labs/scan.    2. Leukopenia  - intermittent leukopenia since 2017  - RBC antigens Guy A/B antigens were negative, suggestive of benign neutropenia  - continue to monitor    3. Constipation / GERD  - she " takes pantoprazole  - she underwent colonoscopy/upper GI endoscopy in July 2023. Polyps were biopsied/removed; they were negative for malignancy.  - continue to monitor.    4. Advance Care Planning     Power of   After our discussion (at previous visit), the patient decided to complete a HCPOA and appointed her  daughter Sujata Brantley ([phone]), and brothers Isael Haq ([phone]) and Andrzej Haq ([phone]) .         - return to clinic in 9 months with repeat labs/scan.    Eliot Woodruff M.D.  Hematology/Oncology  [facility] - [address], [address]  [place] [address]  Phone: [phone]  Fax: [phone]

## 2024-01-23 ENCOUNTER — OFFICE VISIT (OUTPATIENT)
Dept: HEMATOLOGY/ONCOLOGY | Facility: CLINIC | Age: 69
End: 2024-01-23
Payer: COMMERCIAL

## 2024-01-23 ENCOUNTER — INFUSION (OUTPATIENT)
Dept: INFUSION THERAPY | Facility: HOSPITAL | Age: 69
End: 2024-01-23
Attending: INTERNAL MEDICINE
Payer: COMMERCIAL

## 2024-01-23 VITALS
HEART RATE: 96 BPM | BODY MASS INDEX: 22.63 KG/M2 | SYSTOLIC BLOOD PRESSURE: 109 MMHG | DIASTOLIC BLOOD PRESSURE: 73 MMHG | RESPIRATION RATE: 16 BRPM | OXYGEN SATURATION: 100 % | WEIGHT: 131.81 LBS

## 2024-01-23 VITALS
HEART RATE: 96 BPM | SYSTOLIC BLOOD PRESSURE: 109 MMHG | DIASTOLIC BLOOD PRESSURE: 73 MMHG | OXYGEN SATURATION: 100 % | RESPIRATION RATE: 16 BRPM

## 2024-01-23 DIAGNOSIS — D72.818 OTHER DECREASED WHITE BLOOD CELL (WBC) COUNT: ICD-10-CM

## 2024-01-23 DIAGNOSIS — K59.09 OTHER CONSTIPATION: ICD-10-CM

## 2024-01-23 DIAGNOSIS — K21.9 GERD WITHOUT ESOPHAGITIS: ICD-10-CM

## 2024-01-23 DIAGNOSIS — C7B.02 METASTATIC MALIGNANT CARCINOID TUMOR TO LIVER: Primary | ICD-10-CM

## 2024-01-23 DIAGNOSIS — C7A.00 MALIGNANT CARCINOID TUMOR OF UNKNOWN PRIMARY SITE: Primary | ICD-10-CM

## 2024-01-23 DIAGNOSIS — C7B.02 METASTATIC MALIGNANT CARCINOID TUMOR TO LIVER: ICD-10-CM

## 2024-01-23 DIAGNOSIS — C7B.8 SECONDARY NEUROENDOCRINE TUMOR OF LIVER: ICD-10-CM

## 2024-01-23 PROCEDURE — 63600175 PHARM REV CODE 636 W HCPCS: Mod: JZ,JG | Performed by: INTERNAL MEDICINE

## 2024-01-23 PROCEDURE — 99999 PR PBB SHADOW E&M-EST. PATIENT-LVL III: CPT | Mod: PBBFAC,,, | Performed by: INTERNAL MEDICINE

## 2024-01-23 PROCEDURE — 3078F DIAST BP <80 MM HG: CPT | Mod: CPTII,S$GLB,, | Performed by: INTERNAL MEDICINE

## 2024-01-23 PROCEDURE — 96372 THER/PROPH/DIAG INJ SC/IM: CPT

## 2024-01-23 PROCEDURE — 1126F AMNT PAIN NOTED NONE PRSNT: CPT | Mod: CPTII,S$GLB,, | Performed by: INTERNAL MEDICINE

## 2024-01-23 PROCEDURE — 1157F ADVNC CARE PLAN IN RCRD: CPT | Mod: CPTII,S$GLB,, | Performed by: INTERNAL MEDICINE

## 2024-01-23 PROCEDURE — 99215 OFFICE O/P EST HI 40 MIN: CPT | Mod: S$GLB,,, | Performed by: INTERNAL MEDICINE

## 2024-01-23 PROCEDURE — 1101F PT FALLS ASSESS-DOCD LE1/YR: CPT | Mod: CPTII,S$GLB,, | Performed by: INTERNAL MEDICINE

## 2024-01-23 PROCEDURE — 1160F RVW MEDS BY RX/DR IN RCRD: CPT | Mod: CPTII,S$GLB,, | Performed by: INTERNAL MEDICINE

## 2024-01-23 PROCEDURE — 3008F BODY MASS INDEX DOCD: CPT | Mod: CPTII,S$GLB,, | Performed by: INTERNAL MEDICINE

## 2024-01-23 PROCEDURE — 3074F SYST BP LT 130 MM HG: CPT | Mod: CPTII,S$GLB,, | Performed by: INTERNAL MEDICINE

## 2024-01-23 PROCEDURE — 3288F FALL RISK ASSESSMENT DOCD: CPT | Mod: CPTII,S$GLB,, | Performed by: INTERNAL MEDICINE

## 2024-01-23 PROCEDURE — 1159F MED LIST DOCD IN RCRD: CPT | Mod: CPTII,S$GLB,, | Performed by: INTERNAL MEDICINE

## 2024-01-23 RX ORDER — LANREOTIDE ACETATE 120 MG/.5ML
120 INJECTION SUBCUTANEOUS
Status: CANCELLED | OUTPATIENT
Start: 2024-01-23

## 2024-01-23 RX ORDER — LANREOTIDE ACETATE 120 MG/.5ML
120 INJECTION SUBCUTANEOUS
Status: DISCONTINUED | OUTPATIENT
Start: 2024-01-23 | End: 2024-01-23 | Stop reason: HOSPADM

## 2024-01-23 RX ADMIN — LANREOTIDE ACETATE 120 MG: 120 INJECTION SUBCUTANEOUS at 12:01

## 2024-02-08 ENCOUNTER — OFFICE VISIT (OUTPATIENT)
Dept: FAMILY MEDICINE | Facility: CLINIC | Age: 69
End: 2024-02-08
Payer: COMMERCIAL

## 2024-02-08 VITALS
OXYGEN SATURATION: 98 % | BODY MASS INDEX: 22.96 KG/M2 | WEIGHT: 134.5 LBS | SYSTOLIC BLOOD PRESSURE: 136 MMHG | DIASTOLIC BLOOD PRESSURE: 76 MMHG | HEART RATE: 80 BPM | HEIGHT: 64 IN

## 2024-02-08 DIAGNOSIS — M72.2 PLANTAR FASCIITIS OF LEFT FOOT: ICD-10-CM

## 2024-02-08 DIAGNOSIS — Z00.00 ROUTINE GENERAL MEDICAL EXAMINATION AT A HEALTH CARE FACILITY: Primary | ICD-10-CM

## 2024-02-08 DIAGNOSIS — F41.1 GENERALIZED ANXIETY DISORDER: ICD-10-CM

## 2024-02-08 DIAGNOSIS — C25.4 GASTRINOMA, MALIGNANT: ICD-10-CM

## 2024-02-08 DIAGNOSIS — F32.1 CURRENT MODERATE EPISODE OF MAJOR DEPRESSIVE DISORDER WITHOUT PRIOR EPISODE: ICD-10-CM

## 2024-02-08 DIAGNOSIS — C7A.00 MALIGNANT CARCINOID TUMOR OF UNKNOWN PRIMARY SITE: ICD-10-CM

## 2024-02-08 PROCEDURE — 3288F FALL RISK ASSESSMENT DOCD: CPT | Mod: CPTII,S$GLB,, | Performed by: FAMILY MEDICINE

## 2024-02-08 PROCEDURE — 1157F ADVNC CARE PLAN IN RCRD: CPT | Mod: CPTII,S$GLB,, | Performed by: FAMILY MEDICINE

## 2024-02-08 PROCEDURE — 99214 OFFICE O/P EST MOD 30 MIN: CPT | Mod: S$GLB,,, | Performed by: FAMILY MEDICINE

## 2024-02-08 PROCEDURE — 1159F MED LIST DOCD IN RCRD: CPT | Mod: CPTII,S$GLB,, | Performed by: FAMILY MEDICINE

## 2024-02-08 PROCEDURE — 1126F AMNT PAIN NOTED NONE PRSNT: CPT | Mod: CPTII,S$GLB,, | Performed by: FAMILY MEDICINE

## 2024-02-08 PROCEDURE — 1101F PT FALLS ASSESS-DOCD LE1/YR: CPT | Mod: CPTII,S$GLB,, | Performed by: FAMILY MEDICINE

## 2024-02-08 PROCEDURE — 3008F BODY MASS INDEX DOCD: CPT | Mod: CPTII,S$GLB,, | Performed by: FAMILY MEDICINE

## 2024-02-08 PROCEDURE — 3078F DIAST BP <80 MM HG: CPT | Mod: CPTII,S$GLB,, | Performed by: FAMILY MEDICINE

## 2024-02-08 PROCEDURE — 3075F SYST BP GE 130 - 139MM HG: CPT | Mod: CPTII,S$GLB,, | Performed by: FAMILY MEDICINE

## 2024-02-08 PROCEDURE — 99999 PR PBB SHADOW E&M-EST. PATIENT-LVL IV: CPT | Mod: PBBFAC,,, | Performed by: FAMILY MEDICINE

## 2024-02-08 RX ORDER — HYDROXYZINE HYDROCHLORIDE 25 MG/1
25 TABLET, FILM COATED ORAL 3 TIMES DAILY PRN
Qty: 60 TABLET | Refills: 2 | Status: SHIPPED | OUTPATIENT
Start: 2024-02-08

## 2024-02-08 RX ORDER — IBUPROFEN 600 MG/1
600 TABLET ORAL EVERY 8 HOURS PRN
Qty: 20 TABLET | Refills: 0 | Status: SHIPPED | OUTPATIENT
Start: 2024-02-08

## 2024-02-08 NOTE — PROGRESS NOTES
(Portions of this note were dictated using voice recognition software and may contain dictation related errors in spelling/grammar/syntax not found on text review)    CC:   Chief Complaint   Patient presents with    Annual Exam       HPI: 68 y.o. female presented for routine annual checkup and labs.  She is medical history significant for neuroendocrine malignant carcinoid tumor to liver, GERD, generalized anxiety disorder and depression.    Neuroendocrine tumor:  She follows up with Hematology, Dr. Woodruff, most recent visit 1/24, takes lanreotide injection every month. Had MRI abdomen on 01/24 which shows stable 3 cm liver lesion which has not changed from prior    GERD:  She takes Protonix 40 mg daily, states that it is too strong ans causes upset stomach, recently started taking over-the-counter Prilosec and it was much better    Anxiety and depression:  Patient reports feeling stressed out and overwhelmed due to medical conditions, used to take BuSpar in the past, however, was not able to tolerate it.  Has not been taking any medication recently, wants to start as needed medication for anxiety and panic attacks, also interested in therapy referral to start therapy    Heel pain:  Her other concern is having left hip pain, ongoing for the past few months, started after participating in yoga and exercise class, reports having pain in the heel mostly in the morning, taking 1st few steps are hard, pain improves with movement, takes Tylenol as needed, it is intermittent problem, denies having any trauma/fall    She feels weak sometimes, recently started making ensure and boost, tries to eat better, weight has been stable    She is due for annual labs.    She does not smoke, has no toxic habits.        Past Medical History:   Diagnosis Date    Anxiety     Cancer     Depression     Elevated bilirubin 9/9/2020    Fatigue     GERD (gastroesophageal reflux disease)     History of iron deficiency anemia 9/9/2020    Hx of  psychiatric care     Liver mass 06/2016    Mass of colon 06/2016    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     Psychiatric problem     Secondary neuroendocrine tumor of liver 9/9/2020    Sickle cell trait     Sleep difficulties        Past Surgical History:   Procedure Laterality Date    COLONOSCOPY N/A 7/17/2023    Procedure: COLONOSCOPY;  Surgeon: Tyron Avendaño MD;  Location: [facility];  Service: Endoscopy;  Laterality: N/A;    ESOPHAGOGASTRODUODENOSCOPY N/A 7/17/2023    Procedure: EGD (ESOPHAGOGASTRODUODENOSCOPY);  Surgeon: Tyron Avendaño MD;  Location: [facility] END[facility];  Service: Endoscopy;  Laterality: N/A;       Family History   Problem Relation Age of Onset    Cancer Maternal Grandmother     Cancer Other     Depression Mother     Anxiety disorder Mother        Social History     Tobacco Use    Smoking status: Never     Passive exposure: Never    Smokeless tobacco: Never   Substance Use Topics    Alcohol use: No    Drug use: No       Lab Results   Component Value Date    WBC 3.75 (L) 01/08/2024    HGB 14.6 01/08/2024    HCT 42.5 01/08/2024    MCV 85 01/08/2024     01/08/2024    CHOL 209 (H) 11/04/2021    TRIG 70 11/04/2021    HDL 47 11/04/2021    ALT 15 01/08/2024    AST 21 01/08/2024    BILITOT 1.4 (H) 01/08/2024    ALKPHOS 94 01/08/2024     01/08/2024    K 4.3 01/08/2024     01/08/2024    CREATININE 0.9 01/08/2024    ESTGFRAFRICA >60 06/23/2022    EGFRNONAA 60 06/23/2022    CALCIUM 9.8 01/08/2024    ALBUMIN 3.9 01/08/2024    BUN 11 01/08/2024    CO2 27 01/08/2024    TSH 1.278 02/29/2020    INR 1.1 09/08/2016    HGBA1C 5.8 (H) 11/04/2021    LDLCALC 148.0 11/04/2021     (H) 01/08/2024    QOAEMLIW61BP 52 11/04/2021             Vital signs reviewed  PE:   APPEARANCE: Well nourished, well developed, in no acute distress.    HEAD: Normocephalic, atraumatic.  EYES: EOMI.  Conjunctivae noninjected.  NOSE: Mucosa pink. Airway clear.  MOUTH & THROAT: No tonsillar  enlargement. No pharyngeal erythema or exudate.   NECK: Supple with no cervical lymphadenopathy.    CHEST: Good inspiratory effort. Lungs clear to auscultation with no wheezes or crackles.  CARDIOVASCULAR: Normal S1, S2. No rubs, murmurs, or gallops.  ABDOMEN: Bowel sounds normal. Not distended. Soft. No tenderness or masses. No organomegaly.  EXTREMITIES: No edema, cyanosis, or clubbing.    Review of Systems   Constitutional:  Negative for chills, fatigue and fever.   HENT: Negative.     Respiratory:  Negative for cough, shortness of breath and wheezing.    Cardiovascular:  Negative for chest pain, palpitations and leg swelling.   Gastrointestinal: Negative.    Genitourinary: Negative.    Neurological: Negative.    Psychiatric/Behavioral: Negative.     All other systems reviewed and are negative.      IMPRESSION  1. Routine general medical examination at a health care facility    2. Malignant carcinoid tumor of unknown primary site    3. Gastrinoma, malignant    4. Generalized anxiety disorder    5. Current moderate episode of major depressive disorder without prior episode    6. Plantar fasciitis of left foot            PLAN      1. Routine general medical examination at a health care facility    - Lipid Panel; Future  - Hemoglobin A1C; Future  - TSH; Future  - CBC Auto Differential; Future      2. Malignant carcinoid tumor of unknown primary site      3. Gastrinoma, malignant    Followed by hematology  Stable as per MRI abdomen on 01/2024   Continue current treatment        4. Generalized anxiety disorder    - Ambulatory referral/consult to Psychology; Future    - hydrOXYzine HCL (ATARAX) 25 MG tablet; Take 1 tablet (25 mg total) by mouth 3 (three) times daily as needed for Anxiety.  Dispense: 60 tablet; Refill: 2      5. Current moderate episode of major depressive disorder without prior episode    - Ambulatory referral/consult to Psychology; Future    - hydrOXYzine HCL (ATARAX) 25 MG tablet; Take 1 tablet (25  mg total) by mouth 3 (three) times daily as needed for Anxiety.  Dispense: 60 tablet; Refill: 2      6. Plantar fasciitis of left foot    - ibuprofen (ADVIL,MOTRIN) 600 MG tablet; Take 1 tablet (600 mg total) by mouth every 8 (eight) hours as needed for Pain.  Dispense: 20 tablet; Refill: 0     Plantar fasciitis exercises printed for patient, advised to do at home   Advised on plantar fasciitis insoles for additional support        SCREENINGS      Immunizations:   Up-to-date with flu and COVID vaccine   Encouraged to take tetanus and shingles vaccine      Age/demographic appropriate health maintenance:    Up-to-date with mammogram and colonoscopy      Health Maintenance Due   Topic Date Due    RSV Vaccine (Age 60+ and Pregnant patients) (1 - 1-dose 60+ series) Never done           Spent adequate time in obtaining history and explaining differentials     35 minutes spent during this visit of which greater than 50% devoted to face-face counseling and coordination of care regarding diagnosis and management plan         Anat Blackwell   2/8/2024

## 2024-02-12 ENCOUNTER — PATIENT MESSAGE (OUTPATIENT)
Dept: FAMILY MEDICINE | Facility: CLINIC | Age: 69
End: 2024-02-12
Payer: COMMERCIAL

## 2024-02-20 ENCOUNTER — INFUSION (OUTPATIENT)
Dept: INFUSION THERAPY | Facility: HOSPITAL | Age: 69
End: 2024-02-20
Attending: INTERNAL MEDICINE
Payer: COMMERCIAL

## 2024-02-20 ENCOUNTER — LAB VISIT (OUTPATIENT)
Dept: LAB | Facility: HOSPITAL | Age: 69
End: 2024-02-20
Attending: FAMILY MEDICINE
Payer: COMMERCIAL

## 2024-02-20 VITALS
SYSTOLIC BLOOD PRESSURE: 133 MMHG | WEIGHT: 141.13 LBS | BODY MASS INDEX: 24.1 KG/M2 | DIASTOLIC BLOOD PRESSURE: 80 MMHG | HEIGHT: 64 IN

## 2024-02-20 DIAGNOSIS — C7B.02 METASTATIC MALIGNANT CARCINOID TUMOR TO LIVER: ICD-10-CM

## 2024-02-20 DIAGNOSIS — Z00.00 ROUTINE GENERAL MEDICAL EXAMINATION AT A HEALTH CARE FACILITY: ICD-10-CM

## 2024-02-20 DIAGNOSIS — C7A.00 MALIGNANT CARCINOID TUMOR OF UNKNOWN PRIMARY SITE: Primary | ICD-10-CM

## 2024-02-20 LAB
BASOPHILS # BLD AUTO: 0.04 K/UL (ref 0–0.2)
BASOPHILS NFR BLD: 0.9 % (ref 0–1.9)
CHOLEST SERPL-MCNC: 218 MG/DL (ref 120–199)
CHOLEST/HDLC SERPL: 3.6 {RATIO} (ref 2–5)
DIFFERENTIAL METHOD BLD: ABNORMAL
EOSINOPHIL # BLD AUTO: 0.1 K/UL (ref 0–0.5)
EOSINOPHIL NFR BLD: 1.1 % (ref 0–8)
ERYTHROCYTE [DISTWIDTH] IN BLOOD BY AUTOMATED COUNT: 13.1 % (ref 11.5–14.5)
ESTIMATED AVG GLUCOSE: 117 MG/DL (ref 68–131)
HBA1C MFR BLD: 5.7 % (ref 4–5.6)
HCT VFR BLD AUTO: 44.1 % (ref 37–48.5)
HDLC SERPL-MCNC: 60 MG/DL (ref 40–75)
HDLC SERPL: 27.5 % (ref 20–50)
HGB BLD-MCNC: 14.7 G/DL (ref 12–16)
IMM GRANULOCYTES # BLD AUTO: 0.02 K/UL (ref 0–0.04)
IMM GRANULOCYTES NFR BLD AUTO: 0.5 % (ref 0–0.5)
LDLC SERPL CALC-MCNC: 144.4 MG/DL (ref 63–159)
LYMPHOCYTES # BLD AUTO: 0.9 K/UL (ref 1–4.8)
LYMPHOCYTES NFR BLD: 19.6 % (ref 18–48)
MCH RBC QN AUTO: 28.9 PG (ref 27–31)
MCHC RBC AUTO-ENTMCNC: 33.3 G/DL (ref 32–36)
MCV RBC AUTO: 87 FL (ref 82–98)
MONOCYTES # BLD AUTO: 0.4 K/UL (ref 0.3–1)
MONOCYTES NFR BLD: 9.8 % (ref 4–15)
NEUTROPHILS # BLD AUTO: 3 K/UL (ref 1.8–7.7)
NEUTROPHILS NFR BLD: 68.1 % (ref 38–73)
NONHDLC SERPL-MCNC: 158 MG/DL
NRBC BLD-RTO: 0 /100 WBC
PLATELET # BLD AUTO: 210 K/UL (ref 150–450)
PMV BLD AUTO: 11.6 FL (ref 9.2–12.9)
RBC # BLD AUTO: 5.09 M/UL (ref 4–5.4)
TRIGL SERPL-MCNC: 68 MG/DL (ref 30–150)
TSH SERPL DL<=0.005 MIU/L-ACNC: 0.67 UIU/ML (ref 0.4–4)
WBC # BLD AUTO: 4.38 K/UL (ref 3.9–12.7)

## 2024-02-20 PROCEDURE — 85025 COMPLETE CBC W/AUTO DIFF WBC: CPT | Performed by: FAMILY MEDICINE

## 2024-02-20 PROCEDURE — 84443 ASSAY THYROID STIM HORMONE: CPT | Performed by: FAMILY MEDICINE

## 2024-02-20 PROCEDURE — 80061 LIPID PANEL: CPT | Performed by: FAMILY MEDICINE

## 2024-02-20 PROCEDURE — 83036 HEMOGLOBIN GLYCOSYLATED A1C: CPT | Performed by: FAMILY MEDICINE

## 2024-02-20 PROCEDURE — 96372 THER/PROPH/DIAG INJ SC/IM: CPT

## 2024-02-20 PROCEDURE — 63600175 PHARM REV CODE 636 W HCPCS: Mod: JZ,JG | Performed by: INTERNAL MEDICINE

## 2024-02-20 PROCEDURE — 36415 COLL VENOUS BLD VENIPUNCTURE: CPT | Performed by: FAMILY MEDICINE

## 2024-02-20 RX ORDER — LANREOTIDE ACETATE 120 MG/.5ML
120 INJECTION SUBCUTANEOUS
Status: CANCELLED | OUTPATIENT
Start: 2024-02-20

## 2024-02-20 RX ORDER — LANREOTIDE ACETATE 120 MG/.5ML
120 INJECTION SUBCUTANEOUS
Status: DISCONTINUED | OUTPATIENT
Start: 2024-02-20 | End: 2024-02-20 | Stop reason: HOSPADM

## 2024-02-20 RX ADMIN — LANREOTIDE ACETATE 120 MG: 120 INJECTION SUBCUTANEOUS at 11:02

## 2024-03-19 ENCOUNTER — INFUSION (OUTPATIENT)
Dept: INFUSION THERAPY | Facility: HOSPITAL | Age: 69
End: 2024-03-19
Attending: INTERNAL MEDICINE
Payer: COMMERCIAL

## 2024-03-19 VITALS
WEIGHT: 141.13 LBS | BODY MASS INDEX: 24.1 KG/M2 | DIASTOLIC BLOOD PRESSURE: 75 MMHG | HEART RATE: 74 BPM | SYSTOLIC BLOOD PRESSURE: 129 MMHG | RESPIRATION RATE: 18 BRPM | OXYGEN SATURATION: 98 % | HEIGHT: 64 IN

## 2024-03-19 DIAGNOSIS — C7A.00 MALIGNANT CARCINOID TUMOR OF UNKNOWN PRIMARY SITE: Primary | ICD-10-CM

## 2024-03-19 DIAGNOSIS — C7B.02 METASTATIC MALIGNANT CARCINOID TUMOR TO LIVER: ICD-10-CM

## 2024-03-19 PROCEDURE — 96372 THER/PROPH/DIAG INJ SC/IM: CPT

## 2024-03-19 PROCEDURE — 63600175 PHARM REV CODE 636 W HCPCS: Mod: JZ,JG | Performed by: INTERNAL MEDICINE

## 2024-03-19 RX ORDER — LANREOTIDE ACETATE 120 MG/.5ML
120 INJECTION SUBCUTANEOUS
Status: CANCELLED | OUTPATIENT
Start: 2024-03-19

## 2024-03-19 RX ORDER — LANREOTIDE ACETATE 120 MG/.5ML
120 INJECTION SUBCUTANEOUS
Status: DISCONTINUED | OUTPATIENT
Start: 2024-03-19 | End: 2024-03-19 | Stop reason: HOSPADM

## 2024-03-19 RX ADMIN — LANREOTIDE ACETATE 120 MG: 120 INJECTION SUBCUTANEOUS at 10:03

## 2024-04-16 ENCOUNTER — INFUSION (OUTPATIENT)
Dept: INFUSION THERAPY | Facility: HOSPITAL | Age: 69
End: 2024-04-16
Attending: INTERNAL MEDICINE
Payer: COMMERCIAL

## 2024-04-16 VITALS
OXYGEN SATURATION: 99 % | SYSTOLIC BLOOD PRESSURE: 129 MMHG | HEIGHT: 64 IN | RESPIRATION RATE: 18 BRPM | WEIGHT: 141.13 LBS | HEART RATE: 69 BPM | BODY MASS INDEX: 24.1 KG/M2 | DIASTOLIC BLOOD PRESSURE: 78 MMHG

## 2024-04-16 DIAGNOSIS — C7B.02 METASTATIC MALIGNANT CARCINOID TUMOR TO LIVER: ICD-10-CM

## 2024-04-16 DIAGNOSIS — C7A.00 MALIGNANT CARCINOID TUMOR OF UNKNOWN PRIMARY SITE: Primary | ICD-10-CM

## 2024-04-16 PROCEDURE — 96372 THER/PROPH/DIAG INJ SC/IM: CPT

## 2024-04-16 PROCEDURE — 63600175 PHARM REV CODE 636 W HCPCS: Mod: JZ,JG | Performed by: INTERNAL MEDICINE

## 2024-04-16 RX ORDER — LANREOTIDE ACETATE 120 MG/.5ML
120 INJECTION SUBCUTANEOUS
Status: DISCONTINUED | OUTPATIENT
Start: 2024-04-16 | End: 2024-04-16 | Stop reason: HOSPADM

## 2024-04-16 RX ORDER — LANREOTIDE ACETATE 120 MG/.5ML
120 INJECTION SUBCUTANEOUS
Status: CANCELLED | OUTPATIENT
Start: 2024-04-16

## 2024-04-16 RX ADMIN — LANREOTIDE ACETATE 120 MG: 120 INJECTION SUBCUTANEOUS at 11:04

## 2024-05-14 ENCOUNTER — INFUSION (OUTPATIENT)
Dept: INFUSION THERAPY | Facility: HOSPITAL | Age: 69
End: 2024-05-14
Attending: INTERNAL MEDICINE
Payer: COMMERCIAL

## 2024-05-14 VITALS
OXYGEN SATURATION: 98 % | DIASTOLIC BLOOD PRESSURE: 73 MMHG | HEART RATE: 95 BPM | TEMPERATURE: 98 F | RESPIRATION RATE: 18 BRPM | SYSTOLIC BLOOD PRESSURE: 109 MMHG

## 2024-05-14 DIAGNOSIS — C7A.00 MALIGNANT CARCINOID TUMOR OF UNKNOWN PRIMARY SITE: Primary | ICD-10-CM

## 2024-05-14 DIAGNOSIS — C7B.02 METASTATIC MALIGNANT CARCINOID TUMOR TO LIVER: ICD-10-CM

## 2024-05-14 PROCEDURE — 63600175 PHARM REV CODE 636 W HCPCS: Mod: JZ,JG | Performed by: INTERNAL MEDICINE

## 2024-05-14 PROCEDURE — 96372 THER/PROPH/DIAG INJ SC/IM: CPT

## 2024-05-14 RX ORDER — LANREOTIDE ACETATE 120 MG/.5ML
120 INJECTION SUBCUTANEOUS
Status: CANCELLED | OUTPATIENT
Start: 2024-05-14

## 2024-05-14 RX ORDER — LANREOTIDE ACETATE 120 MG/.5ML
120 INJECTION SUBCUTANEOUS
Status: DISCONTINUED | OUTPATIENT
Start: 2024-05-14 | End: 2024-05-14 | Stop reason: HOSPADM

## 2024-05-14 RX ADMIN — LANREOTIDE ACETATE 120 MG: 120 INJECTION SUBCUTANEOUS at 11:05

## 2024-05-14 NOTE — PLAN OF CARE
Patient tolerated lanreotide injection well, VSS, no complaints at this time. Next appointment scheduled and pt d/c in no acute distress.

## 2024-05-30 ENCOUNTER — TELEPHONE (OUTPATIENT)
Dept: HEMATOLOGY/ONCOLOGY | Facility: CLINIC | Age: 69
End: 2024-05-30
Payer: COMMERCIAL

## 2024-05-31 ENCOUNTER — PATIENT MESSAGE (OUTPATIENT)
Dept: HEMATOLOGY/ONCOLOGY | Facility: CLINIC | Age: 69
End: 2024-05-31
Payer: COMMERCIAL

## 2024-06-11 ENCOUNTER — INFUSION (OUTPATIENT)
Dept: INFUSION THERAPY | Facility: HOSPITAL | Age: 69
End: 2024-06-11
Attending: INTERNAL MEDICINE
Payer: COMMERCIAL

## 2024-06-11 VITALS
HEART RATE: 81 BPM | RESPIRATION RATE: 18 BRPM | DIASTOLIC BLOOD PRESSURE: 78 MMHG | OXYGEN SATURATION: 99 % | TEMPERATURE: 98 F | SYSTOLIC BLOOD PRESSURE: 131 MMHG

## 2024-06-11 DIAGNOSIS — C7A.00 MALIGNANT CARCINOID TUMOR OF UNKNOWN PRIMARY SITE: Primary | ICD-10-CM

## 2024-06-11 DIAGNOSIS — C7B.02 METASTATIC MALIGNANT CARCINOID TUMOR TO LIVER: ICD-10-CM

## 2024-06-11 PROCEDURE — 63600175 PHARM REV CODE 636 W HCPCS: Mod: JZ,JG | Performed by: INTERNAL MEDICINE

## 2024-06-11 PROCEDURE — 96372 THER/PROPH/DIAG INJ SC/IM: CPT

## 2024-06-11 RX ORDER — LANREOTIDE ACETATE 120 MG/.5ML
120 INJECTION SUBCUTANEOUS
Status: DISCONTINUED | OUTPATIENT
Start: 2024-06-11 | End: 2024-06-11 | Stop reason: HOSPADM

## 2024-06-11 RX ORDER — LANREOTIDE ACETATE 120 MG/.5ML
120 INJECTION SUBCUTANEOUS
OUTPATIENT
Start: 2024-06-11

## 2024-06-11 RX ADMIN — LANREOTIDE ACETATE 120 MG: 120 INJECTION SUBCUTANEOUS at 02:06

## 2024-07-01 ENCOUNTER — PATIENT MESSAGE (OUTPATIENT)
Dept: HEMATOLOGY/ONCOLOGY | Facility: CLINIC | Age: 69
End: 2024-07-01
Payer: COMMERCIAL

## 2024-07-09 ENCOUNTER — INFUSION (OUTPATIENT)
Dept: INFUSION THERAPY | Facility: HOSPITAL | Age: 69
End: 2024-07-09
Attending: INTERNAL MEDICINE
Payer: COMMERCIAL

## 2024-07-09 ENCOUNTER — HOSPITAL ENCOUNTER (EMERGENCY)
Facility: HOSPITAL | Age: 69
Discharge: HOME | End: 2024-07-09
Attending: STUDENT IN AN ORGANIZED HEALTH CARE EDUCATION/TRAINING PROGRAM
Payer: COMMERCIAL

## 2024-07-09 VITALS
OXYGEN SATURATION: 98 % | SYSTOLIC BLOOD PRESSURE: 122 MMHG | HEART RATE: 64 BPM | HEIGHT: 64 IN | BODY MASS INDEX: 22.71 KG/M2 | WEIGHT: 133 LBS | TEMPERATURE: 98 F | DIASTOLIC BLOOD PRESSURE: 75 MMHG | RESPIRATION RATE: 18 BRPM

## 2024-07-09 VITALS
DIASTOLIC BLOOD PRESSURE: 78 MMHG | HEART RATE: 90 BPM | RESPIRATION RATE: 18 BRPM | TEMPERATURE: 98 F | OXYGEN SATURATION: 100 % | SYSTOLIC BLOOD PRESSURE: 121 MMHG

## 2024-07-09 DIAGNOSIS — R53.1 GENERALIZED WEAKNESS: ICD-10-CM

## 2024-07-09 DIAGNOSIS — C7A.00 MALIGNANT CARCINOID TUMOR OF UNKNOWN PRIMARY SITE: Primary | ICD-10-CM

## 2024-07-09 DIAGNOSIS — C7B.02 METASTATIC MALIGNANT CARCINOID TUMOR TO LIVER: ICD-10-CM

## 2024-07-09 LAB
ALBUMIN SERPL BCP-MCNC: 3.7 G/DL (ref 3.5–5.2)
ALP SERPL-CCNC: 90 U/L (ref 55–135)
ALT SERPL W/O P-5'-P-CCNC: 14 U/L (ref 10–44)
ANION GAP SERPL CALC-SCNC: 7 MMOL/L (ref 8–16)
AST SERPL-CCNC: 21 U/L (ref 10–40)
BASOPHILS # BLD AUTO: 0.05 K/UL (ref 0–0.2)
BASOPHILS NFR BLD: 1 % (ref 0–1.9)
BILIRUB SERPL-MCNC: 1.3 MG/DL (ref 0.1–1)
BILIRUB UR QL STRIP: NEGATIVE
BUN SERPL-MCNC: 13 MG/DL (ref 8–23)
CALCIUM SERPL-MCNC: 9.8 MG/DL (ref 8.7–10.5)
CHLORIDE SERPL-SCNC: 108 MMOL/L (ref 95–110)
CLARITY UR: ABNORMAL
CO2 SERPL-SCNC: 26 MMOL/L (ref 23–29)
COLOR UR: COLORLESS
CREAT SERPL-MCNC: 0.9 MG/DL (ref 0.5–1.4)
DIFFERENTIAL METHOD BLD: ABNORMAL
EOSINOPHIL # BLD AUTO: 0.1 K/UL (ref 0–0.5)
EOSINOPHIL NFR BLD: 1.4 % (ref 0–8)
ERYTHROCYTE [DISTWIDTH] IN BLOOD BY AUTOMATED COUNT: 13.1 % (ref 11.5–14.5)
EST. GFR  (NO RACE VARIABLE): >60 ML/MIN/1.73 M^2
GLUCOSE SERPL-MCNC: 53 MG/DL (ref 70–110)
GLUCOSE UR QL STRIP: NEGATIVE
HCT VFR BLD AUTO: 40.8 % (ref 37–48.5)
HGB BLD-MCNC: 13.9 G/DL (ref 12–16)
HGB UR QL STRIP: NEGATIVE
IMM GRANULOCYTES # BLD AUTO: 0.02 K/UL (ref 0–0.04)
IMM GRANULOCYTES NFR BLD AUTO: 0.4 % (ref 0–0.5)
KETONES UR QL STRIP: NEGATIVE
LEUKOCYTE ESTERASE UR QL STRIP: NEGATIVE
LYMPHOCYTES # BLD AUTO: 0.7 K/UL (ref 1–4.8)
LYMPHOCYTES NFR BLD: 14.2 % (ref 18–48)
MCH RBC QN AUTO: 29.8 PG (ref 27–31)
MCHC RBC AUTO-ENTMCNC: 34.1 G/DL (ref 32–36)
MCV RBC AUTO: 87 FL (ref 82–98)
MONOCYTES # BLD AUTO: 0.5 K/UL (ref 0.3–1)
MONOCYTES NFR BLD: 9.7 % (ref 4–15)
NEUTROPHILS # BLD AUTO: 3.8 K/UL (ref 1.8–7.7)
NEUTROPHILS NFR BLD: 73.3 % (ref 38–73)
NITRITE UR QL STRIP: NEGATIVE
NRBC BLD-RTO: 0 /100 WBC
PH UR STRIP: 6 [PH] (ref 5–8)
PLATELET # BLD AUTO: 194 K/UL (ref 150–450)
PMV BLD AUTO: 11 FL (ref 9.2–12.9)
POTASSIUM SERPL-SCNC: 3.8 MMOL/L (ref 3.5–5.1)
PROT SERPL-MCNC: 7.1 G/DL (ref 6–8.4)
PROT UR QL STRIP: NEGATIVE
RBC # BLD AUTO: 4.67 M/UL (ref 4–5.4)
SODIUM SERPL-SCNC: 141 MMOL/L (ref 136–145)
SP GR UR STRIP: <1.005 (ref 1–1.03)
TSH SERPL DL<=0.005 MIU/L-ACNC: 0.57 UIU/ML (ref 0.4–4)
URN SPEC COLLECT METH UR: ABNORMAL
UROBILINOGEN UR STRIP-ACNC: NEGATIVE EU/DL
WBC # BLD AUTO: 5.14 K/UL (ref 3.9–12.7)

## 2024-07-09 PROCEDURE — 99284 EMERGENCY DEPT VISIT MOD MDM: CPT | Mod: 25

## 2024-07-09 PROCEDURE — 84443 ASSAY THYROID STIM HORMONE: CPT | Performed by: STUDENT IN AN ORGANIZED HEALTH CARE EDUCATION/TRAINING PROGRAM

## 2024-07-09 PROCEDURE — 93005 ELECTROCARDIOGRAM TRACING: CPT

## 2024-07-09 PROCEDURE — 81003 URINALYSIS AUTO W/O SCOPE: CPT | Performed by: STUDENT IN AN ORGANIZED HEALTH CARE EDUCATION/TRAINING PROGRAM

## 2024-07-09 PROCEDURE — 85025 COMPLETE CBC W/AUTO DIFF WBC: CPT | Performed by: STUDENT IN AN ORGANIZED HEALTH CARE EDUCATION/TRAINING PROGRAM

## 2024-07-09 PROCEDURE — 80053 COMPREHEN METABOLIC PANEL: CPT | Performed by: STUDENT IN AN ORGANIZED HEALTH CARE EDUCATION/TRAINING PROGRAM

## 2024-07-09 PROCEDURE — 93010 ELECTROCARDIOGRAM REPORT: CPT | Mod: ,,, | Performed by: INTERNAL MEDICINE

## 2024-07-09 PROCEDURE — 96372 THER/PROPH/DIAG INJ SC/IM: CPT

## 2024-07-09 PROCEDURE — 63600175 PHARM REV CODE 636 W HCPCS: Mod: JZ,JG | Performed by: INTERNAL MEDICINE

## 2024-07-09 RX ORDER — LANREOTIDE ACETATE 120 MG/.5ML
120 INJECTION SUBCUTANEOUS
Status: DISCONTINUED | OUTPATIENT
Start: 2024-07-09 | End: 2024-07-09 | Stop reason: HOSPADM

## 2024-07-09 RX ORDER — LANREOTIDE ACETATE 120 MG/.5ML
120 INJECTION SUBCUTANEOUS
OUTPATIENT
Start: 2024-07-09

## 2024-07-09 RX ADMIN — LANREOTIDE ACETATE 120 MG: 120 INJECTION SUBCUTANEOUS at 10:07

## 2024-07-09 NOTE — ED NOTES
"Patient presents to the ED with c/o weakness, abd pain, and cramping while arriving to her doctor's appointment. Patient states when she pulled up to the office she got "faint," flushed, and felt like she needed to have a bowel movement. Pt states she was able to ambulate into clinic and have a bowel movement. States she felt better after that but was still flushed and felt "out of it." Pt received her chemo injection and was instructed to go to ED for further evaluation. Patient denies symptoms at this time. Able to ambulate with steady gait. Neuro intact. Alert and oriented. Denies pain. Assisted into gown. Placed onto continuous cardiac, BP, and O2 monitoring. Vitals stable. Safety intact. Call light in reach. Care ongoing.   "

## 2024-07-09 NOTE — ED NOTES
Patient explains that she feels much better at this time. States she has not symptoms/complaints. Ambulatory to restroom with steady gait.

## 2024-07-09 NOTE — NURSING
"Patient arrived to clinic for lanreotide injection. Patient states "feeling faint and not right." All VSS. Patient states able to eat and drink this morning. States when she walked out in the parking lot is when it started. She also c/o tingling in her legs. Patient seen squinting eyes. Notified Dr. Woodruff. Ok to give Lanreotide injection and bring patient to ER.    ER notified and patient escorted to ER by EDWAR Baker.   "

## 2024-07-09 NOTE — ED PROVIDER NOTES
NAME:  Lena Brantley  CSN:     [id number]  MRN:    [id number]  ADMIT DATE: 7/9/2024        eMERGENCY dEPARTMENT eNCOUnter    CHIEF COMPLAINT    Chief Complaint   Patient presents with    Weakness     Patient reports weakness while walking to get injection for cancer. States that she received injection but reports continued weakness and BLE tingling. Neuro intact.        HPI    Lena Brantley is a 68 y.o. 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     she presents to the ED due to generalized weakness.  States this occurred prior to receiving her chemo injection that she gets once a month.  Thought perhaps she was dehydrated and did drink some fluids.  To the bathroom in the clinic and had a bowel movement with some improvement.  However after getting the injection she felt some continued tingling to the lower legs.  She states this happens to her very often when she is dehydrated.  She notes that does very stressful for her to go get these chemo injections once a month as she sees lots of other people in poor condition and it makes her anxious about her health.  She notes that her liver tumor has been stable for quite some time and she does feel fortunate.  Follows closely with Dr. Woodruff.  At the time of my assessment patient states she was back to baseline and asymptomatic.    HPI       PAST MEDICAL HISTORY  Past Medical History:   Diagnosis Date    Anxiety     Cancer     Depression     Elevated bilirubin 9/9/2020    Fatigue     GERD (gastroesophageal reflux disease)     History of iron deficiency  anemia 9/9/2020    Hx of psychiatric care     Liver mass 06/2016    Mass of colon 06/2016    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     Psychiatric problem     Secondary neuroendocrine tumor of liver 9/9/2020    Sickle cell trait     Sleep difficulties        SURGICAL HISTORY    Past Surgical History:   Procedure Laterality Date    COLONOSCOPY N/A 7/17/2023    Procedure: COLONOSCOPY;  Surgeon: Tyron Avendaño MD;  Location: [facility];  Service: Endoscopy;  Laterality: N/A;    ESOPHAGOGASTRODUODENOSCOPY N/A 7/17/2023    Procedure: EGD (ESOPHAGOGASTRODUODENOSCOPY);  Surgeon: Tyron Avendaño MD;  Location: [facility] ENDO;  Service: Endoscopy;  Laterality: N/A;       FAMILY HISTORY    Family History   Problem Relation Name Age of Onset    Cancer Maternal Grandmother      Cancer Other      Depression Mother      Anxiety disorder Mother         SOCIAL HISTORY    Social History     Socioeconomic History    Marital status: Single    Number of children: 1   Tobacco Use    Smoking status: Never     Passive exposure: Never    Smokeless tobacco: Never   Substance and Sexual Activity    Alcohol use: No    Drug use: No    Sexual activity: Yes     Partners: Male     Social Determinants of Health     Transportation Needs: No Transportation Needs (4/2/2020)    PRAPARE - Transportation     Lack of Transportation (Medical): No     Lack of Transportation (Non-Medical): No   Physical Activity: Sufficiently Active (4/2/2020)    Exercise Vital Sign     Days of Exercise per Week: 5 days     Minutes of Exercise per Session: 60 min   Stress: Stress Concern Present (4/2/2020)    [employer] [place] [employer] - Occupational Stress Questionnaire     Feeling of Stress : Very much       MEDICATIONS  Current Outpatient Medications   Medication Instructions    ascorbic acid (vitamin C) (VITAMIN C) 500 mg, Oral, Daily    calcium carbonate (OS-TJ) 500 mg calcium (1,250 mg) tablet 1 tablet, Oral, Daily    cyanocobalamin  "(VITAMIN B-12) 100 mcg, Oral, Daily    hydrOXYzine HCL (ATARAX) 25 mg, Oral, 3 times daily PRN    ibuprofen (ADVIL,MOTRIN) 600 mg, Oral, Every 8 hours PRN    lactulose (CHRONULAC) 10 g, Oral, 2 times daily PRN    lanreotide (SOMATULINE DEPOT) 120 mg, Subcutaneous, Every 28 days    multivitamin capsule 1 capsule, Oral, Daily    pantoprazole (PROTONIX) 40 mg, Oral, Daily    sucralfate (CARAFATE) 1 g, Oral, 4 times daily    vitamin D (VITAMIN D3) 1,000 Units, Oral, Daily       ALLERGIES    Review of patient's allergies indicates:   Allergen Reactions    Epinephrine Anaphylaxis     Can Cause Carcinoid Crisis         REVIEW OF SYSTEMS   Review of Systems       PHYSICAL EXAM    Reviewed Triage Note    VITAL SIGNS:   ED Triage Vitals   Enc Vitals Group      BP 07/09/24 1056 139/80      Pulse 07/09/24 1056 75      Resp 07/09/24 1056 18      Temp 07/09/24 1056 98.2 °F (36.8 °C)      Temp Source 07/09/24 1056 Oral      SpO2 07/09/24 1056 99 %      Weight 07/09/24 1057 133 lb      Height 07/09/24 1057 5' 4"      Head Circumference --       Peak Flow --       Pain Score --       Pain Loc --       Pain Education --       Exclude from Growth Chart --        Patient Vitals for the past 24 hrs:   BP Temp Temp src Pulse Resp SpO2 Height Weight   07/09/24 1057 -- -- -- -- -- -- 5' 4" (1.626 m) 60.3 kg (133 lb)   07/09/24 1056 139/80 98.2 °F (36.8 °C) Oral 75 18 99 % -- --       Physical Exam    Nursing note and vitals reviewed.  Constitutional: She appears well-developed and well-nourished.   HENT:   Head: Normocephalic and atraumatic.   Eyes: EOM are normal. Pupils are equal, round, and reactive to light.   Neck: Neck supple.   Normal range of motion.  Cardiovascular:  Normal rate, regular rhythm and normal heart sounds.           Pulmonary/Chest: Breath sounds normal. No respiratory distress.   Abdominal: Abdomen is soft. There is no abdominal tenderness.   Musculoskeletal:         General: Normal range of motion.      Cervical " back: Normal range of motion and neck supple.     Neurological: She is alert and oriented to person, place, and time. She has normal strength. No cranial nerve deficit or sensory deficit. GCS score is 15. GCS eye subscore is 4. GCS verbal subscore is 5. GCS motor subscore is 6.   Skin: Skin is warm and dry.   Psychiatric: Her mood appears anxious.          EKG     Interpreted by EM physician if performed:   Normal sinus rhythm. No ST elevation or depression.  No T-wave inversions.  No evidence of ischemia. Rate 85.  Baseline artifact            LABS  Pertinent labs reviewed. (See chart for details)   Labs Reviewed   CBC W/ AUTO DIFFERENTIAL - Abnormal; Notable for the following components:       Result Value    Lymph # 0.7 (*)     Gran % 73.3 (*)     Lymph % 14.2 (*)     All other components within normal limits   COMPREHENSIVE METABOLIC PANEL - Abnormal; Notable for the following components:    Glucose 53 (*)     Total Bilirubin 1.3 (*)     Anion Gap 7 (*)     All other components within normal limits   URINALYSIS, REFLEX TO URINE CULTURE - Abnormal; Notable for the following components:    Color, UA Colorless (*)     Appearance, UA Hazy (*)     Specific Gravity, UA <1.005 (*)     All other components within normal limits    Narrative:     Specimen Source->Urine   TSH         RADIOLOGY          Imaging Results    None           PROCEDURES    Procedures      ED COURSE & MEDICAL DECISION MAKING    Pertinent Labs & Imaging studies reviewed. (See chart for details and specific orders.)          Summary of review of records:   Patient received    Dose Frequency Start End   lanreotide injection 120 mg       Just prior to arrival for malignant carcinoid tumor of liver with unclear primary source.    Medical Decision Making  Amount and/or Complexity of Data Reviewed  External Data Reviewed: notes.  Labs: ordered. Decision-making details documented in ED Course.  ECG/medicine tests: ordered and independent interpretation  performed. Decision-making details documented in ED Course.      Lena Brantley is a 68 y.o. female who presents with transient episode of generalized weakness and tingling in the lower extremities.  States she gets this from time to time when she was dehydrated.  Does note increased anxiety today around getting her monthly chemo injection.    Differential includes but is not limited to volume depletion, electrolyte dyscrasia, MICHAELA, stress reaction.  Considered but doubt TIA or acute intracranial process.  At the time of my assessment she was asymptomatic.          Medications - No data to display    ED Course as of 07/09/24 1707   Tue Jul 09, 2024   1217 CBC auto differential(!)  No acute abnormalities  [HL]   1217 Comprehensive metabolic panel(!)  No acute abnormalities  [HL]   1217 Urinalysis, Reflex to Urine Culture Urine, Clean Catch(!)  No e/o infection or dehydration [HL]   1321 TSH: 0.566  within normal limits  [HL]      ED Course User Index  [HL] Kamilla Murrell DO       Patient remained asymptomatic for a total of 3 hours while in the emergency department she was reassured by this.  Plans to follow up closely with her primary care as well as oncologist.  Reasons to return discussed and all questions addressed.      FINAL IMPRESSION    Final diagnoses:  [R53.1] Generalized weakness       DISPOSITION  Patient discharged in stable condition             DISCLAIMER: This note was prepared with [employer] voice recognition transcription software. Garbled syntax, mangled pronouns, and other bizarre constructions may be attributed to that software system.             Kamilla Murrell DO  07/11/24 1517

## 2024-07-10 LAB
OHS QRS DURATION: 80 MS
OHS QTC CALCULATION: 447 MS

## 2024-07-17 ENCOUNTER — OFFICE VISIT (OUTPATIENT)
Dept: URGENT CARE | Facility: CLINIC | Age: 69
End: 2024-07-17
Payer: COMMERCIAL

## 2024-07-17 VITALS
BODY MASS INDEX: 22.83 KG/M2 | WEIGHT: 133 LBS | DIASTOLIC BLOOD PRESSURE: 87 MMHG | RESPIRATION RATE: 16 BRPM | OXYGEN SATURATION: 97 % | TEMPERATURE: 98 F | HEART RATE: 108 BPM | SYSTOLIC BLOOD PRESSURE: 135 MMHG

## 2024-07-17 DIAGNOSIS — B97.89 ACUTE VIRAL SINUSITIS: Primary | ICD-10-CM

## 2024-07-17 DIAGNOSIS — J30.9 ALLERGIC RHINITIS WITH POSTNASAL DRIP: ICD-10-CM

## 2024-07-17 DIAGNOSIS — J01.90 ACUTE VIRAL SINUSITIS: Primary | ICD-10-CM

## 2024-07-17 DIAGNOSIS — J02.9 SORE THROAT: ICD-10-CM

## 2024-07-17 DIAGNOSIS — R09.82 ALLERGIC RHINITIS WITH POSTNASAL DRIP: ICD-10-CM

## 2024-07-17 DIAGNOSIS — Z11.52 ENCOUNTER FOR SCREENING FOR COVID-19: ICD-10-CM

## 2024-07-17 LAB
CTP QC/QA: YES
SARS-COV-2 AG RESP QL IA.RAPID: NEGATIVE

## 2024-07-17 PROCEDURE — 87811 SARS-COV-2 COVID19 W/OPTIC: CPT | Mod: QW,S$GLB,, | Performed by: PHYSICIAN ASSISTANT

## 2024-07-17 PROCEDURE — 99214 OFFICE O/P EST MOD 30 MIN: CPT | Mod: S$GLB,,, | Performed by: PHYSICIAN ASSISTANT

## 2024-07-17 RX ORDER — CETIRIZINE HYDROCHLORIDE 10 MG/1
10 TABLET ORAL DAILY PRN
Qty: 30 TABLET | Refills: 0 | Status: SHIPPED | OUTPATIENT
Start: 2024-07-17 | End: 2025-07-17

## 2024-07-17 RX ORDER — FLUTICASONE PROPIONATE 50 MCG
1 SPRAY, SUSPENSION (ML) NASAL 2 TIMES DAILY PRN
Qty: 16 G | Refills: 0 | Status: SHIPPED | OUTPATIENT
Start: 2024-07-17

## 2024-07-17 NOTE — PATIENT INSTRUCTIONS
PLEASE READ YOUR DISCHARGE INSTRUCTIONS ENTIRELY AS IT CONTAINS IMPORTANT INFORMATION.    Patient had covid testing done today.      If Negative and has direct exposure:  Symptom free without fever reducing meds for 24 hours may return to work with a mask on for the next 7-10 days at all times.  Return for COVID testing here if symptoms worsens in 5-7 days. Recommend repeat testing at home every 48 hours for 7 days.  Return sooner for evaluation if new or worsening symptoms.  You may also use home COVID test to check.    Discussed corona virus precautions and reviewed CDC FAC; printed a copy for patient.  I discussed to continue to monitor their symptoms. Discussed that if their symptoms persist or worsen to seek re-evaluation. Clinic vs. ER precautions were given.  Patient verbalized understanding and agreed with the above plan of care.    - Reviewed radiographs and all diagnostic testing with patient/family.    - Rest.  Drink plenty of fluids.    - Tylenol/anti-inflammatory(ibuprofen/motrin/aleve) as directed as needed for fever/pain.  For Tylenol, do not exceed 3000 mg/ day. If no contraindication.  -OK to supplement with OTC  NyQuil at night as needed for cough and congestion.  Use caution of total amount of Tylenol/acetaminophen per day.    -Below are suggestions for symptomatic relief:              -Salt water gargles to soothe throat pain.              -Chloroseptic spray also helps to numb throat pain.              -Nasal saline spray reduces inflammation and dryness.  Drink hot tea with lemon to help soothe your sore throat.              -Warm face compresses to help with facial sinus pain/pressure.              -Vicks vapor rub at night.              -Flonase OTC or Nasacort OTC  once or twice a day a day for nasal/sinus congestion and runny nose. DON'T USE IF YOU HAVE GLAUCOMA. CHECK WITH YOUR PHARMACIST/PHYSICIAN.              -Simple foods like chicken noodle soup.              -Mucinex DM every 12  hours (ANY COUGH EXPECTORANT--guaifenesin) for cough or chest congestion with mucus    (ANY COUGH SUPPRESSANT--dextromethorphan) helps with coughing at night. Mucinex-DM if you have chest congestion or sputum (caution if history of high blood pressure or palpitations).              -Zyrtec/Claritin/xyzal during the day time  & Benadryl at night (only if severe runny nose) may help with allergies and runny nose. Add decongestant if you have nasal/sinus congestion/sinus pressure/ear fullness sensation. (see below)     Caution with use of Decongestant meds:  -Do not combine pseudophed or phenylephrine with any other brand allergy-D for DECONGESTANT.   -Or vice versa, you can you take plain allergy medications (allegra/claritin/zyrtec with NO Decongestant) and ADD OTC pseudophed or phenelyphrine 3 times a day. Avoid taking decongestant late at night or with caffeine as it can keep you up or cause jittery feeling.  -If you DO have Hypertension , anxiety, or palpitations, it is safe to take Coricidin HBP for relief of cough, congestion, or sinus symptoms every 4-6hours.      For your GI symptoms:  -Use gatorade/pedialyte or rehydration packets to help stay hydrated. Vitamin water and plain water do not contain rehydrating electrolytes.  -Increase clear liquids (water, gatorade, pedialyte, broths, jello, etc) If you develop nausea/vomiting/diarrhea, Hold off on solids for 12-18 hours. Then advance to BRAT diet (banana, rice, applesauce, tea, toast/crackers), then advance further as tolerated. Avoid spicy or fatty foods.   -Please go to the ER if you experience worsening abdominal pain, blood in your vomit or stool, high fever, dizziness, fainting, swelling of your abdomen, inability to pass gas or stool, or inability to urinate.       -You must understand that you've received an Urgent Care treatment only and that you may be released before all your medical problems are known or treated. You, the patient, will arrange for  follow up care as instructed. Please arrange follow up with your primary medical clinic within 2-5 days if your signs and symptoms have not resolved or worsen.   - Follow up with your PCP or specialty clinic as directed.  You can call [phone] or [phone] to schedule an appointment with the appropriate provider.    - If your condition worsens or fails to improve we recommend that you receive another evaluation at the emergency room immediately or contact your primary medical clinic to discuss your concerns.       Prevention steps for patients with confirmed or suspected COVID-19  Stay home and stay away from family members and friends. The CDC says, you can leave home after these three things have happened: 1) You have had no fever for at least 24 hours (that is one full day of no fever without the use of medicine that reduces fevers) BUT MUST WEAR A SURGICAL MASKS IN PUBLIC FOR 5 days passed from first positive test.  Separate yourself from other people and animals in your home.  Call ahead before visiting your doctor.  Wear a facemask.  Cover your coughs and sneezes.  Wash your hands often with soap and water; hand  can be used, too.  Avoid sharing personal household items.  Wipe down surfaces used daily.  Monitor your symptoms. Seek prompt medical attention if your illness is worsening (e.g., difficulty breathing).   Before seeking care, call your healthcare provider.  If you have a medical emergency and need to call 911, notify the dispatch personnel that you have, or are being evaluated for COVID-19. If possible, put on a facemask before emergency medical services arrive.      Recommended precautions for household members, intimate partners, and caregivers in a home setting of a patient with symptomatic laboratory-confirmed COVID-19 or a patient under investigation.  Household members, intimate partners, and caregivers in the home setting awaiting tests results have close contact with a  person with symptomatic, laboratory-confirmed COVID-19 or a person under investigation. Close contacts should monitor their health; they should call their provider right away if they develop symptoms suggestive of COVID-19 (e.g., fever, cough, shortness of breath).    Close contacts should also follow these recommendations:  Make sure that you understand and can help the patient follow their provider's instructions for medication(s) and care. You should help the patient with basic needs in the home and provide support for getting groceries, prescriptions, and other personal needs.  Monitor the patient's symptoms. If the patient is getting sicker, call his or her healthcare provider and tell them that the patient has laboratory-confirmed COVID-19. If the patient has a medical emergency and you need to call 911, notify the dispatch personnel that the patient has, or is being evaluated for COVID-19.  Household members should stay in another room or be  from the patient. Household members should use a separate bedroom and bathroom, if available.  Prohibit visitors.  Household members should care for any pets in the home.  Make sure that shared spaces in the home have good air flow, such as by an air conditioner or an opened window, weather permitting.  Perform hand hygiene frequently. Wash your hands often with soap and water for at least 20 seconds or use an alcohol-based hand  (that contains > 60% alcohol) covering all surfaces of your hands and rubbing them together until they feel dry. Soap and water should be used preferentially.  Avoid touching your eyes, nose, and mouth.  The patient should wear a facemask. If the patient is not able to wear a facemask (for example, because it causes trouble breathing), caregivers should wear a mask when they are in the same room as the patient.  Wear a disposable facemask and gloves when you touch or have contact with the patient's blood, stool, or body fluids,  such as saliva, sputum, nasal mucus, vomit, urine.  Throw out disposable facemasks and gloves after using them. Do not reuse.  When removing personal protective equipment, first remove and dispose of gloves. Then, immediately clean your hands with soap and water or alcohol-based hand . Next, remove and dispose of facemask, and immediately clean your hands again with soap and water or alcohol-based hand .  You should not share dishes, drinking glasses, cups, eating utensils, towels, bedding, or other items with the patient. After the patient uses these items, you should wash them thoroughly (see below Wash laundry thoroughly).  Clean all high-touch surfaces, such as counters, tabletops, doorknobs, bathroom fixtures, toilets, phones, keyboards, tablets, and bedside tables, every day. Also, clean any surfaces that may have blood, stool, or body fluids on them.  Use a household cleaning spray or wipe, according to the label instructions. Labels contain instructions for safe and effective use of the cleaning product including precautions you should take when applying the product, such as wearing gloves and making sure you have good ventilation during use of the product.  Wash laundry thoroughly.  Immediately remove and wash clothes or bedding that have blood, stool, or body fluids on them.  Wear disposable gloves while handling soiled items and keep soiled items away from your body. Clean your hands (with soap and water or an alcohol-based hand ) immediately after removing your gloves.  Read and follow directions on labels of laundry or clothing items and detergent. In general, using a normal laundry detergent according to washing machine instructions and dry thoroughly using the warmest temperatures recommended on the clothing label.  Place all used disposable gloves, facemasks, and other contaminated items in a lined container before disposing of them with other household waste. Clean  your hands (with soap and water or an alcohol-based hand ) immediately after handling these items. Soap and water should be used preferentially if hands are visibly dirty.  Discuss any additional questions with your state or local health department or healthcare provider. Check available hours when contacting your local health department.    For more information see CDC link below.      https://www.cdc.gov/coronavirus/2019-ncov/hcp/guidance-prevent-spread.html#precautions        Sources:  [facility], [place] [facility]      Instructions for Home Care of Patients and Caretakers with Coronavirus Disease 2019  Limit visitors to the home.  Older persons and those that have chronic medical conditions such as diabetes, lung and heart disease are at increased risk for illness.   If possible, patients should use a separate bedroom while recovering. Caregivers and household members should avoid prolonged contact with the patient which means to stay 6 feet away and avoid contact with cough droplets.  When close contact is necessary, wash your hands before and immediately after contact.   Perform hand hygiene frequently. Wash your hands often with soap and water for at least 20 seconds or use an alcohol-based hand , covering all surfaces of your hands and rubbing them together until they feel dry.   Avoid touching your eyes, nose, and mouth with unwashed hands.  Avoid sharing household items with the patient. You should not share dishes, drinking glasses, cups, eating utensils, towels, bedding, or other items. After the patient uses these items, you should wash them thoroughly.  Wash laundry thoroughly.   Immediately remove and wash clothes or bedding that have blood, stool, or body fluids on them.  Clean all high-touch surfaces, such as counters, tabletops, doorknobs, bathroom fixtures, toilets, phones, keyboards, tablets, and bedside tables, every day.   Use a household cleaning spray  or wipe, according to the label instructions. Labels contain instructions for safe and effective use of the cleaning product including precautions you should take when applying the product, such as wearing gloves and making sure you have good ventilation during use of the product.    For more information see CDC link below.      https://www.cdc.gov/coronavirus/2019-ncov/hcp/guidance-prevent-spread.html#precautions               If your symptoms worsen or if you have any other concerns, please contact [facility] at [phone].

## 2024-07-17 NOTE — PROGRESS NOTES
Subjective:      Patient ID: Lena Calderon Early is a 68 y.o. female.    Vitals:  weight is 60.3 kg (133 lb). Her oral temperature is 98.4 °F (36.9 °C). Her blood pressure is 135/87 and her pulse is 108. Her respiration is 16 and oxygen saturation is 97%.     Chief Complaint: Sinus Problem    This is a 68 y.o. female who presents to urgent care clinic for evaluation.  She is reporting of non-productive cough, nasal /sinus congestion, fatigue, chills,   and headache (sinus pressure) x 7 days. Pt also presents with sore throat (only in beginning of sx; no sore throat in past 4 days). No ear px, ear congestion, nausea, vomiting, diarrhea, abd px or fever. Exposed to others with sinus problems (bacterial).  Home tx: ASA; vaporub; tea with honey and lemon.  Patient feels her symptoms have improved significantly compared to previously.    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 The current episode started in the past 7 days. The problem has been gradually improving since onset. There has been no fever. Her pain is at a severity of 0/10. She is experiencing no pain. Associated symptoms include congestion, coughing, headaches, sinus pressure and a sore throat. Pertinent negatives include no chills, diaphoresis, ear pain, neck pain, shortness of breath or sneezing. The treatment provided significant relief.       Constitution: Positive for fatigue. Negative for activity change, appetite change, chills, sweating, fever and generalized weakness.   HENT:  Positive for congestion, postnasal drip, sinus pressure and sore throat. Negative for ear pain, hearing loss, facial swelling, sinus pain, trouble swallowing and voice change.    Neck: Negative for neck pain, neck stiffness and painful lymph nodes.   Cardiovascular:  Negative for chest pain, leg swelling, palpitations, sob on exertion and passing out.   Eyes:  Negative for eye discharge, eye  pain, photophobia, vision loss, double vision and blurred vision.   Respiratory:  Positive for cough. Negative for chest tightness, sputum production, bloody sputum, COPD, shortness of breath, stridor, wheezing and asthma.    Gastrointestinal:  Negative for abdominal pain, nausea, vomiting, constipation, diarrhea, bright red blood in stool, rectal bleeding, heartburn and bowel incontinence.   Genitourinary:  Negative for dysuria, frequency, urgency, urine decreased, flank pain, bladder incontinence and hematuria.   Musculoskeletal:  Negative for trauma, joint pain, joint swelling, abnormal ROM of joint, muscle cramps and muscle ache.   Skin:  Negative for color change, pale, rash and wound.   Allergic/Immunologic: Positive for environmental allergies and seasonal allergies. Negative for asthma, immunocompromised state and sneezing.   Neurological:  Positive for headaches. Negative for dizziness, history of vertigo, light-headedness, passing out, facial drooping, speech difficulty, coordination disturbances, loss of balance, disorientation, altered mental status, loss of consciousness, numbness, tingling and seizures.   Hematologic/Lymphatic: Negative for swollen lymph nodes, easy bruising/bleeding and trouble clotting. Does not bruise/bleed easily.   Psychiatric/Behavioral:  Negative for altered mental status and disorientation.       Objective:     Physical Exam   Constitutional: She is oriented to person, place, and time. She appears well-developed. She does not appear ill. No distress.   HENT:   Head: Normocephalic.   Ears:   Right Ear: Hearing, external ear and ear canal normal. No no drainage, swelling or tenderness. No mastoid tenderness.   Left Ear: Hearing, external ear and ear canal normal. No no drainage, swelling or tenderness. No mastoid tenderness.   Nose: Nose normal. No rhinorrhea or congestion. Right sinus exhibits no maxillary sinus tenderness and no frontal sinus tenderness. Left sinus exhibits no  maxillary sinus tenderness and no frontal sinus tenderness.   Mouth/Throat: Oropharynx is clear and moist and mucous membranes are normal. Mucous membranes are moist. No oral lesions. No oropharyngeal exudate, posterior oropharyngeal edema, posterior oropharyngeal erythema or tonsillar abscesses. No tonsillar exudate. Oropharynx is clear.   Eyes: Conjunctivae, EOM and lids are normal. Right eye exhibits no discharge. Left eye exhibits no discharge. Right conjunctiva is not injected. Right conjunctiva has no hemorrhage. Left conjunctiva is not injected. Left conjunctiva has no hemorrhage. vision grossly intact gaze aligned appropriately   Neck: Phonation normal. Neck supple.   Cardiovascular: Normal rate, regular rhythm, normal heart sounds and normal pulses.   Pulmonary/Chest: Effort normal and breath sounds normal. No accessory muscle usage. No respiratory distress. She has no wheezes.   Abdominal: Normal appearance. She exhibits no distension. Soft. There is no abdominal tenderness. There is no rebound and no guarding.   Musculoskeletal: Normal range of motion.         General: Normal range of motion.      Comments: Moves all extremities with normal tone, strength, and ROM.  Gait normal.   Lymphadenopathy:     She has no cervical adenopathy.   Neurological: no focal deficit. She is alert, oriented to person, place, and time and at baseline. She has normal motor skills and normal sensation. She displays facial symmetry and no dysarthria. Gait and coordination normal. GCS eye subscore is 4. GCS verbal subscore is 5. GCS motor subscore is 6.   Skin: Skin is warm, dry and no rash. Capillary refill takes less than 2 seconds.   Psychiatric: She experiences Normal attention. Her speech is normal and behavior is normal. Thought content normal.   Nursing note and vitals reviewed.    Results for orders placed or performed in visit on 07/17/24   SARS Coronavirus 2 Antigen, POCT Manual Read   Result Value Ref Range    SARS  Coronavirus 2 Antigen Negative Negative     Acceptable Yes          Assessment:     1. Acute viral sinusitis    2. Allergic rhinitis with postnasal drip    3. Sore throat    4. Encounter for screening for COVID-19      Note dictated with voice recognition software, please excuse any grammatical errors.    Patient presents with clinical exam findings and history consistent with above.  We discussed the differential diagnosis.    On exam, patient is nontoxic appearing and vitals are stable.  Patient is essentially neurovascularly intact on exam.      Diagnostic testing results were independently reviewed and interpreted, which were discussed in depth with patient.   Test ordered in clinic:  COVID antigen negative.  Additionally, previous progress notes/admissions/lab were reviewed and interpreted.  CMP 07/09/2024 and 01/08/2024 with Good kidney function and EGFR.  PCP progress note 02/08/2022 for review    We had shared medical decision making for patient's treatment and care.      Plan:     Emphasized importance of prescribed and OTC symptomatic treatment to prevent worsening of condition.      Acute viral sinusitis  -     SARS Coronavirus 2 Antigen, POCT Manual Read  -     cetirizine (ZYRTEC) 10 MG tablet; Take 1 tablet (10 mg total) by mouth daily as needed for Allergies or Rhinitis.  Dispense: 30 tablet; Refill: 0  -     fluticasone propionate (FLONASE) 50 mcg/actuation nasal spray; 1 spray (50 mcg total) by Each Nostril route 2 (two) times daily as needed for Rhinitis or Allergies.  Dispense: 16 g; Refill: 0  -     dextromethorphan-guaiFENesin  mg (MUCINEX DM)  mg per 12 hr tablet; Take 1 tablet by mouth every 12 (twelve) hours as needed (cough and chest congestion). Take with 1 full glass of water.  Dispense: 30 tablet; Refill: 0    Allergic rhinitis with postnasal drip  -     cetirizine (ZYRTEC) 10 MG tablet; Take 1 tablet (10 mg total) by mouth daily as needed for Allergies or  Rhinitis.  Dispense: 30 tablet; Refill: 0  -     fluticasone propionate (FLONASE) 50 mcg/actuation nasal spray; 1 spray (50 mcg total) by Each Nostril route 2 (two) times daily as needed for Rhinitis or Allergies.  Dispense: 16 g; Refill: 0    Sore throat  -     cetirizine (ZYRTEC) 10 MG tablet; Take 1 tablet (10 mg total) by mouth daily as needed for Allergies or Rhinitis.  Dispense: 30 tablet; Refill: 0  -     fluticasone propionate (FLONASE) 50 mcg/actuation nasal spray; 1 spray (50 mcg total) by Each Nostril route 2 (two) times daily as needed for Rhinitis or Allergies.  Dispense: 16 g; Refill: 0    Encounter for screening for COVID-19  -     SARS Coronavirus 2 Antigen, POCT Manual Read      Note dictated with voice recognition software, please excuse any grammatical errors.    We had shared decision making for patient's treatment. We discussed side effects/alternatives/benefits/risk and patient would like to proceed with treatment plan. We also discussed other OTC treatment recommendations.    Patient was counseled, explained with the test results meaning, expected course, and answered all of questions. They can also receive results via my chart.      Patient was instructed to return for re-evaluation with urgent care or PCP for continued outpatient workup and management if symptoms do not improve/worsening symptoms. Strict ED versus clinic precautions given in depth.   Guideline, discharge and follow-up instructions given verbally/printed; patient will also receive via Mychart. Patient verbalized understanding and agreed with the entirety of plan of care.    Patient Instructions     PLEASE READ YOUR DISCHARGE INSTRUCTIONS ENTIRELY AS IT CONTAINS IMPORTANT INFORMATION.    Patient had covid testing done today.      If Negative and has direct exposure:  Symptom free without fever reducing meds for 24 hours may return to work with a mask on for the next 7-10 days at all times.  Return for COVID testing here if  symptoms worsens in 5-7 days. Recommend repeat testing at home every 48 hours for 7 days.  Return sooner for evaluation if new or worsening symptoms.  You may also use home COVID test to check.    Discussed corona virus precautions and reviewed CDC FAC; printed a copy for patient.  I discussed to continue to monitor their symptoms. Discussed that if their symptoms persist or worsen to seek re-evaluation. Clinic vs. ER precautions were given.  Patient verbalized understanding and agreed with the above plan of care.    - Reviewed radiographs and all diagnostic testing with patient/family.    - Rest.  Drink plenty of fluids.    - Tylenol/anti-inflammatory(ibuprofen/motrin/aleve) as directed as needed for fever/pain.  For Tylenol, do not exceed 3000 mg/ day. If no contraindication.  -OK to supplement with OTC  NyQuil at night as needed for cough and congestion.  Use caution of total amount of Tylenol/acetaminophen per day.    -Below are suggestions for symptomatic relief:              -Salt water gargles to soothe throat pain.              -Chloroseptic spray also helps to numb throat pain.              -Nasal saline spray reduces inflammation and dryness.  Drink hot tea with lemon to help soothe your sore throat.              -Warm face compresses to help with facial sinus pain/pressure.              -Vicks vapor rub at night.              -Flonase OTC or Nasacort OTC  once or twice a day a day for nasal/sinus congestion and runny nose. DON'T USE IF YOU HAVE GLAUCOMA. CHECK WITH YOUR PHARMACIST/PHYSICIAN.              -Simple foods like chicken noodle soup.              -Mucinex DM every 12 hours (ANY COUGH EXPECTORANT--guaifenesin) for cough or chest congestion with mucus    (ANY COUGH SUPPRESSANT--dextromethorphan) helps with coughing at night. Mucinex-DM if you have chest congestion or sputum (caution if history of high blood pressure or palpitations).              -Zyrtec/Claritin/xyzal during the day time  &  Benadryl at night (only if severe runny nose) may help with allergies and runny nose. Add decongestant if you have nasal/sinus congestion/sinus pressure/ear fullness sensation. (see below)     Caution with use of Decongestant meds:  -Do not combine pseudophed or phenylephrine with any other brand allergy-D for DECONGESTANT.   -Or vice versa, you can you take plain allergy medications (allegra/claritin/zyrtec with NO Decongestant) and ADD OTC pseudophed or phenelyphrine 3 times a day. Avoid taking decongestant late at night or with caffeine as it can keep you up or cause jittery feeling.  -If you DO have Hypertension , anxiety, or palpitations, it is safe to take Coricidin HBP for relief of cough, congestion, or sinus symptoms every 4-6hours.      For your GI symptoms:  -Use gatorade/pedialyte or rehydration packets to help stay hydrated. Vitamin water and plain water do not contain rehydrating electrolytes.  -Increase clear liquids (water, gatorade, pedialyte, broths, jello, etc) If you develop nausea/vomiting/diarrhea, Hold off on solids for 12-18 hours. Then advance to BRAT diet (banana, rice, applesauce, tea, toast/crackers), then advance further as tolerated. Avoid spicy or fatty foods.   -Please go to the ER if you experience worsening abdominal pain, blood in your vomit or stool, high fever, dizziness, fainting, swelling of your abdomen, inability to pass gas or stool, or inability to urinate.       -You must understand that you've received an Urgent Care treatment only and that you may be released before all your medical problems are known or treated. You, the patient, will arrange for follow up care as instructed. Please arrange follow up with your primary medical clinic within 2-5 days if your signs and symptoms have not resolved or worsen.   - Follow up with your PCP or specialty clinic as directed.  You can call [phone] or [phone] to schedule an appointment with the appropriate provider.    -  If your condition worsens or fails to improve we recommend that you receive another evaluation at the emergency room immediately or contact your primary medical clinic to discuss your concerns.       Prevention steps for patients with confirmed or suspected COVID-19  Stay home and stay away from family members and friends. The CDC says, you can leave home after these three things have happened: 1) You have had no fever for at least 24 hours (that is one full day of no fever without the use of medicine that reduces fevers) BUT MUST WEAR A SURGICAL MASKS IN PUBLIC FOR 5 days passed from first positive test.  Separate yourself from other people and animals in your home.  Call ahead before visiting your doctor.  Wear a facemask.  Cover your coughs and sneezes.  Wash your hands often with soap and water; hand  can be used, too.  Avoid sharing personal household items.  Wipe down surfaces used daily.  Monitor your symptoms. Seek prompt medical attention if your illness is worsening (e.g., difficulty breathing).   Before seeking care, call your healthcare provider.  If you have a medical emergency and need to call 911, notify the dispatch personnel that you have, or are being evaluated for COVID-19. If possible, put on a facemask before emergency medical services arrive.      Recommended precautions for household members, intimate partners, and caregivers in a home setting of a patient with symptomatic laboratory-confirmed COVID-19 or a patient under investigation.  Household members, intimate partners, and caregivers in the home setting awaiting tests results have close contact with a person with symptomatic, laboratory-confirmed COVID-19 or a person under investigation. Close contacts should monitor their health; they should call their provider right away if they develop symptoms suggestive of COVID-19 (e.g., fever, cough, shortness of breath).    Close contacts should also follow these recommendations:  Make  sure that you understand and can help the patient follow their provider's instructions for medication(s) and care. You should help the patient with basic needs in the home and provide support for getting groceries, prescriptions, and other personal needs.  Monitor the patient's symptoms. If the patient is getting sicker, call his or her healthcare provider and tell them that the patient has laboratory-confirmed COVID-19. If the patient has a medical emergency and you need to call 911, notify the dispatch personnel that the patient has, or is being evaluated for COVID-19.  Household members should stay in another room or be  from the patient. Household members should use a separate bedroom and bathroom, if available.  Prohibit visitors.  Household members should care for any pets in the home.  Make sure that shared spaces in the home have good air flow, such as by an air conditioner or an opened window, weather permitting.  Perform hand hygiene frequently. Wash your hands often with soap and water for at least 20 seconds or use an alcohol-based hand  (that contains > 60% alcohol) covering all surfaces of your hands and rubbing them together until they feel dry. Soap and water should be used preferentially.  Avoid touching your eyes, nose, and mouth.  The patient should wear a facemask. If the patient is not able to wear a facemask (for example, because it causes trouble breathing), caregivers should wear a mask when they are in the same room as the patient.  Wear a disposable facemask and gloves when you touch or have contact with the patient's blood, stool, or body fluids, such as saliva, sputum, nasal mucus, vomit, urine.  Throw out disposable facemasks and gloves after using them. Do not reuse.  When removing personal protective equipment, first remove and dispose of gloves. Then, immediately clean your hands with soap and water or alcohol-based hand . Next, remove and dispose of  facemask, and immediately clean your hands again with soap and water or alcohol-based hand .  You should not share dishes, drinking glasses, cups, eating utensils, towels, bedding, or other items with the patient. After the patient uses these items, you should wash them thoroughly (see below Wash laundry thoroughly).  Clean all high-touch surfaces, such as counters, tabletops, doorknobs, bathroom fixtures, toilets, phones, keyboards, tablets, and bedside tables, every day. Also, clean any surfaces that may have blood, stool, or body fluids on them.  Use a household cleaning spray or wipe, according to the label instructions. Labels contain instructions for safe and effective use of the cleaning product including precautions you should take when applying the product, such as wearing gloves and making sure you have good ventilation during use of the product.  Wash laundry thoroughly.  Immediately remove and wash clothes or bedding that have blood, stool, or body fluids on them.  Wear disposable gloves while handling soiled items and keep soiled items away from your body. Clean your hands (with soap and water or an alcohol-based hand ) immediately after removing your gloves.  Read and follow directions on labels of laundry or clothing items and detergent. In general, using a normal laundry detergent according to washing machine instructions and dry thoroughly using the warmest temperatures recommended on the clothing label.  Place all used disposable gloves, facemasks, and other contaminated items in a lined container before disposing of them with other household waste. Clean your hands (with soap and water or an alcohol-based hand ) immediately after handling these items. Soap and water should be used preferentially if hands are visibly dirty.  Discuss any additional questions with your state or local health department or healthcare provider. Check available hours when contacting your  [facility] health department.    For more information see CDC link below.      https://www.cdc.gov/coronavirus/2019-ncov/hcp/guidance-prevent-spread.html#precautions        Sources:  [facility], [place] Department [facility] and [facility]      Instructions for Home Care of Patients and Caretakers with Coronavirus Disease 2019  Limit visitors to the home.  Older persons and those that have chronic medical conditions such as diabetes, lung and heart disease are at increased risk for illness.   If possible, patients should use a separate bedroom while recovering. Caregivers and household members should avoid prolonged contact with the patient which means to stay 6 feet away and avoid contact with cough droplets.  When close contact is necessary, wash your hands before and immediately after contact.   Perform hand hygiene frequently. Wash your hands often with soap and water for at least 20 seconds or use an alcohol-based hand , covering all surfaces of your hands and rubbing them together until they feel dry.   Avoid touching your eyes, nose, and mouth with unwashed hands.  Avoid sharing household items with the patient. You should not share dishes, drinking glasses, cups, eating utensils, towels, bedding, or other items. After the patient uses these items, you should wash them thoroughly.  Wash laundry thoroughly.   Immediately remove and wash clothes or bedding that have blood, stool, or body fluids on them.  Clean all high-touch surfaces, such as counters, tabletops, doorknobs, bathroom fixtures, toilets, phones, keyboards, tablets, and bedside tables, every day.   Use a household cleaning spray or wipe, according to the label instructions. Labels contain instructions for safe and effective use of the cleaning product including precautions you should take when applying the product, such as wearing gloves and making sure you have good ventilation during use of the product.    For more information see CDC link below.       https://www.cdc.gov/coronavirus/2019-ncov/hcp/guidance-prevent-spread.html#precautions               If your symptoms worsen or if you have any other concerns, please contact [facility] at [phone].               Additional MDM:     Heart Failure Score:   COPD = No

## 2024-08-05 ENCOUNTER — PATIENT MESSAGE (OUTPATIENT)
Dept: HEMATOLOGY/ONCOLOGY | Facility: CLINIC | Age: 69
End: 2024-08-05
Payer: COMMERCIAL

## 2024-08-06 ENCOUNTER — INFUSION (OUTPATIENT)
Dept: INFUSION THERAPY | Facility: HOSPITAL | Age: 69
End: 2024-08-06
Attending: INTERNAL MEDICINE
Payer: COMMERCIAL

## 2024-08-06 VITALS
SYSTOLIC BLOOD PRESSURE: 93 MMHG | OXYGEN SATURATION: 98 % | RESPIRATION RATE: 18 BRPM | DIASTOLIC BLOOD PRESSURE: 62 MMHG | HEART RATE: 99 BPM

## 2024-08-06 DIAGNOSIS — C7B.02 METASTATIC MALIGNANT CARCINOID TUMOR TO LIVER: ICD-10-CM

## 2024-08-06 DIAGNOSIS — C7A.00 MALIGNANT CARCINOID TUMOR OF UNKNOWN PRIMARY SITE: Primary | ICD-10-CM

## 2024-08-06 PROCEDURE — 63600175 PHARM REV CODE 636 W HCPCS: Mod: JZ,JG | Performed by: INTERNAL MEDICINE

## 2024-08-06 PROCEDURE — 96372 THER/PROPH/DIAG INJ SC/IM: CPT

## 2024-08-06 RX ORDER — LANREOTIDE ACETATE 120 MG/.5ML
120 INJECTION SUBCUTANEOUS
OUTPATIENT
Start: 2024-08-06

## 2024-08-06 RX ORDER — LANREOTIDE ACETATE 120 MG/.5ML
120 INJECTION SUBCUTANEOUS
Status: DISCONTINUED | OUTPATIENT
Start: 2024-08-06 | End: 2024-08-06 | Stop reason: HOSPADM

## 2024-08-06 RX ADMIN — LANREOTIDE ACETATE 120 MG: 120 INJECTION SUBCUTANEOUS at 01:08

## 2024-08-13 ENCOUNTER — TELEPHONE (OUTPATIENT)
Dept: HEMATOLOGY/ONCOLOGY | Facility: CLINIC | Age: 69
End: 2024-08-13

## 2024-09-03 ENCOUNTER — INFUSION (OUTPATIENT)
Dept: INFUSION THERAPY | Facility: HOSPITAL | Age: 69
End: 2024-09-03
Attending: INTERNAL MEDICINE
Payer: COMMERCIAL

## 2024-09-03 VITALS
TEMPERATURE: 98 F | OXYGEN SATURATION: 97 % | DIASTOLIC BLOOD PRESSURE: 83 MMHG | RESPIRATION RATE: 18 BRPM | HEART RATE: 97 BPM | SYSTOLIC BLOOD PRESSURE: 132 MMHG

## 2024-09-03 DIAGNOSIS — C7B.02 METASTATIC MALIGNANT CARCINOID TUMOR TO LIVER: ICD-10-CM

## 2024-09-03 DIAGNOSIS — C7A.00 MALIGNANT CARCINOID TUMOR OF UNKNOWN PRIMARY SITE: Primary | ICD-10-CM

## 2024-09-03 PROCEDURE — 63600175 PHARM REV CODE 636 W HCPCS: Mod: JZ,JG | Performed by: INTERNAL MEDICINE

## 2024-09-03 PROCEDURE — 96372 THER/PROPH/DIAG INJ SC/IM: CPT

## 2024-09-03 RX ORDER — LANREOTIDE ACETATE 120 MG/.5ML
120 INJECTION SUBCUTANEOUS
Status: DISCONTINUED | OUTPATIENT
Start: 2024-09-03 | End: 2024-09-03 | Stop reason: HOSPADM

## 2024-09-03 RX ORDER — LANREOTIDE ACETATE 120 MG/.5ML
120 INJECTION SUBCUTANEOUS
OUTPATIENT
Start: 2024-09-03

## 2024-09-03 RX ADMIN — LANREOTIDE ACETATE 120 MG: 120 INJECTION SUBCUTANEOUS at 01:09

## 2024-09-20 NOTE — ANESTHESIA POSTPROCEDURE EVALUATION
Anesthesia Post Evaluation    Patient: Lena Brantley    Procedure(s) Performed: Procedure(s) (LRB):  COLONOSCOPY (N/A)  EGD (ESOPHAGOGASTRODUODENOSCOPY) (N/A)    Final Anesthesia Type: general      Patient location during evaluation: GI PACU  Patient participation: Yes- Able to Participate  Level of consciousness: awake and alert  Post-procedure vital signs: reviewed and stable  Pain management: adequate  Airway patency: patent    PONV status at discharge: No PONV  Anesthetic complications: no      Cardiovascular status: blood pressure returned to baseline  Respiratory status: unassisted  Hydration status: euvolemic            Vitals Value Taken Time   /64 07/17/23 1029   Temp 36.4 °C (97.5 °F) 07/17/23 0959   Pulse 74 07/17/23 1029   Resp 20 07/17/23 1029   SpO2 100 % 07/17/23 1029         Event Time   Out of Recovery 10:29:00         Pain/Thor Score: Thor Score: 10 (7/17/2023 10:29 AM)        
Right UE

## 2024-09-30 ENCOUNTER — PATIENT MESSAGE (OUTPATIENT)
Dept: HEMATOLOGY/ONCOLOGY | Facility: CLINIC | Age: 69
End: 2024-09-30
Payer: COMMERCIAL

## 2024-09-30 DIAGNOSIS — K21.9 GERD WITHOUT ESOPHAGITIS: ICD-10-CM

## 2024-09-30 RX ORDER — PANTOPRAZOLE SODIUM 40 MG/1
40 TABLET, DELAYED RELEASE ORAL DAILY
Qty: 90 TABLET | Refills: 3 | Status: SHIPPED | OUTPATIENT
Start: 2024-09-30

## 2024-09-30 RX ORDER — PANTOPRAZOLE SODIUM 40 MG/1
40 TABLET, DELAYED RELEASE ORAL DAILY
Qty: 90 TABLET | Refills: 3 | OUTPATIENT
Start: 2024-09-30

## 2024-10-01 ENCOUNTER — INFUSION (OUTPATIENT)
Dept: INFUSION THERAPY | Facility: HOSPITAL | Age: 69
End: 2024-10-01
Attending: INTERNAL MEDICINE
Payer: COMMERCIAL

## 2024-10-01 VITALS
DIASTOLIC BLOOD PRESSURE: 78 MMHG | TEMPERATURE: 98 F | RESPIRATION RATE: 17 BRPM | HEART RATE: 86 BPM | SYSTOLIC BLOOD PRESSURE: 121 MMHG | OXYGEN SATURATION: 97 %

## 2024-10-01 DIAGNOSIS — C7A.00 MALIGNANT CARCINOID TUMOR OF UNKNOWN PRIMARY SITE: Primary | ICD-10-CM

## 2024-10-01 DIAGNOSIS — C7B.02 METASTATIC MALIGNANT CARCINOID TUMOR TO LIVER: ICD-10-CM

## 2024-10-01 PROCEDURE — 63600175 PHARM REV CODE 636 W HCPCS: Mod: JZ,JG | Performed by: INTERNAL MEDICINE

## 2024-10-01 PROCEDURE — 96372 THER/PROPH/DIAG INJ SC/IM: CPT

## 2024-10-01 RX ORDER — LANREOTIDE ACETATE 120 MG/.5ML
120 INJECTION SUBCUTANEOUS
Status: DISCONTINUED | OUTPATIENT
Start: 2024-10-01 | End: 2024-10-01 | Stop reason: HOSPADM

## 2024-10-01 RX ORDER — LANREOTIDE ACETATE 120 MG/.5ML
120 INJECTION SUBCUTANEOUS
OUTPATIENT
Start: 2024-10-01

## 2024-10-01 RX ADMIN — LANREOTIDE ACETATE 120 MG: 120 INJECTION SUBCUTANEOUS at 01:10

## 2024-10-16 ENCOUNTER — TELEPHONE (OUTPATIENT)
Dept: HEMATOLOGY/ONCOLOGY | Facility: CLINIC | Age: 69
End: 2024-10-16
Payer: COMMERCIAL

## 2024-10-16 NOTE — TELEPHONE ENCOUNTER
----- Message from Abel sent at 10/16/2024  2:32 PM CDT -----  Type:  Patient Returning Call    Who Called:PT  Who Left Message for Patient:  Does the patient know what this is regarding?:UPCOMING APPT  Would the patient rather a call back or a response via [facility]? CALL  Best Call Back Number: [phone]  Additional Information: Pt states she would like to speak with office In regards to getting her appt rescheduled. Thank you

## 2024-10-17 ENCOUNTER — PATIENT MESSAGE (OUTPATIENT)
Dept: FAMILY MEDICINE | Facility: CLINIC | Age: 69
End: 2024-10-17
Payer: COMMERCIAL

## 2024-10-17 ENCOUNTER — TELEPHONE (OUTPATIENT)
Dept: FAMILY MEDICINE | Facility: CLINIC | Age: 69
End: 2024-10-17

## 2024-10-17 DIAGNOSIS — Z00.00 ANNUAL PHYSICAL EXAM: Primary | ICD-10-CM

## 2024-10-20 NOTE — PROGRESS NOTES
"PATIENT: Lena Brantley  MRN: [id number]  DATE: 10/24/2024    Diagnosis:   1. 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 Secondary neuroendocrine tumor of liver    3. Other decreased white blood cell (WBC) count    4. GERD without esophagitis    5. Other fatigue      Chief Complaint: neuroendocrine tumor    Oncologic History:       Oncologic History Neuroendocrine tumor unknown primary diagnosed 09/2016  Metastatic disease to liver at presentation      Oncologic Treatment Lanreotide 10/2016      Pathology Well-differentiated neuroendocrine tumor, Ki-67 1%         Subjective:    History of Present Illness: Ms. Brantley is a 69 y.o. female who presented in August 2022 for evaluation and management of metastatic neuroendocrine tumor.    Information from chart review:  "Her history dates to 9/2016 when she was undergoing workup for episodic nausea, vomiting and diarrhea.  A CT scan was performed showing a solitary and had seeing liver mass.  A biopsy was performed showing a well-differentiated neuroendocrine tumor, Ki-67 of 1%.  Conventional imaging an octreotide scan did not reveal a primary site disease.  Gene expression profiling was performed indicating a possible primary site pancreas.  She was started on Lanreotide in 2016.  She was offered surgical resection and also liver directed therapy and declined."    - she was last seen by Dr. Riddle on 4/22/22.  - case was discussed at neuroendocrine conference on 4/25/22. Recommendation was to proceed with chemoembolization of 3.7 x 2.4cm mass in segment 2/4.    - she underwent CT scan on 12/7/22    - she met with interventional radiology on 1/10/23 regarding liver-directed therapy. She decided not to proceed with it.  - she presented to the emergency room in May 2023 for nausea/abdominal pain/constipation. She underwent imaging then.  - she underwent MRI abdomen on 6/20/23.  - she underwent upper GI endoscopy/colonoscopy on 7/17/23  - she underwent MRI " abdomen on 1/8/24.      Interval history:  - she presents for a follow-up appointment for her neuroendocrine tumor.  - she underwent CT scan on 10/21/24.  - her main complaint is fatigue. She denies chest pain, nausea, vomiting, diarrhea, constipation.      Past medical, surgical, family, and social histories have been reviewed and updated below.    Past Medical History:   Past Medical History:   Diagnosis Date    Anxiety     Cancer     Depression     Elevated bilirubin 9/9/2020    Fatigue     GERD (gastroesophageal reflux disease)     History of iron deficiency anemia 9/9/2020    Hx of psychiatric care     Liver mass 06/2016    Mass of colon 06/2016    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     Psychiatric problem     Secondary neuroendocrine tumor of liver 9/9/2020    Sickle cell trait     Sleep difficulties        Past Surgical History:   Past Surgical History:   Procedure Laterality Date    COLONOSCOPY N/A 7/17/2023    Procedure: COLONOSCOPY;  Surgeon: Tyron Avendaño MD;  Location: [facility];  Service: Endoscopy;  Laterality: N/A;    ESOPHAGOGASTRODUODENOSCOPY N/A 7/17/2023    Procedure: EGD (ESOPHAGOGASTRODUODENOSCOPY);  Surgeon: Tyron Avendaño MD;  Location: [facility];  Service: Endoscopy;  Laterality: N/A;       Family History:   Family History   Problem Relation Name Age of Onset    Cancer Maternal Grandmother      Cancer Other      Depression Mother      Anxiety disorder Mother         Social History:  reports that she has never smoked. She has never been exposed to tobacco smoke. She has never used smokeless tobacco. She reports that she does not drink alcohol and does not use drugs.    Allergies:  Review of patient's allergies indicates:   Allergen Reactions    Epinephrine Anaphylaxis     Can Cause Carcinoid Crisis       Medications:  Current Outpatient Medications   Medication Sig Dispense Refill    ascorbic acid, vitamin C, (VITAMIN C) 500 MG tablet Take 500 mg by mouth once  daily.      calcium carbonate (OS-TJ) 500 mg calcium (1,250 mg) tablet Take 1 tablet by mouth once daily.      cetirizine (ZYRTEC) 10 MG tablet Take 1 tablet (10 mg total) by mouth daily as needed for Allergies or Rhinitis. 30 tablet 0    cyanocobalamin (VITAMIN B-12) 1000 MCG tablet Take 100 mcg by mouth once daily.      dextromethorphan-guaiFENesin  mg (MUCINEX DM)  mg per 12 hr tablet Take 1 tablet by mouth every 12 (twelve) hours as needed (cough and chest congestion). Take with 1 full glass of water. 30 tablet 0    fluticasone propionate (FLONASE) 50 mcg/actuation nasal spray 1 spray (50 mcg total) by Each Nostril route 2 (two) times daily as needed for Rhinitis or Allergies. 16 g 0    hydrOXYzine HCL (ATARAX) 25 MG tablet Take 1 tablet (25 mg total) by mouth 3 (three) times daily as needed for Anxiety. (Patient not taking: Reported on 7/17/2024) 60 tablet 2    ibuprofen (ADVIL,MOTRIN) 600 MG tablet Take 1 tablet (600 mg total) by mouth every 8 (eight) hours as needed for Pain. (Patient not taking: Reported on 7/17/2024) 20 tablet 0    lactulose (CHRONULAC) 20 gram/30 mL Soln Take 15 mLs (10 g total) by mouth 2 (two) times daily as needed (constipation). (Patient not taking: Reported on 7/17/2024) 300 mL 1    lanreotide (SOMATULINE DEPOT) 120 mg/0.5 mL Syrg Inject 120 mg into the skin every 28 days.      multivitamin capsule Take 1 capsule by mouth once daily.      pantoprazole (PROTONIX) 40 MG tablet Take 1 tablet (40 mg total) by mouth once daily. 90 tablet 3    sucralfate (CARAFATE) 100 mg/mL suspension Take 10 mLs (1 g total) by mouth 4 (four) times daily. (Patient not taking: Reported on 7/17/2024) 414 mL 0    vitamin D (VITAMIN D3) 1000 units Tab Take 1,000 Units by mouth once daily.       No current facility-administered medications for this visit.       Review of Systems   Constitutional:  Positive for fatigue.   HENT:  Negative for sore throat.    Eyes:  Negative for visual disturbance.  "  Respiratory:  Negative for cough.    Cardiovascular:  Negative for chest pain.   Gastrointestinal:  Negative for abdominal pain, diarrhea, nausea and vomiting.   Genitourinary:  Negative for dysuria.   Musculoskeletal:  Negative for back pain.   Skin:  Negative for rash.   Neurological:  Negative for headaches.   Hematological:  Negative for adenopathy.   Psychiatric/Behavioral:  The patient is not nervous/anxious.        ECOG Performance Status:   ECOG SCORE 1            Objective:      Vitals:   Vitals:    10/24/24 0805   BP: (!) 149/90   BP Location: Left arm   Patient Position: Sitting   Pulse: 68   Resp: 20   Temp: 98 °F (36.7 °C)   TempSrc: Oral   SpO2: 100%   Weight: 62.1 kg (136 lb 14.5 oz)   Height: 5' 4.5" (1.638 m)       BMI: Body mass index is 23.14 kg/m².    Physical Exam  Vitals and nursing note reviewed.   Constitutional:       Appearance: She is well-developed.   HENT:      Head: Normocephalic and atraumatic.   Eyes:      Pupils: Pupils are equal, round, and reactive to light.   Cardiovascular:      Rate and Rhythm: Normal rate and regular rhythm.   Pulmonary:      Effort: Pulmonary effort is normal.      Breath sounds: Normal breath sounds.   Abdominal:      General: Bowel sounds are normal.      Palpations: Abdomen is soft.   Musculoskeletal:         General: Normal range of motion.      Cervical back: Normal range of motion and neck supple.   Skin:     General: Skin is warm and dry.   Neurological:      Mental Status: She is alert and oriented to person, place, and time.   Psychiatric:         Behavior: Behavior normal.         Thought Content: Thought content normal.         Judgment: Judgment normal.         Laboratory Data:  Labs have been reviewed.    Lab Results   Component Value Date    WBC 3.46 (L) 10/21/2024    HGB 14.0 10/21/2024    HCT 42.5 10/21/2024    MCV 88 10/21/2024     10/21/2024     Diagnostics:     CT abdomen/pelvis (10/21/24): I have personally reviewed the " images  There is a stable left lobe liver lesion measuring 3.1 cm.  No new liver lesions are seen.  Lung bases are clear.  Remaining liver, is unremarkable.  There are gallstones in the gallbladder.  The spleen, stomach, pancreas, and duodenum demonstrate nothing unusual.  The adrenal glands are not enlarged.  There is a left renal lesion most likely a cyst.  The vessels enhance normally.  No obstruction, ileus, or perforation seen.  No adenopathy is seen.  There are small uterine leiomyomas.  Pelvic organs demonstrate nothing unusual.  Bowel loops demonstrate nothing unusual.  Bones demonstrate DJD.     Impression:     No acute process or significant abnormality seen.     Stable liver lesion.     Left renal cyst.    MRI abdomen (1/8/24): I have personally reviewed the images  Stable 3.3 cm liver lesion as above.  There has been no worrisome change.     Upper GI endoscopy (7/17/23):  - Normal esophagus.                          - Small hiatal hernia.                          - Multiple gastric polyps. Biopsied.                          - Normal examined duodenum.                          - Biopsies were taken with a cold forceps for                          Helicobacter pylori testing.     Colonoscopy (7/17/23):  - Two 6 to 13 mm polyps in the cecum, removed with                          a hot snare. Resected and retrieved.                          - Two 4 to 5 mm polyps in the transverse colon and                          in the ascending colon, removed with a cold snare.                          Resected and retrieved.       MRI abdomen (6/20/23): I have personally reviewed the images  Comparison is a CT dated 05/18/2023.     Patient was administered 10 cc of Gadavist.     Liver lesion measures 3.3 cm, previously 3.1.  Spleen, stomach, gallbladder, biliary tree, pancreas, kidneys, and adrenal glands show nothing unusual.  There is a left renal cyst.  Vessels are unremarkable.  No para-aortic or retroperitoneal  adenopathy is identified.     Impression:     Stable liver lesion as above.  There has been no detrimental change.  Gallium 68 Dotatate scan could be helpful.      CT abdomen/pelvis (12/7/22): I have personally reviewed the images  No significant detrimental interval change when compared to prior exam.     Stable left hepatic lobe lesion.  No new focal lesions.     Simple left renal cyst.     Gallbladder sludge.     RECIST SUMMARY:     Date of prior examination for comparison: 01/06/2022     Lesion 1: Left hepatic lobe lesion.  3.3 cm.  Series 3 image 27.  Prior measurement 3.0 cm           Copper pet scan (3/31/22): I have personally reviewed the images    Quality of the study: Adequate.     SUV measurements may not be directly comparable with those made on Ga-68 DOTATATE PET/CT.     In the head and neck, there is physiologic distribution in the pituitary, salivary, and thyroid glands, and there are no tracer avid lesions suspicious for malignancy.     In the chest, there are no tracer avid lesions suspicious for malignancy.     In the abdomen and pelvis, there is physiologic uptake in the pancreatic uncinate process and body, spleen, adrenal glands and  tract.     Stable appearing radiotracer avid lesion within the liver measuring 3.0 x 2.5 cm (previously 3.0 x 2.5 cm) with SUV max of 33 (previously 27).  No new radiotracer avid lesion identified within the abdomen or pelvis.     Small umbilical hernia containing partial loop of bowel without evidence of obstruction.     In the bones, there are no tracer avid lesions suspicious for malignancy.     Impression:     Stable somatostatin receptor avid left hepatic lobe lesion.  No new radiotracer avid lesion.      Assessment:       1. 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 Secondary neuroendocrine tumor of liver    3. Other decreased white blood cell (WBC) count    4. Other constipation    5. GERD without esophagitis         Plan:     1. Metastatic  "neuroendocrine tumor  - I have reviewed her chart  "Her history dates to 9/2016 when she was undergoing workup for episodic nausea, vomiting and diarrhea.  A CT scan was performed showing a solitary and had seeing liver mass.  A biopsy was performed showing a well-differentiated neuroendocrine tumor, Ki-67 of 1%.  Conventional imaging an octreotide scan did not reveal a primary site disease.  Gene expression profiling was performed indicating a possible primary site pancreas.  She was started on Lanreotide in 2016.  She was offered surgical resection and also liver directed therapy and declined."  - she was last seen by Dr. Riddle on 4/22/22.  - case was discussed at neuroendocrine conference on 4/25/22. Recommendation was to proceed with chemoembolization of 3.7 x 2.4 cm mass in segment 2/4.  - she did not proceed with chemoembolization.  - I discussed proceeding with chemoembolization. She would like to get imaging in a few months and decide at that time.  - CT abdomen/pelvis (12/8/22) revealed mostly stable disease. The liver lesion increased in size from 3 to 3.3 cm.  - I presented her case at neuroendocrine conference on 12/15/22. Recommendation was consideration for liver directed therapy.  - she met with interventional radiology on 1/10/23 regarding liver-directed therapy. She decided not to proceed with it.  - MRI abdomen (6/20/23) revealed stable disease.  MRI abdomen (1/8/24):"Stable 3.3 cm liver lesion as above.  There has been no worrisome change."  CT abdomen/pelvis (10/21/24): "stable left lobe liver lesion measuring 3.1 cm. No new liver lesions are seen."  - I offered to refer her to interventional radiology regarding liver-directed therapy. She would like to just continue lanreotide for now.  - continue lanreotide.  - repeat tumor markers in 4 months.  - return to clinic in 9 months with repeat labs/scan.    2. Leukopenia  - intermittent leukopenia since 2017  - RBC antigens Guy A/B antigens " were negative, suggestive of benign neutropenia  - continue to monitor    3. Fatigue / iron deficiency  - iron studies in 2020 were borderline decreased  - repeat iron studies today.    4. Advance Care Planning     Power of   After our discussion (at previous visit), the patient decided to complete a HCPOA and appointed her  daughter Sujata Brantley ([phone]), and brothers Isael Haq ([phone]) and Andrzej Haq ([phone]) .         - repeat tumor markers in 4 months.  - return to clinic in 9 months with repeat labs/scan.    Eliot Woodruff M.D.  Hematology/Oncology  [facility] - [address], [address]  [place] [address]  Phone: [phone]  Fax: [phone]

## 2024-10-21 ENCOUNTER — HOSPITAL ENCOUNTER (OUTPATIENT)
Dept: RADIOLOGY | Facility: HOSPITAL | Age: 69
Discharge: HOME | End: 2024-10-21
Attending: INTERNAL MEDICINE
Payer: COMMERCIAL

## 2024-10-21 DIAGNOSIS — C7B.8 SECONDARY NEUROENDOCRINE TUMOR OF LIVER: ICD-10-CM

## 2024-10-21 DIAGNOSIS — C7B.02 METASTATIC MALIGNANT CARCINOID TUMOR TO LIVER: ICD-10-CM

## 2024-10-21 PROCEDURE — 74177 CT ABD & PELVIS W/CONTRAST: CPT | Mod: 26,,, | Performed by: RADIOLOGY

## 2024-10-21 PROCEDURE — 74177 CT ABD & PELVIS W/CONTRAST: CPT | Mod: TC

## 2024-10-21 PROCEDURE — 25500020 PHARM REV CODE 255: Performed by: INTERNAL MEDICINE

## 2024-10-21 RX ADMIN — IOHEXOL 30 ML: 350 INJECTION, SOLUTION INTRAVENOUS at 10:10

## 2024-10-21 RX ADMIN — IOHEXOL 75 ML: 350 INJECTION, SOLUTION INTRAVENOUS at 12:10

## 2024-10-24 ENCOUNTER — OFFICE VISIT (OUTPATIENT)
Dept: HEMATOLOGY/ONCOLOGY | Facility: CLINIC | Age: 69
End: 2024-10-24
Payer: COMMERCIAL

## 2024-10-24 ENCOUNTER — PATIENT MESSAGE (OUTPATIENT)
Dept: HEMATOLOGY/ONCOLOGY | Facility: CLINIC | Age: 69
End: 2024-10-24

## 2024-10-24 ENCOUNTER — LAB VISIT (OUTPATIENT)
Dept: LAB | Facility: HOSPITAL | Age: 69
End: 2024-10-24
Attending: INTERNAL MEDICINE
Payer: COMMERCIAL

## 2024-10-24 VITALS
SYSTOLIC BLOOD PRESSURE: 149 MMHG | BODY MASS INDEX: 22.81 KG/M2 | TEMPERATURE: 98 F | OXYGEN SATURATION: 100 % | DIASTOLIC BLOOD PRESSURE: 90 MMHG | WEIGHT: 136.88 LBS | RESPIRATION RATE: 20 BRPM | HEIGHT: 65 IN | HEART RATE: 68 BPM

## 2024-10-24 DIAGNOSIS — D72.818 OTHER DECREASED WHITE BLOOD CELL (WBC) COUNT: ICD-10-CM

## 2024-10-24 DIAGNOSIS — E61.1 IRON DEFICIENCY: ICD-10-CM

## 2024-10-24 DIAGNOSIS — C7B.02 METASTATIC MALIGNANT CARCINOID TUMOR TO LIVER: Primary | ICD-10-CM

## 2024-10-24 DIAGNOSIS — R53.83 OTHER FATIGUE: ICD-10-CM

## 2024-10-24 DIAGNOSIS — C7B.8 SECONDARY NEUROENDOCRINE TUMOR OF LIVER: ICD-10-CM

## 2024-10-24 LAB
FERRITIN SERPL-MCNC: 42 NG/ML (ref 20–300)
IRON SERPL-MCNC: 67 UG/DL (ref 30–160)
SATURATED IRON: 17 % (ref 20–50)
TOTAL IRON BINDING CAPACITY: 388 UG/DL (ref 250–450)
TRANSFERRIN SERPL-MCNC: 262 MG/DL (ref 200–375)

## 2024-10-24 PROCEDURE — 83540 ASSAY OF IRON: CPT | Performed by: INTERNAL MEDICINE

## 2024-10-24 PROCEDURE — 99215 OFFICE O/P EST HI 40 MIN: CPT | Mod: S$GLB,,, | Performed by: INTERNAL MEDICINE

## 2024-10-24 PROCEDURE — 3080F DIAST BP >= 90 MM HG: CPT | Mod: CPTII,S$GLB,, | Performed by: INTERNAL MEDICINE

## 2024-10-24 PROCEDURE — 1126F AMNT PAIN NOTED NONE PRSNT: CPT | Mod: CPTII,S$GLB,, | Performed by: INTERNAL MEDICINE

## 2024-10-24 PROCEDURE — 3077F SYST BP >= 140 MM HG: CPT | Mod: CPTII,S$GLB,, | Performed by: INTERNAL MEDICINE

## 2024-10-24 PROCEDURE — 1159F MED LIST DOCD IN RCRD: CPT | Mod: CPTII,S$GLB,, | Performed by: INTERNAL MEDICINE

## 2024-10-24 PROCEDURE — 3288F FALL RISK ASSESSMENT DOCD: CPT | Mod: CPTII,S$GLB,, | Performed by: INTERNAL MEDICINE

## 2024-10-24 PROCEDURE — 99999 PR PBB SHADOW E&M-EST. PATIENT-LVL III: CPT | Mod: PBBFAC,,, | Performed by: INTERNAL MEDICINE

## 2024-10-24 PROCEDURE — 36415 COLL VENOUS BLD VENIPUNCTURE: CPT | Performed by: INTERNAL MEDICINE

## 2024-10-24 PROCEDURE — 82728 ASSAY OF FERRITIN: CPT | Performed by: INTERNAL MEDICINE

## 2024-10-24 PROCEDURE — 1157F ADVNC CARE PLAN IN RCRD: CPT | Mod: CPTII,S$GLB,, | Performed by: INTERNAL MEDICINE

## 2024-10-24 PROCEDURE — 3044F HG A1C LEVEL LT 7.0%: CPT | Mod: CPTII,S$GLB,, | Performed by: INTERNAL MEDICINE

## 2024-10-24 PROCEDURE — 1160F RVW MEDS BY RX/DR IN RCRD: CPT | Mod: CPTII,S$GLB,, | Performed by: INTERNAL MEDICINE

## 2024-10-24 PROCEDURE — 84466 ASSAY OF TRANSFERRIN: CPT | Performed by: INTERNAL MEDICINE

## 2024-10-24 PROCEDURE — 1101F PT FALLS ASSESS-DOCD LE1/YR: CPT | Mod: CPTII,S$GLB,, | Performed by: INTERNAL MEDICINE

## 2024-10-24 PROCEDURE — 3008F BODY MASS INDEX DOCD: CPT | Mod: CPTII,S$GLB,, | Performed by: INTERNAL MEDICINE

## 2024-10-24 RX ORDER — FERROUS SULFATE 325(65) MG
325 TABLET ORAL DAILY
Qty: 90 TABLET | Refills: 3 | Status: SHIPPED | OUTPATIENT
Start: 2024-10-24 | End: 2025-10-24

## 2024-10-31 ENCOUNTER — INFUSION (OUTPATIENT)
Dept: INFUSION THERAPY | Facility: HOSPITAL | Age: 69
End: 2024-10-31
Attending: INTERNAL MEDICINE
Payer: COMMERCIAL

## 2024-10-31 VITALS
OXYGEN SATURATION: 98 % | BODY MASS INDEX: 23.14 KG/M2 | RESPIRATION RATE: 18 BRPM | HEART RATE: 67 BPM | SYSTOLIC BLOOD PRESSURE: 130 MMHG | DIASTOLIC BLOOD PRESSURE: 78 MMHG | WEIGHT: 136.88 LBS | TEMPERATURE: 98 F

## 2024-10-31 DIAGNOSIS — C7A.00 MALIGNANT CARCINOID TUMOR OF UNKNOWN PRIMARY SITE: Primary | ICD-10-CM

## 2024-10-31 DIAGNOSIS — C7B.02 METASTATIC MALIGNANT CARCINOID TUMOR TO LIVER: ICD-10-CM

## 2024-10-31 PROCEDURE — 63600175 PHARM REV CODE 636 W HCPCS: Mod: JZ,JG | Performed by: INTERNAL MEDICINE

## 2024-10-31 PROCEDURE — 96372 THER/PROPH/DIAG INJ SC/IM: CPT

## 2024-10-31 RX ORDER — LANREOTIDE ACETATE 120 MG/.5ML
120 INJECTION SUBCUTANEOUS
OUTPATIENT
Start: 2024-10-31

## 2024-10-31 RX ORDER — LANREOTIDE ACETATE 120 MG/.5ML
120 INJECTION SUBCUTANEOUS
Status: DISCONTINUED | OUTPATIENT
Start: 2024-10-31 | End: 2024-10-31 | Stop reason: HOSPADM

## 2024-10-31 RX ADMIN — LANREOTIDE ACETATE 120 MG: 120 INJECTION SUBCUTANEOUS at 11:10

## 2024-11-27 ENCOUNTER — OFFICE VISIT (OUTPATIENT)
Dept: URGENT CARE | Facility: CLINIC | Age: 69
End: 2024-11-27
Payer: COMMERCIAL

## 2024-11-27 VITALS
SYSTOLIC BLOOD PRESSURE: 118 MMHG | OXYGEN SATURATION: 99 % | BODY MASS INDEX: 22.66 KG/M2 | HEART RATE: 90 BPM | DIASTOLIC BLOOD PRESSURE: 75 MMHG | TEMPERATURE: 99 F | WEIGHT: 136 LBS | RESPIRATION RATE: 17 BRPM | HEIGHT: 65 IN

## 2024-11-27 DIAGNOSIS — R30.0 DYSURIA: ICD-10-CM

## 2024-11-27 DIAGNOSIS — N39.0 URINARY TRACT INFECTION WITH HEMATURIA, SITE UNSPECIFIED: Primary | ICD-10-CM

## 2024-11-27 DIAGNOSIS — R31.9 URINARY TRACT INFECTION WITH HEMATURIA, SITE UNSPECIFIED: Primary | ICD-10-CM

## 2024-11-27 LAB
BILIRUBIN, UA POC OHS: NEGATIVE
BLOOD, UA POC OHS: ABNORMAL
CLARITY, UA POC OHS: CLEAR
COLOR, UA POC OHS: YELLOW
GLUCOSE, UA POC OHS: NEGATIVE
KETONES, UA POC OHS: NEGATIVE
LEUKOCYTES, UA POC OHS: ABNORMAL
NITRITE, UA POC OHS: NEGATIVE
PH, UA POC OHS: 6
PROTEIN, UA POC OHS: NEGATIVE
SPECIFIC GRAVITY, UA POC OHS: 1.01
UROBILINOGEN, UA POC OHS: 0.2

## 2024-11-27 PROCEDURE — 81003 URINALYSIS AUTO W/O SCOPE: CPT | Mod: QW,S$GLB,,

## 2024-11-27 PROCEDURE — 99213 OFFICE O/P EST LOW 20 MIN: CPT | Mod: S$GLB,,,

## 2024-11-27 RX ORDER — SULFAMETHOXAZOLE AND TRIMETHOPRIM 800; 160 MG/1; MG/1
1 TABLET ORAL 2 TIMES DAILY
Qty: 10 TABLET | Refills: 0 | Status: SHIPPED | OUTPATIENT
Start: 2024-11-27 | End: 2024-12-02

## 2024-11-27 NOTE — PATIENT INSTRUCTIONS
If your condition worsens or fails to improve we recommend that you receive another evaluation at the ER immediately or contact your PCP to discuss your concerns or return here. You must understand that you've received an urgent care treatment only and that you may be released before all your medical problems are known or treated. You the patient will arrange for followup care as instructed.     Cranberry juice may help. Get the 100% cranberry juice and mix 4 oz of juice with 4 oz of water and drink this 8 oz glass of liquid once a day.   Increase water intake to at least 8-10 glasses/day.    Do not wear contacts with Pyridium as it will stain them.  You may want to wear a panty liner with Pyridium as it may stain your underwear.  Avoid caffeine, alcohol, or spicy foods as they irritate the bladder.      If symptoms do not improve in 2-3 days please return for evaluation

## 2024-11-27 NOTE — PROGRESS NOTES
"Subjective:      Patient ID: Lena Brantley is a 69 y.o. female.    Vitals:  height is 5' 4.5" (1.638 m) and weight is 61.7 kg (136 lb). Her oral temperature is 98.6 °F (37 °C). Her blood pressure is 118/75 and her pulse is 90. Her respiration is 17 and oxygen saturation is 99%.     Chief Complaint: Dysuria    68 yo female patient presents to clinic with complaints of dysuria, and urinary frequency that began Sunday. Patient denies history of recurrent UTI. Denies any flank pain. Denies any nausea/vomiting. Denies any fevers.     Dysuria   The current episode started acute onset. There has been no fever. Associated symptoms include frequency. Pertinent negatives include no discharge, flank pain, nausea or vomiting.       Constitution: Negative. Negative for fever.   HENT: Negative.     Neck: neck negative.   Cardiovascular: Negative.    Eyes: Negative.    Respiratory: Negative.     Gastrointestinal: Negative.  Negative for nausea and vomiting.   Endocrine: negative.   Genitourinary:  Positive for dysuria and frequency. Negative for flank pain and bladder incontinence.   Musculoskeletal: Negative.    Skin: Negative.    Allergic/Immunologic: Negative.    Neurological: Negative.    Hematologic/Lymphatic: Negative.    Psychiatric/Behavioral: Negative.        Objective:     Physical Exam   Constitutional: She is oriented to person, place, and time.   HENT:   Head: Normocephalic and atraumatic.   Ears:   Right Ear: External ear normal.   Left Ear: External ear normal.   Nose: Nose normal.   Mouth/Throat: Mucous membranes are moist.   Eyes: Conjunctivae are normal.   Cardiovascular: Normal rate, regular rhythm and normal heart sounds.   Pulmonary/Chest: Effort normal and breath sounds normal. No stridor. No respiratory distress. She has no wheezes.   Abdominal: Normal appearance and bowel sounds are normal. There is abdominal tenderness in the suprapubic area. There is no rebound, no guarding, no left CVA tenderness " and no right CVA tenderness.   Musculoskeletal: Normal range of motion.         General: Normal range of motion.   Neurological: She is alert and oriented to person, place, and time.   Skin: Skin is warm and dry.   Psychiatric: Her behavior is normal. Mood normal.     Results for orders placed or performed in visit on 11/27/24   POCT Urinalysis(Instrument)    Collection Time: 11/27/24  3:20 PM   Result Value Ref Range    Color, POC UA Yellow Yellow, Straw, Colorless    Clarity, POC UA Clear Clear    Glucose, POC UA Negative Negative    Bilirubin, POC UA Negative Negative    Ketones, POC UA Negative Negative    Spec Grav POC UA 1.010 1.005 - 1.030    Blood, POC UA Large (A) Negative    pH, POC UA 6.0 5.0 - 8.0    Protein, POC UA Negative Negative    Urobilinogen, POC UA 0.2 <=1.0    Nitrite, POC UA Negative Negative    WBC, POC UA Moderate (A) Negative     *Note: Due to a large number of results and/or encounters for the requested time period, some results have not been displayed. A complete set of results can be found in Results Review.      Assessment:     1. Urinary tract infection with hematuria, site unspecified    2. Dysuria        Plan:       Urinary tract infection with hematuria, site unspecified  -     sulfamethoxazole-trimethoprim 800-160mg (BACTRIM DS) 800-160 mg Tab; Take 1 tablet by mouth 2 (two) times daily. for 5 days  Dispense: 10 tablet; Refill: 0    Dysuria  -     POCT Urinalysis(Instrument)      Patient Instructions   If your condition worsens or fails to improve we recommend that you receive another evaluation at the ER immediately or contact your PCP to discuss your concerns or return here. You must understand that you've received an urgent care treatment only and that you may be released before all your medical problems are known or treated. You the patient will arrange for followup care as instructed.     Cranberry juice may help. Get the 100% cranberry juice and mix 4 oz of juice with 4 oz of  water and drink this 8 oz glass of liquid once a day.   Increase water intake to at least 8-10 glasses/day.    Do not wear contacts with Pyridium as it will stain them.  You may want to wear a panty liner with Pyridium as it may stain your underwear.  Avoid caffeine, alcohol, or spicy foods as they irritate the bladder.      If symptoms do not improve in 2-3 days please return for evaluation

## 2024-12-05 ENCOUNTER — INFUSION (OUTPATIENT)
Dept: INFUSION THERAPY | Facility: HOSPITAL | Age: 69
End: 2024-12-05
Attending: INTERNAL MEDICINE
Payer: COMMERCIAL

## 2024-12-05 ENCOUNTER — HOSPITAL ENCOUNTER (OUTPATIENT)
Dept: RADIOLOGY | Facility: HOSPITAL | Age: 69
Discharge: HOME | End: 2024-12-05
Attending: FAMILY MEDICINE
Payer: COMMERCIAL

## 2024-12-05 VITALS
OXYGEN SATURATION: 100 % | DIASTOLIC BLOOD PRESSURE: 83 MMHG | SYSTOLIC BLOOD PRESSURE: 126 MMHG | RESPIRATION RATE: 18 BRPM | TEMPERATURE: 97 F | HEART RATE: 73 BPM

## 2024-12-05 DIAGNOSIS — C7B.02 METASTATIC MALIGNANT CARCINOID TUMOR TO LIVER: ICD-10-CM

## 2024-12-05 DIAGNOSIS — C7A.00 MALIGNANT CARCINOID TUMOR OF UNKNOWN PRIMARY SITE: Primary | ICD-10-CM

## 2024-12-05 DIAGNOSIS — Z00.00 ANNUAL PHYSICAL EXAM: ICD-10-CM

## 2024-12-05 PROCEDURE — 77063 BREAST TOMOSYNTHESIS BI: CPT | Mod: TC

## 2024-12-05 PROCEDURE — 63600175 PHARM REV CODE 636 W HCPCS: Mod: JZ,JG | Performed by: INTERNAL MEDICINE

## 2024-12-05 PROCEDURE — 96372 THER/PROPH/DIAG INJ SC/IM: CPT

## 2024-12-05 RX ORDER — LANREOTIDE ACETATE 120 MG/.5ML
120 INJECTION SUBCUTANEOUS
Status: DISCONTINUED | OUTPATIENT
Start: 2024-12-05 | End: 2024-12-05 | Stop reason: HOSPADM

## 2024-12-05 RX ORDER — LANREOTIDE ACETATE 120 MG/.5ML
120 INJECTION SUBCUTANEOUS
OUTPATIENT
Start: 2024-12-05

## 2024-12-05 RX ADMIN — LANREOTIDE ACETATE 120 MG: 120 INJECTION SUBCUTANEOUS at 11:12

## 2024-12-23 ENCOUNTER — PATIENT MESSAGE (OUTPATIENT)
Dept: HEMATOLOGY/ONCOLOGY | Facility: CLINIC | Age: 69
End: 2024-12-23
Payer: COMMERCIAL

## 2024-12-24 ENCOUNTER — TELEPHONE (OUTPATIENT)
Dept: HEMATOLOGY/ONCOLOGY | Facility: CLINIC | Age: 69
End: 2024-12-24
Payer: COMMERCIAL

## 2025-01-07 ENCOUNTER — INFUSION (OUTPATIENT)
Dept: INFUSION THERAPY | Facility: HOSPITAL | Age: 70
End: 2025-01-07
Attending: INTERNAL MEDICINE
Payer: COMMERCIAL

## 2025-01-07 VITALS
SYSTOLIC BLOOD PRESSURE: 140 MMHG | RESPIRATION RATE: 18 BRPM | TEMPERATURE: 98 F | DIASTOLIC BLOOD PRESSURE: 91 MMHG | HEART RATE: 75 BPM | OXYGEN SATURATION: 100 %

## 2025-01-07 DIAGNOSIS — C7B.02 METASTATIC MALIGNANT CARCINOID TUMOR TO LIVER: ICD-10-CM

## 2025-01-07 DIAGNOSIS — C7A.00 MALIGNANT CARCINOID TUMOR OF UNKNOWN PRIMARY SITE: Primary | ICD-10-CM

## 2025-01-07 PROCEDURE — 96372 THER/PROPH/DIAG INJ SC/IM: CPT

## 2025-01-07 PROCEDURE — 63600175 PHARM REV CODE 636 W HCPCS: Mod: JZ,TB | Performed by: INTERNAL MEDICINE

## 2025-01-07 RX ORDER — LANREOTIDE ACETATE 120 MG/.5ML
120 INJECTION SUBCUTANEOUS
Status: DISCONTINUED | OUTPATIENT
Start: 2025-01-07 | End: 2025-01-07 | Stop reason: HOSPADM

## 2025-01-07 RX ORDER — LANREOTIDE ACETATE 120 MG/.5ML
120 INJECTION SUBCUTANEOUS
OUTPATIENT
Start: 2025-01-07

## 2025-01-07 RX ADMIN — LANREOTIDE ACETATE 120 MG: 120 INJECTION SUBCUTANEOUS at 09:01

## 2025-01-07 NOTE — NURSING
Patient tolerated lanreotide injection to left buttocks well today. Plan to rtc 2/4 for next injection. NAD noted upon discharge. Discharged home, ambulated independently.

## 2025-01-21 ENCOUNTER — TELEPHONE (OUTPATIENT)
Dept: HEMATOLOGY/ONCOLOGY | Facility: CLINIC | Age: 70
End: 2025-01-21
Payer: COMMERCIAL

## 2025-01-21 NOTE — NURSING
RN Elie lvm with return contact information regarding weather related closure/yoga class cancellation today at 5:30pm, EDWAR [place].

## 2025-01-28 ENCOUNTER — TELEPHONE (OUTPATIENT)
Dept: HEMATOLOGY/ONCOLOGY | Facility: CLINIC | Age: 70
End: 2025-01-28
Payer: COMMERCIAL

## 2025-01-29 ENCOUNTER — TELEPHONE (OUTPATIENT)
Dept: HEMATOLOGY/ONCOLOGY | Facility: CLINIC | Age: 70
End: 2025-01-29
Payer: COMMERCIAL

## 2025-02-04 ENCOUNTER — TELEPHONE (OUTPATIENT)
Dept: INFUSION THERAPY | Facility: HOSPITAL | Age: 70
End: 2025-02-04
Payer: COMMERCIAL

## 2025-02-04 NOTE — TELEPHONE ENCOUNTER
Pt called infusion center, unable to make appointment today 2/4/25. Pt asked to rescheduled to next Tuesday, 2/11/25. Pt reschedule 2/11/25 at 10am.

## 2025-02-11 ENCOUNTER — INFUSION (OUTPATIENT)
Dept: INFUSION THERAPY | Facility: HOSPITAL | Age: 70
End: 2025-02-11
Attending: INTERNAL MEDICINE
Payer: COMMERCIAL

## 2025-02-11 VITALS
DIASTOLIC BLOOD PRESSURE: 86 MMHG | OXYGEN SATURATION: 98 % | RESPIRATION RATE: 18 BRPM | TEMPERATURE: 98 F | HEART RATE: 98 BPM | SYSTOLIC BLOOD PRESSURE: 138 MMHG

## 2025-02-11 DIAGNOSIS — C7A.00 MALIGNANT CARCINOID TUMOR OF UNKNOWN PRIMARY SITE: Primary | ICD-10-CM

## 2025-02-11 DIAGNOSIS — C7B.02 METASTATIC MALIGNANT CARCINOID TUMOR TO LIVER: ICD-10-CM

## 2025-02-11 PROCEDURE — 96372 THER/PROPH/DIAG INJ SC/IM: CPT

## 2025-02-11 PROCEDURE — 63600175 PHARM REV CODE 636 W HCPCS: Mod: JZ,TB | Performed by: INTERNAL MEDICINE

## 2025-02-11 RX ORDER — LANREOTIDE ACETATE 120 MG/.5ML
120 INJECTION SUBCUTANEOUS
Status: DISCONTINUED | OUTPATIENT
Start: 2025-02-11 | End: 2025-02-11 | Stop reason: HOSPADM

## 2025-02-11 RX ORDER — LANREOTIDE ACETATE 120 MG/.5ML
120 INJECTION SUBCUTANEOUS
OUTPATIENT
Start: 2025-02-11

## 2025-02-11 RX ADMIN — LANREOTIDE ACETATE 120 MG: 120 INJECTION SUBCUTANEOUS at 10:02

## 2025-02-11 NOTE — NURSING
Patient tolerated Lanreotide injection to right buttocks well today. Plan to [facility] 3/11 for next dose. NAD noted upon discharge. Discharged home, ambulated independently.

## 2025-03-11 ENCOUNTER — INFUSION (OUTPATIENT)
Dept: INFUSION THERAPY | Facility: HOSPITAL | Age: 70
End: 2025-03-11
Attending: INTERNAL MEDICINE
Payer: COMMERCIAL

## 2025-03-11 VITALS
OXYGEN SATURATION: 98 % | RESPIRATION RATE: 18 BRPM | TEMPERATURE: 98 F | SYSTOLIC BLOOD PRESSURE: 126 MMHG | HEART RATE: 65 BPM | DIASTOLIC BLOOD PRESSURE: 86 MMHG

## 2025-03-11 DIAGNOSIS — C7B.02 METASTATIC MALIGNANT CARCINOID TUMOR TO LIVER: ICD-10-CM

## 2025-03-11 DIAGNOSIS — C7A.00 MALIGNANT CARCINOID TUMOR OF UNKNOWN PRIMARY SITE: Primary | ICD-10-CM

## 2025-03-11 PROCEDURE — 96372 THER/PROPH/DIAG INJ SC/IM: CPT

## 2025-03-11 PROCEDURE — 63600175 PHARM REV CODE 636 W HCPCS: Mod: JZ,TB | Performed by: INTERNAL MEDICINE

## 2025-03-11 RX ORDER — LANREOTIDE ACETATE 120 MG/.5ML
120 INJECTION SUBCUTANEOUS
Status: DISCONTINUED | OUTPATIENT
Start: 2025-03-11 | End: 2025-03-11 | Stop reason: HOSPADM

## 2025-03-11 RX ORDER — LANREOTIDE ACETATE 120 MG/.5ML
120 INJECTION SUBCUTANEOUS
OUTPATIENT
Start: 2025-03-11

## 2025-03-11 RX ADMIN — LANREOTIDE ACETATE 120 MG: 120 INJECTION SUBCUTANEOUS at 10:03

## 2025-03-31 ENCOUNTER — OFFICE VISIT (OUTPATIENT)
Dept: URGENT CARE | Facility: CLINIC | Age: 70
End: 2025-03-31
Payer: COMMERCIAL

## 2025-03-31 VITALS
TEMPERATURE: 98 F | BODY MASS INDEX: 23.22 KG/M2 | RESPIRATION RATE: 20 BRPM | OXYGEN SATURATION: 96 % | SYSTOLIC BLOOD PRESSURE: 127 MMHG | HEART RATE: 90 BPM | DIASTOLIC BLOOD PRESSURE: 88 MMHG | WEIGHT: 136 LBS | HEIGHT: 64 IN

## 2025-03-31 DIAGNOSIS — R05.9 COUGH, UNSPECIFIED TYPE: ICD-10-CM

## 2025-03-31 DIAGNOSIS — U07.1 COVID-19: Primary | ICD-10-CM

## 2025-03-31 LAB
CTP QC/QA: YES
SARS CORONAVIRUS 2 ANTIGEN: POSITIVE

## 2025-03-31 PROCEDURE — 99214 OFFICE O/P EST MOD 30 MIN: CPT | Mod: S$GLB,,, | Performed by: PHYSICIAN ASSISTANT

## 2025-03-31 PROCEDURE — 87811 SARS-COV-2 COVID19 W/OPTIC: CPT | Mod: QW,S$GLB,, | Performed by: PHYSICIAN ASSISTANT

## 2025-03-31 RX ORDER — BENZONATATE 100 MG/1
100 CAPSULE ORAL 3 TIMES DAILY PRN
Qty: 20 CAPSULE | Refills: 0 | Status: SHIPPED | OUTPATIENT
Start: 2025-03-31 | End: 2025-04-10

## 2025-03-31 NOTE — LETTER
March 31, 2025      [facility] [place]  [address]  [facility] [address]  Phone: [phone]  Fax: [phone]       Patient: Lena Brantley   YOB: 1955  Date of Visit: 03/31/2025    To Whom It May Concern:    Oswaldo Brantley  was at [facility] on 03/31/2025. She may return to school/work once she experiences improvement of symptoms and is fever free for 24 hours (without the use of fever-reducing medications). If you have any questions or concerns, or if I can be of further assistance, please do not hesitate to contact me.      Sincerely,    Klarissa George PA-C

## 2025-03-31 NOTE — PROGRESS NOTES
" Subjective:      Patient ID: Lena Brantley is a 69 y.o. female.    Vitals:  height is 5' 4" (1.626 m) and weight is 61.7 kg (136 lb). Her oral temperature is 98.4 °F (36.9 °C). Her blood pressure is 127/88 and her pulse is 90. Her respiration is 20 and oxygen saturation is 96%.     Chief Complaint: Sinus Problem    This is a 69 y.o. female who presents today with a chief complaint of sinus problem. Pt states symptoms started on Saturday. Pt states on Sunday . Pt states she has sinus infection    Patient provider note starts here:  Patient presents to the clinic with complaints of nasal congestion, sinus pressure, postnasal drip and a slight cough which started about 2 days ago.  Reports that yesterday, her symptoms seemed to have worsened.  She has been taking Zyrtec, Flonase, Tylenol and Vicks vapor rub for her symptoms without significant relief.  She denies documented fevers, body aches or chills. No known ill contacts.     Sinus Problem  This is a new problem. The current episode started in the past 7 days. The problem is unchanged. There has been no fever. Her pain is at a severity of 4/10. The pain is moderate. Associated symptoms include chills, congestion, coughing, diaphoresis, ear pain, headaches, a hoarse voice, sinus pressure, sneezing, a sore throat and swollen glands. Pertinent negatives include no neck pain or shortness of breath. Past treatments include nothing. The treatment provided no relief.       Constitution: Positive for chills and sweating. Negative for fever.   HENT:  Positive for ear pain, congestion, postnasal drip, sinus pressure and sore throat.    Neck: Negative for neck pain.   Cardiovascular:  Negative for chest pain, palpitations and sob on exertion.   Respiratory:  Positive for cough. Negative for chest tightness, sputum production, shortness of breath and wheezing.    Gastrointestinal:  Negative for abdominal pain, vomiting and diarrhea.   Musculoskeletal:  Positive for " muscle ache.   Skin:  Negative for color change and wound.   Allergic/Immunologic: Positive for sneezing.   Neurological:  Positive for headaches. Negative for numbness and tingling.      Objective:     Physical Exam   Constitutional: She is oriented to person, place, and time. She appears well-developed. She is cooperative.  Non-toxic appearance. She does not appear ill. No distress.   HENT:   Head: Normocephalic and atraumatic.   Ears:   Right Ear: Hearing, tympanic membrane, external ear and ear canal normal.   Left Ear: Hearing, tympanic membrane, external ear and ear canal normal.   Nose: Congestion present. No mucosal edema, rhinorrhea or nasal deformity. No epistaxis. Right sinus exhibits no maxillary sinus tenderness and no frontal sinus tenderness. Left sinus exhibits no maxillary sinus tenderness and no frontal sinus tenderness.   Mouth/Throat: Uvula is midline and mucous membranes are normal. No trismus in the jaw. Normal dentition. No uvula swelling. Posterior oropharyngeal erythema present. No oropharyngeal exudate or posterior oropharyngeal edema.   Eyes: Conjunctivae and lids are normal. No scleral icterus.   Neck: Trachea normal and phonation normal. Neck supple. No edema present. No erythema present. No neck rigidity present.   Cardiovascular: Normal rate, regular rhythm, normal heart sounds and normal pulses.   Pulmonary/Chest: Effort normal and breath sounds normal. No respiratory distress. She has no decreased breath sounds. She has no wheezes. She has no rhonchi.   Abdominal: Normal appearance.   Musculoskeletal: Normal range of motion.         General: No deformity. Normal range of motion.   Neurological: She is alert and oriented to person, place, and time. She exhibits normal muscle tone. Coordination normal.   Skin: Skin is warm, dry, intact, not diaphoretic and not pale.   Psychiatric: Her speech is normal and behavior is normal. Judgment and thought content normal.   Nursing note and  vitals reviewed.      Assessment:     1. COVID-19    2. Cough, unspecified type      Results for orders placed or performed in visit on 03/31/25   SARS Coronavirus 2 Antigen, POCT Manual Read    Collection Time: 03/31/25  5:00 PM   Result Value Ref Range    SARS Coronavirus 2 Antigen Positive (A) Negative, Presumptive Negative     Acceptable Yes        Plan:       COVID-19  -     nirmatrelvir-ritonavir 300 mg (150 mg x 2)-100 mg copackaged tablets (EUA); Take 3 tablets by mouth 2 (two) times daily for 5 days. Each dose contains 2 nirmatrelvir (pink tablets) and 1 ritonavir (white tablet). Take all 3 tablets together  Dispense: 30 tablet; Refill: 0  -     benzonatate (TESSALON) 100 MG capsule; Take 1 capsule (100 mg total) by mouth 3 (three) times daily as needed for Cough.  Dispense: 20 capsule; Refill: 0    Cough, unspecified type  -     SARS Coronavirus 2 Antigen, POCT Manual Read          Medical Decision Making:   History:   Old Medical Records: I decided to obtain old medical records.  Old Records Summarized: records from clinic visits.  Clinical Tests:   Lab Tests: Ordered and Reviewed  Urgent Care Management:  A. Problem List:   -Acute: COVID 19    -Chronic: carcinoid tumor, anxiety, iron deficiency anemia, vitamin D deficiency   B. Differential diagnosis: viral vs bacterial URI, pharyngitis, otitis, COVID 19, influenza, pneumonia  C. Diagnostic Testing Ordered: COVID   D. Diagnostic Testing Considered: None  E. Independent Historians: None  F. Urgent Care Midlevel Independent Results Interpretation: COVID+  G. Radiology:  H. Review of Previous Medical Records: Recently had her lanreotide injection on 3/11/25.  I. Home Medications Reviewed  J. Social Determinants of Health considered  K. Medical Decision Making and Disposition:  Patient presents to the clinic with complaints of sinus pressure, nasal congestion, postnasal drip for the past 2 days.  On exam, she is afebrile and nontoxic in  appearance.  Her lungs are clear to auscultation bilaterally and vital signs are stable.  She has tested positive for COVID-19.  The risks and benefits of Paxlovid in addition to continuing symptomatic treatment was discussed with the patient.  ED precautions were discussed and advised to follow-up closely with primary care.  She verbalized understanding and agreed with this plan.         Patient Instructions   Preventing Spread of Respiratory Viruses When You're Sick    When you may have a respiratory virus...  Stay home and away from others *including people you live with who are not sick) if you have respiratory virus symptoms that are not better explained by another cause. These symptoms including fever, chills, fatigue, cough, runny nose, and headache.      * You can go back to your normal activities when, for at least 24 hours, both are true:     - Your symptoms are better overall AND     - You have not had a fever (and are not using fever-reducing medication)    * When you go back to your normal activities, take added precaution over the next 5 days, such as taking additional steps for  air, hygiene, masks, physical distancing, and/or testing when you will be around other people indoors.   - Keep in mind that you may still be able to spread the virus that made you sick, even if you are feeling better. You are likely to be less contagious at this time, depending on factors like how long you were sick or how sick you were.   - If you develop a fever or you start to feel worse after you have gone back to normal activities, stay home and away from other again until, for at least 24 hours, both are true: your symptoms are improving overall, and you have not had a fever (and are not using fever-reducing medications). Then take added precaution for the next 5 days.       If you never had symptoms but tested positive for a respiratory virus...  You may be contagious. For the next 5 days: take added  precautions, such as taking additional steps for  air, hygiene, masks, physical distancing, and/or testing when you will be around other people indoors. This is especially important to protect people with factors that increase their risk of severe illness from respiratory viruses.       How it works...  When you have a respiratory virus infection, you can spread it to others. How long someone can spread the virus depends on different factors, including how sick they are (severity) and how long their illness lasts (duration). This is not the same for everyone.     When, for at least 24 hours, your symptoms are getting better overall and you have not had a fever (and are not using fever-reducing medications), you are typically less contagious, but it still take more time for your body to fully get rid of the virus. During this time, you may still be able to spread the virus to others. Taking precautions for the next 5 days can help reduce this risk. After this 5-day period, you are typically much less likely to be contagious. However, some people, especially people with weakened immune systems, can continue to spread the virus for longer periods of time.     Reference: About Preventing Spread of Respiratory Viruses When Youre Sick  Respiratory Illnesses  CDC

## 2025-04-10 ENCOUNTER — INFUSION (OUTPATIENT)
Dept: INFUSION THERAPY | Facility: HOSPITAL | Age: 70
End: 2025-04-10
Attending: INTERNAL MEDICINE
Payer: COMMERCIAL

## 2025-04-10 VITALS
DIASTOLIC BLOOD PRESSURE: 80 MMHG | HEART RATE: 85 BPM | RESPIRATION RATE: 18 BRPM | OXYGEN SATURATION: 96 % | SYSTOLIC BLOOD PRESSURE: 132 MMHG

## 2025-04-10 DIAGNOSIS — C7A.00 MALIGNANT CARCINOID TUMOR OF UNKNOWN PRIMARY SITE: Primary | ICD-10-CM

## 2025-04-10 DIAGNOSIS — C7B.02 METASTATIC MALIGNANT CARCINOID TUMOR TO LIVER: ICD-10-CM

## 2025-04-10 PROCEDURE — 96372 THER/PROPH/DIAG INJ SC/IM: CPT

## 2025-04-10 PROCEDURE — 63600175 PHARM REV CODE 636 W HCPCS: Mod: JZ,TB | Performed by: INTERNAL MEDICINE

## 2025-04-10 RX ORDER — LANREOTIDE ACETATE 120 MG/.5ML
120 INJECTION SUBCUTANEOUS
OUTPATIENT
Start: 2025-04-10

## 2025-04-10 RX ORDER — LANREOTIDE ACETATE 120 MG/.5ML
120 INJECTION SUBCUTANEOUS
Status: DISCONTINUED | OUTPATIENT
Start: 2025-04-10 | End: 2025-04-10 | Stop reason: HOSPADM

## 2025-04-10 RX ADMIN — LANREOTIDE ACETATE 120 MG: 120 INJECTION SUBCUTANEOUS at 01:04

## 2025-05-08 ENCOUNTER — INFUSION (OUTPATIENT)
Dept: INFUSION THERAPY | Facility: HOSPITAL | Age: 70
End: 2025-05-08
Attending: INTERNAL MEDICINE
Payer: COMMERCIAL

## 2025-05-08 VITALS
TEMPERATURE: 98 F | SYSTOLIC BLOOD PRESSURE: 137 MMHG | RESPIRATION RATE: 18 BRPM | HEART RATE: 77 BPM | DIASTOLIC BLOOD PRESSURE: 91 MMHG | OXYGEN SATURATION: 97 %

## 2025-05-08 DIAGNOSIS — C7A.00 MALIGNANT CARCINOID TUMOR OF UNKNOWN PRIMARY SITE: Primary | ICD-10-CM

## 2025-05-08 DIAGNOSIS — C7B.02 METASTATIC MALIGNANT CARCINOID TUMOR TO LIVER: ICD-10-CM

## 2025-05-08 PROCEDURE — 96372 THER/PROPH/DIAG INJ SC/IM: CPT

## 2025-05-08 PROCEDURE — 63600175 PHARM REV CODE 636 W HCPCS: Mod: JZ,TB | Performed by: INTERNAL MEDICINE

## 2025-05-08 RX ORDER — LANREOTIDE ACETATE 120 MG/.5ML
120 INJECTION SUBCUTANEOUS
Status: DISCONTINUED | OUTPATIENT
Start: 2025-05-08 | End: 2025-05-08 | Stop reason: HOSPADM

## 2025-05-08 RX ORDER — LANREOTIDE ACETATE 120 MG/.5ML
120 INJECTION SUBCUTANEOUS
OUTPATIENT
Start: 2025-05-08

## 2025-05-08 RX ADMIN — LANREOTIDE ACETATE 120 MG: 120 INJECTION SUBCUTANEOUS at 11:05

## 2025-05-08 NOTE — NURSING
Patient tolerated Lanreotide injection to left buttocks well today. NAD noted upon discharge. Plan to rtc 6/5th for next appt. Discharged home, ambulated independently.

## 2025-06-05 ENCOUNTER — INFUSION (OUTPATIENT)
Dept: INFUSION THERAPY | Facility: HOSPITAL | Age: 70
End: 2025-06-05
Attending: INTERNAL MEDICINE
Payer: COMMERCIAL

## 2025-06-05 VITALS
RESPIRATION RATE: 18 BRPM | SYSTOLIC BLOOD PRESSURE: 130 MMHG | DIASTOLIC BLOOD PRESSURE: 76 MMHG | HEART RATE: 73 BPM | TEMPERATURE: 98 F | OXYGEN SATURATION: 99 %

## 2025-06-05 DIAGNOSIS — C7A.00 MALIGNANT CARCINOID TUMOR OF UNKNOWN PRIMARY SITE: Primary | ICD-10-CM

## 2025-06-05 DIAGNOSIS — C7B.02 METASTATIC MALIGNANT CARCINOID TUMOR TO LIVER: ICD-10-CM

## 2025-06-05 PROCEDURE — 63600175 PHARM REV CODE 636 W HCPCS: Mod: JZ,TB | Performed by: INTERNAL MEDICINE

## 2025-06-05 PROCEDURE — 96372 THER/PROPH/DIAG INJ SC/IM: CPT

## 2025-06-05 RX ORDER — LANREOTIDE ACETATE 120 MG/.5ML
120 INJECTION SUBCUTANEOUS
Status: DISCONTINUED | OUTPATIENT
Start: 2025-06-05 | End: 2025-06-05 | Stop reason: HOSPADM

## 2025-06-05 RX ORDER — LANREOTIDE ACETATE 120 MG/.5ML
120 INJECTION SUBCUTANEOUS
OUTPATIENT
Start: 2025-06-05

## 2025-06-05 RX ADMIN — LANREOTIDE ACETATE 120 MG: 120 INJECTION SUBCUTANEOUS at 11:06

## 2025-07-03 ENCOUNTER — INFUSION (OUTPATIENT)
Dept: INFUSION THERAPY | Facility: HOSPITAL | Age: 70
End: 2025-07-03
Attending: INTERNAL MEDICINE
Payer: COMMERCIAL

## 2025-07-03 VITALS
OXYGEN SATURATION: 99 % | RESPIRATION RATE: 16 BRPM | HEART RATE: 79 BPM | TEMPERATURE: 98 F | DIASTOLIC BLOOD PRESSURE: 86 MMHG | SYSTOLIC BLOOD PRESSURE: 136 MMHG

## 2025-07-03 DIAGNOSIS — C7A.00 MALIGNANT CARCINOID TUMOR OF UNKNOWN PRIMARY SITE: Primary | ICD-10-CM

## 2025-07-03 DIAGNOSIS — C7B.02 METASTATIC MALIGNANT CARCINOID TUMOR TO LIVER: ICD-10-CM

## 2025-07-03 PROCEDURE — 63600175 PHARM REV CODE 636 W HCPCS: Mod: JZ,TB | Performed by: INTERNAL MEDICINE

## 2025-07-03 PROCEDURE — 96372 THER/PROPH/DIAG INJ SC/IM: CPT

## 2025-07-03 RX ORDER — LANREOTIDE ACETATE 120 MG/.5ML
120 INJECTION SUBCUTANEOUS
Status: DISCONTINUED | OUTPATIENT
Start: 2025-07-03 | End: 2025-07-03 | Stop reason: HOSPADM

## 2025-07-03 RX ORDER — LANREOTIDE ACETATE 120 MG/.5ML
120 INJECTION SUBCUTANEOUS
OUTPATIENT
Start: 2025-07-03

## 2025-07-03 RX ADMIN — LANREOTIDE ACETATE 120 MG: 120 INJECTION SUBCUTANEOUS at 09:07

## 2025-07-03 NOTE — NURSING
1000-Lanreotide given to LUQ  of gluteal. Tolerated it well. Ambulated off unit in good condition.

## 2025-07-03 NOTE — PLAN OF CARE
Problem: Comorbidity Management  Goal: Maintenance of Asthma Control  Outcome: Progressing  Goal: Maintenance of Behavioral Health Symptom Control  Outcome: Progressing  Goal: Maintenance of COPD Symptom Control  Outcome: Progressing  Goal: Maintenance of Heart Failure Symptom Control  Outcome: Progressing  Goal: Blood Pressure in Desired Range  Outcome: Progressing  Goal: Maintenance of Osteoarthritis Symptom Control  Outcome: Progressing  Goal: Bariatric Home Regimen Maintained  Outcome: Progressing  Goal: Maintenance of Seizure Control  Outcome: Progressing

## 2025-07-24 ENCOUNTER — HOSPITAL ENCOUNTER (OUTPATIENT)
Dept: RADIOLOGY | Facility: HOSPITAL | Age: 70
Discharge: HOME | End: 2025-07-24
Attending: INTERNAL MEDICINE
Payer: COMMERCIAL

## 2025-07-24 DIAGNOSIS — C7B.02 METASTATIC MALIGNANT CARCINOID TUMOR TO LIVER: ICD-10-CM

## 2025-07-24 DIAGNOSIS — C7B.8 SECONDARY NEUROENDOCRINE TUMOR OF LIVER: ICD-10-CM

## 2025-07-24 PROCEDURE — 25500020 PHARM REV CODE 255: Performed by: INTERNAL MEDICINE

## 2025-07-24 PROCEDURE — 74177 CT ABD & PELVIS W/CONTRAST: CPT | Mod: 26,,, | Performed by: RADIOLOGY

## 2025-07-24 PROCEDURE — A9698 NON-RAD CONTRAST MATERIALNOC: HCPCS | Performed by: INTERNAL MEDICINE

## 2025-07-24 PROCEDURE — 74177 CT ABD & PELVIS W/CONTRAST: CPT | Mod: TC

## 2025-07-24 RX ADMIN — IOHEXOL 75 ML: 350 INJECTION, SOLUTION INTRAVENOUS at 11:07

## 2025-07-24 RX ADMIN — IOHEXOL 1000 ML: 9 SOLUTION ORAL at 10:07

## 2025-07-31 ENCOUNTER — INFUSION (OUTPATIENT)
Dept: INFUSION THERAPY | Facility: HOSPITAL | Age: 70
End: 2025-07-31
Attending: INTERNAL MEDICINE
Payer: COMMERCIAL

## 2025-07-31 VITALS
TEMPERATURE: 98 F | SYSTOLIC BLOOD PRESSURE: 122 MMHG | RESPIRATION RATE: 18 BRPM | DIASTOLIC BLOOD PRESSURE: 86 MMHG | OXYGEN SATURATION: 97 % | HEART RATE: 82 BPM

## 2025-07-31 DIAGNOSIS — C7B.02 METASTATIC MALIGNANT CARCINOID TUMOR TO LIVER: ICD-10-CM

## 2025-07-31 DIAGNOSIS — C7A.00 MALIGNANT CARCINOID TUMOR OF UNKNOWN PRIMARY SITE: Primary | ICD-10-CM

## 2025-07-31 PROCEDURE — 96372 THER/PROPH/DIAG INJ SC/IM: CPT

## 2025-07-31 PROCEDURE — 63600175 PHARM REV CODE 636 W HCPCS: Mod: JZ,TB | Performed by: INTERNAL MEDICINE

## 2025-07-31 RX ORDER — LANREOTIDE ACETATE 120 MG/.5ML
120 INJECTION SUBCUTANEOUS
OUTPATIENT
Start: 2025-07-31

## 2025-07-31 RX ORDER — LANREOTIDE ACETATE 120 MG/.5ML
120 INJECTION SUBCUTANEOUS
Status: DISCONTINUED | OUTPATIENT
Start: 2025-07-31 | End: 2025-07-31 | Stop reason: HOSPADM

## 2025-07-31 RX ADMIN — LANREOTIDE ACETATE 120 MG: 120 INJECTION SUBCUTANEOUS at 10:07

## 2025-07-31 NOTE — PLAN OF CARE
Patient tolerated Lanreotide injection well, VSS. Next appointment scheduled and pt d/c in no acute distress.

## 2025-08-14 ENCOUNTER — OFFICE VISIT (OUTPATIENT)
Dept: HEMATOLOGY/ONCOLOGY | Facility: CLINIC | Age: 70
End: 2025-08-14
Payer: COMMERCIAL

## 2025-08-14 VITALS
WEIGHT: 138.44 LBS | DIASTOLIC BLOOD PRESSURE: 87 MMHG | SYSTOLIC BLOOD PRESSURE: 141 MMHG | TEMPERATURE: 98 F | BODY MASS INDEX: 23.76 KG/M2 | HEART RATE: 67 BPM | OXYGEN SATURATION: 99 %

## 2025-08-14 DIAGNOSIS — D72.818 OTHER DECREASED WHITE BLOOD CELL (WBC) COUNT: ICD-10-CM

## 2025-08-14 DIAGNOSIS — C7B.02 METASTATIC MALIGNANT CARCINOID TUMOR TO LIVER: Primary | ICD-10-CM

## 2025-08-14 DIAGNOSIS — C7B.8 SECONDARY NEUROENDOCRINE TUMOR OF LIVER: ICD-10-CM

## 2025-08-14 DIAGNOSIS — R53.83 OTHER FATIGUE: ICD-10-CM

## 2025-08-14 DIAGNOSIS — E61.1 IRON DEFICIENCY: ICD-10-CM

## 2025-08-14 PROCEDURE — 1159F MED LIST DOCD IN RCRD: CPT | Mod: CPTII,S$GLB,, | Performed by: INTERNAL MEDICINE

## 2025-08-14 PROCEDURE — 3077F SYST BP >= 140 MM HG: CPT | Mod: CPTII,S$GLB,, | Performed by: INTERNAL MEDICINE

## 2025-08-14 PROCEDURE — 3008F BODY MASS INDEX DOCD: CPT | Mod: CPTII,S$GLB,, | Performed by: INTERNAL MEDICINE

## 2025-08-14 PROCEDURE — 99999 PR PBB SHADOW E&M-EST. PATIENT-LVL IV: CPT | Mod: PBBFAC,,, | Performed by: INTERNAL MEDICINE

## 2025-08-14 PROCEDURE — 99215 OFFICE O/P EST HI 40 MIN: CPT | Mod: S$GLB,,, | Performed by: INTERNAL MEDICINE

## 2025-08-14 PROCEDURE — 3079F DIAST BP 80-89 MM HG: CPT | Mod: CPTII,S$GLB,, | Performed by: INTERNAL MEDICINE

## 2025-08-14 PROCEDURE — 1157F ADVNC CARE PLAN IN RCRD: CPT | Mod: CPTII,S$GLB,, | Performed by: INTERNAL MEDICINE

## 2025-08-14 PROCEDURE — 3288F FALL RISK ASSESSMENT DOCD: CPT | Mod: CPTII,S$GLB,, | Performed by: INTERNAL MEDICINE

## 2025-08-14 PROCEDURE — 1101F PT FALLS ASSESS-DOCD LE1/YR: CPT | Mod: CPTII,S$GLB,, | Performed by: INTERNAL MEDICINE

## 2025-08-28 ENCOUNTER — INFUSION (OUTPATIENT)
Dept: INFUSION THERAPY | Facility: HOSPITAL | Age: 70
End: 2025-08-28
Attending: INTERNAL MEDICINE
Payer: COMMERCIAL

## 2025-08-28 VITALS
OXYGEN SATURATION: 98 % | DIASTOLIC BLOOD PRESSURE: 80 MMHG | RESPIRATION RATE: 16 BRPM | SYSTOLIC BLOOD PRESSURE: 133 MMHG | TEMPERATURE: 98 F | HEART RATE: 78 BPM

## 2025-08-28 DIAGNOSIS — C7A.00 MALIGNANT CARCINOID TUMOR OF UNKNOWN PRIMARY SITE: Primary | ICD-10-CM

## 2025-08-28 DIAGNOSIS — C7B.02 METASTATIC MALIGNANT CARCINOID TUMOR TO LIVER: ICD-10-CM

## 2025-08-28 PROCEDURE — 63600175 PHARM REV CODE 636 W HCPCS: Mod: JZ,TB | Performed by: INTERNAL MEDICINE

## 2025-08-28 PROCEDURE — 96372 THER/PROPH/DIAG INJ SC/IM: CPT

## 2025-08-28 RX ORDER — LANREOTIDE ACETATE 120 MG/.5ML
120 INJECTION SUBCUTANEOUS
OUTPATIENT
Start: 2025-08-28

## 2025-08-28 RX ORDER — LANREOTIDE ACETATE 120 MG/.5ML
120 INJECTION SUBCUTANEOUS
Status: DISCONTINUED | OUTPATIENT
Start: 2025-08-28 | End: 2025-08-28 | Stop reason: HOSPADM

## 2025-08-28 RX ADMIN — LANREOTIDE ACETATE 120 MG: 120 INJECTION SUBCUTANEOUS at 10:08

## 2025-09-03 ENCOUNTER — TELEPHONE (OUTPATIENT)
Dept: HEMATOLOGY/ONCOLOGY | Facility: CLINIC | Age: 70
End: 2025-09-03
Payer: COMMERCIAL